# Patient Record
Sex: MALE | Race: WHITE | NOT HISPANIC OR LATINO | URBAN - METROPOLITAN AREA
[De-identification: names, ages, dates, MRNs, and addresses within clinical notes are randomized per-mention and may not be internally consistent; named-entity substitution may affect disease eponyms.]

---

## 2018-04-07 ENCOUNTER — INPATIENT (INPATIENT)
Facility: HOSPITAL | Age: 63
LOS: 9 days | Discharge: HOME | DRG: 234 | End: 2018-04-17
Attending: THORACIC SURGERY (CARDIOTHORACIC VASCULAR SURGERY) | Admitting: STUDENT IN AN ORGANIZED HEALTH CARE EDUCATION/TRAINING PROGRAM
Payer: COMMERCIAL

## 2018-04-07 VITALS
TEMPERATURE: 98 F | SYSTOLIC BLOOD PRESSURE: 181 MMHG | WEIGHT: 268.08 LBS | HEART RATE: 83 BPM | OXYGEN SATURATION: 100 % | RESPIRATION RATE: 18 BRPM | HEIGHT: 71 IN | DIASTOLIC BLOOD PRESSURE: 94 MMHG

## 2018-04-07 DIAGNOSIS — Z98.890 OTHER SPECIFIED POSTPROCEDURAL STATES: Chronic | ICD-10-CM

## 2018-04-07 DIAGNOSIS — R07.9 CHEST PAIN, UNSPECIFIED: ICD-10-CM

## 2018-04-07 LAB
ALBUMIN SERPL ELPH-MCNC: 3.8 G/DL — SIGNIFICANT CHANGE UP (ref 3.3–5)
ALP SERPL-CCNC: 66 U/L — SIGNIFICANT CHANGE UP (ref 40–120)
ALT FLD-CCNC: 49 U/L RC — HIGH (ref 10–45)
ANION GAP SERPL CALC-SCNC: 14 MMOL/L — SIGNIFICANT CHANGE UP (ref 5–17)
APTT BLD: 26.2 SEC — LOW (ref 27.5–37.4)
AST SERPL-CCNC: 20 U/L — SIGNIFICANT CHANGE UP (ref 10–40)
BASOPHILS # BLD AUTO: 0.1 K/UL — SIGNIFICANT CHANGE UP (ref 0–0.2)
BASOPHILS NFR BLD AUTO: 0.6 % — SIGNIFICANT CHANGE UP (ref 0–2)
BILIRUB SERPL-MCNC: 1 MG/DL — SIGNIFICANT CHANGE UP (ref 0.2–1.2)
BUN SERPL-MCNC: 14 MG/DL — SIGNIFICANT CHANGE UP (ref 7–23)
CALCIUM SERPL-MCNC: 9.2 MG/DL — SIGNIFICANT CHANGE UP (ref 8.4–10.5)
CHLORIDE SERPL-SCNC: 95 MMOL/L — LOW (ref 96–108)
CO2 SERPL-SCNC: 27 MMOL/L — SIGNIFICANT CHANGE UP (ref 22–31)
CREAT SERPL-MCNC: 0.82 MG/DL — SIGNIFICANT CHANGE UP (ref 0.5–1.3)
EOSINOPHIL # BLD AUTO: 0.1 K/UL — SIGNIFICANT CHANGE UP (ref 0–0.5)
EOSINOPHIL NFR BLD AUTO: 0.7 % — SIGNIFICANT CHANGE UP (ref 0–6)
GLUCOSE SERPL-MCNC: 282 MG/DL — HIGH (ref 70–99)
HCT VFR BLD CALC: 38.4 % — LOW (ref 39–50)
HGB BLD-MCNC: 12.8 G/DL — LOW (ref 13–17)
INR BLD: 1.37 RATIO — HIGH (ref 0.88–1.16)
LYMPHOCYTES # BLD AUTO: 18.3 % — SIGNIFICANT CHANGE UP (ref 13–44)
LYMPHOCYTES # BLD AUTO: 2.5 K/UL — SIGNIFICANT CHANGE UP (ref 1–3.3)
MCHC RBC-ENTMCNC: 31.1 PG — SIGNIFICANT CHANGE UP (ref 27–34)
MCHC RBC-ENTMCNC: 33.4 GM/DL — SIGNIFICANT CHANGE UP (ref 32–36)
MCV RBC AUTO: 93 FL — SIGNIFICANT CHANGE UP (ref 80–100)
MONOCYTES # BLD AUTO: 0.6 K/UL — SIGNIFICANT CHANGE UP (ref 0–0.9)
MONOCYTES NFR BLD AUTO: 4.1 % — SIGNIFICANT CHANGE UP (ref 2–14)
NEUTROPHILS # BLD AUTO: 10.4 K/UL — HIGH (ref 1.8–7.4)
NEUTROPHILS NFR BLD AUTO: 76.4 % — SIGNIFICANT CHANGE UP (ref 43–77)
PLATELET # BLD AUTO: 253 K/UL — SIGNIFICANT CHANGE UP (ref 150–400)
POTASSIUM SERPL-MCNC: 4.4 MMOL/L — SIGNIFICANT CHANGE UP (ref 3.5–5.3)
POTASSIUM SERPL-SCNC: 4.4 MMOL/L — SIGNIFICANT CHANGE UP (ref 3.5–5.3)
PROT SERPL-MCNC: 7.6 G/DL — SIGNIFICANT CHANGE UP (ref 6–8.3)
PROTHROM AB SERPL-ACNC: 14.9 SEC — HIGH (ref 9.8–12.7)
RBC # BLD: 4.12 M/UL — LOW (ref 4.2–5.8)
RBC # FLD: 12.4 % — SIGNIFICANT CHANGE UP (ref 10.3–14.5)
SODIUM SERPL-SCNC: 136 MMOL/L — SIGNIFICANT CHANGE UP (ref 135–145)
TROPONIN T SERPL-MCNC: 1.05 NG/ML — HIGH (ref 0–0.06)
TROPONIN T SERPL-MCNC: 1.24 NG/ML — HIGH (ref 0–0.06)
WBC # BLD: 13.6 K/UL — HIGH (ref 3.8–10.5)
WBC # FLD AUTO: 13.6 K/UL — HIGH (ref 3.8–10.5)

## 2018-04-07 PROCEDURE — 99285 EMERGENCY DEPT VISIT HI MDM: CPT

## 2018-04-07 PROCEDURE — 99223 1ST HOSP IP/OBS HIGH 75: CPT

## 2018-04-07 PROCEDURE — 71046 X-RAY EXAM CHEST 2 VIEWS: CPT | Mod: 26

## 2018-04-07 PROCEDURE — 99223 1ST HOSP IP/OBS HIGH 75: CPT | Mod: AI

## 2018-04-07 RX ORDER — METOPROLOL TARTRATE 50 MG
12.5 TABLET ORAL EVERY 12 HOURS
Qty: 0 | Refills: 0 | Status: DISCONTINUED | OUTPATIENT
Start: 2018-04-07 | End: 2018-04-11

## 2018-04-07 RX ORDER — ATORVASTATIN CALCIUM 80 MG/1
80 TABLET, FILM COATED ORAL AT BEDTIME
Qty: 0 | Refills: 0 | Status: DISCONTINUED | OUTPATIENT
Start: 2018-04-07 | End: 2018-04-11

## 2018-04-07 RX ORDER — DEXTROSE 50 % IN WATER 50 %
12.5 SYRINGE (ML) INTRAVENOUS ONCE
Qty: 0 | Refills: 0 | Status: DISCONTINUED | OUTPATIENT
Start: 2018-04-07 | End: 2018-04-11

## 2018-04-07 RX ORDER — SODIUM CHLORIDE 9 MG/ML
3 INJECTION INTRAMUSCULAR; INTRAVENOUS; SUBCUTANEOUS ONCE
Qty: 0 | Refills: 0 | Status: COMPLETED | OUTPATIENT
Start: 2018-04-07 | End: 2018-04-07

## 2018-04-07 RX ORDER — CLOPIDOGREL BISULFATE 75 MG/1
75 TABLET, FILM COATED ORAL DAILY
Qty: 0 | Refills: 0 | Status: DISCONTINUED | OUTPATIENT
Start: 2018-04-07 | End: 2018-04-08

## 2018-04-07 RX ORDER — GLUCAGON INJECTION, SOLUTION 0.5 MG/.1ML
1 INJECTION, SOLUTION SUBCUTANEOUS ONCE
Qty: 0 | Refills: 0 | Status: DISCONTINUED | OUTPATIENT
Start: 2018-04-07 | End: 2018-04-11

## 2018-04-07 RX ORDER — DEXTROSE 50 % IN WATER 50 %
1 SYRINGE (ML) INTRAVENOUS ONCE
Qty: 0 | Refills: 0 | Status: DISCONTINUED | OUTPATIENT
Start: 2018-04-07 | End: 2018-04-11

## 2018-04-07 RX ORDER — CLOPIDOGREL BISULFATE 75 MG/1
300 TABLET, FILM COATED ORAL ONCE
Qty: 0 | Refills: 0 | Status: COMPLETED | OUTPATIENT
Start: 2018-04-07 | End: 2018-04-07

## 2018-04-07 RX ORDER — HEPARIN SODIUM 5000 [USP'U]/ML
6000 INJECTION INTRAVENOUS; SUBCUTANEOUS EVERY 6 HOURS
Qty: 0 | Refills: 0 | Status: DISCONTINUED | OUTPATIENT
Start: 2018-04-07 | End: 2018-04-09

## 2018-04-07 RX ORDER — HEPARIN SODIUM 5000 [USP'U]/ML
5000 INJECTION INTRAVENOUS; SUBCUTANEOUS ONCE
Qty: 0 | Refills: 0 | Status: COMPLETED | OUTPATIENT
Start: 2018-04-07 | End: 2018-04-07

## 2018-04-07 RX ORDER — SODIUM CHLORIDE 9 MG/ML
1000 INJECTION, SOLUTION INTRAVENOUS
Qty: 0 | Refills: 0 | Status: DISCONTINUED | OUTPATIENT
Start: 2018-04-07 | End: 2018-04-11

## 2018-04-07 RX ORDER — DEXTROSE 50 % IN WATER 50 %
25 SYRINGE (ML) INTRAVENOUS ONCE
Qty: 0 | Refills: 0 | Status: DISCONTINUED | OUTPATIENT
Start: 2018-04-07 | End: 2018-04-11

## 2018-04-07 RX ORDER — ISOSORBIDE MONONITRATE 60 MG/1
30 TABLET, EXTENDED RELEASE ORAL DAILY
Qty: 0 | Refills: 0 | Status: DISCONTINUED | OUTPATIENT
Start: 2018-04-07 | End: 2018-04-09

## 2018-04-07 RX ORDER — ASPIRIN/CALCIUM CARB/MAGNESIUM 324 MG
81 TABLET ORAL DAILY
Qty: 0 | Refills: 0 | Status: DISCONTINUED | OUTPATIENT
Start: 2018-04-07 | End: 2018-04-11

## 2018-04-07 RX ORDER — ASPIRIN/CALCIUM CARB/MAGNESIUM 324 MG
324 TABLET ORAL ONCE
Qty: 0 | Refills: 0 | Status: COMPLETED | OUTPATIENT
Start: 2018-04-07 | End: 2018-04-07

## 2018-04-07 RX ORDER — HEPARIN SODIUM 5000 [USP'U]/ML
INJECTION INTRAVENOUS; SUBCUTANEOUS
Qty: 25000 | Refills: 0 | Status: DISCONTINUED | OUTPATIENT
Start: 2018-04-07 | End: 2018-04-08

## 2018-04-07 RX ORDER — INSULIN LISPRO 100/ML
VIAL (ML) SUBCUTANEOUS
Qty: 0 | Refills: 0 | Status: DISCONTINUED | OUTPATIENT
Start: 2018-04-07 | End: 2018-04-11

## 2018-04-07 RX ADMIN — ATORVASTATIN CALCIUM 80 MILLIGRAM(S): 80 TABLET, FILM COATED ORAL at 23:27

## 2018-04-07 RX ADMIN — HEPARIN SODIUM 5000 UNIT(S): 5000 INJECTION INTRAVENOUS; SUBCUTANEOUS at 20:29

## 2018-04-07 RX ADMIN — CLOPIDOGREL BISULFATE 300 MILLIGRAM(S): 75 TABLET, FILM COATED ORAL at 20:31

## 2018-04-07 RX ADMIN — HEPARIN SODIUM 1000 UNIT(S)/HR: 5000 INJECTION INTRAVENOUS; SUBCUTANEOUS at 20:30

## 2018-04-07 RX ADMIN — Medication 12.5 MILLIGRAM(S): at 23:27

## 2018-04-07 RX ADMIN — SODIUM CHLORIDE 3 MILLILITER(S): 9 INJECTION INTRAMUSCULAR; INTRAVENOUS; SUBCUTANEOUS at 18:41

## 2018-04-07 RX ADMIN — Medication 324 MILLIGRAM(S): at 18:43

## 2018-04-07 NOTE — H&P ADULT - HISTORY OF PRESENT ILLNESS
62 m with h/o DM and htn though pt states d/mervin meds 1 yr ago 2nd controlled numbers with weight loss (+ h/o retinopathy + laser rx and neuropathy of toes) now a/w 2nd episode of chest tightness and dyspnea. 62 m with h/o DM and htn though pt states d/mervin meds 1 yr ago 2nd controlled numbers with weight loss (+ h/o retinopathy + laser rx and neuropathy of toes) now a/w 2nd episode of chest tightness and dyspnea.  Pt is very active, does martial arts.  Saw PMD tuesday with uri symptoms (cough) and given prednisone/antibiotics.  Had chest tightness and dyspnea once and resolved (in the setting of exertion/dust) but occurred today at 2pm.  Noted diaphoresis once at night unrelated to cp/sob.  No n/v.  Chronic constipation with mild RLQ pain relieved with BM.      In ED elevated BPs, hr 90s.  EKG changes  +Q waves II, III, AVF, fixed 1mm STD inferior leads, Q-waves V4-V6 ( new from 11/27/2017 EKG).  + troponin.  Started heparin drip and loaded with aspirin and plavix.  Pt asymptomatic in ED.

## 2018-04-07 NOTE — ED PROVIDER NOTE - PROGRESS NOTE DETAILS
NSTEMI, [facility] and cards called Dennys PGY3: positive trop and started on heparin ACS and plavix plus aspirin. [facility] and obtain the number of the Cardiologist when hospitalist calls me back and consult [facility] Card[facility]. Dennys PGY3: 2x attempts to get in touch with [facility] for admission/cards consult Dennys PGY3: no call back from Dr. Sellers. Patient requires urgent consult from cardiolgy. Consulted Dr. Zapien in house attending cardiologist to see patient and make recommendations.

## 2018-04-07 NOTE — ED ADULT NURSE NOTE - OBJECTIVE STATEMENT
62y male c/o dyspnea. A&Ox3 and VSS. Denies medical hx. 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 Patient states episode of chest pain and sob during construction. Went to Urgent Care who prescribed steroid and Z-pack. Nanda PALACIOS at Urgent Care reported EKG abnormalities and recommend patient go to ED. Upon arrival to ED, patient denies chest pain and sob. Reports intermittent fever/chills since Tuesday and one additional epiosode of chest pain. 62y male c/o dyspnea. A&Ox3 and VSS. Denies medical hx. 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 Patient states episode of chest pain and sob during construction. Went to Urgent Care who prescribed steroid and Z-pack. Nanda PALACIOS at Urgent Care reported EKG abnormalities and recommend patient go to ED. Upon arrival to ED, patient denies chest pain and sob. States additional episode of midsternal chest pain lasting 2-3 min in duration today accompanied by nonexertional sob. Reports intermittent fever/chills and nonproductive cough since Tuesday. Denies n/v/d. Lungs clear. Cardiac monitor in place. Family at bedside.

## 2018-04-07 NOTE — H&P ADULT - NSHPLABSRESULTS_GEN_ALL_CORE
labs/cxr/ekg reviewed personally by me  ekg as above  cxr ? pulm edema  wbc 13.6, plt 253, hb 12.8, inr 1.37  alt 49  bun 14, creat 0.82, gluc 282  trop 1.24  bnp 6770

## 2018-04-07 NOTE — ED PROVIDER NOTE - MEDICAL DECISION MAKING DETAILS
Hx obesity, prior neg stress test 6 yrs ago, p/w CP and SOB, brief episode, now resolved, ECG w/ new wave inversions in inferior leads. Hx obesity, prior neg stress test 6 yrs ago, p/w CP and SOB, brief episode, now resolved, ECG w/ new wave inversions in inferior leads.      Attn - pt with subtle EKG changes inferior leads - CE, EKG, CXR  r/o ACS

## 2018-04-07 NOTE — H&P ADULT - PROBLEM SELECTOR PLAN 2
pt with significant hyperglycemia  monitor FSs and dose ISS  ck A1C  likely will need basal/bolus regimen for glycemia control

## 2018-04-07 NOTE — H&P ADULT - PMH
Essential hypertension    Type 2 diabetes mellitus with diabetic neuropathy, without long-term current use of insulin

## 2018-04-07 NOTE — ED ADULT NURSE REASSESSMENT NOTE - NS ED NURSE REASSESS COMMENT FT1
Patient's chest xray delayed because patient requested he wanted to eat first.  ED MD aware.  Xray to be called when patient is finished.

## 2018-04-07 NOTE — ED PROVIDER NOTE - OBJECTIVE STATEMENT
63 y/o M w/ hx of  obesity p/w acute CP; had brief episode around 2pm today associated w/ cough and SOB, nonexertional, sharp, substernal, resolved after few minutes. Had similar episode 4 days ago. No cough, fever, chills, n/v/d/. Now feels well.   Cardiologist: Dr. Orr  primary care physician: [facility] 63 y/o M w/ hx of  obesity p/w acute CP; had brief episode around 2pm today associated w/ cough and SOB, nonexertional, sharp, substernal, resolved after few minutes. Had similar episode 4 days ago. No cough, fever, chills, n/v/d/. Now feels well.   Cardiologist: Dr. 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  Seen at Urgent care today and + EKG changes.  asymptomatic now.  Reports normal stress and echo approx 6 yrs ago.  No prior episodes or family hx of CAD.  Pt exercises couple of hours few times each week without cp or change in exertional capactiy.

## 2018-04-07 NOTE — H&P ADULT - PROBLEM SELECTOR PLAN 1
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 new event  trend Bob and ck tsh/a1c/lipid panel  ck TTE  for nstemi management overnight cont heparin drip and monitor PTTs to adjust dose  start metoprolol 12.5mg po bid with caution given possible inferior wall dz  start lipitor 80mg qhs and monitor liver enzymes  plan to start imdur 30mg daily in the morning to ensure bp stability overnight  tele monitoring  npo after MN for likely cath in am

## 2018-04-07 NOTE — ED ADULT TRIAGE NOTE - CHIEF COMPLAINT QUOTE
pt c/o right sided chest pain and SOB for a few days, [facility] sent pt for abnormal EKG

## 2018-04-07 NOTE — ED ADULT NURSE REASSESSMENT NOTE - NS ED NURSE REASSESS COMMENT FT1
at the bedside per patient's request.  Patient states he "is feeling hot".  No diaphoresis noted, denies CP but states some SOB.  He states "I am very nervous and scared".  Repeat EKG to be performed.  ED MD Noyola aware.  Daughter at the bedside.  at the bedside per patient's request.  Patient states he "is feeling hot".  No diaphoresis noted, denies CP but states some SOB.  He states "I am very nervous and scared".  Repeat EKG to be performed.  ED MD Noyola aware.  Daughter at the bedside.  Heparin infusion in progress as ordered.

## 2018-04-07 NOTE — CONSULT NOTE ADULT - SUBJECTIVE AND OBJECTIVE BOX
Cardiology Attending Consult Note      CHIEF COMPLAINT/REASON FOR CONSULT: NSTEMI, CP  Date of Admission: 04-07-18    HISTORY OF PRESENT ILLNESS:    62y.o. Male with DM, HTN, Active smoker (Cigars 4-5x/week x 2-3 years), now admitted after presenting to urgent care with cough/dyspnea, found to have abnormal EKG, subsequently sent to ED where he was found to have elevated cardiac enzymes c/w NSTEMI. He reports that he was doing well until earlier this week, went to urgent care on tuesday with cough/fatigue and was started on antibiotics, returned to urgent care after no improvement and sent to the ED. Here his EKG demonstrates NSR@81, +Q waves II, III, AVF, fixed 1mm STD inferior leads, Q-waves V4-V6 (Q-waves in V4-V6 appear new when compared to prior EKG 11/27/2017). Initial Troponin 1.24, BNP 6770. He reports mild chest tightness 1/10, but has mostly been chest pain free over the last several days. No HA. No diziness. No N/V/D/C.     ET: Active in [employer] 3-4x/week    ALLERGIES:  allergy to INSECT VENOM (Hives)  penicillin (Rash)    Home Medications:  None    MEDICATIONS:  heparin  Infusion.  Unit(s)/Hr IV Continuous <Continuous>    PAST MEDICAL & SURGICAL HISTORY:      FAMILY HISTORY:  No significant hx of MI/CVA    SOCIAL HISTORY:    Active cigar smoker, no ETOH, no IVDU  -Works in construction    REVIEW OF SYSTEMS:    CONSTITUTIONAL: No weakness, fevers or chills  EYES/ENT: No visual changes;  No vertigo or throat pain   NECK: No pain or stiffness  RESPIRATORY: No cough, wheezing, hemoptysis; No shortness of breath  CARDIOVASCULAR: +chest pain or palpitations  GASTROINTESTINAL: No abdominal or epigastric pain. No nausea, vomiting, or hematemesis; No diarrhea or constipation. No melena or hematochezia.  GENITOURINARY: No dysuria, frequency or hematuria  NEUROLOGICAL: No numbness or weakness  SKIN: No itching, burning, rashes, or lesions   All other review of systems is negative unless indicated above.    PHYSICAL EXAM:  T(C): 37 (04-07-18 @ 18:16), Max: 37 (04-07-18 @ 18:16)  HR: 92 (04-07-18 @ 20:40) (82 - 92)  BP: 174/123 (04-07-18 @ 20:40) (147/97 - 181/94)  RR: 18 (04-07-18 @ 20:40) (18 - 18)  SpO2: 96% (04-07-18 @ 20:40) (94% - 100%)  Wt(kg): --    Drug Dosing Weight  Height (cm): 180.34 (07 Apr 2018 17:48)  Weight (kg): 121.6 (07 Apr 2018 17:48)  BMI (kg/m2): 37.4 (07 Apr 2018 17:48)  BSA (m2): 2.39 (07 Apr 2018 17:48)    I&O's Summary      Appearance: Normal	  HEENT:   Normal oral mucosa, PERRL, EOMI	  Lymphatic: No lymphadenopathy  Cardiovascular: Normal S1 S2, No JVD, No murmurs  Respiratory: Lungs clear to auscultation	  Psychiatry: A & O x 3, Mood & affect appropriate  Gastrointestinal:  Soft, Non-tender, + BS	  Skin: No rashes, No ecchymoses, No cyanosis	  Neurologic: Non-focal  Extremities: Normal range of motion, No clubbing, cyanosis or edema  Vascular: Peripheral pulses palpable 2+ bilaterally    LABS:	 	    CBC Full  -  ( 07 Apr 2018 18:46 )  WBC Count : 13.6 K/uL  Hemoglobin : 12.8 g/dL  Hematocrit : 38.4 %  Platelet Count - Automated : 253 K/uL  Mean Cell Volume : 93.0 fl  Mean Cell Hemoglobin : 31.1 pg  Mean Cell Hemoglobin Concentration : 33.4 gm/dL  Auto Neutrophil # : 10.4 K/uL  Auto Lymphocyte # : 2.5 K/uL  Auto Monocyte # : 0.6 K/uL  Auto Eosinophil # : 0.1 K/uL  Auto Basophil # : 0.1 K/uL  Auto Neutrophil % : 76.4 %  Auto Lymphocyte % : 18.3 %  Auto Monocyte % : 4.1 %  Auto Eosinophil % : 0.7 %  Auto Basophil % : 0.6 %    04-07    136  |  95<L>  |  14  ----------------------------<  282<H>  4.4   |  27  |  0.82    Ca    9.2      07 Apr 2018 18:46    TPro  7.6  /  Alb  3.8  /  TBili  1.0  /  DBili  x   /  AST  20  /  ALT  49<H>  /  AlkPhos  66  04-07    PT/INR - ( 07 Apr 2018 18:46 )   PT: 14.9 sec;   INR: 1.37 ratio         PTT - ( 07 Apr 2018 18:46 )  PTT:26.2 sec  LIVER FUNCTIONS - ( 07 Apr 2018 18:46 )  Alb: 3.8 g/dL / Pro: 7.6 g/dL / ALK PHOS: 66 U/L / ALT: 49 U/L RC / AST: 20 U/L / GGT: x             EKG:  NSR@81, +Q waves II, III, AVF, fixed 1mm STD inferior leads, Q-waves V4-V6 (Q-waves in V4-V6 appear new when compared to prior EKG 11/27/2017).    Telemetry: NSR    CXR: Clear lugns BL    TTE: None      A/P: 62y.o. Male with DM, HTN, Active smoker (Cigars 4-5x/week x 2-3 years), now admitted with NSTEMI, EKG with new Q-waves in V4-V6 when compared to prior EKG 11/27/2017, Initial Troponin 1.24, BNP 6770.     1. NSTEMI - Suspect this patient has a history of CAD with old infarcts given EKG with old Q-Waves in the inferior leads. Now with new Q-waves in V4-V6 suggestive of recent or subacute event, now with elevated cardiac enzymes. EKG pattern seems to suggestive of inferior + anterolateral ischemia. Given risk factors including DM, HTN, active smoking and elevated cardiac enzymes, will treat for NSTEMI and plan for LHC.  -Cont start Heparin GTT  -Serial EKG if progression in chest pain or pain changes in quality  -Serial Cardiac enzymes (Troponin/CKMB x 2 sets or until downtrending)  -Cont ASA 81 mg po QD  -s/p Plavix load in ED. Cont Plavix 75 gm po QD.  -Start Atorvastatin 80 mg po first dose now, then po QHS  -Start Metoprolol 12.5 mg po first dose now, then po BID  -Monitor on Telemetry  -Please order routine TTE  -Please order Hemoglobin A1c, Lipid Panel, TSH  -Please keep NPO after midnight for possible LHC in AM vs Monday pending repeat cardiac enzymes. Will discuss with interventional team    2. HTN - Hypertensive urgency in the setting of NSTEMI vs hypertensive heart syndrome  -Start Imdur 30 mg po QD, first dose now.  -Start low dose metoprolol as noted above.    Thank you for this interesting consult. My team will continue to follow along with you.      Curry Mills MD  Cardiology Attending  [facility]   Cell: [phone]  (Cardiology Nocturnist cell number available 7 pm - 7 am every night; available daytime week days for follow-up only; daytime weekends covered by general cardiology consult service)

## 2018-04-07 NOTE — H&P ADULT - PROBLEM SELECTOR PLAN 4
? BERNAL given risk factors  ck acute hep serologies  repeat hepatic panel in am, consider rUQ US if remain elevated

## 2018-04-07 NOTE — H&P ADULT - NSHPSOCIALHISTORY_GEN_ALL_CORE
father  at 89 of MI  lives with wife  works in finance at [employer]  intermittent cigar use,  remote history of  binge drinking (up to 15 beers at a timg) but no longer, no drug use

## 2018-04-07 NOTE — ED PROVIDER NOTE - PHYSICAL EXAMINATION
Gen: NAD  Eyes:  sclerae white, no icterus  ENT: Moist mucous membranes. No exudates  Neck: supple, no LAD, mass or goiter, trachea midline  CV: RRR. Audible S1 and S2. No murmurs, rubs, gallops, S3, nor S4  Pulm: Clear to auscultation bilaterally. No wheezes, rales, or rhonchi  Abd: BS+, nondistended, No tenderness to palpation  Musculoskeletal:  No edema  Skin: no lesions or scars noted  Psych: mood good, affect full range and congruent with mood.  Neurologic: AAOx3 Gen: NAD  Eyes:  sclerae white, no icterus  ENT: Moist mucous membranes. No exudates  Neck: supple, no LAD, mass or goiter, trachea midline  CV: RRR. Audible S1 and S2. No murmurs, rubs, gallops, S3, nor S4  Pulm: Clear to auscultation bilaterally. No wheezes, rales, or rhonchi  Abd: BS+, nondistended, No tenderness to palpation  Musculoskeletal:  No edema  Skin: no lesions or scars noted  Psych: mood good, affect full range and congruent with mood.  Neurologic: AAOx3      Attn - alert, nad, skin dry, no pallor, moist mm, lungs-, cor-, abdo - obese, soft, NT, ND, on CVAT, ext - no edema, neuro -

## 2018-04-07 NOTE — ED ADULT NURSE NOTE - CHIEF COMPLAINT QUOTE
pt c/o right sided chest pain and SOB for a few days, [facility] sent pt for abnormal EKG

## 2018-04-08 DIAGNOSIS — I10 ESSENTIAL (PRIMARY) HYPERTENSION: ICD-10-CM

## 2018-04-08 DIAGNOSIS — R74.0 NONSPECIFIC ELEVATION OF LEVELS OF TRANSAMINASE AND LACTIC ACID DEHYDROGENASE [LDH]: ICD-10-CM

## 2018-04-08 DIAGNOSIS — E11.40 TYPE 2 DIABETES MELLITUS WITH DIABETIC NEUROPATHY, UNSPECIFIED: ICD-10-CM

## 2018-04-08 DIAGNOSIS — I21.4 NON-ST ELEVATION (NSTEMI) MYOCARDIAL INFARCTION: ICD-10-CM

## 2018-04-08 LAB
ALBUMIN SERPL ELPH-MCNC: 3.8 G/DL — SIGNIFICANT CHANGE UP (ref 3.3–5)
ALP SERPL-CCNC: 66 U/L — SIGNIFICANT CHANGE UP (ref 40–120)
ALT FLD-CCNC: 33 U/L RC — SIGNIFICANT CHANGE UP (ref 10–45)
ANION GAP SERPL CALC-SCNC: 14 MMOL/L — SIGNIFICANT CHANGE UP (ref 5–17)
APTT BLD: 26 SEC — LOW (ref 27.5–37.4)
APTT BLD: 27.4 SEC — LOW (ref 27.5–37.4)
APTT BLD: 28 SEC — SIGNIFICANT CHANGE UP (ref 27.5–37.4)
APTT BLD: 28.5 SEC — SIGNIFICANT CHANGE UP (ref 27.5–37.4)
AST SERPL-CCNC: 12 U/L — SIGNIFICANT CHANGE UP (ref 10–40)
BILIRUB DIRECT SERPL-MCNC: 0.2 MG/DL — SIGNIFICANT CHANGE UP (ref 0–0.2)
BILIRUB INDIRECT FLD-MCNC: 0.9 MG/DL — SIGNIFICANT CHANGE UP (ref 0.2–1)
BILIRUB SERPL-MCNC: 1.1 MG/DL — SIGNIFICANT CHANGE UP (ref 0.2–1.2)
BUN SERPL-MCNC: 19 MG/DL — SIGNIFICANT CHANGE UP (ref 7–23)
CALCIUM SERPL-MCNC: 9.4 MG/DL — SIGNIFICANT CHANGE UP (ref 8.4–10.5)
CHLORIDE SERPL-SCNC: 97 MMOL/L — SIGNIFICANT CHANGE UP (ref 96–108)
CHOLEST SERPL-MCNC: 146 MG/DL — SIGNIFICANT CHANGE UP (ref 10–199)
CK MB CFR SERPL CALC: 3.4 NG/ML — SIGNIFICANT CHANGE UP (ref 0–6.7)
CK MB CFR SERPL CALC: 3.7 NG/ML — SIGNIFICANT CHANGE UP (ref 0–6.7)
CK SERPL-CCNC: 74 U/L — SIGNIFICANT CHANGE UP (ref 30–200)
CK SERPL-CCNC: 79 U/L — SIGNIFICANT CHANGE UP (ref 30–200)
CO2 SERPL-SCNC: 28 MMOL/L — SIGNIFICANT CHANGE UP (ref 22–31)
CREAT SERPL-MCNC: 0.88 MG/DL — SIGNIFICANT CHANGE UP (ref 0.5–1.3)
GLUCOSE SERPL-MCNC: 186 MG/DL — HIGH (ref 70–99)
HAV IGM SER-ACNC: SIGNIFICANT CHANGE UP
HBA1C BLD-MCNC: 7.9 % — HIGH (ref 4–5.6)
HBV CORE IGM SER-ACNC: SIGNIFICANT CHANGE UP
HBV SURFACE AG SER-ACNC: SIGNIFICANT CHANGE UP
HCT VFR BLD CALC: 36.9 % — LOW (ref 39–50)
HCT VFR BLD CALC: 37.2 % — LOW (ref 39–50)
HCV AB S/CO SERPL IA: 0.12 S/CO — SIGNIFICANT CHANGE UP
HCV AB SERPL-IMP: SIGNIFICANT CHANGE UP
HDLC SERPL-MCNC: 21 MG/DL — LOW (ref 40–125)
HGB BLD-MCNC: 12.6 G/DL — LOW (ref 13–17)
HGB BLD-MCNC: 13.1 G/DL — SIGNIFICANT CHANGE UP (ref 13–17)
LIPID PNL WITH DIRECT LDL SERPL: 91 MG/DL — SIGNIFICANT CHANGE UP
MAGNESIUM SERPL-MCNC: 1.9 MG/DL — SIGNIFICANT CHANGE UP (ref 1.6–2.6)
MCHC RBC-ENTMCNC: 31.9 PG — SIGNIFICANT CHANGE UP (ref 27–34)
MCHC RBC-ENTMCNC: 33 PG — SIGNIFICANT CHANGE UP (ref 27–34)
MCHC RBC-ENTMCNC: 34.2 GM/DL — SIGNIFICANT CHANGE UP (ref 32–36)
MCHC RBC-ENTMCNC: 35.3 GM/DL — SIGNIFICANT CHANGE UP (ref 32–36)
MCV RBC AUTO: 93.3 FL — SIGNIFICANT CHANGE UP (ref 80–100)
MCV RBC AUTO: 93.4 FL — SIGNIFICANT CHANGE UP (ref 80–100)
PHOSPHATE SERPL-MCNC: 3.3 MG/DL — SIGNIFICANT CHANGE UP (ref 2.5–4.5)
PLATELET # BLD AUTO: 267 K/UL — SIGNIFICANT CHANGE UP (ref 150–400)
PLATELET # BLD AUTO: 319 K/UL — SIGNIFICANT CHANGE UP (ref 150–400)
POTASSIUM SERPL-MCNC: 4.1 MMOL/L — SIGNIFICANT CHANGE UP (ref 3.5–5.3)
POTASSIUM SERPL-SCNC: 4.1 MMOL/L — SIGNIFICANT CHANGE UP (ref 3.5–5.3)
PROT SERPL-MCNC: 7.3 G/DL — SIGNIFICANT CHANGE UP (ref 6–8.3)
RBC # BLD: 3.96 M/UL — LOW (ref 4.2–5.8)
RBC # BLD: 3.98 M/UL — LOW (ref 4.2–5.8)
RBC # FLD: 12.5 % — SIGNIFICANT CHANGE UP (ref 10.3–14.5)
RBC # FLD: 12.5 % — SIGNIFICANT CHANGE UP (ref 10.3–14.5)
SODIUM SERPL-SCNC: 139 MMOL/L — SIGNIFICANT CHANGE UP (ref 135–145)
TOTAL CHOLESTEROL/HDL RATIO MEASUREMENT: 7 RATIO — SIGNIFICANT CHANGE UP (ref 3.4–9.6)
TRIGL SERPL-MCNC: 169 MG/DL — HIGH (ref 10–149)
TROPONIN T SERPL-MCNC: 0.97 NG/ML — HIGH (ref 0–0.06)
TROPONIN T SERPL-MCNC: 1.51 NG/ML — HIGH (ref 0–0.06)
TSH SERPL-MCNC: 2.09 UIU/ML — SIGNIFICANT CHANGE UP (ref 0.27–4.2)
WBC # BLD: 13.9 K/UL — HIGH (ref 3.8–10.5)
WBC # BLD: 18 K/UL — HIGH (ref 3.8–10.5)
WBC # FLD AUTO: 13.9 K/UL — HIGH (ref 3.8–10.5)
WBC # FLD AUTO: 18 K/UL — HIGH (ref 3.8–10.5)

## 2018-04-08 PROCEDURE — 70450 CT HEAD/BRAIN W/O DYE: CPT | Mod: 26

## 2018-04-08 RX ORDER — HEPARIN SODIUM 5000 [USP'U]/ML
INJECTION INTRAVENOUS; SUBCUTANEOUS
Qty: 25000 | Refills: 0 | Status: DISCONTINUED | OUTPATIENT
Start: 2018-04-08 | End: 2018-04-08

## 2018-04-08 RX ORDER — HEPARIN SODIUM 5000 [USP'U]/ML
1300 INJECTION INTRAVENOUS; SUBCUTANEOUS
Qty: 25000 | Refills: 0 | Status: DISCONTINUED | OUTPATIENT
Start: 2018-04-08 | End: 2018-04-09

## 2018-04-08 RX ADMIN — Medication 12.5 MILLIGRAM(S): at 12:10

## 2018-04-08 RX ADMIN — ATORVASTATIN CALCIUM 80 MILLIGRAM(S): 80 TABLET, FILM COATED ORAL at 21:53

## 2018-04-08 RX ADMIN — HEPARIN SODIUM 1600 UNIT(S)/HR: 5000 INJECTION INTRAVENOUS; SUBCUTANEOUS at 14:46

## 2018-04-08 RX ADMIN — Medication 3: at 09:31

## 2018-04-08 RX ADMIN — HEPARIN SODIUM 1900 UNIT(S)/HR: 5000 INJECTION INTRAVENOUS; SUBCUTANEOUS at 21:53

## 2018-04-08 RX ADMIN — HEPARIN SODIUM 1300 UNIT(S)/HR: 5000 INJECTION INTRAVENOUS; SUBCUTANEOUS at 07:20

## 2018-04-08 RX ADMIN — HEPARIN SODIUM 6000 UNIT(S): 5000 INJECTION INTRAVENOUS; SUBCUTANEOUS at 21:58

## 2018-04-08 RX ADMIN — Medication 1: at 12:53

## 2018-04-08 RX ADMIN — ISOSORBIDE MONONITRATE 30 MILLIGRAM(S): 60 TABLET, EXTENDED RELEASE ORAL at 12:10

## 2018-04-08 RX ADMIN — HEPARIN SODIUM 6000 UNIT(S): 5000 INJECTION INTRAVENOUS; SUBCUTANEOUS at 14:46

## 2018-04-08 RX ADMIN — Medication 81 MILLIGRAM(S): at 12:10

## 2018-04-08 RX ADMIN — HEPARIN SODIUM 6000 UNIT(S): 5000 INJECTION INTRAVENOUS; SUBCUTANEOUS at 07:41

## 2018-04-08 RX ADMIN — Medication 3: at 17:56

## 2018-04-08 RX ADMIN — Medication 12.5 MILLIGRAM(S): at 17:56

## 2018-04-08 NOTE — PROGRESS NOTE ADULT - ASSESSMENT
62 m with h/o DM and htn though pt states d/mervin meds 1 yr ago 2nd controlled numbers with weight loss (+ h/o retinopathy + laser rx and neuropathy of toes) now a/w 2nd episode of chest tightness and dyspnea, admitted for NSTEMI.

## 2018-04-08 NOTE — CHART NOTE - NSCHARTNOTEFT_GEN_A_CORE
JASE KENNEDY  62y Male  Patient is a 62y old  Male who presents with a chief complaint of complain of CP (08 Apr 2018 12:56)    Event Summary:  Pt with serial CE Q6 hours, noted with Trop #4 (1.51) uptrending from CE x 3 (Trop #1-1.24, Trop #2-1.05, Trop # 3-0.97). Pt currently subtherapeutic on Hep gtt for NSTEMI, aptt 28.5, RN increased rate by 3 units as per protocol. Pt currently chest pain free, with no other complaints at this time. Pt remains hemodynamically stable.  -Discuss Trop #4 with Dr. Sellers-Mary, recommends c/w Hep gtt and plan to cath tomorrow in AM, no urgent cath tonight.  -Attending, Dr. Locke, aware of Trop & of Mary recommendations, will continue to medically manage for now.      [facility]athaniel [facility]  #[phone]

## 2018-04-08 NOTE — PROGRESS NOTE ADULT - SUBJECTIVE AND OBJECTIVE BOX
Patient is a 62y old  Male who presents with a chief complaint of cp/sob (07 Apr 2018 23:07)      SUBJECTIVE / OVERNIGHT EVENTS:    Patient seen and examined. denies cp sob. co hunger. wants to eat breakfast. explains symptoms start friday, did not feel well, heavy chest and breathing. has been doing construction on house sheetrock, heavy lifting.       Vital Signs Last 24 Hrs  T(C): 36.9 (08 Apr 2018 08:28), Max: 37.2 (08 Apr 2018 05:58)  T(F): 98.4 (08 Apr 2018 08:28), Max: 99 (08 Apr 2018 05:58)  HR: 79 (08 Apr 2018 08:28) (76 - 92)  BP: 158/109 (08 Apr 2018 08:28) (147/97 - 181/94)  BP(mean): 140 (07 Apr 2018 23:07) (140 - 140)  RR: 20 (08 Apr 2018 08:28) (18 - 20)  SpO2: 98% (08 Apr 2018 08:28) (94% - 100%)  I&O's Summary      PE:  GENERAL: NAD, AAOx3, obese  HEAD:  Atraumatic, Normocephalic  EYES: EOMI, PERRLA, conjunctiva and sclera clear  NECK: Supple, No JVD  CHEST/LUNG: CTABL, No wheeze  HEART: Regular rate and rhythm; no murmur  ABDOMEN: Soft, Nontender, Nondistended; Bowel sounds present  EXTREMITIES:  2+ Peripheral Pulses, No clubbing, cyanosis, or edema  SKIN: No rashes or lesions  NEURO: No focal deficits    LABS:                        12.6   13.9  )-----------( 267      ( 08 Apr 2018 03:55 )             36.9     04-07    136  |  95<L>  |  14  ----------------------------<  282<H>  4.4   |  27  |  0.82    Ca    9.2      07 Apr 2018 18:46    TPro  7.6  /  Alb  3.8  /  TBili  1.0  /  DBili  x   /  AST  20  /  ALT  49<H>  /  AlkPhos  66  04-07    PT/INR - ( 07 Apr 2018 18:46 )   PT: 14.9 sec;   INR: 1.37 ratio         PTT - ( 08 Apr 2018 05:54 )  PTT:27.4 sec  CAPILLARY BLOOD GLUCOSE        CARDIAC MARKERS ( 08 Apr 2018 03:55 )  x     / 0.97 ng/mL / 79 U/L / x     / 3.7 ng/mL  CARDIAC MARKERS ( 07 Apr 2018 23:08 )  x     / 1.05 ng/mL / x     / x     / x      CARDIAC MARKERS ( 07 Apr 2018 18:46 )  x     / 1.24 ng/mL / x     / x     / x              RADIOLOGY & ADDITIONAL TESTS:    Imaging Personally Reviewed:  [x] YES  [ ] NO    Consultant(s) Notes Reviewed:  [x] YES  [ ] NO    MEDICATIONS  (STANDING):  aspirin enteric coated 81 milliGRAM(s) Oral daily  atorvastatin 80 milliGRAM(s) Oral at bedtime  clopidogrel Tablet 75 milliGRAM(s) Oral daily  dextrose 5%. 1000 milliLiter(s) (50 mL/Hr) IV Continuous <Continuous>  dextrose 50% Injectable 12.5 Gram(s) IV Push once  dextrose 50% Injectable 25 Gram(s) IV Push once  dextrose 50% Injectable 25 Gram(s) IV Push once  heparin  Infusion. 1300 Unit(s)/Hr (13 mL/Hr) IV Continuous <Continuous>  insulin lispro (HumaLOG) corrective regimen sliding scale   SubCutaneous three times a day before meals  isosorbide    mononitrate Tablet (ISMO) 30 milliGRAM(s) Oral daily  metoprolol tartrate 12.5 milliGRAM(s) Oral every 12 hours    MEDICATIONS  (PRN):  dextrose Gel 1 Dose(s) Oral once PRN Blood Glucose LESS THAN 70 milliGRAM(s)/deciliter  glucagon  Injectable 1 milliGRAM(s) IntraMuscular once PRN Glucose LESS THAN 70 milligrams/deciliter  heparin  Injectable 6000 Unit(s) IV Push every 6 hours PRN For aPTT less than 40      Care Discussed with Consultants/Other Providers [x] YES  [ ] NO    HEALTH ISSUES - PROBLEM Dx:  Transaminitis: Transaminitis  Essential hypertension: Essential hypertension  Type 2 diabetes mellitus with diabetic neuropathy, without long-term current use of insulin: Type 2 diabetes mellitus with diabetic neuropathy, without long-term current use of insulin  NSTEMI (non-ST elevated myocardial infarction): NSTEMI (non-ST elevated myocardial infarction)

## 2018-04-08 NOTE — PROVIDER CONTACT NOTE (MEDICATION) - ACTION/TREATMENT ORDERED:
NHAN Darden made aware. Heparin bolus given as order. Follow [facility] ACS nomogram as order. Increase Heparin gtt to 16cc/hr. Bleeding precaution maintained.

## 2018-04-08 NOTE — CONSULT NOTE ADULT - SUBJECTIVE AND OBJECTIVE BOX
[facility] Cardiology Consult - SECOND OPINION  ____________________________________________________________    CC: Chest pain.       HPI:  62 M DM, HTN, Active smoker (Cigars 4-5x/week x 2-3 years), who presented to urgent care yesterday with dyspnea a a cough found to have abnormal EKG there - so he was referred to ED where he was found to have elevated cardiac enzymes He went to urgent care on tuesday with cough/fatigue and was started on antibiotics, returned to urgent care after no improvement yesterday now with associated mild chest tightness and was sent to the ED.  Currently CP free.      PAST MEDICAL & SURGICAL HISTORY:  Essential hypertension  Type 2 diabetes mellitus with diabetic neuropathy, without long-term current use of insulin  History of excision of pilonidal cyst  H/O inguinal hernia repair      MEDICATIONS  (STANDING):  aspirin enteric coated 81 milliGRAM(s) Oral daily  atorvastatin 80 milliGRAM(s) Oral at bedtime  clopidogrel Tablet 75 milliGRAM(s) Oral daily  dextrose 5%. 1000 milliLiter(s) (50 mL/Hr) IV Continuous <Continuous>  dextrose 50% Injectable 12.5 Gram(s) IV Push once  dextrose 50% Injectable 25 Gram(s) IV Push once  dextrose 50% Injectable 25 Gram(s) IV Push once  heparin  Infusion. 1300 Unit(s)/Hr (13 mL/Hr) IV Continuous <Continuous>  insulin lispro (HumaLOG) corrective regimen sliding scale   SubCutaneous three times a day before meals  isosorbide    mononitrate Tablet (ISMO) 30 milliGRAM(s) Oral daily  metoprolol tartrate 12.5 milliGRAM(s) Oral every 12 hours    MEDICATIONS  (PRN):  dextrose Gel 1 Dose(s) Oral once PRN Blood Glucose LESS THAN 70 milliGRAM(s)/deciliter  glucagon  Injectable 1 milliGRAM(s) IntraMuscular once PRN Glucose LESS THAN 70 milligrams/deciliter  heparin  Injectable 6000 Unit(s) IV Push every 6 hours PRN For aPTT less than 40      Allergies    allery to INSECT VENOM (Hives)  Novocain (Hives)    Intolerances        Social Histroy: Tobacco- , ETOH-, Illicit Drugs-    T(C): 36.9 (04-08-18 @ 08:28), Max: 37.2 (04-08-18 @ 05:58)  HR: 79 (04-08-18 @ 08:28) (76 - 92)  BP: 158/109 (04-08-18 @ 08:28) (147/97 - 181/94)  RR: 20 (04-08-18 @ 08:28) (18 - 20)  SpO2: 98% (04-08-18 @ 08:28) (94% - 100%)  I&O's Summary      Review of Systems:  Constitutional: [ ] Fever [ ] Chills [ ] Fatigue [ ] Weight change   HEENT: [ ] Blurred vision [ ] Eye Pain [ ] Headache [ ] Runny nose [ ] Sore Throat   Respiratory: [ x] Cough [ ] Wheezing [ ] Shortness of breath  Cardiovascular: [x ] Chest Pain [ ] Palpitations [ ] GARCÍA [ ] PND [ ] Orthopnea  Gastrointestinal: [ ] Abdominal Pain [ ] Diarrhea [ ] Constipation [ ] Hemorrhoids [ ] Nausea [ ] Vomiting  Genitourinary: [ ] Nocturia [ ] Dysuria [ ] Incontinence  Extremities: [ ] Swelling [ ] Joint Pain  Neurologic: [ ] Focal deficit [ ] Paresthesias [ ] Syncope  Lymphatic: [ ] Swelling [ ] Lymphadenopathy   Skin: [ ] Rash [ ] Ecchymoses [ ] Wounds [ ] Lesions  Psychiatry: [ ] Depression [ ] Suicidal/Homicidal Ideation [ ] Anxiety [ ] Sleep Disturbances  [x ] 10 point review of systems is otherwise negative except as mentioned above            [ ]Unable to obtain    PHYSICAL EXAM:  GENERAL: Alert, NAD  NECK: Supple, No JVD, No carotid bruit.  CHEST/LUNG: Clear to auscultation bilaterally; No wheezes, rales, or rhonchi  HEART: S1 S2 normal, RRR,  No murmurs, rubs, or gallops  ABDOMEN: Soft, Nontender, Nondistended; Bowel sounds present  EXTREMITIES:  No LE edema.      LABS:                        12.6   13.9  )-----------( 267      ( 08 Apr 2018 03:55 )             36.9     04-07    136  |  95<L>  |  14  ----------------------------<  282<H>  4.4   |  27  |  0.82    Ca    9.2      07 Apr 2018 18:46    TPro  7.6  /  Alb  3.8  /  TBili  1.0  /  DBili  x   /  AST  20  /  ALT  49<H>  /  AlkPhos  66  04-07    PT/INR - ( 07 Apr 2018 18:46 )   PT: 14.9 sec;   INR: 1.37 ratio         PTT - ( 08 Apr 2018 05:54 )  PTT:27.4 sec  CARDIAC MARKERS ( 08 Apr 2018 03:55 )  x     / 0.97 ng/mL / 79 U/L / x     / 3.7 ng/mL  CARDIAC MARKERS ( 07 Apr 2018 23:08 )  x     / 1.05 ng/mL / x     / x     / x      CARDIAC MARKERS ( 07 Apr 2018 18:46 )  x     / 1.24 ng/mL / x     / x     / x          Serum Pro-Brain Natriuretic Peptide: 6770 pg/mL (04-07-18 @ 18:46)        MEDICATIONS  (STANDING):  aspirin enteric coated 81 milliGRAM(s) Oral daily  atorvastatin 80 milliGRAM(s) Oral at bedtime  clopidogrel Tablet 75 milliGRAM(s) Oral daily  dextrose 5%. 1000 milliLiter(s) (50 mL/Hr) IV Continuous <Continuous>  dextrose 50% Injectable 12.5 Gram(s) IV Push once  dextrose 50% Injectable 25 Gram(s) IV Push once  dextrose 50% Injectable 25 Gram(s) IV Push once  heparin  Infusion. 1300 Unit(s)/Hr (13 mL/Hr) IV Continuous <Continuous>  insulin lispro (HumaLOG) corrective regimen sliding scale   SubCutaneous three times a day before meals  isosorbide    mononitrate Tablet (ISMO) 30 milliGRAM(s) Oral daily  metoprolol tartrate 12.5 milliGRAM(s) Oral every 12 hours    MEDICATIONS  (PRN):  dextrose Gel 1 Dose(s) Oral once PRN Blood Glucose LESS THAN 70 milliGRAM(s)/deciliter  glucagon  Injectable 1 milliGRAM(s) IntraMuscular once PRN Glucose LESS THAN 70 milligrams/deciliter  heparin  Injectable 6000 Unit(s) IV Push every 6 hours PRN For aPTT less than 40      RADIOLOGY & ADDITIONAL TESTS:    Cardiology testing:  EKG: NSR inferiro and anterior Q waves, inferior ST depressions. [facility] Cardiology Consult - SECOND OPINION  ____________________________________________________________    CC: Chest pain.       HPI:  62 M h/o Obesity, DM, HTN, Active smoker (Cigars 4-5x/week x 2-3 years), who presented to urgent care yesterday with dyspnea a cough found to have abnormal EKG there - so he was referred to ED where he was found to have elevated cardiac enzymes He went to urgent care on tuesday with cough/fatigue and was started on antibiotics, returned to urgent care after no improvement yesterday. He denies any chest pain or palpitations the whole time. No LE edema, orthopnea or PND. Admits to significant life stress (wife with h/o ETOH abuse)    PAST MEDICAL & SURGICAL HISTORY:  Essential hypertension  Type 2 diabetes mellitus with diabetic neuropathy, without long-term current use of insulin  History of excision of pilonidal cyst  H/O inguinal hernia repair      MEDICATIONS  (STANDING):  aspirin enteric coated 81 milliGRAM(s) Oral daily  atorvastatin 80 milliGRAM(s) Oral at bedtime  clopidogrel Tablet 75 milliGRAM(s) Oral daily  dextrose 5%. 1000 milliLiter(s) (50 mL/Hr) IV Continuous <Continuous>  dextrose 50% Injectable 12.5 Gram(s) IV Push once  dextrose 50% Injectable 25 Gram(s) IV Push once  dextrose 50% Injectable 25 Gram(s) IV Push once  heparin  Infusion. 1300 Unit(s)/Hr (13 mL/Hr) IV Continuous <Continuous>  insulin lispro (HumaLOG) corrective regimen sliding scale   SubCutaneous three times a day before meals  isosorbide    mononitrate Tablet (ISMO) 30 milliGRAM(s) Oral daily  metoprolol tartrate 12.5 milliGRAM(s) Oral every 12 hours    MEDICATIONS  (PRN):  dextrose Gel 1 Dose(s) Oral once PRN Blood Glucose LESS THAN 70 milliGRAM(s)/deciliter  glucagon  Injectable 1 milliGRAM(s) IntraMuscular once PRN Glucose LESS THAN 70 milligrams/deciliter  heparin  Injectable 6000 Unit(s) IV Push every 6 hours PRN For aPTT less than 40      Allergies    allery to INSECT VENOM (Hives)  Novocain (Hives)    Intolerances        Social Histroy: Tobacco- , ETOH-, Illicit Drugs-    T(C): 36.9 (04-08-18 @ 08:28), Max: 37.2 (04-08-18 @ 05:58)  HR: 79 (04-08-18 @ 08:28) (76 - 92)  BP: 158/109 (04-08-18 @ 08:28) (147/97 - 181/94)  RR: 20 (04-08-18 @ 08:28) (18 - 20)  SpO2: 98% (04-08-18 @ 08:28) (94% - 100%)  I&O's Summary      Review of Systems:  Constitutional: [ ] Fever [ ] Chills [ ] Fatigue [ ] Weight change   HEENT: [ ] Blurred vision [ ] Eye Pain [ ] Headache [ ] Runny nose [ ] Sore Throat   Respiratory: [ x] Cough [ ] Wheezing [ ] Shortness of breath  Cardiovascular: [x ] Chest Pain [ ] Palpitations [ ] GARCÍA [ ] PND [ ] Orthopnea  Gastrointestinal: [ ] Abdominal Pain [ ] Diarrhea [ ] Constipation [ ] Hemorrhoids [ ] Nausea [ ] Vomiting  Genitourinary: [ ] Nocturia [ ] Dysuria [ ] Incontinence  Extremities: [ ] Swelling [ ] Joint Pain  Neurologic: [ ] Focal deficit [ ] Paresthesias [ ] Syncope  Lymphatic: [ ] Swelling [ ] Lymphadenopathy   Skin: [ ] Rash [ ] Ecchymoses [ ] Wounds [ ] Lesions  Psychiatry: [ ] Depression [ ] Suicidal/Homicidal Ideation [ ] Anxiety [ ] Sleep Disturbances  [x ] 10 point review of systems is otherwise negative except as mentioned above            [ ]Unable to obtain    PHYSICAL EXAM:  GENERAL: Alert, NAD  NECK: Supple, No JVD, No carotid bruit.  CHEST/LUNG: Clear to auscultation bilaterally; No wheezes, rales, or rhonchi  HEART: S1 S2 normal, RRR,  No murmurs, rubs, or gallops  ABDOMEN: Soft, Nontender, Nondistended; Bowel sounds present  EXTREMITIES:  No LE edema.      LABS:                        12.6   13.9  )-----------( 267      ( 08 Apr 2018 03:55 )             36.9     04-07    136  |  95<L>  |  14  ----------------------------<  282<H>  4.4   |  27  |  0.82    Ca    9.2      07 Apr 2018 18:46    TPro  7.6  /  Alb  3.8  /  TBili  1.0  /  DBili  x   /  AST  20  /  ALT  49<H>  /  AlkPhos  66  04-07    PT/INR - ( 07 Apr 2018 18:46 )   PT: 14.9 sec;   INR: 1.37 ratio         PTT - ( 08 Apr 2018 05:54 )  PTT:27.4 sec  CARDIAC MARKERS ( 08 Apr 2018 03:55 )  x     / 0.97 ng/mL / 79 U/L / x     / 3.7 ng/mL  CARDIAC MARKERS ( 07 Apr 2018 23:08 )  x     / 1.05 ng/mL / x     / x     / x      CARDIAC MARKERS ( 07 Apr 2018 18:46 )  x     / 1.24 ng/mL / x     / x     / x          Serum Pro-Brain Natriuretic Peptide: 6770 pg/mL (04-07-18 @ 18:46)        MEDICATIONS  (STANDING):  aspirin enteric coated 81 milliGRAM(s) Oral daily  atorvastatin 80 milliGRAM(s) Oral at bedtime  clopidogrel Tablet 75 milliGRAM(s) Oral daily  dextrose 5%. 1000 milliLiter(s) (50 mL/Hr) IV Continuous <Continuous>  dextrose 50% Injectable 12.5 Gram(s) IV Push once  dextrose 50% Injectable 25 Gram(s) IV Push once  dextrose 50% Injectable 25 Gram(s) IV Push once  heparin  Infusion. 1300 Unit(s)/Hr (13 mL/Hr) IV Continuous <Continuous>  insulin lispro (HumaLOG) corrective regimen sliding scale   SubCutaneous three times a day before meals  isosorbide    mononitrate Tablet (ISMO) 30 milliGRAM(s) Oral daily  metoprolol tartrate 12.5 milliGRAM(s) Oral every 12 hours    MEDICATIONS  (PRN):  dextrose Gel 1 Dose(s) Oral once PRN Blood Glucose LESS THAN 70 milliGRAM(s)/deciliter  glucagon  Injectable 1 milliGRAM(s) IntraMuscular once PRN Glucose LESS THAN 70 milligrams/deciliter  heparin  Injectable 6000 Unit(s) IV Push every 6 hours PRN For aPTT less than 40      RADIOLOGY & ADDITIONAL TESTS:    Cardiology testing:  EKG: NSR, inferior Q waves (old compared with previous EKG), Q waves V3-V6 (new compared with previous EKG).

## 2018-04-08 NOTE — CHART NOTE - NSCHARTNOTEFT_GEN_A_CORE
JASE KENNEDY  62y Male  Patient is a 62y old  Male who presents with a chief complaint of complain of CP (08 Apr 2018 12:56)    Event Summary:  Notified by RN, pt c/o R eye pressure. Pt with hx of open-angle glaucoma s/p shunts in b/l eyes x 1.5 years ago. His Ophthalmologist is Dr. Jason Wolff ([facility]) & endorses shunts were placed at [facility] [address] [facility] in [facility] by Dr. Juárez.  -House Ophthalmologist fellow, Dr. Ana Reynolds [phone], consulted to evaluate IOP, plan to evaluate patient urgently in ED holding. At this time, no vision changes, good pupillary response to light.       Nathaniel Saintus [facility]  #[phone] JASE KENNEDY  62y Male  Patient is a 62y old  Male who presents with a chief complaint of complain of CP (08 Apr 2018 12:56)    Event Summary:  Notified by RN, pt c/o R eye pressure/R Frontal HA. Pt with hx of open-angle glaucoma s/p shunts in b/l eyes x 1.5 years ago. His Ophthalmologist is Dr. Jason Wolff ([facility]) & endorses shunts were placed at [facility] in [facility] by Dr. Juárez.  -House Ophthalmologist fellow, Dr. Ana Reynolds [phone], consulted to evaluate IOP, evaluated patient urgently in ED holding, pressures recorded as 14 & 12, WNL. At this time, no vision changes, good pupillary response to light.   -Pt currently on Hep gtt, plan to perform CTH to r/o ICH Nathaniel Saintus [facility]  #[phone]

## 2018-04-09 DIAGNOSIS — I25.10 ATHEROSCLEROTIC HEART DISEASE OF NATIVE CORONARY ARTERY WITHOUT ANGINA PECTORIS: ICD-10-CM

## 2018-04-09 LAB
APPEARANCE UR: CLEAR — SIGNIFICANT CHANGE UP
APTT BLD: 31.6 SEC — SIGNIFICANT CHANGE UP (ref 27.5–37.4)
APTT BLD: 55.6 SEC — HIGH (ref 27.5–37.4)
BILIRUB UR-MCNC: NEGATIVE — SIGNIFICANT CHANGE UP
BLD GP AB SCN SERPL QL: NEGATIVE — SIGNIFICANT CHANGE UP
CK MB CFR SERPL CALC: 3.1 NG/ML — SIGNIFICANT CHANGE UP (ref 0–6.7)
CK MB CFR SERPL CALC: 3.2 NG/ML — SIGNIFICANT CHANGE UP (ref 0–6.7)
CK SERPL-CCNC: 63 U/L — SIGNIFICANT CHANGE UP (ref 30–200)
CK SERPL-CCNC: 68 U/L — SIGNIFICANT CHANGE UP (ref 30–200)
COLOR SPEC: YELLOW — SIGNIFICANT CHANGE UP
DIFF PNL FLD: NEGATIVE — SIGNIFICANT CHANGE UP
GLUCOSE UR QL: NEGATIVE — SIGNIFICANT CHANGE UP
HCT VFR BLD CALC: 37.8 % — LOW (ref 39–50)
HGB BLD-MCNC: 13.1 G/DL — SIGNIFICANT CHANGE UP (ref 13–17)
KETONES UR-MCNC: NEGATIVE — SIGNIFICANT CHANGE UP
LEUKOCYTE ESTERASE UR-ACNC: NEGATIVE — SIGNIFICANT CHANGE UP
MCHC RBC-ENTMCNC: 32.6 PG — SIGNIFICANT CHANGE UP (ref 27–34)
MCHC RBC-ENTMCNC: 34.7 GM/DL — SIGNIFICANT CHANGE UP (ref 32–36)
MCV RBC AUTO: 93.8 FL — SIGNIFICANT CHANGE UP (ref 80–100)
MRSA PCR RESULT.: SIGNIFICANT CHANGE UP
NITRITE UR-MCNC: NEGATIVE — SIGNIFICANT CHANGE UP
PA ADP PRP-ACNC: 194 PRU — SIGNIFICANT CHANGE UP (ref 194–417)
PH UR: 7 — SIGNIFICANT CHANGE UP (ref 5–8)
PLATELET # BLD AUTO: 264 K/UL — SIGNIFICANT CHANGE UP (ref 150–400)
PROT UR-MCNC: NEGATIVE — SIGNIFICANT CHANGE UP
RBC # BLD: 4.03 M/UL — LOW (ref 4.2–5.8)
RBC # FLD: 12.6 % — SIGNIFICANT CHANGE UP (ref 10.3–14.5)
RH IG SCN BLD-IMP: POSITIVE — SIGNIFICANT CHANGE UP
RH IG SCN BLD-IMP: POSITIVE — SIGNIFICANT CHANGE UP
S AUREUS DNA NOSE QL NAA+PROBE: SIGNIFICANT CHANGE UP
SP GR SPEC: 1.02 — SIGNIFICANT CHANGE UP (ref 1.01–1.02)
TROPONIN T SERPL-MCNC: 1.18 NG/ML — HIGH (ref 0–0.06)
TROPONIN T SERPL-MCNC: 1.24 NG/ML — HIGH (ref 0–0.06)
UROBILINOGEN FLD QL: 1 MG/DL
WBC # BLD: 13.1 K/UL — HIGH (ref 3.8–10.5)
WBC # FLD AUTO: 13.1 K/UL — HIGH (ref 3.8–10.5)

## 2018-04-09 PROCEDURE — 93458 L HRT ARTERY/VENTRICLE ANGIO: CPT | Mod: 26

## 2018-04-09 PROCEDURE — 93306 TTE W/DOPPLER COMPLETE: CPT | Mod: 26

## 2018-04-09 PROCEDURE — 93880 EXTRACRANIAL BILAT STUDY: CPT | Mod: 26

## 2018-04-09 PROCEDURE — 99152 MOD SED SAME PHYS/QHP 5/>YRS: CPT

## 2018-04-09 RX ORDER — ISOSORBIDE MONONITRATE 60 MG/1
30 TABLET, EXTENDED RELEASE ORAL DAILY
Qty: 0 | Refills: 0 | Status: DISCONTINUED | OUTPATIENT
Start: 2018-04-09 | End: 2018-04-11

## 2018-04-09 RX ORDER — ACETAMINOPHEN 500 MG
650 TABLET ORAL ONCE
Qty: 0 | Refills: 0 | Status: COMPLETED | OUTPATIENT
Start: 2018-04-09 | End: 2018-04-09

## 2018-04-09 RX ORDER — INSULIN GLARGINE 100 [IU]/ML
10 INJECTION, SOLUTION SUBCUTANEOUS AT BEDTIME
Qty: 0 | Refills: 0 | Status: DISCONTINUED | OUTPATIENT
Start: 2018-04-09 | End: 2018-04-11

## 2018-04-09 RX ORDER — HEPARIN SODIUM 5000 [USP'U]/ML
6000 INJECTION INTRAVENOUS; SUBCUTANEOUS EVERY 6 HOURS
Qty: 0 | Refills: 0 | Status: DISCONTINUED | OUTPATIENT
Start: 2018-04-09 | End: 2018-04-11

## 2018-04-09 RX ORDER — SODIUM CHLORIDE 0.65 %
1 AEROSOL, SPRAY (ML) NASAL THREE TIMES A DAY
Qty: 0 | Refills: 0 | Status: DISCONTINUED | OUTPATIENT
Start: 2018-04-09 | End: 2018-04-11

## 2018-04-09 RX ORDER — HEPARIN SODIUM 5000 [USP'U]/ML
2200 INJECTION INTRAVENOUS; SUBCUTANEOUS
Qty: 25000 | Refills: 0 | Status: DISCONTINUED | OUTPATIENT
Start: 2018-04-09 | End: 2018-04-11

## 2018-04-09 RX ORDER — INSULIN LISPRO 100/ML
5 VIAL (ML) SUBCUTANEOUS
Qty: 0 | Refills: 0 | Status: DISCONTINUED | OUTPATIENT
Start: 2018-04-09 | End: 2018-04-11

## 2018-04-09 RX ADMIN — ATORVASTATIN CALCIUM 80 MILLIGRAM(S): 80 TABLET, FILM COATED ORAL at 21:12

## 2018-04-09 RX ADMIN — Medication 12.5 MILLIGRAM(S): at 17:26

## 2018-04-09 RX ADMIN — Medication 1: at 17:25

## 2018-04-09 RX ADMIN — Medication 1: at 08:30

## 2018-04-09 RX ADMIN — INSULIN GLARGINE 10 UNIT(S): 100 INJECTION, SOLUTION SUBCUTANEOUS at 21:43

## 2018-04-09 RX ADMIN — HEPARIN SODIUM 2200 UNIT(S)/HR: 5000 INJECTION INTRAVENOUS; SUBCUTANEOUS at 04:22

## 2018-04-09 RX ADMIN — HEPARIN SODIUM 6000 UNIT(S): 5000 INJECTION INTRAVENOUS; SUBCUTANEOUS at 04:24

## 2018-04-09 RX ADMIN — HEPARIN SODIUM 2200 UNIT(S)/HR: 5000 INJECTION INTRAVENOUS; SUBCUTANEOUS at 18:35

## 2018-04-09 RX ADMIN — Medication 5 UNIT(S): at 12:59

## 2018-04-09 RX ADMIN — Medication 5 UNIT(S): at 17:26

## 2018-04-09 RX ADMIN — Medication 650 MILLIGRAM(S): at 06:46

## 2018-04-09 RX ADMIN — Medication 81 MILLIGRAM(S): at 17:26

## 2018-04-09 RX ADMIN — Medication 12.5 MILLIGRAM(S): at 05:19

## 2018-04-09 RX ADMIN — Medication 3: at 12:59

## 2018-04-09 RX ADMIN — ISOSORBIDE MONONITRATE 30 MILLIGRAM(S): 60 TABLET, EXTENDED RELEASE ORAL at 12:59

## 2018-04-09 NOTE — PROVIDER CONTACT NOTE (OTHER) - ASSESSMENT
Pt complain of right eye pressure. Pt denied chest pain.
Headache 10/10, nonradiating. S/p CTH, negative. , /94, 77, RR 18, 96%, 98.8
Pt A&Ox4, VSS at this time. Pt denies chest pain, pressure or palpitations. Pt hemodynamically stable at this time.

## 2018-04-09 NOTE — CONSULT NOTE ADULT - PROBLEM SELECTOR PROBLEM 2
Type 2 diabetes mellitus with diabetic neuropathy, without long-term current use of insulin
CAD (coronary artery disease)

## 2018-04-09 NOTE — CONSULT NOTE ADULT - SUBJECTIVE AND OBJECTIVE BOX
History of Present Illness:  62y Male    Past Medical History  Essential hypertension  Type 2 diabetes mellitus with diabetic neuropathy, without long-term current use of insulin      Past Surgical History  History of excision of pilonidal cyst  H/O inguinal hernia repair      MEDICATIONS  (STANDING):  aspirin enteric coated 81 milliGRAM(s) Oral daily  atorvastatin 80 milliGRAM(s) Oral at bedtime  dextrose 5%. 1000 milliLiter(s) (50 mL/Hr) IV Continuous <Continuous>  dextrose 50% Injectable 12.5 Gram(s) IV Push once  dextrose 50% Injectable 25 Gram(s) IV Push once  dextrose 50% Injectable 25 Gram(s) IV Push once  insulin glargine Injectable (LANTUS) 10 Unit(s) SubCutaneous at bedtime  insulin lispro (HumaLOG) corrective regimen sliding scale   SubCutaneous three times a day before meals  insulin lispro Injectable (HumaLOG) 5 Unit(s) SubCutaneous three times a day before meals  isosorbide   mononitrate ER Tablet (IMDUR) 30 milliGRAM(s) Oral daily  metoprolol tartrate 12.5 milliGRAM(s) Oral every 12 hours    MEDICATIONS  (PRN):  dextrose Gel 1 Dose(s) Oral once PRN Blood Glucose LESS THAN 70 milliGRAM(s)/deciliter  glucagon  Injectable 1 milliGRAM(s) IntraMuscular once PRN Glucose LESS THAN 70 milligrams/deciliter  sodium chloride 0.65% Nasal 1 Spray(s) Both Nostrils three times a day PRN Nasal Congestion    Antiplatelet therapy:                           Last dose/amt:    Allergies: allery to INSECT VENOM (Hives)  Novocain (Hives)      SOCIAL HISTORY:  Smoker: [ ] Yes  [ ] No        PACK YEARS:                         WHEN QUIT?  ETOH use: [ ] Yes  [ ] No              FREQUENCY / QUANTITY:  Ilicit Drug use:  [ ] Yes  [ ] No  Occupation:  Live with:  Assist device use:    PMD:  Referring Cardiologist:    Relevant Family History  FAMILY HISTORY:  Family history of acute myocardial infarction (Father): father in 90s      Review of Systems  GENERAL:  Fevers[] chills[] sweats[] fatigue[] weight loss[] weight gain []                                      [ ] NEGATIVE  NEURO:  parathesias[] seizures []  syncope []  confusion []                                                                [ ] NEGATIVE                 EYES: glasses[]  blurry vision[]  discharge[] pain[] glaucoma []                                                           [ ] NEGATIVE                 ENMT:  difficulty hearing []  vertigo[]  dysphagia[] epistaxis[] recent dental work []                     [ ] NEGATIVE                 CV:  chest pain[] palpitations[] GARCÍA [] diaphoresis [] edema[]                                                           [ ] NEGATIVE                                 RESPIRATORY:  wheezing[] SOB[] cough [] sputum[] hemoptysis[]                                                   [ ] NEGATIVE               GI:  nausea[]  vomiting []  diarrhea[] constipation [] melena []                                                          [ ] NEGATIVE            : hematuria[ ]  dysuria[ ] urgency[] incontinence[]                                                                          [ ] NEGATIVE                    MUSKULOSKELETAL:  arthritis[ ]  joint swelling [ ] muscle weakness [ ]                                            [ ] NEGATIVE                     SKIN/BREAST:  rash[ ] itching [ ]  hair loss[ ] masses[ ]                                                                          [ ] NEGATIVE                     PSYCH:  dementia [ ] depresion [ ] anxiety[ ]                                                                                          [ ] NEGATIVE                        HEME/LYMPH:  bruises easily[ ] enlarged lymph nodes[ ] tender lymph nodes[ ]                           [ ] NEGATIVE                      ENDOCRINE:  cold intolerance[ ] heat intolerance[ ] polydipsia[ ]                                                      [ ] NEGATIVE                        PHYSICAL EXAM  Vital Signs Last 24 Hrs  T(C): 36.8 (09 Apr 2018 12:50), Max: 37.1 (09 Apr 2018 06:25)  T(F): 98.3 (09 Apr 2018 12:50), Max: 98.8 (09 Apr 2018 06:25)  HR: 79 (09 Apr 2018 13:20) (74 - 82)  BP: 151/87 (09 Apr 2018 13:20) (134/82 - 160/98)  BP(mean): --  RR: 20 (09 Apr 2018 12:50) (17 - 20)  SpO2: 95% (09 Apr 2018 12:50) (95% - 98%)    General: Well nourished, well developed, no acute distress.                                                         Neuro: Normal exam oriented to person/place & time with no focal motor or sensory  deficits.                    Eyes: Normal exam of conjunctiva & lids, pupils equally reactive.   ENT: Normal exam of nasal/oral mucosa with absence of cyanosis.   Neck: Normal exam of jugular veins, trachea & thyroid.   Chest: Normal lung exam with good air movement absence of wheezes, rales, or rhonchi:                                                                          CV:  Auscultation: normal [ ] S3[ ] S4[ ] Irregular [ ] Rub[ ] Clicks[ ]  Murmurs none:[ ]systolic [ ]  diastolic [ ] holosystolic [ ]  Carotids: No Bruits[ ] Other____________ Abdominal Aorta: normal [ ] nonpalpable[ ]                                                                         GI: Normal exam of abdomen, liver & spleen with no noted masses or tenderness.                                                                                              Extremities: Normal no evidence of cyanosis or deformity Edema: none[ ]trace[ ]1+[ ]2+[ ]3+[ ]4+[ ]  Lower Extremity Pulses: Right[ ] Left[ ]Varicosities[ ]  SKIN : Normal exam to inspection & palation.                                                           LABS:                        13.1   13.1  )-----------( 264      ( 09 Apr 2018 05:37 )             37.8     04-09    134<L>  |  96  |  18  ----------------------------<  246<H>  4.5   |  24  |  0.84    Ca    9.0      09 Apr 2018 05:37  Phos  3.3     04-08  Mg     1.9     04-08    TPro  7.3  /  Alb  3.8  /  TBili  1.1  /  DBili  0.2  /  AST  12  /  ALT  33  /  AlkPhos  66  04-08    PT/INR - ( 07 Apr 2018 18:46 )   PT: 14.9 sec;   INR: 1.37 ratio         PTT - ( 09 Apr 2018 10:02 )  PTT:55.6 sec    CARDIAC MARKERS ( 09 Apr 2018 05:37 )  x     / 1.18 ng/mL / 68 U/L / x     / 3.1 ng/mL  CARDIAC MARKERS ( 08 Apr 2018 23:43 )  x     / 1.24 ng/mL / 63 U/L / x     / 3.2 ng/mL  CARDIAC MARKERS ( 08 Apr 2018 14:06 )  x     / 1.51 ng/mL / 74 U/L / x     / 3.4 ng/mL  CARDIAC MARKERS ( 08 Apr 2018 03:55 )  x     / 0.97 ng/mL / 79 U/L / x     / 3.7 ng/mL  CARDIAC MARKERS ( 07 Apr 2018 23:08 )  x     / 1.05 ng/mL / x     / x     / x      CARDIAC MARKERS ( 07 Apr 2018 18:46 )  x     / 1.24 ng/mL / x     / x     / x              Cardiac Cath:    TTE / JIMMY:    Assessment:  62y Male presents with Chest pain History of Present Illness:  62y Male  with complaints of a cough and shortness of breath over last month,  he denies chest pain or palpitations  found  to have TVD on cardiac cath today.  Past Medical History  Essential hypertension  Type 2 diabetes mellitus with diabetic neuropathy, without long-term current use of insulin    Past Surgical History  History of excision of pilonidal cyst  H/O inguinal hernia repair      MEDICATIONS  (STANDING):  aspirin enteric coated 81 milliGRAM(s) Oral daily  atorvastatin 80 milliGRAM(s) Oral at bedtime  dextrose 5%. 1000 milliLiter(s) (50 mL/Hr) IV Continuous <Continuous>  dextrose 50% Injectable 12.5 Gram(s) IV Push once  dextrose 50% Injectable 25 Gram(s) IV Push once  dextrose 50% Injectable 25 Gram(s) IV Push once  insulin glargine Injectable (LANTUS) 10 Unit(s) SubCutaneous at bedtime  insulin lispro (HumaLOG) corrective regimen sliding scale   SubCutaneous three times a day before meals  insulin lispro Injectable (HumaLOG) 5 Unit(s) SubCutaneous three times a day before meals  isosorbide   mononitrate ER Tablet (IMDUR) 30 milliGRAM(s) Oral daily  metoprolol tartrate 12.5 milliGRAM(s) Oral every 12 hours    MEDICATIONS  (PRN):  dextrose Gel 1 Dose(s) Oral once PRN Blood Glucose LESS THAN 70 milliGRAM(s)/deciliter  glucagon  Injectable 1 milliGRAM(s) IntraMuscular once PRN Glucose LESS THAN 70 milligrams/deciliter  sodium chloride 0.65% Nasal 1 Spray(s) Both Nostrils three times a day PRN Nasal Congestion        Allergies: allergy to INSECT VENOM (Hives)  Novocain (Hives)      SOCIAL HISTORY:  Smoker:  Yes   cigar smoking      PACK YEARS:      5                    WHEN QUIT? 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  Assist device use:   on    Relevant Family History  FAMILY HISTORY:  Family history of acute myocardial infarction (Father): father in 90s      Review of Systems  GENERAL:  denies   Fevers and chill                                     [ NEGATIVE  NEURO:  parathesias[] seizures []  syncope []  confusion []                                                                [ ] NEGATIVE                 EYES: glasses[]  blurry vision[]  discharge[] pain[] glaucoma []                                                           [ ] NEGATIVE                 ENMT:  difficulty hearing []  vertigo[]  dysphagia[] epistaxis[] recent dental work []                     [ ] NEGATIVE                 CV:  chest pain[] palpitations[] GARCÍA [] diaphoresis [] edema[]                                                           [ ] NEGATIVE                                 RESPIRATORY:  wheezing[] SOB[] cough [] sputum[] hemoptysis[]                                                   [ ] NEGATIVE               GI:  nausea[]  vomiting []  diarrhea[] constipation [] melena []                                                          [ ] NEGATIVE            : hematuria[ ]  dysuria[ ] urgency[] incontinence[]                                                                          [ ] NEGATIVE                    MUSKULOSKELETAL:  arthritis[ ]  joint swelling [ ] muscle weakness [ ]                                            [x ] NEGATIVE                     SKIN/BREAST:  rash[ ] itching [ ]  hair loss[ ] masses[ ]                                                                          [x ] NEGATIVE                     PSYCH:  dementia [ ] depresion [ ] anxiety[ ]                                                                                          [x ] NEGATIVE                        HEME/LYMPH:  bruises easily[ ] enlarged lymph nodes[ ] tender lymph nodes[ ]                           [ x] NEGATIVE                      ENDOCRINE: history of DM2                                                                           PHYSICAL EXAM  Vital Signs Last 24 Hrs  T(C): 36.8 (09 Apr 2018 12:50), Max: 37.1 (09 Apr 2018 06:25)  T(F): 98.3 (09 Apr 2018 12:50), Max: 98.8 (09 Apr 2018 06:25)  HR: 79 (09 Apr 2018 13:20) (74 - 82)  BP: 151/87 (09 Apr 2018 13:20) (134/82 - 160/98)  BP(mean): --  RR: 20 (09 Apr 2018 12:50) (17 - 20)  SpO2: 95% (09 Apr 2018 12:50) (95% - 98%)    General: Well nourished, well developed, no acute distress.                                                         Neuro: Normal exam oriented to person/place & time with no focal motor or sensory  deficits.                    Eyes: Normal exam of conjunctiva & lids, pupils equally reactive.   ENT: Normal exam of nasal/oral mucosa with absence of cyanosis.   Neck: Normal exam of jugular veins, trachea & thyroid.   Chest: Normal lung exam with good air movement absence of wheezes, rales, or rhonchi:                                                                          CV:  Auscultation: normal [ ] S3[ ] S4[ ] Irregular [ ] Rub[ ] Clicks[ ]  Murmurs none:[ ]systolic [ ]  diastolic [ ] holosystolic [ ]  Carotids: No Bruits[ ] Other____________ Abdominal Aorta: normal [ ] nonpalpable[ ]                                                                         GI: Normal exam of abdomen, liver & spleen with no noted masses or tenderness.                                                                                              Extremities: Normal no evidence of cyanosis or deformity Edema: none[ ]trace[ ]1+[ ]2+[ ]3+[ ]4+[ ]  Lower Extremity Pulses: Right[ ] Left[ ]Varicosities[ ]  SKIN : Normal exam to inspection & palation.                                                           LABS:                        13.1   13.1  )-----------( 264      ( 09 Apr 2018 05:37 )             37.8     04-09    134<L>  |  96  |  18  ----------------------------<  246<H>  4.5   |  24  |  0.84    Ca    9.0      09 Apr 2018 05:37  Phos  3.3     04-08  Mg     1.9     04-08    TPro  7.3  /  Alb  3.8  /  TBili  1.1  /  DBili  0.2  /  AST  12  /  ALT  33  /  AlkPhos  66  04-08    PT/INR - ( 07 Apr 2018 18:46 )   PT: 14.9 sec;   INR: 1.37 ratio         PTT - ( 09 Apr 2018 10:02 )  PTT:55.6 sec    CARDIAC MARKERS ( 09 Apr 2018 05:37 )  x     / 1.18 ng/mL / 68 U/L / x     / 3.1 ng/mL  CARDIAC MARKERS ( 08 Apr 2018 23:43 )  x     / 1.24 ng/mL / 63 U/L / x     / 3.2 ng/mL  CARDIAC MARKERS ( 08 Apr 2018 14:06 )  x     / 1.51 ng/mL / 74 U/L / x     / 3.4 ng/mL  CARDIAC MARKERS ( 08 Apr 2018 03:55 )  x     / 0.97 ng/mL / 79 U/L / x     / 3.7 ng/mL  CARDIAC MARKERS ( 07 Apr 2018 23:08 )  x     / 1.05 ng/mL / x     / x     / x      CARDIAC MARKERS ( 07 Apr 2018 18:46 )  x     / 1.24 ng/mL / x     / x     / x              Cardiac Cath:    TTE / JIMMY:    Assessment:  62y Male presents with Chest pain History of Present Illness:  62y Male  with complaints of a cough and shortness of breath over last month,  he denies chest pain or palpitations  found  to have TVD on cardiac cath today.  Past Medical History  Essential hypertension  Type 2 diabetes mellitus with diabetic neuropathy, without long-term current use of insulin    Past Surgical History  History of excision of pilonidal cyst  H/O inguinal hernia repair      MEDICATIONS  (STANDING):  aspirin enteric coated 81 milliGRAM(s) Oral daily  atorvastatin 80 milliGRAM(s) Oral at bedtime  dextrose 5%. 1000 milliLiter(s) (50 mL/Hr) IV Continuous <Continuous>  dextrose 50% Injectable 12.5 Gram(s) IV Push once  dextrose 50% Injectable 25 Gram(s) IV Push once  dextrose 50% Injectable 25 Gram(s) IV Push once  insulin glargine Injectable (LANTUS) 10 Unit(s) SubCutaneous at bedtime  insulin lispro (HumaLOG) corrective regimen sliding scale   SubCutaneous three times a day before meals  insulin lispro Injectable (HumaLOG) 5 Unit(s) SubCutaneous three times a day before meals  isosorbide   mononitrate ER Tablet (IMDUR) 30 milliGRAM(s) Oral daily  metoprolol tartrate 12.5 milliGRAM(s) Oral every 12 hours    MEDICATIONS  (PRN):  dextrose Gel 1 Dose(s) Oral once PRN Blood Glucose LESS THAN 70 milliGRAM(s)/deciliter  glucagon  Injectable 1 milliGRAM(s) IntraMuscular once PRN Glucose LESS THAN 70 milligrams/deciliter  sodium chloride 0.65% Nasal 1 Spray(s) Both Nostrils three times a day PRN Nasal Congestion        Allergies: allergy to INSECT VENOM (Hives)  Novocain (Hives)      SOCIAL HISTORY:  Smoker:  Yes   cigar smoking      PACK YEARS:      5                    WHEN QUIT? 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  Assist device use:   on    Relevant Family History  FAMILY HISTORY:  Family history of acute myocardial infarction (Father): father in 90s      Review of Systems  GENERAL:  denies   Fevers and chill                                     [ NEGATIVE  NEURO:  parathesias[] seizures []  syncope []  confusion []                                                                [ ] NEGATIVE                 EYES: glasses[]  blurry vision[]  discharge[] pain[] glaucoma []                                                           [ ] NEGATIVE                 ENMT:  difficulty hearing []  vertigo[]  dysphagia[] epistaxis[] recent dental work []                     [ ] NEGATIVE                 CV:  chest pain[] palpitations[] GARCÍA [] diaphoresis [] edema[]                                                           [ ] NEGATIVE                                 RESPIRATORY:  wheezing[] SOB[] cough [] sputum[] hemoptysis[]                                                   [ ] NEGATIVE               GI:  nausea[]  vomiting []  diarrhea[] constipation [] melena []                                                          [ ] NEGATIVE            : hematuria[ ]  dysuria[ ] urgency[] incontinence[]                                                                          [ ] NEGATIVE                    MUSKULOSKELETAL:  arthritis[ ]  joint swelling [ ] muscle weakness [ ]                                            [x ] NEGATIVE                     SKIN/BREAST:  rash[ ] itching [ ]  hair loss[ ] masses[ ]                                                                          [x ] NEGATIVE                     PSYCH:  dementia [ ] depresion [ ] anxiety[ ]                                                                                          [x ] NEGATIVE                        HEME/LYMPH:  bruises easily[ ] enlarged lymph nodes[ ] tender lymph nodes[ ]                           [ x] NEGATIVE                      ENDOCRINE: history of DM2                                                                           PHYSICAL EXAM  Vital Signs Last 24 Hrs  T(C): 36.8 (09 Apr 2018 12:50), Max: 37.1 (09 Apr 2018 06:25)  T(F): 98.3 (09 Apr 2018 12:50), Max: 98.8 (09 Apr 2018 06:25)  HR: 79 (09 Apr 2018 13:20) (74 - 82)  BP: 151/87 (09 Apr 2018 13:20) (134/82 - 160/98)  BP(mean): --  RR: 20 (09 Apr 2018 12:50) (17 - 20)  SpO2: 95% (09 Apr 2018 12:50) (95% - 98%)    General: Well nourished, well developed, no acute distress.                                                         Neuro: Normal exam oriented to person/place & time with no focal motor or sensory  deficits.                    Eyes: Normal exam of conjunctiva & lids, pupils equally reactive.   ENT: Normal exam of nasal/oral mucosa with absence of cyanosis.   Neck: Normal exam of jugular veins, trachea & thyroid.   Chest: Normal lung exam with good air movement absence of wheezes, rales, or rhonchi:                                                                          CV:  Auscultation: normal [ ] S3[ ] S4[ ] Irregular [ ] Rub[ ] Clicks[ ]  Murmurs none:[ ]systolic [ ]  diastolic [ ] holosystolic [ ]  Carotids: No Bruits[ ] Other____________ Abdominal Aorta: normal [ ] nonpalpable[ ]                                                                         GI: Normal exam of abdomen, liver & spleen with no noted masses or tenderness.                                                                                              Extremities: Normal no evidence of cyanosis or deformity Edema: none[ ]trace[ ]1+[ ]2+[ ]3+[ ]4+[ ]  Lower Extremity Pulses: Right[ ] Left[ ]Varicosities[ ]  SKIN : Normal exam to inspection & palation.                                                           LABS:                        13.1   13.1  )-----------( 264      ( 09 Apr 2018 05:37 )             37.8     04-09    134<L>  |  96  |  18  ----------------------------<  246<H>  4.5   |  24  |  0.84    Ca    9.0      09 Apr 2018 05:37  Phos  3.3     04-08  Mg     1.9     04-08    TPro  7.3  /  Alb  3.8  /  TBili  1.1  /  DBili  0.2  /  AST  12  /  ALT  33  /  AlkPhos  66  04-08    PT/INR - ( 07 Apr 2018 18:46 )   PT: 14.9 sec;   INR: 1.37 ratio         PTT - ( 09 Apr 2018 10:02 )  PTT:55.6 sec    CARDIAC MARKERS ( 09 Apr 2018 05:37 )  x     / 1.18 ng/mL / 68 U/L / x     / 3.1 ng/mL  CARDIAC MARKERS ( 08 Apr 2018 23:43 )  x     / 1.24 ng/mL / 63 U/L / x     / 3.2 ng/mL  CARDIAC MARKERS ( 08 Apr 2018 14:06 )  x     / 1.51 ng/mL / 74 U/L / x     / 3.4 ng/mL  CARDIAC MARKERS ( 08 Apr 2018 03:55 )  x     / 0.97 ng/mL / 79 U/L / x     / 3.7 ng/mL  CARDIAC MARKERS ( 07 Apr 2018 23:08 )  x     / 1.05 ng/mL / x     / x     / x      CARDIAC MARKERS ( 07 Apr 2018 18:46 )  x     / 1.24 ng/mL / x     / x     / x              Cardiac Cath:    TTE / JIMMY:   results to follow History of Present Illness:  62y Male  with complaints of a cough and shortness of breath over last month,  he denies chest pain or palpitations  found  to have TVD on cardiac cath today.  Past Medical History  Essential hypertension  Type 2 diabetes mellitus with diabetic neuropathy, without long-term current use of insulin    Past Surgical History  History of excision of pilonidal cyst  H/O inguinal hernia repair      MEDICATIONS  (STANDING):  aspirin enteric coated 81 milliGRAM(s) Oral daily  atorvastatin 80 milliGRAM(s) Oral at bedtime  dextrose 5%. 1000 milliLiter(s) (50 mL/Hr) IV Continuous <Continuous>  dextrose 50% Injectable 12.5 Gram(s) IV Push once  dextrose 50% Injectable 25 Gram(s) IV Push once  dextrose 50% Injectable 25 Gram(s) IV Push once  insulin glargine Injectable (LANTUS) 10 Unit(s) SubCutaneous at bedtime  insulin lispro (HumaLOG) corrective regimen sliding scale   SubCutaneous three times a day before meals  insulin lispro Injectable (HumaLOG) 5 Unit(s) SubCutaneous three times a day before meals  isosorbide   mononitrate ER Tablet (IMDUR) 30 milliGRAM(s) Oral daily  metoprolol tartrate 12.5 milliGRAM(s) Oral every 12 hours    MEDICATIONS  (PRN):  dextrose Gel 1 Dose(s) Oral once PRN Blood Glucose LESS THAN 70 milliGRAM(s)/deciliter  glucagon  Injectable 1 milliGRAM(s) IntraMuscular once PRN Glucose LESS THAN 70 milligrams/deciliter  sodium chloride 0.65% Nasal 1 Spray(s) Both Nostrils three times a day PRN Nasal Congestion        Allergies: allergy to INSECT VENOM (Hives)  Novocain (Hives)      SOCIAL HISTORY:  Smoker:  Yes   cigar smoking      PACK YEARS:      5                    WHEN QUIT? 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  Assist device use:   on    Relevant Family History  FAMILY HISTORY:  Family history of acute myocardial infarction (Father): father in 90s      Review of Systems  GENERAL:  denies   Fevers and chill                                     [ NEGATIVE  NEURO:    denies     parathesias, seizures, and syncope                                                                               EYES:  denies  glasses/  blurry vision                ENMT:   denies difficulty hearing /  vertigo/  dysphagia                CV:   denies chest pain or palpitations  has  GARCÍA                                RESPIRATORY:   denies wheezing  has SOB has cough                                     : hematuria[ ]  dysuria[ ] urgency[] incontinence[]                                                                          [x ] NEGATIVE                    MUSKULOSKELETAL:  arthritis[ ]  joint swelling [ ] muscle weakness [ ]                                            [x ] NEGATIVE                     SKIN/BREAST:  rash[ ] itching [ ]  hair loss[ ] masses[ ]                                                                          [x ] NEGATIVE                     PSYCH:  dementia [ ] depresion [ ] anxiety[ ]                                                                                          [x ] NEGATIVE                        HEME/LYMPH:  bruises easily[ ] enlarged lymph nodes[ ] tender lymph nodes[ ]                           [ x] NEGATIVE                      ENDOCRINE: history of DM2                                                                           PHYSICAL EXAM  Vital Signs Last 24 Hrs  T(C): 36.8 (09 Apr 2018 12:50), Max: 37.1 (09 Apr 2018 06:25)  T(F): 98.3 (09 Apr 2018 12:50), Max: 98.8 (09 Apr 2018 06:25)  HR: 79 (09 Apr 2018 13:20) (74 - 82)  BP: 151/87 (09 Apr 2018 13:20) (134/82 - 160/98)  BP(mean): --  RR: 20 (09 Apr 2018 12:50) (17 - 20)  SpO2: 95% (09 Apr 2018 12:50) (95% - 98%)    General: Well nourished, well developed, no acute distress with anxiety                                                         Neuro: Normal exam oriented to person/place & time with no focal motor or sensory  deficits.                    Eyes: Normal exam of conjunctiva & lids, pupils equally reactive.   ENT: Normal exam of nasal/oral mucosa with absence of cyanosis.   Neck: Normal exam of jugular veins, trachea & thyroid.   Chest: Normal lung exam with good air movement absence of wheezes, rales, or rhonchi:                                                                          CV:  Auscultation: normal [x ] S3[ ] S4[ ] Carotids: No Bruits[x ]                                                                     GI: Normal exam of abdomen, liver & spleen with no noted masses or tenderness.                                                                                              Extremities: Normal no evidence of cyanosis or deformity Edema: none[x ]trace  Lower Extremity Pulses: Right[ ] Left[ ]Varicosities[ ]  SKIN : Normal exam to inspection & palation.                                                           LABS:                        13.1   13.1  )-----------( 264      ( 09 Apr 2018 05:37 )             37.8     04-09    134<L>  |  96  |  18  ----------------------------<  246<H>  4.5   |  24  |  0.84    Ca    9.0      09 Apr 2018 05:37  Phos  3.3     04-08  Mg     1.9     04-08    TPro  7.3  /  Alb  3.8  /  TBili  1.1  /  DBili  0.2  /  AST  12  /  ALT  33  /  AlkPhos  66  04-08    PT/INR - ( 07 Apr 2018 18:46 )   PT: 14.9 sec;   INR: 1.37 ratio         PTT - ( 09 Apr 2018 10:02 )  PTT:55.6 sec    CARDIAC MARKERS ( 09 Apr 2018 05:37 )  x     / 1.18 ng/mL / 68 U/L / x     / 3.1 ng/mL  CARDIAC MARKERS ( 08 Apr 2018 23:43 )  x     / 1.24 ng/mL / 63 U/L / x     / 3.2 ng/mL  CARDIAC MARKERS ( 08 Apr 2018 14:06 )  x     / 1.51 ng/mL / 74 U/L / x     / 3.4 ng/mL  CARDIAC MARKERS ( 08 Apr 2018 03:55 )  x     / 0.97 ng/mL / 79 U/L / x     / 3.7 ng/mL  CARDIAC MARKERS ( 07 Apr 2018 23:08 )  x     / 1.05 ng/mL / x     / x     / x      CARDIAC MARKERS ( 07 Apr 2018 18:46 )  x     / 1.24 ng/mL / x     / x     / x              Cardiac Cath:LM:   --  LM: Angiography showed minor luminal irregularities with no flow  limiting lesions.  LAD:   --  Proximal LAD: There was a diffuse 50 % stenosis.  --  Mid LAD: There was a 100 % stenosis.  CX:   --  Distal circumflex: Angiography showed severe atherosclerosis.  --  OM1: There was a 95 % stenosis.  RCA:   --  Mid RCA: There was a 100 % stenosis.  COMPLICATIONS: There were no complications.  DIAGNOSTIC IMPRESSIONS: Severe multivessel CAD. Elevated LVEDP  DIAGNOSTIC RECOMMENDATIONS: Echocardiogram. CTS consultation for CABG eval.    TTE / JIMMY:   results to follow

## 2018-04-09 NOTE — CONSULT NOTE ADULT - ASSESSMENT
62 M   s/p cardiac cath   eith  h/o Obesity, DM, HTN, active cigar smoker. 62 M   s/p cardiac cath    found to have TVD    h/o Obesity, DM, HTN, active cigar smoker. 62 M   s/p cardiac cath    found to have TVD    h/o Obesity, DM 2 , HTN, active cigar smoker.

## 2018-04-09 NOTE — CONSULT NOTE ADULT - PROBLEM SELECTOR PROBLEM 1
CAD (coronary artery disease)
NSTEMI (non-ST elevated myocardial infarction)
Type 2 diabetes mellitus with diabetic neuropathy, without long-term current use of insulin

## 2018-04-09 NOTE — PROVIDER CONTACT NOTE (OTHER) - ACTION/TREATMENT ORDERED:
NP made aware.
NP states to wait for PTT result and to scan heparin gtt when patient arrives back to unit after reassessment of cath results. Will maintain safety.
Provider stated she will order Tylenol PO.

## 2018-04-09 NOTE — CHART NOTE - NSCHARTNOTEFT_GEN_A_CORE
Pt s/p cardiac catheterization, found with triple vessel disease. CTs consulted for CABG evaluation (#7891), plan to evaluate patient for recommendations. Pt remains hemodynamically stable at this time, no complaints of chest pain.    Nathaniel Saintus [facility]  #[phone]

## 2018-04-09 NOTE — PROGRESS NOTE ADULT - SUBJECTIVE AND OBJECTIVE BOX
Patient is a 62y old  Male who presents with a chief complaint of complain of CP (08 Apr 2018 12:56)      SUBJECTIVE / OVERNIGHT EVENTS:    Patient seen and examined. yesterday, co right eye pressure, evaluated by ophthomology and IOP 14 and 12. co severe HA, CT head no hemorrhage. patient currently denies complaints. states he is nervous. denies cp sob.      Vital Signs Last 24 Hrs  T(C): 37.1 (09 Apr 2018 06:25), Max: 37.1 (09 Apr 2018 06:25)  T(F): 98.8 (09 Apr 2018 06:25), Max: 98.8 (09 Apr 2018 06:25)  HR: 77 (09 Apr 2018 06:25) (74 - 79)  BP: 147/94 (09 Apr 2018 06:25) (134/82 - 160/98)  BP(mean): --  RR: 18 (09 Apr 2018 06:25) (17 - 18)  SpO2: 96% (09 Apr 2018 06:25) (95% - 97%)  I&O's Summary    08 Apr 2018 07:01  -  09 Apr 2018 07:00  --------------------------------------------------------  IN: 513 mL / OUT: 1050 mL / NET: -537 mL    09 Apr 2018 07:01  -  09 Apr 2018 08:49  --------------------------------------------------------  IN: 344 mL / OUT: 300 mL / NET: 44 mL        PE:  GENERAL: NAD, AAOx3, obese  HEAD:  Atraumatic, Normocephalic  EYES:  conjunctiva and sclera clear  NECK: Supple, No JVD  CHEST/LUNG: CTABL, No wheeze  HEART: Regular rate and rhythm; no murmur  ABDOMEN: Soft, Nontender, Nondistended; Bowel sounds present  EXTREMITIES:  2+ Peripheral Pulses, No clubbing, cyanosis, or edema  SKIN: No rashes or lesions  NEURO: No focal deficits    LABS:                        13.1   13.1  )-----------( 264      ( 09 Apr 2018 05:37 )             37.8     04-09    134<L>  |  96  |  18  ----------------------------<  246<H>  4.5   |  24  |  0.84    Ca    9.0      09 Apr 2018 05:37  Phos  3.3     04-08  Mg     1.9     04-08    TPro  7.3  /  Alb  3.8  /  TBili  1.1  /  DBili  0.2  /  AST  12  /  ALT  33  /  AlkPhos  66  04-08    PT/INR - ( 07 Apr 2018 18:46 )   PT: 14.9 sec;   INR: 1.37 ratio         PTT - ( 09 Apr 2018 03:46 )  PTT:31.6 sec  CAPILLARY BLOOD GLUCOSE      POCT Blood Glucose.: 197 mg/dL (09 Apr 2018 08:06)  POCT Blood Glucose.: 221 mg/dL (09 Apr 2018 06:29)  POCT Blood Glucose.: 252 mg/dL (08 Apr 2018 22:16)  POCT Blood Glucose.: 292 mg/dL (08 Apr 2018 17:40)  POCT Blood Glucose.: 194 mg/dL (08 Apr 2018 12:26)  POCT Blood Glucose.: 291 mg/dL (08 Apr 2018 08:57)    CARDIAC MARKERS ( 09 Apr 2018 05:37 )  x     / 1.18 ng/mL / 68 U/L / x     / 3.1 ng/mL  CARDIAC MARKERS ( 08 Apr 2018 23:43 )  x     / 1.24 ng/mL / 63 U/L / x     / 3.2 ng/mL  CARDIAC MARKERS ( 08 Apr 2018 14:06 )  x     / 1.51 ng/mL / 74 U/L / x     / 3.4 ng/mL  CARDIAC MARKERS ( 08 Apr 2018 03:55 )  x     / 0.97 ng/mL / 79 U/L / x     / 3.7 ng/mL  CARDIAC MARKERS ( 07 Apr 2018 23:08 )  x     / 1.05 ng/mL / x     / x     / x      CARDIAC MARKERS ( 07 Apr 2018 18:46 )  x     / 1.24 ng/mL / x     / x     / x              RADIOLOGY & ADDITIONAL TESTS:    Imaging Personally Reviewed:  [x] YES  [ ] NO    Consultant(s) Notes Reviewed:  [x] YES  [ ] NO    MEDICATIONS  (STANDING):  aspirin enteric coated 81 milliGRAM(s) Oral daily  atorvastatin 80 milliGRAM(s) Oral at bedtime  dextrose 5%. 1000 milliLiter(s) (50 mL/Hr) IV Continuous <Continuous>  dextrose 50% Injectable 12.5 Gram(s) IV Push once  dextrose 50% Injectable 25 Gram(s) IV Push once  dextrose 50% Injectable 25 Gram(s) IV Push once  heparin  Infusion. 1300 Unit(s)/Hr (13 mL/Hr) IV Continuous <Continuous>  insulin lispro (HumaLOG) corrective regimen sliding scale   SubCutaneous three times a day before meals  isosorbide    mononitrate Tablet (ISMO) 30 milliGRAM(s) Oral daily  metoprolol tartrate 12.5 milliGRAM(s) Oral every 12 hours    MEDICATIONS  (PRN):  dextrose Gel 1 Dose(s) Oral once PRN Blood Glucose LESS THAN 70 milliGRAM(s)/deciliter  glucagon  Injectable 1 milliGRAM(s) IntraMuscular once PRN Glucose LESS THAN 70 milligrams/deciliter  heparin  Injectable 6000 Unit(s) IV Push every 6 hours PRN For aPTT less than 40      Care Discussed with Consultants/Other Providers [x] YES  [ ] NO    HEALTH ISSUES - PROBLEM Dx:  Transaminitis: Transaminitis  Essential hypertension: Essential hypertension  Type 2 diabetes mellitus with diabetic neuropathy, without long-term current use of insulin: Type 2 diabetes mellitus with diabetic neuropathy, without long-term current use of insulin  NSTEMI (non-ST elevated myocardial infarction): NSTEMI (non-ST elevated myocardial infarction)

## 2018-04-09 NOTE — PROVIDER CONTACT NOTE (OTHER) - REASON
Headache
Pt complain of right eye pressure.
Pt heparin drip PTT 55.6-Pt transported off unit for cath so unable to scan
independent

## 2018-04-09 NOTE — PROVIDER CONTACT NOTE (OTHER) - BACKGROUND
Pt admitted with NSTEMI. Heparin gtt at 16cc/hr. Pt on ACS nomogram
Pt admitted for CP
Pt admitted for chest pain.

## 2018-04-09 NOTE — CONSULT NOTE ADULT - SUBJECTIVE AND OBJECTIVE BOX
HPI:  62 m with h/o DM and htn though pt states d/mervin meds 1 yr ago 2nd controlled numbers with weight loss (+ h/o retinopathy + laser rx and neuropathy of toes) now a/w 2nd episode of chest tightness and dyspnea.  Pt is very active, does martial arts.  Saw PMD tuesday with uri symptoms (cough) and given prednisone/antibiotics.  Had chest tightness and dyspnea once and resolved (in the setting of exertion/dust) but occurred today at 2pm.  Noted diaphoresis once at night unrelated to cp/sob.  No n/v.  Chronic constipation with mild RLQ pain relieved with BM.      In ED elevated BPs, hr 90s.  EKG changes  +Q waves II, III, AVF, fixed 1mm STD inferior leads, Q-waves V4-V6 ( new from 11/27/2017 EKG).  + troponin.  Started heparin drip and loaded with aspirin and plavix.  Pt asymptomatic in ED. (07 Apr 2018 23:07)  Patient has history of diabetes, not on any DM meds at home,  no polyuria polydipsia. Patient follows up with PCP.    PAST MEDICAL & SURGICAL HISTORY:  Essential hypertension  Type 2 diabetes mellitus with diabetic neuropathy, without long-term current use of insulin  History of excision of pilonidal cyst  H/O inguinal hernia repair      FAMILY HISTORY:  Family history of acute myocardial infarction (Father): father in 90s      Social History:    Outpatient Medications:    MEDICATIONS  (STANDING):  aspirin enteric coated 81 milliGRAM(s) Oral daily  atorvastatin 80 milliGRAM(s) Oral at bedtime  dextrose 5%. 1000 milliLiter(s) (50 mL/Hr) IV Continuous <Continuous>  dextrose 50% Injectable 12.5 Gram(s) IV Push once  dextrose 50% Injectable 25 Gram(s) IV Push once  dextrose 50% Injectable 25 Gram(s) IV Push once  heparin  Infusion. 1300 Unit(s)/Hr (13 mL/Hr) IV Continuous <Continuous>  insulin glargine Injectable (LANTUS) 10 Unit(s) SubCutaneous at bedtime  insulin lispro (HumaLOG) corrective regimen sliding scale   SubCutaneous three times a day before meals  insulin lispro Injectable (HumaLOG) 5 Unit(s) SubCutaneous three times a day before meals  isosorbide   mononitrate ER Tablet (IMDUR) 30 milliGRAM(s) Oral daily  metoprolol tartrate 12.5 milliGRAM(s) Oral every 12 hours    MEDICATIONS  (PRN):  dextrose Gel 1 Dose(s) Oral once PRN Blood Glucose LESS THAN 70 milliGRAM(s)/deciliter  glucagon  Injectable 1 milliGRAM(s) IntraMuscular once PRN Glucose LESS THAN 70 milligrams/deciliter  heparin  Injectable 6000 Unit(s) IV Push every 6 hours PRN For aPTT less than 40      Allergies    allery to INSECT VENOM (Hives)  Novocain (Hives)    Intolerances      Review of Systems:  Constitutional: No fever, no chills  Eyes: No blurry vision  Neuro: No tremors  HEENT: No pain, no neck swelling  Cardiovascular: No chest pain, no palpitations  Respiratory: Has SOB, no cough  GI: No nausea, vomiting, abdominal pain  : No dysuria  Skin: no rash  MSK: Has leg swelling.  Psych: no depression  Endocrine: no polyuria, polydipsia    ALL OTHER SYSTEMS REVIEWED AND NEGATIVE    UNABLE TO OBTAIN    PHYSICAL EXAM:  VITALS: T(C): 37.1 (04-09-18 @ 08:53)  T(F): 98.8 (04-09-18 @ 08:53), Max: 98.8 (04-09-18 @ 06:25)  HR: 80 (04-09-18 @ 08:53) (74 - 80)  BP: 145/88 (04-09-18 @ 08:53) (134/82 - 160/98)  RR:  (17 - 19)  SpO2:  (95% - 98%)  Wt(kg): --  GENERAL: NAD, well-groomed, well-developed  EYES: No proptosis, no lid lag  HEENT:  Atraumatic, Normocephalic  THYROID: Normal size, no palpable nodules  RESPIRATORY: Clear to auscultation bilaterally; No rales, rhonchi, wheezing  CARDIOVASCULAR: Si S2, No murmurs;  GI: Soft, non distended, normal bowel sounds  SKIN: Dry, intact, No rashes or lesions  MUSCULOSKELETAL: Has BL lower extremity edema.  NEURO:  no tremor, sensation decreased in feet BL,    POCT Blood Glucose.: 197 mg/dL (04-09-18 @ 08:06)  POCT Blood Glucose.: 221 mg/dL (04-09-18 @ 06:29)  POCT Blood Glucose.: 252 mg/dL (04-08-18 @ 22:16)  POCT Blood Glucose.: 292 mg/dL (04-08-18 @ 17:40)  POCT Blood Glucose.: 194 mg/dL (04-08-18 @ 12:26)  POCT Blood Glucose.: 291 mg/dL (04-08-18 @ 08:57)                            13.1   13.1  )-----------( 264      ( 09 Apr 2018 05:37 )             37.8       04-09    134<L>  |  96  |  18  ----------------------------<  246<H>  4.5   |  24  |  0.84    EGFR if : 110  EGFR if non : 94    Ca    9.0      04-09  Mg     1.9     04-08  Phos  3.3     04-08    TPro  7.3  /  Alb  3.8  /  TBili  1.1  /  DBili  0.2  /  AST  12  /  ALT  33  /  AlkPhos  66  04-08      Thyroid Function Tests:  04-08 @ 07:52 TSH 2.09 FreeT4 -- T3 -- Anti TPO -- Anti Thyroglobulin Ab -- TSI --      Hemoglobin A1C, Whole Blood: 7.9 % <H> [4.0 - 5.6] (04-08-18 @ 07:52)      04-08 Chol 146 LDL 91 HDL 21<L> Trig 169<H>    Radiology:

## 2018-04-09 NOTE — PROGRESS NOTE ADULT - SUBJECTIVE AND OBJECTIVE BOX
[facility] Cardiology Progress Note  _______________________________    Pt. seen and examined. No new cardiac-related complaints. No chest pain, dyspnea, LE edema, orthopnea or palpitations.    Telemetry - sinus 60-70's, PAC's, multifocal PVC's.    T(C): 37.1 (04-09-18 @ 08:53), Max: 37.1 (04-09-18 @ 06:25)  HR: 80 (04-09-18 @ 08:53) (74 - 80)  BP: 145/88 (04-09-18 @ 08:53) (134/82 - 160/98)  RR: 19 (04-09-18 @ 08:53) (17 - 19)  SpO2: 98% (04-09-18 @ 08:53) (95% - 98%)  I&O's Summary    08 Apr 2018 07:01  -  09 Apr 2018 07:00  --------------------------------------------------------  IN: 513 mL / OUT: 1050 mL / NET: -537 mL    09 Apr 2018 07:01  -  09 Apr 2018 09:12  --------------------------------------------------------  IN: 344 mL / OUT: 650 mL / NET: -306 mL        PHYSICAL EXAM:  GENERAL: Alert, NAD  NECK: Supple, No JVD, No carotid bruit.  CHEST/LUNG: Clear to auscultation bilaterally; No wheezes, rales, or rhonchi  HEART: S1 S2 normal, RRR,  No murmurs, rubs, or gallops  ABDOMEN: Soft, Nontender, Nondistended; Bowel sounds present  EXTREMITIES:  No LE edema.    LABS:                        13.1   13.1  )-----------( 264      ( 09 Apr 2018 05:37 )             37.8     04-09    134<L>  |  96  |  18  ----------------------------<  246<H>  4.5   |  24  |  0.84    Ca    9.0      09 Apr 2018 05:37  Phos  3.3     04-08  Mg     1.9     04-08    TPro  7.3  /  Alb  3.8  /  TBili  1.1  /  DBili  0.2  /  AST  12  /  ALT  33  /  AlkPhos  66  04-08    PT/INR - ( 07 Apr 2018 18:46 )   PT: 14.9 sec;   INR: 1.37 ratio         PTT - ( 09 Apr 2018 03:46 )  PTT:31.6 sec  CARDIAC MARKERS ( 09 Apr 2018 05:37 )  x     / 1.18 ng/mL / 68 U/L / x     / 3.1 ng/mL  CARDIAC MARKERS ( 08 Apr 2018 23:43 )  x     / 1.24 ng/mL / 63 U/L / x     / 3.2 ng/mL  CARDIAC MARKERS ( 08 Apr 2018 14:06 )  x     / 1.51 ng/mL / 74 U/L / x     / 3.4 ng/mL  CARDIAC MARKERS ( 08 Apr 2018 03:55 )  x     / 0.97 ng/mL / 79 U/L / x     / 3.7 ng/mL  CARDIAC MARKERS ( 07 Apr 2018 23:08 )  x     / 1.05 ng/mL / x     / x     / x      CARDIAC MARKERS ( 07 Apr 2018 18:46 )  x     / 1.24 ng/mL / x     / x     / x                  MEDICATIONS  (STANDING):  aspirin enteric coated 81 milliGRAM(s) Oral daily  atorvastatin 80 milliGRAM(s) Oral at bedtime  dextrose 5%. 1000 milliLiter(s) (50 mL/Hr) IV Continuous <Continuous>  dextrose 50% Injectable 12.5 Gram(s) IV Push once  dextrose 50% Injectable 25 Gram(s) IV Push once  dextrose 50% Injectable 25 Gram(s) IV Push once  heparin  Infusion. 1300 Unit(s)/Hr (13 mL/Hr) IV Continuous <Continuous>  insulin lispro (HumaLOG) corrective regimen sliding scale   SubCutaneous three times a day before meals  isosorbide    mononitrate Tablet (ISMO) 30 milliGRAM(s) Oral daily  metoprolol tartrate 12.5 milliGRAM(s) Oral every 12 hours    MEDICATIONS  (PRN):  dextrose Gel 1 Dose(s) Oral once PRN Blood Glucose LESS THAN 70 milliGRAM(s)/deciliter  glucagon  Injectable 1 milliGRAM(s) IntraMuscular once PRN Glucose LESS THAN 70 milligrams/deciliter  heparin  Injectable 6000 Unit(s) IV Push every 6 hours PRN For aPTT less than 40      RADIOLOGY & ADDITIONAL TESTS:

## 2018-04-09 NOTE — PROVIDER CONTACT NOTE (OTHER) - SITUATION
Pt heparin drip PTT 55.6-Pt transported off unit for cath so unable to scan heparin drip. RN in cath lab states heparin gtt will be shut off during cath procedure.

## 2018-04-09 NOTE — CONSULT NOTE ADULT - ASSESSMENT
Assessment  DMT2: 62y Male with DM T2 with hyperglycemia on insulin coverage now, not on any DM meds at home, was trying to lose weight,  blood sugars running high, no hypoglycemic episode,  eating meals,  non compliant with low carb diet.  CAD: Planing Cath today, o medications, stable, monitored.  HTN: Controlled, On med.

## 2018-04-09 NOTE — CONSULT NOTE ADULT - PROBLEM SELECTOR RECOMMENDATION 9
OR  wednesday for CABG  Please send [id number]  Need ECHO for LV function, EF
-  - CK's are normal and troponins have downtrended since admission  - Likely recent/missed MI.  - currently asymptomatic without any clinical evidence of heart failure.  - telemetry monitoring.  - cont heparin gtt for.  - cont. ASA and high intensity statin.  - will DC plavix after today's dose in case cath show's 3VD.  - check echo.  - Plan for cardiac cath tomorrow. Risks/benefits/alternatives d/w pt and he is agreeable to proceed for tomorrow.  - will follow.    Venkatesh Sellers M.D., [facility]  [phone]
Will continue current insulin regimen for now. Will continue monitoring FS, log, will Follow up.  Will start Lantus 10 units at bed time.  Will start Humalog 5 units before each meal in addition to Humalog correction scale coverage.  Patient counseled for compliance with consistent low carb diet.

## 2018-04-10 LAB
ALBUMIN SERPL ELPH-MCNC: 3.5 G/DL — SIGNIFICANT CHANGE UP (ref 3.3–5)
ALP SERPL-CCNC: 58 U/L — SIGNIFICANT CHANGE UP (ref 40–120)
ALT FLD-CCNC: 21 U/L RC — SIGNIFICANT CHANGE UP (ref 10–45)
ANION GAP SERPL CALC-SCNC: 12 MMOL/L — SIGNIFICANT CHANGE UP (ref 5–17)
APTT BLD: 49.8 SEC — HIGH (ref 27.5–37.4)
APTT BLD: 56.3 SEC — HIGH (ref 27.5–37.4)
APTT BLD: 63.1 SEC — HIGH (ref 27.5–37.4)
AST SERPL-CCNC: 7 U/L — LOW (ref 10–40)
BILIRUB SERPL-MCNC: 1.2 MG/DL — SIGNIFICANT CHANGE UP (ref 0.2–1.2)
BUN SERPL-MCNC: 14 MG/DL — SIGNIFICANT CHANGE UP (ref 7–23)
CALCIUM SERPL-MCNC: 9.2 MG/DL — SIGNIFICANT CHANGE UP (ref 8.4–10.5)
CHLORIDE SERPL-SCNC: 97 MMOL/L — SIGNIFICANT CHANGE UP (ref 96–108)
CO2 SERPL-SCNC: 27 MMOL/L — SIGNIFICANT CHANGE UP (ref 22–31)
CREAT SERPL-MCNC: 0.91 MG/DL — SIGNIFICANT CHANGE UP (ref 0.5–1.3)
GLUCOSE SERPL-MCNC: 180 MG/DL — HIGH (ref 70–99)
HCT VFR BLD CALC: 37.9 % — LOW (ref 39–50)
HCT VFR BLD CALC: 39.2 % — SIGNIFICANT CHANGE UP (ref 39–50)
HGB BLD-MCNC: 13.1 G/DL — SIGNIFICANT CHANGE UP (ref 13–17)
HGB BLD-MCNC: 13.5 G/DL — SIGNIFICANT CHANGE UP (ref 13–17)
MCHC RBC-ENTMCNC: 32 PG — SIGNIFICANT CHANGE UP (ref 27–34)
MCHC RBC-ENTMCNC: 32.1 PG — SIGNIFICANT CHANGE UP (ref 27–34)
MCHC RBC-ENTMCNC: 34.5 GM/DL — SIGNIFICANT CHANGE UP (ref 32–36)
MCHC RBC-ENTMCNC: 34.5 GM/DL — SIGNIFICANT CHANGE UP (ref 32–36)
MCV RBC AUTO: 92.7 FL — SIGNIFICANT CHANGE UP (ref 80–100)
MCV RBC AUTO: 93 FL — SIGNIFICANT CHANGE UP (ref 80–100)
PLATELET # BLD AUTO: 257 K/UL — SIGNIFICANT CHANGE UP (ref 150–400)
PLATELET # BLD AUTO: 276 K/UL — SIGNIFICANT CHANGE UP (ref 150–400)
POTASSIUM SERPL-MCNC: 4.3 MMOL/L — SIGNIFICANT CHANGE UP (ref 3.5–5.3)
POTASSIUM SERPL-SCNC: 4.3 MMOL/L — SIGNIFICANT CHANGE UP (ref 3.5–5.3)
PROT SERPL-MCNC: 6.8 G/DL — SIGNIFICANT CHANGE UP (ref 6–8.3)
RBC # BLD: 4.07 M/UL — LOW (ref 4.2–5.8)
RBC # BLD: 4.22 M/UL — SIGNIFICANT CHANGE UP (ref 4.2–5.8)
RBC # FLD: 12.6 % — SIGNIFICANT CHANGE UP (ref 10.3–14.5)
RBC # FLD: 13 % — SIGNIFICANT CHANGE UP (ref 10.3–14.5)
SODIUM SERPL-SCNC: 136 MMOL/L — SIGNIFICANT CHANGE UP (ref 135–145)
WBC # BLD: 12.8 K/UL — HIGH (ref 3.8–10.5)
WBC # BLD: 14.5 K/UL — HIGH (ref 3.8–10.5)
WBC # FLD AUTO: 12.8 K/UL — HIGH (ref 3.8–10.5)
WBC # FLD AUTO: 14.5 K/UL — HIGH (ref 3.8–10.5)

## 2018-04-10 RX ORDER — PANTOPRAZOLE SODIUM 20 MG/1
40 TABLET, DELAYED RELEASE ORAL DAILY
Qty: 0 | Refills: 0 | Status: DISCONTINUED | OUTPATIENT
Start: 2018-04-11 | End: 2018-04-12

## 2018-04-10 RX ORDER — INSULIN HUMAN 100 [IU]/ML
3 INJECTION, SOLUTION SUBCUTANEOUS
Qty: 100 | Refills: 0 | Status: DISCONTINUED | OUTPATIENT
Start: 2018-04-11 | End: 2018-04-13

## 2018-04-10 RX ORDER — SODIUM CHLORIDE 9 MG/ML
1000 INJECTION, SOLUTION INTRAVENOUS
Qty: 0 | Refills: 0 | Status: DISCONTINUED | OUTPATIENT
Start: 2018-04-11 | End: 2018-04-17

## 2018-04-10 RX ORDER — ALPRAZOLAM 0.25 MG
0.25 TABLET ORAL ONCE
Qty: 0 | Refills: 0 | Status: DISCONTINUED | OUTPATIENT
Start: 2018-04-10 | End: 2018-04-10

## 2018-04-10 RX ORDER — MEPERIDINE HYDROCHLORIDE 50 MG/ML
25 INJECTION INTRAMUSCULAR; INTRAVENOUS; SUBCUTANEOUS ONCE
Qty: 0 | Refills: 0 | Status: DISCONTINUED | OUTPATIENT
Start: 2018-04-11 | End: 2018-04-12

## 2018-04-10 RX ORDER — FAMOTIDINE 10 MG/ML
20 INJECTION INTRAVENOUS DAILY
Qty: 0 | Refills: 0 | Status: DISCONTINUED | OUTPATIENT
Start: 2018-04-10 | End: 2018-04-11

## 2018-04-10 RX ORDER — NYSTATIN CREAM 100000 [USP'U]/G
1 CREAM TOPICAL ONCE
Qty: 0 | Refills: 0 | Status: COMPLETED | OUTPATIENT
Start: 2018-04-10 | End: 2018-04-10

## 2018-04-10 RX ORDER — CEFUROXIME AXETIL 250 MG
1500 TABLET ORAL ONCE
Qty: 0 | Refills: 0 | Status: DISCONTINUED | OUTPATIENT
Start: 2018-04-10 | End: 2018-04-11

## 2018-04-10 RX ORDER — CHLORHEXIDINE GLUCONATE 213 G/1000ML
15 SOLUTION TOPICAL
Qty: 0 | Refills: 0 | Status: DISCONTINUED | OUTPATIENT
Start: 2018-04-10 | End: 2018-04-11

## 2018-04-10 RX ORDER — POTASSIUM CHLORIDE 20 MEQ
10 PACKET (EA) ORAL ONCE
Qty: 0 | Refills: 0 | Status: DISCONTINUED | OUTPATIENT
Start: 2018-04-11 | End: 2018-04-12

## 2018-04-10 RX ORDER — DEXTROSE 50 % IN WATER 50 %
25 SYRINGE (ML) INTRAVENOUS
Qty: 0 | Refills: 0 | Status: DISCONTINUED | OUTPATIENT
Start: 2018-04-11 | End: 2018-04-17

## 2018-04-10 RX ORDER — METOCLOPRAMIDE HCL 10 MG
10 TABLET ORAL EVERY 8 HOURS
Qty: 0 | Refills: 0 | Status: COMPLETED | OUTPATIENT
Start: 2018-04-11 | End: 2018-04-13

## 2018-04-10 RX ORDER — DEXTROSE 50 % IN WATER 50 %
50 SYRINGE (ML) INTRAVENOUS
Qty: 0 | Refills: 0 | Status: DISCONTINUED | OUTPATIENT
Start: 2018-04-11 | End: 2018-04-17

## 2018-04-10 RX ORDER — FLUCONAZOLE 150 MG/1
200 TABLET ORAL ONCE
Qty: 0 | Refills: 0 | Status: COMPLETED | OUTPATIENT
Start: 2018-04-10 | End: 2018-04-10

## 2018-04-10 RX ORDER — DOCUSATE SODIUM 100 MG
100 CAPSULE ORAL THREE TIMES A DAY
Qty: 0 | Refills: 0 | Status: DISCONTINUED | OUTPATIENT
Start: 2018-04-11 | End: 2018-04-17

## 2018-04-10 RX ORDER — POTASSIUM CHLORIDE 20 MEQ
10 PACKET (EA) ORAL
Qty: 0 | Refills: 0 | Status: DISCONTINUED | OUTPATIENT
Start: 2018-04-11 | End: 2018-04-12

## 2018-04-10 RX ADMIN — FAMOTIDINE 20 MILLIGRAM(S): 10 INJECTION INTRAVENOUS at 17:49

## 2018-04-10 RX ADMIN — Medication 5 UNIT(S): at 17:49

## 2018-04-10 RX ADMIN — HEPARIN SODIUM 2400 UNIT(S)/HR: 5000 INJECTION INTRAVENOUS; SUBCUTANEOUS at 07:36

## 2018-04-10 RX ADMIN — Medication 1: at 08:15

## 2018-04-10 RX ADMIN — NYSTATIN CREAM 1 APPLICATION(S): 100000 CREAM TOPICAL at 23:02

## 2018-04-10 RX ADMIN — Medication 81 MILLIGRAM(S): at 11:40

## 2018-04-10 RX ADMIN — Medication 12.5 MILLIGRAM(S): at 17:49

## 2018-04-10 RX ADMIN — ISOSORBIDE MONONITRATE 30 MILLIGRAM(S): 60 TABLET, EXTENDED RELEASE ORAL at 11:40

## 2018-04-10 RX ADMIN — ATORVASTATIN CALCIUM 80 MILLIGRAM(S): 80 TABLET, FILM COATED ORAL at 22:39

## 2018-04-10 RX ADMIN — Medication 12.5 MILLIGRAM(S): at 05:08

## 2018-04-10 RX ADMIN — CHLORHEXIDINE GLUCONATE 15 MILLILITER(S): 213 SOLUTION TOPICAL at 22:39

## 2018-04-10 RX ADMIN — HEPARIN SODIUM 2400 UNIT(S)/HR: 5000 INJECTION INTRAVENOUS; SUBCUTANEOUS at 14:23

## 2018-04-10 RX ADMIN — Medication 1: at 12:43

## 2018-04-10 RX ADMIN — Medication 1: at 17:49

## 2018-04-10 RX ADMIN — HEPARIN SODIUM 2400 UNIT(S)/HR: 5000 INJECTION INTRAVENOUS; SUBCUTANEOUS at 01:08

## 2018-04-10 RX ADMIN — Medication 5 UNIT(S): at 08:15

## 2018-04-10 RX ADMIN — INSULIN GLARGINE 10 UNIT(S): 100 INJECTION, SOLUTION SUBCUTANEOUS at 22:45

## 2018-04-10 RX ADMIN — Medication 5 UNIT(S): at 12:43

## 2018-04-10 RX ADMIN — Medication 0.25 MILLIGRAM(S): at 23:02

## 2018-04-10 RX ADMIN — FLUCONAZOLE 200 MILLIGRAM(S): 150 TABLET ORAL at 22:39

## 2018-04-10 NOTE — PROGRESS NOTE ADULT - SUBJECTIVE AND OBJECTIVE BOX
Chief complaint  Patient is a 62y old  Male who presents with a chief complaint of complain of CP (08 Apr 2018 12:56)   Review of systems  Patient in bed, looks comfortable, no fever,  had no hypoglycemia.    Labs and Fingersticks  CAPILLARY BLOOD GLUCOSE      POCT Blood Glucose.: 182 mg/dL (10 Apr 2018 12:39)  POCT Blood Glucose.: 202 mg/dL (10 Apr 2018 11:29)  POCT Blood Glucose.: 176 mg/dL (10 Apr 2018 07:37)  POCT Blood Glucose.: 204 mg/dL (09 Apr 2018 21:34)      Anion Gap, Serum: 12 (04-10 @ 07:07)  Anion Gap, Serum: 14 (04-09 @ 05:37)      Calcium, Total Serum: 9.2 (04-10 @ 07:07)  Calcium, Total Serum: 9.0 (04-09 @ 05:37)  Albumin, Serum: 3.5 (04-10 @ 07:07)    Alanine Aminotransferase (ALT/SGPT): 21 (04-10 @ 07:07)  Alkaline Phosphatase, Serum: 58 (04-10 @ 07:07)  Aspartate Aminotransferase (AST/SGOT): 7 <L> (04-10 @ 07:07)        04-10    136  |  97  |  14  ----------------------------<  180<H>  4.3   |  27  |  0.91    Ca    9.2      10 Apr 2018 07:07    TPro  6.8  /  Alb  3.5  /  TBili  1.2  /  DBili  x   /  AST  7<L>  /  ALT  21  /  AlkPhos  58  04-10                        13.5   14.5  )-----------( 276      ( 10 Apr 2018 07:07 )             39.2     Medications  MEDICATIONS  (STANDING):  aspirin enteric coated 81 milliGRAM(s) Oral daily  atorvastatin 80 milliGRAM(s) Oral at bedtime  cefuroxime  IVPB 1500 milliGRAM(s) IV Intermittent once  chlorhexidine 0.12% Liquid 15 milliLiter(s) Swish and Spit two times a day  dextrose 5%. 1000 milliLiter(s) (50 mL/Hr) IV Continuous <Continuous>  dextrose 50% Injectable 12.5 Gram(s) IV Push once  dextrose 50% Injectable 25 Gram(s) IV Push once  dextrose 50% Injectable 25 Gram(s) IV Push once  famotidine    Tablet 20 milliGRAM(s) Oral daily  heparin  Infusion. 2200 Unit(s)/Hr (22 mL/Hr) IV Continuous <Continuous>  insulin glargine Injectable (LANTUS) 10 Unit(s) SubCutaneous at bedtime  insulin lispro (HumaLOG) corrective regimen sliding scale   SubCutaneous three times a day before meals  insulin lispro Injectable (HumaLOG) 5 Unit(s) SubCutaneous three times a day before meals  isosorbide   mononitrate ER Tablet (IMDUR) 30 milliGRAM(s) Oral daily  metoprolol tartrate 12.5 milliGRAM(s) Oral every 12 hours      Physical Exam  General: Patient comfortable in bed  Vital Signs Last 12 Hrs  T(F): 98.7 (04-10-18 @ 13:30), Max: 98.7 (04-10-18 @ 13:30)  HR: 80 (04-10-18 @ 13:30) (77 - 80)  BP: 126/82 (04-10-18 @ 13:30) (126/82 - 141/95)  BP(mean): --  RR: 18 (04-10-18 @ 13:30) (18 - 18)  SpO2: 96% (04-10-18 @ 13:30) (96% - 96%)  Neck: No palpable thyroid nodules.  CVS: S1S2, No murmurs  Respiratory: No wheezing, no crepitations  GI: Abdomen soft, bowel sounds positive  Musculoskeletal:  edema lower extremities.   Skin: No skin rashes, no ecchymosis    Diagnostics

## 2018-04-10 NOTE — PROGRESS NOTE ADULT - ASSESSMENT
62 M h/o Obesity, DM, HTN, active cigar smoker a/w likely recent/missed MI and cath showing multivessel CAD best addressed by surgical intervention

## 2018-04-10 NOTE — PROGRESS NOTE ADULT - ASSESSMENT
Assessment  DMT2: 62y Male with DM T2 with hyperglycemia on insulin, blood sugars improving now, no hypoglycemic episode,  eating meals,  non compliant with low carb diet.  CAD: Planing Cath today, o medications, stable, monitored.  HTN: Controlled, On med.

## 2018-04-10 NOTE — PROGRESS NOTE ADULT - SUBJECTIVE AND OBJECTIVE BOX
Cardiac Surgery Pre-op Note:    CC: Patient is a 62y old  Male who presents with a chief complaint of complain of CP found to have triple vessel CAD                                                                                                             Surgeon: Yareli    Procedure: CABG    Allergies    allery to INSECT VENOM (Hives)  Novocain (Hives)    Intolerances        HPI:  62 m with h/o DM and htn though pt states d/mervin meds 1 yr ago 2nd controlled numbers with weight loss (+ h/o retinopathy + laser rx and neuropathy of toes) now a/w 2nd episode of chest tightness and dyspnea.  Pt is very active, does martial arts.  Saw PMD tuesday with uri symptoms (cough) and given prednisone/antibiotics.  Had chest tightness and dyspnea once and resolved (in the setting of exertion/dust) but occurred today at 2pm.  Noted diaphoresis once at night unrelated to cp/sob.  No n/v.  Chronic constipation with mild RLQ pain relieved with BM.      In ED elevated BPs, hr 90s.  EKG changes  +Q waves II, III, AVF, fixed 1mm STD inferior leads, Q-waves V4-V6 ( new from 2017 EKG).  + troponin.  Started heparin drip and loaded with aspirin and plavix.  Pt asymptomatic in ED. (2018 23:07)      PAST MEDICAL & SURGICAL HISTORY:  Essential hypertension  Type 2 diabetes mellitus with diabetic neuropathy, without long-term current use of insulin  History of excision of pilonidal cyst  H/O inguinal hernia repair      MEDICATIONS  (STANDING):  aspirin enteric coated 81 milliGRAM(s) Oral daily  atorvastatin 80 milliGRAM(s) Oral at bedtime  cefuroxime  IVPB 1500 milliGRAM(s) IV Intermittent once  chlorhexidine 0.12% Liquid 15 milliLiter(s) Swish and Spit two times a day  dextrose 5%. 1000 milliLiter(s) (50 mL/Hr) IV Continuous <Continuous>  dextrose 50% Injectable 12.5 Gram(s) IV Push once  dextrose 50% Injectable 25 Gram(s) IV Push once  dextrose 50% Injectable 25 Gram(s) IV Push once  famotidine    Tablet 20 milliGRAM(s) Oral daily  heparin  Infusion. 2200 Unit(s)/Hr (22 mL/Hr) IV Continuous <Continuous>  insulin glargine Injectable (LANTUS) 10 Unit(s) SubCutaneous at bedtime  insulin lispro (HumaLOG) corrective regimen sliding scale   SubCutaneous three times a day before meals  insulin lispro Injectable (HumaLOG) 5 Unit(s) SubCutaneous three times a day before meals  isosorbide   mononitrate ER Tablet (IMDUR) 30 milliGRAM(s) Oral daily  metoprolol tartrate 12.5 milliGRAM(s) Oral every 12 hours    MEDICATIONS  (PRN):  dextrose Gel 1 Dose(s) Oral once PRN Blood Glucose LESS THAN 70 milliGRAM(s)/deciliter  glucagon  Injectable 1 milliGRAM(s) IntraMuscular once PRN Glucose LESS THAN 70 milligrams/deciliter  heparin  Injectable 6000 Unit(s) IV Push every 6 hours PRN For aPTT less than 40  sodium chloride 0.65% Nasal 1 Spray(s) Both Nostrils three times a day PRN Nasal Congestion        Labs:                        13.5   14.5  )-----------( 276      ( 10 Apr 2018 07:07 )             39.2     04-10    136  |  97  |  14  ----------------------------<  180<H>  4.3   |  27  |  0.91    Ca    9.2      10 Apr 2018 07:07    TPro  6.8  /  Alb  3.5  /  TBili  1.2  /  DBili  x   /  AST  7<L>  /  ALT  21  /  AlkPhos  58  0410    PTT - ( 10 Apr 2018 13:43 )  PTT:56.3 sec    Blood Type: ABO Interpretation: O ( @ 16:45)    HGB A1C: Hemoglobin A1C, Whole Blood: 7.9 % ( @ 07:52)    Pro-BNP: Serum Pro-Brain Natriuretic Peptide: 6770 pg/mL ( @ 18:46)    Thyroid Panel:  @ 07:52/2.09  --/--/--    MRSA: MRSA PCR Result.: Thuanteglendy ( @ 16:01)   / MSSA:   Urinalysis Basic - ( 2018 14:48 )    Color: Yellow / Appearance: Clear / S.017 / pH: x  Gluc: x / Ketone: Negative  / Bili: Negative / Urobili: 1 mg/dL   Blood: x / Protein: Negative / Nitrite: Negative   Leuk Esterase: Negative / RBC: x / WBC x   Sq Epi: x / Non Sq Epi: x / Bacteria: x        CXR:     EKG:  NORMAL SINUS RHYTHM  INFERIOR INFARCT  80s    Carotid Duplex:    No hemodynamically significant carotid artery stenoses.    PFT's:    Echocardiogram:  1. Mitral annular calcification. Tethered mitral valve  leaflets with normal opening.  2. Aortic valve not well visualized; appears calcified.  Peak transaortic valve gradient equals 17 mm Hg, mean  transaortic valve gradient equals 8 mm Hg, estimated aortic  valve area equals 1.1 sqcm (by continuity equation), aortic  valve velocity time integral equals 40 cm, consistent with  low gradient moderate aortic stenosis. Aortic valve and  LVOT are poorly visualized limiting assessment. Consider  additionalimaging of the aortic valve. No aortic valve  regurgitation seen.  3. Severe concentric left ventricular hypertrophy.  4. Endocardial visualization enhanced with intravenous  injection of echo contrast (Definity). Left ventricle is  suboptimtally visualized despite the use of intravenous  echo contrast; grossly moderate to severe left ventricular  systolic dysfunction. The mid septum, distal segments and  the apex are severely hypo/akinetic. EF by visual  estimation approximately 30-35%.  5. Moderate diastolic dysfunction (Stage II).  6. The right ventricle is not well visualized; grossly  normal right ventricle appears mildly enlarged with normal  systolic function.    Cardiac catheterization:  CORONARY VESSELS: The coronarycirculation is right dominant.  LM:   --  LM: Angiography showed minor luminal irregularities with no flow  limiting lesions.  LAD:   --  Proximal LAD: There was a diffuse 50 % stenosis.  --  Mid LAD: There was a 100 % stenosis.  CX:   --  Distal circumflex: Angiography showed severe atherosclerosis.  --  OM1: There was a 95 % stenosis.  RCA:   --  Mid RCA: There was a 100 % stenosis.      Gen: WN/WD NAD  Neuro: AAOx3, nonfocal  Pulm: CTA B/L  CV: RRR, S1S2  Abd: Soft, NT, ND +BS  Ext: No edema, + peripheral pulses      Pt has AICD/PPM [ ] Yes  [x ] No             Brand Name:  Pre-op Beta Blocker ordered within 24 hrs of surgery?  [x ] Yes  [ ] No  If not, Why?  Type & Cross  [x ] Yes  [ ] No  NPO after Midnight [x ] Yes  [ ] No  Pre-op ABX ordered, to be taped on chart:  [x ] Yes  [ ] No     Hibiclens/Peridex ordered [x ] Yes  [ ] No  Intraop on Hold:  CXR, JIMMY [x ]   Consent obtained  [ ] Yes  [ ] No Cardiac Surgery Pre-op Note:    CC: Patient is a 62y old  Male who presents with a chief complaint of complain of CP found to have triple vessel CAD                                                                                                             Surgeon: Yareli    Procedure: CABG    Allergies    allery to INSECT VENOM (Hives)  Novocain (Hives)    Intolerances        HPI:  62 m with h/o DM and htn though pt states d/mervin meds 1 yr ago 2nd controlled numbers with weight loss (+ h/o retinopathy + laser rx and neuropathy of toes) now a/w 2nd episode of chest tightness and dyspnea.  Pt is very active, does martial arts.  Saw PMD tuesday with uri symptoms (cough) and given prednisone/antibiotics.  Had chest tightness and dyspnea once and resolved (in the setting of exertion/dust) but occurred today at 2pm.  Noted diaphoresis once at night unrelated to cp/sob.  No n/v.  Chronic constipation with mild RLQ pain relieved with BM.      In ED elevated BPs, hr 90s.  EKG changes  +Q waves II, III, AVF, fixed 1mm STD inferior leads, Q-waves V4-V6 ( new from 2017 EKG).  + troponin.  Started heparin drip and loaded with aspirin and plavix.  Pt asymptomatic in ED. (2018 23:07)      PAST MEDICAL & SURGICAL HISTORY:  Essential hypertension  Type 2 diabetes mellitus with diabetic neuropathy, without long-term current use of insulin  History of excision of pilonidal cyst  H/O inguinal hernia repair      MEDICATIONS  (STANDING):  aspirin enteric coated 81 milliGRAM(s) Oral daily  atorvastatin 80 milliGRAM(s) Oral at bedtime  cefuroxime  IVPB 1500 milliGRAM(s) IV Intermittent once  chlorhexidine 0.12% Liquid 15 milliLiter(s) Swish and Spit two times a day  dextrose 5%. 1000 milliLiter(s) (50 mL/Hr) IV Continuous <Continuous>  dextrose 50% Injectable 12.5 Gram(s) IV Push once  dextrose 50% Injectable 25 Gram(s) IV Push once  dextrose 50% Injectable 25 Gram(s) IV Push once  famotidine    Tablet 20 milliGRAM(s) Oral daily  heparin  Infusion. 2200 Unit(s)/Hr (22 mL/Hr) IV Continuous <Continuous>  insulin glargine Injectable (LANTUS) 10 Unit(s) SubCutaneous at bedtime  insulin lispro (HumaLOG) corrective regimen sliding scale   SubCutaneous three times a day before meals  insulin lispro Injectable (HumaLOG) 5 Unit(s) SubCutaneous three times a day before meals  isosorbide   mononitrate ER Tablet (IMDUR) 30 milliGRAM(s) Oral daily  metoprolol tartrate 12.5 milliGRAM(s) Oral every 12 hours    MEDICATIONS  (PRN):  dextrose Gel 1 Dose(s) Oral once PRN Blood Glucose LESS THAN 70 milliGRAM(s)/deciliter  glucagon  Injectable 1 milliGRAM(s) IntraMuscular once PRN Glucose LESS THAN 70 milligrams/deciliter  heparin  Injectable 6000 Unit(s) IV Push every 6 hours PRN For aPTT less than 40  sodium chloride 0.65% Nasal 1 Spray(s) Both Nostrils three times a day PRN Nasal Congestion        Labs:                        13.5   14.5  )-----------( 276      ( 10 Apr 2018 07:07 )             39.2     04-10    136  |  97  |  14  ----------------------------<  180<H>  4.3   |  27  |  0.91    Ca    9.2      10 Apr 2018 07:07    TPro  6.8  /  Alb  3.5  /  TBili  1.2  /  DBili  x   /  AST  7<L>  /  ALT  21  /  AlkPhos  58  0410    PTT - ( 10 Apr 2018 13:43 )  PTT:56.3 sec    Blood Type: ABO Interpretation: O ( @ 16:45)    HGB A1C: Hemoglobin A1C, Whole Blood: 7.9 % ( @ 07:52)    Pro-BNP: Serum Pro-Brain Natriuretic Peptide: 6770 pg/mL ( @ 18:46)    Thyroid Panel:  @ 07:52/2.09  --/--/--    MRSA: MRSA PCR Result.: Thuanteglendy ( @ 16:01)   / MSSA:   Urinalysis Basic - ( 2018 14:48 )    Color: Yellow / Appearance: Clear / S.017 / pH: x  Gluc: x / Ketone: Negative  / Bili: Negative / Urobili: 1 mg/dL   Blood: x / Protein: Negative / Nitrite: Negative   Leuk Esterase: Negative / RBC: x / WBC x   Sq Epi: x / Non Sq Epi: x / Bacteria: x        CXR:     EKG:  NORMAL SINUS RHYTHM  INFERIOR INFARCT  80s    Carotid Duplex:    No hemodynamically significant carotid artery stenoses.    PFT's:    Echocardiogram:  1. Mitral annular calcification. Tethered mitral valve  leaflets with normal opening.  2. Aortic valve not well visualized; appears calcified.  Peak transaortic valve gradient equals 17 mm Hg, mean  transaortic valve gradient equals 8 mm Hg, estimated aortic  valve area equals 1.1 sqcm (by continuity equation), aortic  valve velocity time integral equals 40 cm, consistent with  low gradient moderate aortic stenosis. Aortic valve and  LVOT are poorly visualized limiting assessment. Consider  additionalimaging of the aortic valve. No aortic valve  regurgitation seen.  3. Severe concentric left ventricular hypertrophy.  4. Endocardial visualization enhanced with intravenous  injection of echo contrast (Definity). Left ventricle is  suboptimtally visualized despite the use of intravenous  echo contrast; grossly moderate to severe left ventricular  systolic dysfunction. The mid septum, distal segments and  the apex are severely hypo/akinetic. EF by visual  estimation approximately 30-35%.  5. Moderate diastolic dysfunction (Stage II).  6. The right ventricle is not well visualized; grossly  normal right ventricle appears mildly enlarged with normal  systolic function.    Cardiac catheterization:  CORONARY VESSELS: The coronarycirculation is right dominant.  LM:   --  LM: Angiography showed minor luminal irregularities with no flow  limiting lesions.  LAD:   --  Proximal LAD: There was a diffuse 50 % stenosis.  --  Mid LAD: There was a 100 % stenosis.  CX:   --  Distal circumflex: Angiography showed severe atherosclerosis.  --  OM1: There was a 95 % stenosis.  RCA:   --  Mid RCA: There was a 100 % stenosis.      Gen: WN/WD NAD  Neuro: AAOx3, nonfocal  Pulm: CTA B/L  CV: RRR, S1S2  Abd: Soft, NT, ND +BS  Ext: No edema, + peripheral pulses  B/L groin rash not raised, with chronic  pigment changes noted c/w fungal dermatitis  *Diflucan po x 1> Dr Downs aware      Pt has AICD/PPM [ ] Yes  [x ] No             Brand Name:  Pre-op Beta Blocker ordered within 24 hrs of surgery?  [x ] Yes  [ ] No  If not, Why?  Type & Cross  [x ] Yes  [ ] No  NPO after Midnight [x ] Yes  [ ] No  Pre-op ABX ordered, to be taped on chart:  [x ] Yes  [ ] No     Hibiclens/Peridex ordered [x ] Yes  [ ] No  Intraop on Hold:  CXR, JIMMY [x ]   Consent obtained  [ ] Yes  [ ] No

## 2018-04-10 NOTE — PROGRESS NOTE ADULT - SUBJECTIVE AND OBJECTIVE BOX
Patient is a 62y old  Male who presents with a chief complaint of complain of CP (2018 12:56)      SUBJECTIVE / OVERNIGHT EVENTS:    Patient seen and examined.       Vital Signs Last 24 Hrs  T(C): 36.9 (10 Apr 2018 09:06), Max: 37.4 (2018 22:05)  T(F): 98.5 (10 Apr 2018 09:06), Max: 99.3 (2018 22:05)  HR: 79 (10 Apr 2018 09:06) (69 - 82)  BP: 138/83 (10 Apr 2018 09:06) (118/68 - 151/87)  BP(mean): --  RR: 18 (10 Apr 2018 09:) (18 - 20)  SpO2: 96% (10 Apr 2018 09:) (95% - 98%)  I&O's Summary    2018 07:01  -  10 Apr 2018 07:00  --------------------------------------------------------  IN: 1100 mL / OUT: 2550 mL / NET: -1450 mL    10 Apr 2018 07:01  -  10 Apr 2018 09:43  --------------------------------------------------------  IN: 120 mL / OUT: 0 mL / NET: 120 mL        PE:  GENERAL: NAD, AAOx3  HEAD:  Atraumatic, Normocephalic  EYES: EOMI, PERRLA, conjunctiva and sclera clear  NECK: Supple, No JVD  CHEST/LUNG: CTABL, No wheeze  HEART: Regular rate and rhythm; + murmur  ABDOMEN: Soft, Nontender, Nondistended; Bowel sounds present  EXTREMITIES:  2+ Peripheral Pulses, No clubbing, cyanosis, or edema  SKIN: No rashes or lesions  NEURO: No focal deficits    LABS:                        13.5   14.5  )-----------( 276      ( 10 Apr 2018 07:07 )             39.2     04-10    136  |  97  |  14  ----------------------------<  180<H>  4.3   |  27  |  0.91    Ca    9.2      10 Apr 2018 07:07  Phos  3.3     04-08  Mg     1.9     04-08    TPro  6.8  /  Alb  3.5  /  TBili  1.2  /  DBili  x   /  AST  7<L>  /  ALT  21  /  AlkPhos  58  04-10    PTT - ( 10 Apr 2018 07:07 )  PTT:63.1 sec  CAPILLARY BLOOD GLUCOSE      POCT Blood Glucose.: 176 mg/dL (10 Apr 2018 07:37)  POCT Blood Glucose.: 204 mg/dL (2018 21:34)  POCT Blood Glucose.: 193 mg/dL (2018 17:14)  POCT Blood Glucose.: 282 mg/dL (2018 12:57)    CARDIAC MARKERS ( 2018 05:37 )  x     / 1.18 ng/mL / 68 U/L / x     / 3.1 ng/mL  CARDIAC MARKERS ( 2018 23:43 )  x     / 1.24 ng/mL / 63 U/L / x     / 3.2 ng/mL  CARDIAC MARKERS ( 2018 14:06 )  x     / 1.51 ng/mL / 74 U/L / x     / 3.4 ng/mL      Urinalysis Basic - ( 2018 14:48 )    Color: Yellow / Appearance: Clear / S.017 / pH: x  Gluc: x / Ketone: Negative  / Bili: Negative / Urobili: 1 mg/dL   Blood: x / Protein: Negative / Nitrite: Negative   Leuk Esterase: Negative / RBC: x / WBC x   Sq Epi: x / Non Sq Epi: x / Bacteria: x        RADIOLOGY & ADDITIONAL TESTS:    Imaging Personally Reviewed:  [x] YES  [ ] NO    Consultant(s) Notes Reviewed:  [x] YES  [ ] NO    MEDICATIONS  (STANDING):  aspirin enteric coated 81 milliGRAM(s) Oral daily  atorvastatin 80 milliGRAM(s) Oral at bedtime  dextrose 5%. 1000 milliLiter(s) (50 mL/Hr) IV Continuous <Continuous>  dextrose 50% Injectable 12.5 Gram(s) IV Push once  dextrose 50% Injectable 25 Gram(s) IV Push once  dextrose 50% Injectable 25 Gram(s) IV Push once  heparin  Infusion. 2200 Unit(s)/Hr (22 mL/Hr) IV Continuous <Continuous>  insulin glargine Injectable (LANTUS) 10 Unit(s) SubCutaneous at bedtime  insulin lispro (HumaLOG) corrective regimen sliding scale   SubCutaneous three times a day before meals  insulin lispro Injectable (HumaLOG) 5 Unit(s) SubCutaneous three times a day before meals  isosorbide   mononitrate ER Tablet (IMDUR) 30 milliGRAM(s) Oral daily  metoprolol tartrate 12.5 milliGRAM(s) Oral every 12 hours    MEDICATIONS  (PRN):  dextrose Gel 1 Dose(s) Oral once PRN Blood Glucose LESS THAN 70 milliGRAM(s)/deciliter  glucagon  Injectable 1 milliGRAM(s) IntraMuscular once PRN Glucose LESS THAN 70 milligrams/deciliter  heparin  Injectable 6000 Unit(s) IV Push every 6 hours PRN For aPTT less than 40  sodium chloride 0.65% Nasal 1 Spray(s) Both Nostrils three times a day PRN Nasal Congestion      Care Discussed with Consultants/Other Providers [x] YES  [ ] NO    HEALTH ISSUES - PROBLEM Dx:  CAD (coronary artery disease): CAD (coronary artery disease)  Transaminitis: Transaminitis  Essential hypertension: Essential hypertension  Type 2 diabetes mellitus with diabetic neuropathy, without long-term current use of insulin: Type 2 diabetes mellitus with diabetic neuropathy, without long-term current use of insulin  NSTEMI (non-ST elevated myocardial infarction): NSTEMI (non-ST elevated myocardial infarction) Patient is a 62y old  Male who presents with a chief complaint of complain of CP (2018 12:56)      SUBJECTIVE / OVERNIGHT EVENTS:    Patient seen and examined. denies cp sob. states he is scared. on hep gtt.       Vital Signs Last 24 Hrs  T(C): 36.9 (10 Apr 2018 09:06), Max: 37.4 (2018 22:05)  T(F): 98.5 (10 Apr 2018 09:06), Max: 99.3 (2018 22:05)  HR: 79 (10 Apr 2018 09:06) (69 - 82)  BP: 138/83 (10 Apr 2018 09:06) (118/68 - 151/87)  BP(mean): --  RR: 18 (10 Apr 2018 09:) (18 - 20)  SpO2: 96% (10 Apr 2018 09:06) (95% - 98%)  I&O's Summary    2018 07:01  -  10 Apr 2018 07:00  --------------------------------------------------------  IN: 1100 mL / OUT: 2550 mL / NET: -1450 mL    10 Apr 2018 07:01  -  10 Apr 2018 09:43  --------------------------------------------------------  IN: 120 mL / OUT: 0 mL / NET: 120 mL        PE:    GENERAL: NAD, AAOx3, obese  HEAD:  Atraumatic, Normocephalic  EYES:  conjunctiva and sclera clear  NECK: Supple, No JVD  CHEST/LUNG: CTABL, No wheeze  HEART: Regular rate and rhythm; no murmur  ABDOMEN: Soft, Nontender, Nondistended; Bowel sounds present  EXTREMITIES:  2+ Peripheral Pulses, No clubbing, cyanosis, or edema  SKIN: No rashes or lesions  NEURO: No focal deficits      LABS:                        13.5   14.5  )-----------( 276      ( 10 Apr 2018 07:07 )             39.2     04-10    136  |  97  |  14  ----------------------------<  180<H>  4.3   |  27  |  0.91    Ca    9.2      10 Apr 2018 07:07  Phos  3.3     04-08  Mg     1.9     -08    TPro  6.8  /  Alb  3.5  /  TBili  1.2  /  DBili  x   /  AST  7<L>  /  ALT  21  /  AlkPhos  58  04-10    PTT - ( 10 Apr 2018 07:07 )  PTT:63.1 sec  CAPILLARY BLOOD GLUCOSE      POCT Blood Glucose.: 176 mg/dL (10 Apr 2018 07:37)  POCT Blood Glucose.: 204 mg/dL (2018 21:34)  POCT Blood Glucose.: 193 mg/dL (2018 17:14)  POCT Blood Glucose.: 282 mg/dL (2018 12:57)    CARDIAC MARKERS ( 2018 05:37 )  x     / 1.18 ng/mL / 68 U/L / x     / 3.1 ng/mL  CARDIAC MARKERS ( 2018 23:43 )  x     / 1.24 ng/mL / 63 U/L / x     / 3.2 ng/mL  CARDIAC MARKERS ( 2018 14:06 )  x     / 1.51 ng/mL / 74 U/L / x     / 3.4 ng/mL      Urinalysis Basic - ( 2018 14:48 )    Color: Yellow / Appearance: Clear / S.017 / pH: x  Gluc: x / Ketone: Negative  / Bili: Negative / Urobili: 1 mg/dL   Blood: x / Protein: Negative / Nitrite: Negative   Leuk Esterase: Negative / RBC: x / WBC x   Sq Epi: x / Non Sq Epi: x / Bacteria: x        RADIOLOGY & ADDITIONAL TESTS:    Imaging Personally Reviewed:  [x] YES  [ ] NO    Consultant(s) Notes Reviewed:  [x] YES  [ ] NO    MEDICATIONS  (STANDING):  aspirin enteric coated 81 milliGRAM(s) Oral daily  atorvastatin 80 milliGRAM(s) Oral at bedtime  dextrose 5%. 1000 milliLiter(s) (50 mL/Hr) IV Continuous <Continuous>  dextrose 50% Injectable 12.5 Gram(s) IV Push once  dextrose 50% Injectable 25 Gram(s) IV Push once  dextrose 50% Injectable 25 Gram(s) IV Push once  heparin  Infusion. 2200 Unit(s)/Hr (22 mL/Hr) IV Continuous <Continuous>  insulin glargine Injectable (LANTUS) 10 Unit(s) SubCutaneous at bedtime  insulin lispro (HumaLOG) corrective regimen sliding scale   SubCutaneous three times a day before meals  insulin lispro Injectable (HumaLOG) 5 Unit(s) SubCutaneous three times a day before meals  isosorbide   mononitrate ER Tablet (IMDUR) 30 milliGRAM(s) Oral daily  metoprolol tartrate 12.5 milliGRAM(s) Oral every 12 hours    MEDICATIONS  (PRN):  dextrose Gel 1 Dose(s) Oral once PRN Blood Glucose LESS THAN 70 milliGRAM(s)/deciliter  glucagon  Injectable 1 milliGRAM(s) IntraMuscular once PRN Glucose LESS THAN 70 milligrams/deciliter  heparin  Injectable 6000 Unit(s) IV Push every 6 hours PRN For aPTT less than 40  sodium chloride 0.65% Nasal 1 Spray(s) Both Nostrils three times a day PRN Nasal Congestion      Care Discussed with Consultants/Other Providers [x] YES  [ ] NO    HEALTH ISSUES - PROBLEM Dx:  CAD (coronary artery disease): CAD (coronary artery disease)  Transaminitis: Transaminitis  Essential hypertension: Essential hypertension  Type 2 diabetes mellitus with diabetic neuropathy, without long-term current use of insulin: Type 2 diabetes mellitus with diabetic neuropathy, without long-term current use of insulin  NSTEMI (non-ST elevated myocardial infarction): NSTEMI (non-ST elevated myocardial infarction)

## 2018-04-10 NOTE — PROGRESS NOTE ADULT - SUBJECTIVE AND OBJECTIVE BOX
Pt in bathroom for extended period of time.  Could not see directly.  Chart reviewed exptensively.  CT surgery notes appreciated in addition to cath, med, etc..    Telemetry -sinus 72  Vital Signs Last 24 Hrs  T(C): 36.9 (10 Apr 2018 09:06), Max: 37.4 (2018 22:05)  T(F): 98.5 (10 Apr 2018 09:06), Max: 99.3 (2018 22:05)  HR: 77 (10 Apr 2018 11:34) (69 - 82)  BP: 141/95 (10 Apr 2018 11:34) (118/68 - 151/87)  BP(mean): --  RR: 18 (10 Apr 2018 09:) (18 - 20)  SpO2: 96% (10 Apr 2018 09:) (95% - 98%)    I&O's Summary    2018 07:01  -  10 Apr 2018 07:00  --------------------------------------------------------  IN: 1100 mL / OUT: 2550 mL / NET: -1450 mL    10 Apr 2018 07:01  -  10 Apr 2018 11:50  --------------------------------------------------------  IN: 120 mL / OUT: 0 mL / NET: 120 mL        PHYSICAL EXAM: deferred, pt not available to exam    LABS:                        13.5   14.5  )-----------( 276      ( 10 Apr 2018 07:07 )             39.2     04-10    136  |  97  |  14  ----------------------------<  180<H>  4.3   |  27  |  0.91    Ca    9.2      10 Apr 2018 07:07  Phos  3.3     04-08  Mg     1.9     04-08    TPro  6.8  /  Alb  3.5  /  TBili  1.2  /  DBili  x   /  AST  7<L>  /  ALT  21  /  AlkPhos  58  04-10    PTT - ( 10 Apr 2018 07:07 )  PTT:63.1 sec  CARDIAC MARKERS ( 2018 05:37 )  x     / 1.18 ng/mL / 68 U/L / x     / 3.1 ng/mL  CARDIAC MARKERS ( 2018 23:43 )  x     / 1.24 ng/mL / 63 U/L / x     / 3.2 ng/mL  CARDIAC MARKERS ( 2018 14:06 )  x     / 1.51 ng/mL / 74 U/L / x     / 3.4 ng/mL  CARDIAC MARKERS ( 2018 03:55 )  x     / 0.97 ng/mL / 79 U/L / x     / 3.7 ng/mL  CARDIAC MARKERS ( 2018 23:08 )  x     / 1.05 ng/mL / x     / x     / x      CARDIAC MARKERS ( 2018 18:46 )  x     / 1.24 ng/mL / x     / x     / x              Urinalysis Basic - ( 2018 14:48 )    Color: Yellow / Appearance: Clear / S.017 / pH: x  Gluc: x / Ketone: Negative  / Bili: Negative / Urobili: 1 mg/dL   Blood: x / Protein: Negative / Nitrite: Negative   Leuk Esterase: Negative / RBC: x / WBC x   Sq Epi: x / Non Sq Epi: x / Bacteria: x        MEDICATIONS  (STANDING):  aspirin enteric coated 81 milliGRAM(s) Oral daily  atorvastatin 80 milliGRAM(s) Oral at bedtime  dextrose 5%. 1000 milliLiter(s) (50 mL/Hr) IV Continuous <Continuous>  dextrose 50% Injectable 12.5 Gram(s) IV Push once  dextrose 50% Injectable 25 Gram(s) IV Push once  dextrose 50% Injectable 25 Gram(s) IV Push once  heparin  Infusion. 2200 Unit(s)/Hr (22 mL/Hr) IV Continuous <Continuous>  insulin glargine Injectable (LANTUS) 10 Unit(s) SubCutaneous at bedtime  insulin lispro (HumaLOG) corrective regimen sliding scale   SubCutaneous three times a day before meals  insulin lispro Injectable (HumaLOG) 5 Unit(s) SubCutaneous three times a day before meals  isosorbide   mononitrate ER Tablet (IMDUR) 30 milliGRAM(s) Oral daily  metoprolol tartrate 12.5 milliGRAM(s) Oral every 12 hours    MEDICATIONS  (PRN):  dextrose Gel 1 Dose(s) Oral once PRN Blood Glucose LESS THAN 70 milliGRAM(s)/deciliter  glucagon  Injectable 1 milliGRAM(s) IntraMuscular once PRN Glucose LESS THAN 70 milligrams/deciliter  heparin  Injectable 6000 Unit(s) IV Push every 6 hours PRN For aPTT less than 40  sodium chloride 0.65% Nasal 1 Spray(s) Both Nostrils three times a day PRN Nasal Congestion      RADIOLOGY & ADDITIONAL TESTS:

## 2018-04-11 ENCOUNTER — APPOINTMENT (OUTPATIENT)
Dept: CARDIOTHORACIC SURGERY | Facility: HOSPITAL | Age: 63
End: 2018-04-11
Payer: COMMERCIAL

## 2018-04-11 LAB
ANION GAP SERPL CALC-SCNC: 12 MMOL/L — SIGNIFICANT CHANGE UP (ref 5–17)
APTT BLD: 25.1 SEC — LOW (ref 27.5–37.4)
BASOPHILS # BLD AUTO: 0.1 K/UL — SIGNIFICANT CHANGE UP (ref 0–0.2)
BASOPHILS NFR BLD AUTO: 1 % — SIGNIFICANT CHANGE UP (ref 0–2)
BUN SERPL-MCNC: 17 MG/DL — SIGNIFICANT CHANGE UP (ref 7–23)
CALCIUM SERPL-MCNC: 8.8 MG/DL — SIGNIFICANT CHANGE UP (ref 8.4–10.5)
CHLORIDE SERPL-SCNC: 103 MMOL/L — SIGNIFICANT CHANGE UP (ref 96–108)
CK MB BLD-MCNC: 9.8 % — HIGH (ref 0–3.5)
CK MB CFR SERPL CALC: 19.2 NG/ML — HIGH (ref 0–6.7)
CK SERPL-CCNC: 195 U/L — SIGNIFICANT CHANGE UP (ref 30–200)
CO2 SERPL-SCNC: 26 MMOL/L — SIGNIFICANT CHANGE UP (ref 22–31)
CREAT SERPL-MCNC: 0.99 MG/DL — SIGNIFICANT CHANGE UP (ref 0.5–1.3)
EOSINOPHIL # BLD AUTO: 0.2 K/UL — SIGNIFICANT CHANGE UP (ref 0–0.5)
EOSINOPHIL NFR BLD AUTO: 1 % — SIGNIFICANT CHANGE UP (ref 0–6)
GAS PNL BLDA: SIGNIFICANT CHANGE UP
GLUCOSE BLDC GLUCOMTR-MCNC: 115 MG/DL — HIGH (ref 70–99)
GLUCOSE BLDC GLUCOMTR-MCNC: 125 MG/DL — HIGH (ref 70–99)
GLUCOSE BLDC GLUCOMTR-MCNC: 132 MG/DL — HIGH (ref 70–99)
GLUCOSE BLDC GLUCOMTR-MCNC: 147 MG/DL — HIGH (ref 70–99)
GLUCOSE SERPL-MCNC: 174 MG/DL — HIGH (ref 70–99)
HCT VFR BLD CALC: 33.8 % — LOW (ref 39–50)
HGB BLD-MCNC: 11.3 G/DL — LOW (ref 13–17)
INR BLD: 1.52 RATIO — HIGH (ref 0.88–1.16)
LYMPHOCYTES # BLD AUTO: 10 % — LOW (ref 13–44)
LYMPHOCYTES # BLD AUTO: 2.7 K/UL — SIGNIFICANT CHANGE UP (ref 1–3.3)
MCHC RBC-ENTMCNC: 31.4 PG — SIGNIFICANT CHANGE UP (ref 27–34)
MCHC RBC-ENTMCNC: 33.4 GM/DL — SIGNIFICANT CHANGE UP (ref 32–36)
MCV RBC AUTO: 93.9 FL — SIGNIFICANT CHANGE UP (ref 80–100)
MONOCYTES # BLD AUTO: 0.7 K/UL — SIGNIFICANT CHANGE UP (ref 0–0.9)
MONOCYTES NFR BLD AUTO: 1 % — LOW (ref 2–14)
NEUTROPHILS # BLD AUTO: 22.2 K/UL — HIGH (ref 1.8–7.4)
NEUTROPHILS NFR BLD AUTO: 80 % — HIGH (ref 43–77)
PLATELET # BLD AUTO: 250 K/UL — SIGNIFICANT CHANGE UP (ref 150–400)
POTASSIUM SERPL-MCNC: 4 MMOL/L — SIGNIFICANT CHANGE UP (ref 3.5–5.3)
POTASSIUM SERPL-SCNC: 4 MMOL/L — SIGNIFICANT CHANGE UP (ref 3.5–5.3)
PROTHROM AB SERPL-ACNC: 16.6 SEC — HIGH (ref 9.8–12.7)
RBC # BLD: 3.6 M/UL — LOW (ref 4.2–5.8)
RBC # FLD: 12.8 % — SIGNIFICANT CHANGE UP (ref 10.3–14.5)
SODIUM SERPL-SCNC: 141 MMOL/L — SIGNIFICANT CHANGE UP (ref 135–145)
TROPONIN T SERPL-MCNC: 1.42 NG/ML — HIGH (ref 0–0.06)
WBC # BLD: 25.8 K/UL — HIGH (ref 3.8–10.5)
WBC # FLD AUTO: 25.8 K/UL — HIGH (ref 3.8–10.5)

## 2018-04-11 PROCEDURE — 93010 ELECTROCARDIOGRAM REPORT: CPT

## 2018-04-11 PROCEDURE — 33518 CABG ARTERY-VEIN TWO: CPT

## 2018-04-11 PROCEDURE — 33508 ENDOSCOPIC VEIN HARVEST: CPT

## 2018-04-11 PROCEDURE — 71045 X-RAY EXAM CHEST 1 VIEW: CPT | Mod: 26

## 2018-04-11 PROCEDURE — 33533 CABG ARTERIAL SINGLE: CPT

## 2018-04-11 RX ORDER — CEFUROXIME AXETIL 250 MG
1500 TABLET ORAL EVERY 8 HOURS
Qty: 0 | Refills: 0 | Status: COMPLETED | OUTPATIENT
Start: 2018-04-11 | End: 2018-04-13

## 2018-04-11 RX ORDER — ASPIRIN/CALCIUM CARB/MAGNESIUM 324 MG
300 TABLET ORAL ONCE
Qty: 0 | Refills: 0 | Status: COMPLETED | OUTPATIENT
Start: 2018-04-11 | End: 2018-04-11

## 2018-04-11 RX ORDER — POTASSIUM CHLORIDE 20 MEQ
10 PACKET (EA) ORAL ONCE
Qty: 0 | Refills: 0 | Status: COMPLETED | OUTPATIENT
Start: 2018-04-11 | End: 2018-04-11

## 2018-04-11 RX ORDER — ASPIRIN/CALCIUM CARB/MAGNESIUM 324 MG
325 TABLET ORAL DAILY
Qty: 0 | Refills: 0 | Status: DISCONTINUED | OUTPATIENT
Start: 2018-04-12 | End: 2018-04-17

## 2018-04-11 RX ORDER — MILRINONE LACTATE 1 MG/ML
0.3 INJECTION, SOLUTION INTRAVENOUS
Qty: 20 | Refills: 0 | Status: DISCONTINUED | OUTPATIENT
Start: 2018-04-11 | End: 2018-04-12

## 2018-04-11 RX ORDER — VASOPRESSIN 20 [USP'U]/ML
0.05 INJECTION INTRAVENOUS
Qty: 100 | Refills: 0 | Status: DISCONTINUED | OUTPATIENT
Start: 2018-04-11 | End: 2018-04-12

## 2018-04-11 RX ORDER — HYDROMORPHONE HYDROCHLORIDE 2 MG/ML
0.5 INJECTION INTRAMUSCULAR; INTRAVENOUS; SUBCUTANEOUS ONCE
Qty: 0 | Refills: 0 | Status: DISCONTINUED | OUTPATIENT
Start: 2018-04-11 | End: 2018-04-11

## 2018-04-11 RX ORDER — NOREPINEPHRINE BITARTRATE/D5W 8 MG/250ML
0.05 PLASTIC BAG, INJECTION (ML) INTRAVENOUS
Qty: 8 | Refills: 0 | Status: DISCONTINUED | OUTPATIENT
Start: 2018-04-11 | End: 2018-04-12

## 2018-04-11 RX ORDER — DEXMEDETOMIDINE HYDROCHLORIDE IN 0.9% SODIUM CHLORIDE 4 UG/ML
0.4 INJECTION INTRAVENOUS
Qty: 200 | Refills: 0 | Status: DISCONTINUED | OUTPATIENT
Start: 2018-04-11 | End: 2018-04-12

## 2018-04-11 RX ADMIN — Medication 12.5 MILLIGRAM(S): at 05:25

## 2018-04-11 RX ADMIN — Medication 10 MILLIGRAM(S): at 16:12

## 2018-04-11 RX ADMIN — Medication 10 MILLIGRAM(S): at 22:53

## 2018-04-11 RX ADMIN — INSULIN HUMAN 3 UNIT(S)/HR: 100 INJECTION, SOLUTION SUBCUTANEOUS at 22:09

## 2018-04-11 RX ADMIN — Medication 11.4 MICROGRAM(S)/KG/MIN: at 17:55

## 2018-04-11 RX ADMIN — Medication 100 MILLIGRAM(S): at 16:15

## 2018-04-11 RX ADMIN — VASOPRESSIN 3 UNIT(S)/MIN: 20 INJECTION INTRAVENOUS at 17:55

## 2018-04-11 RX ADMIN — Medication 11.4 MICROGRAM(S)/KG/MIN: at 22:53

## 2018-04-11 RX ADMIN — DEXMEDETOMIDINE HYDROCHLORIDE IN 0.9% SODIUM CHLORIDE 12.16 MICROGRAM(S)/KG/HR: 4 INJECTION INTRAVENOUS at 18:42

## 2018-04-11 RX ADMIN — Medication 125 MILLILITER(S): at 22:30

## 2018-04-11 RX ADMIN — Medication 125 MILLILITER(S): at 22:00

## 2018-04-11 RX ADMIN — MILRINONE LACTATE 13.68 MICROGRAM(S)/KG/MIN: 1 INJECTION, SOLUTION INTRAVENOUS at 17:58

## 2018-04-11 RX ADMIN — Medication 100 MILLIEQUIVALENT(S): at 15:02

## 2018-04-11 RX ADMIN — INSULIN HUMAN 3 UNIT(S)/HR: 100 INJECTION, SOLUTION SUBCUTANEOUS at 17:57

## 2018-04-11 RX ADMIN — Medication 300 MILLIGRAM(S): at 20:45

## 2018-04-11 RX ADMIN — VASOPRESSIN 3 UNIT(S)/MIN: 20 INJECTION INTRAVENOUS at 22:53

## 2018-04-11 RX ADMIN — Medication 50 MILLIEQUIVALENT(S): at 15:30

## 2018-04-11 RX ADMIN — DEXMEDETOMIDINE HYDROCHLORIDE IN 0.9% SODIUM CHLORIDE 12.16 MICROGRAM(S)/KG/HR: 4 INJECTION INTRAVENOUS at 22:09

## 2018-04-11 RX ADMIN — MILRINONE LACTATE 10.94 MICROGRAM(S)/KG/MIN: 1 INJECTION, SOLUTION INTRAVENOUS at 22:09

## 2018-04-11 RX ADMIN — PANTOPRAZOLE SODIUM 40 MILLIGRAM(S): 20 TABLET, DELAYED RELEASE ORAL at 16:15

## 2018-04-11 RX ADMIN — CHLORHEXIDINE GLUCONATE 15 MILLILITER(S): 213 SOLUTION TOPICAL at 05:25

## 2018-04-11 RX ADMIN — HYDROMORPHONE HYDROCHLORIDE 0.5 MILLIGRAM(S): 2 INJECTION INTRAMUSCULAR; INTRAVENOUS; SUBCUTANEOUS at 22:15

## 2018-04-11 RX ADMIN — HYDROMORPHONE HYDROCHLORIDE 0.5 MILLIGRAM(S): 2 INJECTION INTRAMUSCULAR; INTRAVENOUS; SUBCUTANEOUS at 22:00

## 2018-04-11 NOTE — PROGRESS NOTE ADULT - SUBJECTIVE AND OBJECTIVE BOX
Chief complaint  Patient is a 62y old  Male who presents with a chief complaint of complain of CP (08 Apr 2018 12:56)   Review of systems  Patient in bed, looks comfortable, no fever, no hypoglycemia.    Labs and Fingersticks  CAPILLARY BLOOD GLUCOSE  163 (11 Apr 2018 15:00)  184 (11 Apr 2018 14:00)      POCT Blood Glucose.: 250 mg/dL (10 Apr 2018 21:41)  POCT Blood Glucose.: 154 mg/dL (10 Apr 2018 17:19)      Anion Gap, Serum: 12 (04-10 @ 07:07)      Calcium, Total Serum: 9.2 (04-10 @ 07:07)  Albumin, Serum: 3.5 (04-10 @ 07:07)    Alanine Aminotransferase (ALT/SGPT): 21 (04-10 @ 07:07)  Alkaline Phosphatase, Serum: 58 (04-10 @ 07:07)  Aspartate Aminotransferase (AST/SGOT): 7 <L> (04-10 @ 07:07)        04-10    136  |  97  |  14  ----------------------------<  180<H>  4.3   |  27  |  0.91    Ca    9.2      10 Apr 2018 07:07    TPro  6.8  /  Alb  3.5  /  TBili  1.2  /  DBili  x   /  AST  7<L>  /  ALT  21  /  AlkPhos  58  04-10                        11.3   25.8  )-----------( 250      ( 11 Apr 2018 14:34 )             33.8     Medications  MEDICATIONS  (STANDING):  cefuroxime  IVPB 1500 milliGRAM(s) IV Intermittent every 8 hours  dextrose 50% Injectable 50 milliLiter(s) IV Push every 15 minutes  dextrose 50% Injectable 25 milliLiter(s) IV Push every 15 minutes  docusate sodium 100 milliGRAM(s) Oral three times a day  insulin Infusion 3 Unit(s)/Hr (3 mL/Hr) IV Continuous <Continuous>  metoclopramide Injectable 10 milliGRAM(s) IV Push every 8 hours  milrinone Infusion 0.375 MICROgram(s)/kG/Min (13.68 mL/Hr) IV Continuous <Continuous>  norepinephrine Infusion 0.05 MICROgram(s)/kG/Min (11.4 mL/Hr) IV Continuous <Continuous>  pantoprazole  Injectable 40 milliGRAM(s) IV Push daily  potassium chloride  10 mEq/50 mL IVPB 10 milliEquivalent(s) IV Intermittent every 1 hour  potassium chloride  10 mEq/50 mL IVPB 10 milliEquivalent(s) IV Intermittent every 1 hour  potassium chloride  10 mEq/50 mL IVPB 10 milliEquivalent(s) IV Intermittent once  sodium chloride 0.45%. 1000 milliLiter(s) (10 mL/Hr) IV Continuous <Continuous>  vasopressin Infusion 0.05 Unit(s)/Min (3 mL/Hr) IV Continuous <Continuous>      Physical Exam  General: Patient comfortable in bed  Vital Signs Last 12 Hrs  T(F): 98.4 (04-11-18 @ 15:00), Max: 98.4 (04-11-18 @ 05:05)  HR: 72 (04-11-18 @ 15:00) (71 - 77)  BP: 114/73 (04-11-18 @ 05:05) (114/73 - 114/73)  BP(mean): --  RR: 14 (04-11-18 @ 15:00) (10 - 18)  SpO2: 98% (04-11-18 @ 15:00) (96% - 100%)  Neck: No palpable thyroid nodules.  CVS: S1S2, No murmurs  Respiratory: No wheezing, no crepitations  GI: Abdomen soft, bowel sounds positive  Musculoskeletal:  edema lower extremities.   Skin: No skin rashes, no ecchymosis    Diagnostics

## 2018-04-11 NOTE — BRIEF OPERATIVE NOTE - PRE-OP DX
Coronary artery disease involving native coronary artery with other forms of angina pectoris, unspecified whether native or transplanted heart  04/11/2018    Active  Israel Velez

## 2018-04-11 NOTE — BRIEF OPERATIVE NOTE - PROCEDURE
<<-----Click on this checkbox to enter Procedure CABG, using internal thoracic artery and endoscopic vein procurement, adult  04/11/2018  CABGx3    LIMA-LAD, SVG-OM, SVG-PDA  Active  [facility]

## 2018-04-11 NOTE — BRIEF OPERATIVE NOTE - ADULT CT SURG CONDUIT HARVEST PERFORMED BY
[facility]  Right greater saphenous vein via endoscopic techniwue Rosie  Right greater saphenous vein via endoscopic technique

## 2018-04-12 DIAGNOSIS — Z95.1 PRESENCE OF AORTOCORONARY BYPASS GRAFT: ICD-10-CM

## 2018-04-12 LAB
ALBUMIN SERPL ELPH-MCNC: 3.8 G/DL — SIGNIFICANT CHANGE UP (ref 3.3–5)
ALP SERPL-CCNC: 34 U/L — LOW (ref 40–120)
ALT FLD-CCNC: 16 U/L RC — SIGNIFICANT CHANGE UP (ref 10–45)
ANION GAP SERPL CALC-SCNC: 13 MMOL/L — SIGNIFICANT CHANGE UP (ref 5–17)
APTT BLD: 27.5 SEC — SIGNIFICANT CHANGE UP (ref 27.5–37.4)
AST SERPL-CCNC: 22 U/L — SIGNIFICANT CHANGE UP (ref 10–40)
BASOPHILS # BLD AUTO: 0 K/UL — SIGNIFICANT CHANGE UP (ref 0–0.2)
BASOPHILS NFR BLD AUTO: 0 % — SIGNIFICANT CHANGE UP (ref 0–2)
BILIRUB SERPL-MCNC: 1.3 MG/DL — HIGH (ref 0.2–1.2)
BUN SERPL-MCNC: 25 MG/DL — HIGH (ref 7–23)
CALCIUM SERPL-MCNC: 8.7 MG/DL — SIGNIFICANT CHANGE UP (ref 8.4–10.5)
CHLORIDE SERPL-SCNC: 106 MMOL/L — SIGNIFICANT CHANGE UP (ref 96–108)
CO2 SERPL-SCNC: 24 MMOL/L — SIGNIFICANT CHANGE UP (ref 22–31)
CREAT SERPL-MCNC: 1.06 MG/DL — SIGNIFICANT CHANGE UP (ref 0.5–1.3)
EOSINOPHIL # BLD AUTO: 0 K/UL — SIGNIFICANT CHANGE UP (ref 0–0.5)
EOSINOPHIL NFR BLD AUTO: 0.1 % — SIGNIFICANT CHANGE UP (ref 0–6)
GAS PNL BLDA: SIGNIFICANT CHANGE UP
GLUCOSE BLDC GLUCOMTR-MCNC: 101 MG/DL — HIGH (ref 70–99)
GLUCOSE BLDC GLUCOMTR-MCNC: 128 MG/DL — HIGH (ref 70–99)
GLUCOSE BLDC GLUCOMTR-MCNC: 132 MG/DL — HIGH (ref 70–99)
GLUCOSE BLDC GLUCOMTR-MCNC: 135 MG/DL — HIGH (ref 70–99)
GLUCOSE BLDC GLUCOMTR-MCNC: 135 MG/DL — HIGH (ref 70–99)
GLUCOSE BLDC GLUCOMTR-MCNC: 136 MG/DL — HIGH (ref 70–99)
GLUCOSE BLDC GLUCOMTR-MCNC: 138 MG/DL — HIGH (ref 70–99)
GLUCOSE BLDC GLUCOMTR-MCNC: 142 MG/DL — HIGH (ref 70–99)
GLUCOSE BLDC GLUCOMTR-MCNC: 144 MG/DL — HIGH (ref 70–99)
GLUCOSE BLDC GLUCOMTR-MCNC: 153 MG/DL — HIGH (ref 70–99)
GLUCOSE BLDC GLUCOMTR-MCNC: 154 MG/DL — HIGH (ref 70–99)
GLUCOSE BLDC GLUCOMTR-MCNC: 166 MG/DL — HIGH (ref 70–99)
GLUCOSE BLDC GLUCOMTR-MCNC: 173 MG/DL — HIGH (ref 70–99)
GLUCOSE BLDC GLUCOMTR-MCNC: 179 MG/DL — HIGH (ref 70–99)
GLUCOSE BLDC GLUCOMTR-MCNC: 180 MG/DL — HIGH (ref 70–99)
GLUCOSE BLDC GLUCOMTR-MCNC: 184 MG/DL — HIGH (ref 70–99)
GLUCOSE BLDC GLUCOMTR-MCNC: 195 MG/DL — HIGH (ref 70–99)
GLUCOSE BLDC GLUCOMTR-MCNC: 196 MG/DL — HIGH (ref 70–99)
GLUCOSE BLDC GLUCOMTR-MCNC: 93 MG/DL — SIGNIFICANT CHANGE UP (ref 70–99)
GLUCOSE SERPL-MCNC: 155 MG/DL — HIGH (ref 70–99)
HCT VFR BLD CALC: 28.6 % — LOW (ref 39–50)
HGB BLD-MCNC: 10 G/DL — LOW (ref 13–17)
INR BLD: 1.43 RATIO — HIGH (ref 0.88–1.16)
LYMPHOCYTES # BLD AUTO: 1 K/UL — SIGNIFICANT CHANGE UP (ref 1–3.3)
LYMPHOCYTES # BLD AUTO: 6.5 % — LOW (ref 13–44)
MCHC RBC-ENTMCNC: 32.7 PG — SIGNIFICANT CHANGE UP (ref 27–34)
MCHC RBC-ENTMCNC: 34.9 GM/DL — SIGNIFICANT CHANGE UP (ref 32–36)
MCV RBC AUTO: 93.7 FL — SIGNIFICANT CHANGE UP (ref 80–100)
MONOCYTES # BLD AUTO: 0.5 K/UL — SIGNIFICANT CHANGE UP (ref 0–0.9)
MONOCYTES NFR BLD AUTO: 3.5 % — SIGNIFICANT CHANGE UP (ref 2–14)
NEUTROPHILS # BLD AUTO: 13.5 K/UL — HIGH (ref 1.8–7.4)
NEUTROPHILS NFR BLD AUTO: 89.9 % — HIGH (ref 43–77)
PLATELET # BLD AUTO: 184 K/UL — SIGNIFICANT CHANGE UP (ref 150–400)
POTASSIUM SERPL-MCNC: 4.7 MMOL/L — SIGNIFICANT CHANGE UP (ref 3.5–5.3)
POTASSIUM SERPL-SCNC: 4.7 MMOL/L — SIGNIFICANT CHANGE UP (ref 3.5–5.3)
PROT SERPL-MCNC: 6 G/DL — SIGNIFICANT CHANGE UP (ref 6–8.3)
PROTHROM AB SERPL-ACNC: 15.6 SEC — HIGH (ref 9.8–12.7)
RBC # BLD: 3.05 M/UL — LOW (ref 4.2–5.8)
RBC # FLD: 12.6 % — SIGNIFICANT CHANGE UP (ref 10.3–14.5)
SODIUM SERPL-SCNC: 143 MMOL/L — SIGNIFICANT CHANGE UP (ref 135–145)
WBC # BLD: 15 K/UL — HIGH (ref 3.8–10.5)
WBC # FLD AUTO: 15 K/UL — HIGH (ref 3.8–10.5)

## 2018-04-12 PROCEDURE — 71045 X-RAY EXAM CHEST 1 VIEW: CPT | Mod: 26

## 2018-04-12 PROCEDURE — 93010 ELECTROCARDIOGRAM REPORT: CPT

## 2018-04-12 RX ORDER — ATORVASTATIN CALCIUM 80 MG/1
80 TABLET, FILM COATED ORAL AT BEDTIME
Qty: 0 | Refills: 0 | Status: DISCONTINUED | OUTPATIENT
Start: 2018-04-12 | End: 2018-04-17

## 2018-04-12 RX ORDER — ALBUMIN HUMAN 25 %
250 VIAL (ML) INTRAVENOUS ONCE
Qty: 0 | Refills: 0 | Status: COMPLETED | OUTPATIENT
Start: 2018-04-12 | End: 2018-04-11

## 2018-04-12 RX ORDER — INSULIN LISPRO 100/ML
4 VIAL (ML) SUBCUTANEOUS
Qty: 0 | Refills: 0 | Status: DISCONTINUED | OUTPATIENT
Start: 2018-04-12 | End: 2018-04-13

## 2018-04-12 RX ORDER — ENOXAPARIN SODIUM 100 MG/ML
40 INJECTION SUBCUTANEOUS DAILY
Qty: 0 | Refills: 0 | Status: DISCONTINUED | OUTPATIENT
Start: 2018-04-12 | End: 2018-04-17

## 2018-04-12 RX ORDER — INSULIN LISPRO 100/ML
VIAL (ML) SUBCUTANEOUS
Qty: 0 | Refills: 0 | Status: DISCONTINUED | OUTPATIENT
Start: 2018-04-12 | End: 2018-04-17

## 2018-04-12 RX ORDER — OXYCODONE HYDROCHLORIDE 5 MG/1
10 TABLET ORAL EVERY 4 HOURS
Qty: 0 | Refills: 0 | Status: DISCONTINUED | OUTPATIENT
Start: 2018-04-12 | End: 2018-04-12

## 2018-04-12 RX ORDER — INSULIN GLARGINE 100 [IU]/ML
10 INJECTION, SOLUTION SUBCUTANEOUS AT BEDTIME
Qty: 0 | Refills: 0 | Status: DISCONTINUED | OUTPATIENT
Start: 2018-04-12 | End: 2018-04-13

## 2018-04-12 RX ORDER — ACETAMINOPHEN 500 MG
650 TABLET ORAL EVERY 6 HOURS
Qty: 0 | Refills: 0 | Status: DISCONTINUED | OUTPATIENT
Start: 2018-04-12 | End: 2018-04-12

## 2018-04-12 RX ORDER — OXYCODONE HYDROCHLORIDE 5 MG/1
5 TABLET ORAL EVERY 4 HOURS
Qty: 0 | Refills: 0 | Status: DISCONTINUED | OUTPATIENT
Start: 2018-04-12 | End: 2018-04-17

## 2018-04-12 RX ORDER — PANTOPRAZOLE SODIUM 20 MG/1
40 TABLET, DELAYED RELEASE ORAL
Qty: 0 | Refills: 0 | Status: DISCONTINUED | OUTPATIENT
Start: 2018-04-12 | End: 2018-04-17

## 2018-04-12 RX ORDER — ACETAMINOPHEN 500 MG
1000 TABLET ORAL ONCE
Qty: 0 | Refills: 0 | Status: COMPLETED | OUTPATIENT
Start: 2018-04-12 | End: 2018-04-12

## 2018-04-12 RX ORDER — HYDROMORPHONE HYDROCHLORIDE 2 MG/ML
0.5 INJECTION INTRAMUSCULAR; INTRAVENOUS; SUBCUTANEOUS ONCE
Qty: 0 | Refills: 0 | Status: DISCONTINUED | OUTPATIENT
Start: 2018-04-12 | End: 2018-04-12

## 2018-04-12 RX ORDER — HUMAN INSULIN 100 [IU]/ML
8 INJECTION, SUSPENSION SUBCUTANEOUS ONCE
Qty: 0 | Refills: 0 | Status: COMPLETED | OUTPATIENT
Start: 2018-04-12 | End: 2018-04-12

## 2018-04-12 RX ORDER — ALBUMIN HUMAN 25 %
250 VIAL (ML) INTRAVENOUS ONCE
Qty: 0 | Refills: 0 | Status: COMPLETED | OUTPATIENT
Start: 2018-04-12 | End: 2018-04-12

## 2018-04-12 RX ORDER — OXYCODONE HYDROCHLORIDE 5 MG/1
10 TABLET ORAL EVERY 6 HOURS
Qty: 0 | Refills: 0 | Status: DISCONTINUED | OUTPATIENT
Start: 2018-04-12 | End: 2018-04-17

## 2018-04-12 RX ORDER — POLYETHYLENE GLYCOL 3350 17 G/17G
17 POWDER, FOR SOLUTION ORAL DAILY
Qty: 0 | Refills: 0 | Status: DISCONTINUED | OUTPATIENT
Start: 2018-04-12 | End: 2018-04-17

## 2018-04-12 RX ORDER — POLYETHYLENE GLYCOL 3350 17 G/17G
17 POWDER, FOR SOLUTION ORAL DAILY
Qty: 0 | Refills: 0 | Status: DISCONTINUED | OUTPATIENT
Start: 2018-04-12 | End: 2018-04-12

## 2018-04-12 RX ORDER — MILRINONE LACTATE 1 MG/ML
0.15 INJECTION, SOLUTION INTRAVENOUS
Qty: 20 | Refills: 0 | Status: DISCONTINUED | OUTPATIENT
Start: 2018-04-12 | End: 2018-04-13

## 2018-04-12 RX ORDER — ACETAMINOPHEN 500 MG
650 TABLET ORAL EVERY 6 HOURS
Qty: 0 | Refills: 0 | Status: DISCONTINUED | OUTPATIENT
Start: 2018-04-12 | End: 2018-04-17

## 2018-04-12 RX ORDER — LISINOPRIL 2.5 MG/1
5 TABLET ORAL DAILY
Qty: 0 | Refills: 0 | Status: DISCONTINUED | OUTPATIENT
Start: 2018-04-12 | End: 2018-04-17

## 2018-04-12 RX ORDER — OXYCODONE HYDROCHLORIDE 5 MG/1
5 TABLET ORAL EVERY 4 HOURS
Qty: 0 | Refills: 0 | Status: DISCONTINUED | OUTPATIENT
Start: 2018-04-12 | End: 2018-04-12

## 2018-04-12 RX ADMIN — PANTOPRAZOLE SODIUM 40 MILLIGRAM(S): 20 TABLET, DELAYED RELEASE ORAL at 12:25

## 2018-04-12 RX ADMIN — HYDROMORPHONE HYDROCHLORIDE 0.5 MILLIGRAM(S): 2 INJECTION INTRAMUSCULAR; INTRAVENOUS; SUBCUTANEOUS at 04:45

## 2018-04-12 RX ADMIN — Medication 125 MILLILITER(S): at 01:45

## 2018-04-12 RX ADMIN — Medication 10 MILLIGRAM(S): at 14:33

## 2018-04-12 RX ADMIN — Medication 400 MILLIGRAM(S): at 00:45

## 2018-04-12 RX ADMIN — OXYCODONE HYDROCHLORIDE 10 MILLIGRAM(S): 5 TABLET ORAL at 10:13

## 2018-04-12 RX ADMIN — INSULIN GLARGINE 10 UNIT(S): 100 INJECTION, SOLUTION SUBCUTANEOUS at 22:52

## 2018-04-12 RX ADMIN — OXYCODONE HYDROCHLORIDE 5 MILLIGRAM(S): 5 TABLET ORAL at 22:16

## 2018-04-12 RX ADMIN — Medication 400 MILLIGRAM(S): at 07:00

## 2018-04-12 RX ADMIN — Medication 325 MILLIGRAM(S): at 12:25

## 2018-04-12 RX ADMIN — OXYCODONE HYDROCHLORIDE 10 MILLIGRAM(S): 5 TABLET ORAL at 18:31

## 2018-04-12 RX ADMIN — Medication 100 MILLIGRAM(S): at 14:32

## 2018-04-12 RX ADMIN — Medication 125 MILLILITER(S): at 01:15

## 2018-04-12 RX ADMIN — INSULIN HUMAN 3 UNIT(S)/HR: 100 INJECTION, SOLUTION SUBCUTANEOUS at 12:40

## 2018-04-12 RX ADMIN — Medication 1000 MILLIGRAM(S): at 01:15

## 2018-04-12 RX ADMIN — Medication 4 UNIT(S): at 12:26

## 2018-04-12 RX ADMIN — ENOXAPARIN SODIUM 40 MILLIGRAM(S): 100 INJECTION SUBCUTANEOUS at 12:25

## 2018-04-12 RX ADMIN — Medication 100 MILLIGRAM(S): at 18:00

## 2018-04-12 RX ADMIN — POLYETHYLENE GLYCOL 3350 17 GRAM(S): 17 POWDER, FOR SOLUTION ORAL at 12:39

## 2018-04-12 RX ADMIN — Medication 100 MILLIGRAM(S): at 05:27

## 2018-04-12 RX ADMIN — HUMAN INSULIN 8 UNIT(S): 100 INJECTION, SUSPENSION SUBCUTANEOUS at 10:26

## 2018-04-12 RX ADMIN — HYDROMORPHONE HYDROCHLORIDE 0.5 MILLIGRAM(S): 2 INJECTION INTRAMUSCULAR; INTRAVENOUS; SUBCUTANEOUS at 07:00

## 2018-04-12 RX ADMIN — ATORVASTATIN CALCIUM 80 MILLIGRAM(S): 80 TABLET, FILM COATED ORAL at 21:45

## 2018-04-12 RX ADMIN — Medication 100 MILLIGRAM(S): at 21:45

## 2018-04-12 RX ADMIN — Medication 100 MILLIGRAM(S): at 08:17

## 2018-04-12 RX ADMIN — HYDROMORPHONE HYDROCHLORIDE 0.5 MILLIGRAM(S): 2 INJECTION INTRAMUSCULAR; INTRAVENOUS; SUBCUTANEOUS at 07:15

## 2018-04-12 RX ADMIN — Medication 100 MILLIGRAM(S): at 00:45

## 2018-04-12 RX ADMIN — OXYCODONE HYDROCHLORIDE 10 MILLIGRAM(S): 5 TABLET ORAL at 09:43

## 2018-04-12 RX ADMIN — HYDROMORPHONE HYDROCHLORIDE 0.5 MILLIGRAM(S): 2 INJECTION INTRAMUSCULAR; INTRAVENOUS; SUBCUTANEOUS at 05:00

## 2018-04-12 RX ADMIN — OXYCODONE HYDROCHLORIDE 5 MILLIGRAM(S): 5 TABLET ORAL at 21:46

## 2018-04-12 RX ADMIN — OXYCODONE HYDROCHLORIDE 10 MILLIGRAM(S): 5 TABLET ORAL at 18:01

## 2018-04-12 RX ADMIN — Medication 1000 MILLIGRAM(S): at 07:30

## 2018-04-12 RX ADMIN — Medication 4 UNIT(S): at 18:01

## 2018-04-12 RX ADMIN — MILRINONE LACTATE 10.94 MICROGRAM(S)/KG/MIN: 1 INJECTION, SOLUTION INTRAVENOUS at 12:40

## 2018-04-12 RX ADMIN — Medication 10 MILLIGRAM(S): at 05:28

## 2018-04-12 RX ADMIN — Medication 10 MILLIGRAM(S): at 21:45

## 2018-04-12 NOTE — PHYSICAL THERAPY INITIAL EVALUATION ADULT - ADDITIONAL COMMENTS
Pt lives w/ wife in a pvt house w/ 3 steps x 2 to enter and 6 steps to main floor ([place]). Pt was indep w/ all ADLs w/o an assist device.

## 2018-04-12 NOTE — PHYSICAL THERAPY INITIAL EVALUATION ADULT - PERTINENT HX OF CURRENT PROBLEM, REHAB EVAL
62 m with h/o DM and htn (+ h/o retinopathy + laser rx and neuropathy of toes) now a/w 2nd episode of chest tightness and dyspnea. Pt NSTEMI and now s/p C3

## 2018-04-12 NOTE — PROGRESS NOTE ADULT - SUBJECTIVE AND OBJECTIVE BOX
[facility] Cardiology Progress Note  _______________________________    Pt. seen and examined in CTICU. Chart reviewed.    Telemetry - currently sinus rhythm 70's.    T(C): 35.8 (18 @ 06:00), Max: 37.4 (18 @ 19:00)  HR: 91 (18 @ 09:00) (63 - 91)  BP: --  RR: 10 (18 @ 09:00) (8 - 33)  SpO2: 93% (18 @ 09:00) (88% - 100%)  I&O's Summary    2018 07:  -  2018 07:00  --------------------------------------------------------  IN: 2156.5 mL / OUT: 1320 mL / NET: 836.5 mL    2018 07:01  -  2018 09:35  --------------------------------------------------------  IN: 99 mL / OUT: 70 mL / NET: 29 mL        PHYSICAL EXAM:  GENERAL: Alert, NAD.  NECK: Supple, No JVD, no carotid bruit.  CHEST/LUNG: Clear to auscultation bilaterally; No wheezes, rales, or rhonchi.  HEART: S1 S2 normal, RRR; No murmurs, rubs, or gallops  ABDOMEN: Soft, Nontender, Nondistended; Bowel sounds present  EXTREMITIES:  No LE edema.      LABS:                        10.0   15.0  )-----------( 184      ( 2018 02:10 )             28.6         143  |  106  |  25<H>  ----------------------------<  155<H>  4.7   |  24  |  1.06    Ca    8.7      2018 02:09    TPro  6.0  /  Alb  3.8  /  TBili  1.3<H>  /  DBili  x   /  AST  22  /  ALT  16  /  AlkPhos  34<L>  04-12    PT/INR - ( 2018 02:10 )   PT: 15.6 sec;   INR: 1.43 ratio         PTT - ( 2018 02:10 )  PTT:27.5 sec  CARDIAC MARKERS ( 2018 14:34 )  x     / 1.42 ng/mL / 195 U/L / x     / 19.2 ng/mL  CARDIAC MARKERS ( 2018 05:37 )  x     / 1.18 ng/mL / 68 U/L / x     / 3.1 ng/mL  CARDIAC MARKERS ( 2018 23:43 )  x     / 1.24 ng/mL / 63 U/L / x     / 3.2 ng/mL  CARDIAC MARKERS ( 2018 14:06 )  x     / 1.51 ng/mL / 74 U/L / x     / 3.4 ng/mL  CARDIAC MARKERS ( 2018 03:55 )  x     / 0.97 ng/mL / 79 U/L / x     / 3.7 ng/mL  CARDIAC MARKERS ( 2018 23:08 )  x     / 1.05 ng/mL / x     / x     / x      CARDIAC MARKERS ( 2018 18:46 )  x     / 1.24 ng/mL / x     / x     / x                  MEDICATIONS  (STANDING):  aspirin enteric coated 325 milliGRAM(s) Oral daily  atorvastatin 80 milliGRAM(s) Oral at bedtime  cefuroxime  IVPB 1500 milliGRAM(s) IV Intermittent every 8 hours  dextrose 50% Injectable 50 milliLiter(s) IV Push every 15 minutes  dextrose 50% Injectable 25 milliLiter(s) IV Push every 15 minutes  docusate sodium 100 milliGRAM(s) Oral three times a day  enoxaparin Injectable 40 milliGRAM(s) SubCutaneous daily  insulin glargine Injectable (LANTUS) 10 Unit(s) SubCutaneous at bedtime  insulin Infusion 3 Unit(s)/Hr (3 mL/Hr) IV Continuous <Continuous>  insulin lispro (HumaLOG) corrective regimen sliding scale   SubCutaneous Before meals and at bedtime  insulin lispro Injectable (HumaLOG) 4 Unit(s) SubCutaneous before breakfast  insulin lispro Injectable (HumaLOG) 4 Unit(s) SubCutaneous before lunch  insulin lispro Injectable (HumaLOG) 4 Unit(s) SubCutaneous before dinner  insulin NPH human recombinant 8 Unit(s) SubCutaneous once  metoclopramide Injectable 10 milliGRAM(s) IV Push every 8 hours  milrinone Infusion 0.3 MICROgram(s)/kG/Min (10.944 mL/Hr) IV Continuous <Continuous>  pantoprazole    Tablet 40 milliGRAM(s) Oral before breakfast  polyethylene glycol 3350 17 Gram(s) Oral daily  sodium chloride 0.45%. 1000 milliLiter(s) (10 mL/Hr) IV Continuous <Continuous>    MEDICATIONS  (PRN):  acetaminophen   Tablet. 650 milliGRAM(s) Oral every 6 hours PRN Mild Pain (1 - 3)  oxyCODONE    IR 5 milliGRAM(s) Oral every 4 hours PRN Moderate Pain (4 - 6)  oxyCODONE    IR 10 milliGRAM(s) Oral every 6 hours PRN Severe Pain (7 - 10)      RADIOLOGY & ADDITIONAL TESTS:  < from: Cardiac Cath Lab - Adult (18 @ 10:29) >  [facility]  Department of Cardiology  [address] [place] [address]  [phone]  Cath Lab Report -- Comprehensive Report  Patient: JASE KENNEDY  Study date: 2018  Account number: [id number]  MR number: [id number]  : 1955  Gender: Male  Race: W  Case Physician(s):  Manjeet George M.D.  Fellow:  Elisabeth Aparicio D.O.  Referring Physician:  ANA Cunha M.D.  INDICATIONS: Initial NSTEMI.  HISTORY: There was no prior cardiac history. The patient has hypertension,  insulin-controlled diabetes, medication-treated dyslipidemia, and morbid  obesity. PRIOR CARDIOVASCULAR PROCEDURES: None.  PROCEDURE:  --  Left heart catheterization.  --  Left coronary angiography.  --  Right coronary angiography.  TECHNIQUE: The risks and alternatives of the procedures and conscious  sedation were explained to the patient and informed consent was obtained.  Cardiac catheterization performed electively.  Local anesthetic given. Right radial artery access. A 6FR PRELUDE KIT was  inserted in the vessel. Left heart catheterization. A 5FR FR4.0 EXPO  catheter was utilized. After recording ascending aortic pressure, the  catheter was advanced across the aortic valve and left ventricular  pressure was recorded. Left coronary artery angiography. The vessel was  injected utilizing a 5FR FL3.5 EXPO catheter. Right coronary artery  angiography. The vessel was injected utilizing a 5FR FR4.0 EXPO catheter.  RADIATION EXPOSURE: 1.5 min.  CONTRAST GIVEN: Omnipaque 22 ml.  MEDICATIONS GIVEN: Midazolam, 1 mg, IV. Fentanyl, 25 mcg, IV. Verapamil  (Isoptin, Calan, Covera), 2.5 mg, IA. Heparin, 3000 units, IA.  VENTRICLES: No left ventriculogram was performed.  CORONARY VESSELS: The coronarycirculation is right dominant.  LM:   --  LM: Angiography showed minor luminal irregularities with no flow  limiting lesions.  LAD:   --  Proximal LAD: There was a diffuse 50 % stenosis.  --  Mid LAD: There was a 100 % stenosis.  CX:   --  Distal circumflex: Angiography showed severe atherosclerosis.  --  OM1: There was a 95 % stenosis.  RCA:   --  Mid RCA: There was a 100 % stenosis.  COMPLICATIONS: There were no complications.  DIAGNOSTIC IMPRESSIONS: Severe multivessel CAD. Elevated LVEDP  DIAGNOSTIC RECOMMENDATIONS: Echocardiogram. CTS consultation for CABG eval.  Prepared and signed by  Manjeet George M.D.  Signed 2018 11:20:27  HEMODYNAMIC TABLES  Pressures:  Baseline  Pressures:  - HR: 75  Pressures:  - Rhythm:  Pressures:  -- Aortic Pressure (S/D/M): 161/107/130  Pressures:  -- Left Ventricle (s/edp): 147/45/--  Outputs:  Baseline  Outputs:  -- CALCULATIONS[age]  Outputs:  -- CALCULATIONS: Body Surface Area: 2.39  Outputs:  -- CALCULATIONS: Height in cm: 180.00  Outputs:  -- CALCULATIONS: Sex: Male  Outputs:  -- CALCULATIONS: Weight in k.60    < end of copied text >    Echo  < from: TTE with Doppler (w/Cont) (18 @ 14:57) >  Patient name: JASE KENNEDY  YOB: 1955   Age: 62 (M)   MR#: [id number]  Study Date: 2018  [facility] Lilia Little RDCS  Study quality: Technically difficult  Referring Physician: Jessy Locke MD  Blood Pressure: 136/96 mmHg  Height: 180 cm  Weight: 122 kg  BSA: 2.4 m2  ------------------------------------------------------------------------  PROCEDURE: Transthoracic echocardiogram with 2-D, M-Mode  and complete spectral and color flow Doppler. Verbal  consent was obtained for injection of echo contrast  following a discussion of risks and benefits. Following  intravenous injection of contrast, harmonic imaging was  performed.  INDICATION: Encounter for preprocedural cardiovascular  examination (Z01.810)  ------------------------------------------------------------------------  Dimensions:    Normal Values:  LA:     5.1    2.0 - 4.0 cm  Ao:     3.6    2.0 - 3.8 cm  SEPTUM: 1.3    0.6 - 1.2 cm  PWT:    1.3    0.6 - 1.1 cm  LVIDd:  6.1    3.0 - 5.6 cm  LVIDs:  4.8    1.8 - 4.0 cm  Derived variables:  LVMI: 150 g/m2  RWT: 0.42  EF (Visual Estimate): 30-35 %  Doppler Peak Velocity (m/sec): AoV=2.0  ------------------------------------------------------------------------  Observations:  Mitral Valve: Mitral annular calcification. Tethered mitral  valve leaflets with normal opening. Minimal mitral  regurgitation.  Aortic Valve/Aorta: Aortic valve not well visualized;  appears calcified. Peak transaortic valve gradient equals  17 mm Hg, mean transaortic valve gradient equals 8 mm Hg,  estimated aortic valve area equals 1.1 sqcm (by continuity  equation), aortic valve velocity time integral equals 40  cm, consistent with low gradient moderate aortic stenosis.  Aortic valve and LVOT are poorly visualized limiting  assessment. Consider additional imaging of the aortic  valve. No aortic valve regurgitation seen. Peak left  ventricular outflow tract gradient equals 2 mm Hg, mean  gradient is equal to 1 mm Hg, LVOT velocity time integral  [id number] cm.  Aortic Root: 3.6 cm.  LVOT diameter: 2 cm.  Left Atrium: Moderately dilated left atrium.  LA volume  index = 46 cc/m2.  Left Ventricle: Endocardial visualization enhanced with  intravenous injection of echo contrast (Definity). Left  ventricle is suboptimtally visualized despite the use of  intravenous echo contrast; grossly moderate to severe left  ventricular systolic dysfunction. The mid septum, distal  segments and the apex are severely hypo/akinetic. EF by  visual estimation approximately 30-35%. Severe concentric  left ventricular hypertrophy. Moderate diastolic  dysfunction (Stage II).  Right Heart: Right atrium not well visualized, probably  normal. The right ventricle is not well visualized; grossly  normal right ventricle appears mildly enlarged with normal  systolic function. Tricuspid valve not well visualized,  probably normal. Minimal tricuspid regurgitation. Normal  pulmonic valve.  Pericardium/Pleura: Normal pericardium with no pericardial  effusion.  Hemodynamic: Estimated right atrial pressure is 8 mm Hg.  Inadequate tricuspid regurgitation Doppler envelope  precludes estimation of RVSP.  ------------------------------------------------------------------------  Conclusions:  Technically difficult study.  1. Mitral annular calcification. Tethered mitral valve  leaflets with normal opening.  2. Aortic valve not well visualized; appears calcified.  Peak transaortic valve gradient equals 17 mm Hg, mean  transaortic valve gradient equals 8 mm Hg, estimated aortic  valve area equals 1.1 sqcm (by continuity equation), aortic  valve velocity time integral equals 40 cm, consistent with  low gradient moderate aortic stenosis. Aortic valve and  LVOT are poorly visualized limiting assessment. Consider  additionalimaging of the aortic valve. No aortic valve  regurgitation seen.  3. Severe concentric left ventricular hypertrophy.  4. Endocardial visualization enhanced with intravenous  injection of echo contrast (Definity). Left ventricle is  suboptimtally visualized despite the use of intravenous  echo contrast; grossly moderate to severe left ventricular  systolic dysfunction. The mid septum, distal segments and  the apex are severely hypo/akinetic. EF by visual  estimation approximately 30-35%.  5. Moderate diastolic dysfunction (Stage II).  6. The right ventricle is not well visualized; grossly  normal right ventricle appears mildly enlarged with normal  systolic function.  *** No previous Echo exam.  ------------------------------------------------------------------------  Confirmed on  2018 - 18:34:55 by Munira Guzman M.D.  ------------------------------------------------------------------------    < end of copied text >

## 2018-04-12 NOTE — PROGRESS NOTE ADULT - ASSESSMENT
63 y/o male with PMHx of HTN and DM type II s/p NSTEMI and C3L on 4/11. Overall uncomplicated postop course.    4/11 Extubated; BIBAP overnight  4/12 Rand d/c in Unit. Transferred to SDU POD #1. OOB to chair. AVSS; doing well. (+) pacing wires.  Continue primacor drip at 0.15mcg and insulin drip. Continue asa, statin, post op zinacef, PPI, Continue PT. D/C planning. 61 y/o male with PMHx of HTN and DM type II s/p NSTEMI and C3L on 4/11. Overall uncomplicated postop course. Post op EF 45-50%    4/11 Extubated; BIBAP overnight  4/12 Rand d/c in Unit. Transferred to SDU POD #1. OOB to chair. AVSS; doing well. (+) pacing wires.  Continue primacor drip at 0.15mcg and insulin drip. Continue asa, statin, post op zinacef, PPI, Continue PT. D/C planning.

## 2018-04-12 NOTE — PROGRESS NOTE ADULT - ASSESSMENT
62 year old male s/p CABGx3  1. wean primacor as tolerated  2. wean pressors as tolerated  3. aspirin, statins for graft patency ppx  4. vte ppx  5. gi ppx  6. blood sugar control  7. pain management  8. oob to chair, ambulate as tolerated

## 2018-04-12 NOTE — AIRWAY REMOVAL NOTE  ADULT & PEDS - ARTIFICAL AIRWAY REMOVAL COMMENTS
Writen order for extubation verified.  The patient was identified by full name and date of birth compared to the identification band.  Present during the procedure was MEDHAT Thao

## 2018-04-12 NOTE — PHYSICAL THERAPY INITIAL EVALUATION ADULT - PLANNED THERAPY INTERVENTIONS, PT EVAL
transfer training/gait training/1. LTG - Pt will be able to indep'ly neg 6 steps using HR within 2 wks/bed mobility training

## 2018-04-12 NOTE — PROGRESS NOTE ADULT - SUBJECTIVE AND OBJECTIVE BOX
VITAL SIGNS    Telemetry:  NSR 70-80s  Vital Signs Last 24 Hrs  T(C): 36.4 (18 @ 13:30), Max: 37.4 (18 @ 19:00)  T(F): 97.6 (18 @ 13:30), Max: 99.3 (18 @ 19:00)  HR: 81 (18 @ 13:30) (63 - 91)  BP: 128/72 (18 @ 13:30) (128/72 - 128/72)  RR: 18 (18 @ 13:30) (8 - 33)  SpO2: 98% (18 @ 13:30) (88% - 100%)             @ :01  -   @ 07:00  --------------------------------------------------------  IN: 2156.5 mL / OUT: 1320 mL / NET: 836.5 mL     @ 07:01  -   @ 15:00  --------------------------------------------------------  IN: 141 mL / OUT: 70 mL / NET: 71 mL       Daily     Daily Weight in k.5 (2018 00:00)  Admit Wt: Drug Dosing Weight  Height (cm): 180 (2018 14:00)  Weight (kg): 121.6 (2018 14:00)  BMI (kg/m2): 37.5 (2018 14:00)  BSA (m2): 2.39 (2018 14:00)    Bilirubin Total, Serum: 1.3 mg/dL ( @ 02:09)    CAPILLARY BLOOD GLUCOSE  180 (2018 11:00)  138 (2018 10:00)  137 (2018 09:00)  135 (2018 08:00)  144 (2018 07:00)  135 (2018 06:00)  145 (2018 05:00)  153 (2018 04:00)  154 (2018 03:00)  147 (2018 02:00)  146 (2018 01:00)  101 (2018 00:00)  115 (2018 23:00)  125 (2018 22:00)  137 (2018 21:00)  140 (2018 20:00)  147 (2018 18:30)  155 (2018 17:15)  153 (2018 16:00)      POCT Blood Glucose.: 196 mg/dL (2018 14:03)  POCT Blood Glucose.: 173 mg/dL (2018 13:09)  POCT Blood Glucose.: 184 mg/dL (2018 12:15)  POCT Blood Glucose.: 180 mg/dL (2018 11:19)  POCT Blood Glucose.: 138 mg/dL (2018 10:10)  POCT Blood Glucose.: 135 mg/dL (2018 08:16)  POCT Blood Glucose.: 144 mg/dL (2018 07:07)  POCT Blood Glucose.: 132 mg/dL (2018 05:59)  POCT Blood Glucose.: 153 mg/dL (2018 04:11)  POCT Blood Glucose.: 154 mg/dL (2018 02:57)  POCT Blood Glucose.: 101 mg/dL (2018 00:09)  POCT Blood Glucose.: 115 mg/dL (2018 23:05)  POCT Blood Glucose.: 125 mg/dL (2018 22:06)  POCT Blood Glucose.: 132 mg/dL (2018 21:04)  POCT Blood Glucose.: 147 mg/dL (2018 18:29)          MEDICATIONS  acetaminophen   Tablet. 650 milliGRAM(s) Oral every 6 hours PRN  aspirin enteric coated 325 milliGRAM(s) Oral daily  atorvastatin 80 milliGRAM(s) Oral at bedtime  cefuroxime  IVPB 1500 milliGRAM(s) IV Intermittent every 8 hours  dextrose 50% Injectable 50 milliLiter(s) IV Push every 15 minutes  dextrose 50% Injectable 25 milliLiter(s) IV Push every 15 minutes  docusate sodium 100 milliGRAM(s) Oral three times a day  enoxaparin Injectable 40 milliGRAM(s) SubCutaneous daily  insulin glargine Injectable (LANTUS) 10 Unit(s) SubCutaneous at bedtime  insulin Infusion 3 Unit(s)/Hr IV Continuous <Continuous>  insulin lispro (HumaLOG) corrective regimen sliding scale   SubCutaneous Before meals and at bedtime  insulin lispro Injectable (HumaLOG) 4 Unit(s) SubCutaneous before breakfast  insulin lispro Injectable (HumaLOG) 4 Unit(s) SubCutaneous before lunch  insulin lispro Injectable (HumaLOG) 4 Unit(s) SubCutaneous before dinner  metoclopramide Injectable 10 milliGRAM(s) IV Push every 8 hours  milrinone Infusion 0.3 MICROgram(s)/kG/Min IV Continuous <Continuous>  oxyCODONE    IR 5 milliGRAM(s) Oral every 4 hours PRN  oxyCODONE    IR 10 milliGRAM(s) Oral every 6 hours PRN  pantoprazole    Tablet 40 milliGRAM(s) Oral before breakfast  polyethylene glycol 3350 17 Gram(s) Oral daily  sodium chloride 0.45%. 1000 milliLiter(s) IV Continuous <Continuous>      PHYSICAL EXAM  Subjective: Pt denies any pain, sob, or fever.   Neurology: alert and oriented x 3, nonfocal, no gross deficits  CV : s1, s2  Sternal Wound :  CDI , Stable; (+) pw  Lungs: Crackles at bases  Abdomen: soft, NT,ND, (+) Bowel sounds  :  voiding  Extremities:  Right leg with ACE Bandage. -c/c/e. Pedal pulses 2+ b/l     Lt pleural chest tube; [facility]; Mariela    LABS      143  |  106  |  25<H>  ----------------------------<  155<H>  4.7   |  24  |  1.06    Ca    8.7      2018 02:09    TPro  6.0  /  Alb  3.8  /  TBili  1.3<H>  /  DBili  x   /  AST  22  /  ALT  16  /  AlkPhos  34<L>                                   10.0   15.0  )-----------( 184      ( 2018 02:10 )             28.6          PT/INR - ( 2018 02:10 )   PT: 15.6 sec;   INR: 1.43 ratio         PTT - ( 2018 02:10 )  PTT:27.5 sec

## 2018-04-12 NOTE — PROGRESS NOTE ADULT - SUBJECTIVE AND OBJECTIVE BOX
Operation: CABGx3  POD: 1  Narrative: patient resting comfortably in bed    Vital Signs Last 24 Hrs  T(C): 36.2 (12 Apr 2018 01:00), Max: 37.4 (11 Apr 2018 19:00)  T(F): 97.2 (12 Apr 2018 01:00), Max: 99.3 (11 Apr 2018 19:00)  HR: 66 (12 Apr 2018 01:00) (63 - 77)  BP: 114/73 (11 Apr 2018 05:05) (114/73 - 114/73)  BP(mean): --  RR: 13 (12 Apr 2018 01:00) (10 - 33)  SpO2: 92% (12 Apr 2018 01:00) (92% - 100%)  I&O's Detail    10 Apr 2018 07:01  -  11 Apr 2018 07:00  --------------------------------------------------------  IN:    heparin  Infusion.: 192 mL    Oral Fluid: 300 mL  Total IN: 492 mL    OUT:    Voided: 700 mL  Total OUT: 700 mL    Total NET: -208 mL      11 Apr 2018 07:01  -  12 Apr 2018 01:34  --------------------------------------------------------  IN:    Albumin 5%  - 250 mL: 500 mL    dexmedetomidine Infusion: 69.2 mL    insulin Infusion: 60.5 mL    milrinone  Infusion: 44 mL    milrinone  Infusion: 101.5 mL    norepinephrine Infusion: 76.8 mL    sodium chloride 0.45%.: 120 mL    Solution: 400 mL    vasopressin Infusion: 16 mL  Total IN: 1388 mL    OUT:    Chest Tube: 260 mL    Chest Tube: 110 mL    Indwelling Catheter - Urethral: 610 mL  Total OUT: 980 mL    Total NET: 408 mL    LABS:    MEDICATIONS  (STANDING):  acetaminophen  IVPB. 1000 milliGRAM(s) IV Intermittent once  acetaminophen  IVPB. 1000 milliGRAM(s) IV Intermittent once  albumin human  5% IVPB 250 milliLiter(s) IV Intermittent once  albumin human  5% IVPB 250 milliLiter(s) IV Intermittent once  albumin human  5% IVPB 250 milliLiter(s) IV Intermittent once  albumin human  5% IVPB 250 milliLiter(s) IV Intermittent once  aspirin enteric coated 325 milliGRAM(s) Oral daily  cefuroxime  IVPB 1500 milliGRAM(s) IV Intermittent every 8 hours  dexmedetomidine Infusion 0.4 MICROgram(s)/kG/Hr (12.16 mL/Hr) IV Continuous <Continuous>  dextrose 50% Injectable 50 milliLiter(s) IV Push every 15 minutes  dextrose 50% Injectable 25 milliLiter(s) IV Push every 15 minutes  docusate sodium 100 milliGRAM(s) Oral three times a day  HYDROmorphone  Injectable 0.5 milliGRAM(s) IV Push once  insulin Infusion 3 Unit(s)/Hr (3 mL/Hr) IV Continuous <Continuous>  metoclopramide Injectable 10 milliGRAM(s) IV Push every 8 hours  milrinone Infusion 0.3 MICROgram(s)/kG/Min (10.944 mL/Hr) IV Continuous <Continuous>  norepinephrine Infusion 0.05 MICROgram(s)/kG/Min (11.4 mL/Hr) IV Continuous <Continuous>  pantoprazole  Injectable 40 milliGRAM(s) IV Push daily  potassium chloride  10 mEq/50 mL IVPB 10 milliEquivalent(s) IV Intermittent every 1 hour  potassium chloride  10 mEq/50 mL IVPB 10 milliEquivalent(s) IV Intermittent every 1 hour  potassium chloride  10 mEq/50 mL IVPB 10 milliEquivalent(s) IV Intermittent once  sodium chloride 0.45%. 1000 milliLiter(s) (10 mL/Hr) IV Continuous <Continuous>  vasopressin Infusion 0.05 Unit(s)/Min (3 mL/Hr) IV Continuous <Continuous>    MEDICATIONS  (PRN):  meperidine     Injectable 25 milliGRAM(s) IV Push once PRN For Shivering      General: Alert and orientedx3, in no acute distress  Chest: sternal dressing C/D/I  Heart: S1, S2, regular rate and rhythm  Lungs: clear to auscultation B/L, without wheezes, rhonci, rales  Abdomen: Soft, non distended, non tender, positive bowel sounds  Extremeties: without edema B/L LE, positive DP pulses B/L     Does the patient have a history of CHF: yes  -If yes, systolic or diastolic: systolic and diastolic  -pre-operative EF: 30-35-->45-50 after revascularization + stage II Diastolic dysfunction     Extubation within 24 hours: yes    Does the patient have a history of kidney disease: no	  -give CKD stage if applicable:  -what is patient's baseline Creatinine: 0.82    Beta Blockers contraindicated for the first 24 hours due to vasopressor/inotrpic medication: yes  -If on pressors, indication: inotropic support    Did the patient receive transfusion of blood and/or products: no  -If yes, indication:    DVT PPX: SCD b/l     Masha-operative antibiotics discontinued within 48 hours of CTU admission: yes  -name/date/time of discontinue  -zinacef/4-13-18/00:15    Patient care discussed on morning interdisciplinary rounds with CTS team.

## 2018-04-12 NOTE — PROGRESS NOTE ADULT - SUBJECTIVE AND OBJECTIVE BOX
Chief complaint  Patient is a 62y old  Male who presents with a chief complaint of complain of CP (08 Apr 2018 12:56)   Review of systems  Patient in bed, looks comfortable, no fever, no hypoglycemia.    Labs and Fingersticks  CAPILLARY BLOOD GLUCOSE  180 (12 Apr 2018 11:00)  138 (12 Apr 2018 10:00)  137 (12 Apr 2018 09:00)  135 (12 Apr 2018 08:00)  144 (12 Apr 2018 07:00)  135 (12 Apr 2018 06:00)  145 (12 Apr 2018 05:00)  153 (12 Apr 2018 04:00)  154 (12 Apr 2018 03:00)  147 (12 Apr 2018 02:00)  146 (12 Apr 2018 01:00)  101 (12 Apr 2018 00:00)  115 (11 Apr 2018 23:00)  125 (11 Apr 2018 22:00)  137 (11 Apr 2018 21:00)  140 (11 Apr 2018 20:00)  147 (11 Apr 2018 18:30)  155 (11 Apr 2018 17:15)      POCT Blood Glucose.: 166 mg/dL (12 Apr 2018 16:06)  POCT Blood Glucose.: 195 mg/dL (12 Apr 2018 15:09)  POCT Blood Glucose.: 196 mg/dL (12 Apr 2018 14:03)  POCT Blood Glucose.: 173 mg/dL (12 Apr 2018 13:09)  POCT Blood Glucose.: 184 mg/dL (12 Apr 2018 12:15)  POCT Blood Glucose.: 180 mg/dL (12 Apr 2018 11:19)  POCT Blood Glucose.: 138 mg/dL (12 Apr 2018 10:10)  POCT Blood Glucose.: 135 mg/dL (12 Apr 2018 08:16)  POCT Blood Glucose.: 144 mg/dL (12 Apr 2018 07:07)  POCT Blood Glucose.: 132 mg/dL (12 Apr 2018 05:59)  POCT Blood Glucose.: 153 mg/dL (12 Apr 2018 04:11)  POCT Blood Glucose.: 154 mg/dL (12 Apr 2018 02:57)  POCT Blood Glucose.: 101 mg/dL (12 Apr 2018 00:09)  POCT Blood Glucose.: 115 mg/dL (11 Apr 2018 23:05)  POCT Blood Glucose.: 125 mg/dL (11 Apr 2018 22:06)  POCT Blood Glucose.: 132 mg/dL (11 Apr 2018 21:04)  POCT Blood Glucose.: 147 mg/dL (11 Apr 2018 18:29)      Anion Gap, Serum: 13 (04-12 @ 02:09)  Anion Gap, Serum: 12 (04-11 @ 14:34)      Calcium, Total Serum: 8.7 (04-12 @ 02:09)  Calcium, Total Serum: 8.8 (04-11 @ 14:34)  Albumin, Serum: 3.8 (04-12 @ 02:09)    Alanine Aminotransferase (ALT/SGPT): 16 (04-12 @ 02:09)  Alkaline Phosphatase, Serum: 34 <L> (04-12 @ 02:09)  Aspartate Aminotransferase (AST/SGOT): 22 (04-12 @ 02:09)        04-12    143  |  106  |  25<H>  ----------------------------<  155<H>  4.7   |  24  |  1.06    Ca    8.7      12 Apr 2018 02:09    TPro  6.0  /  Alb  3.8  /  TBili  1.3<H>  /  DBili  x   /  AST  22  /  ALT  16  /  AlkPhos  34<L>  04-12                        10.0   15.0  )-----------( 184      ( 12 Apr 2018 02:10 )             28.6     Medications  MEDICATIONS  (STANDING):  aspirin enteric coated 325 milliGRAM(s) Oral daily  atorvastatin 80 milliGRAM(s) Oral at bedtime  cefuroxime  IVPB 1500 milliGRAM(s) IV Intermittent every 8 hours  dextrose 50% Injectable 50 milliLiter(s) IV Push every 15 minutes  dextrose 50% Injectable 25 milliLiter(s) IV Push every 15 minutes  docusate sodium 100 milliGRAM(s) Oral three times a day  enoxaparin Injectable 40 milliGRAM(s) SubCutaneous daily  insulin glargine Injectable (LANTUS) 10 Unit(s) SubCutaneous at bedtime  insulin Infusion 3 Unit(s)/Hr (3 mL/Hr) IV Continuous <Continuous>  insulin lispro (HumaLOG) corrective regimen sliding scale   SubCutaneous Before meals and at bedtime  insulin lispro Injectable (HumaLOG) 4 Unit(s) SubCutaneous before breakfast  insulin lispro Injectable (HumaLOG) 4 Unit(s) SubCutaneous before lunch  insulin lispro Injectable (HumaLOG) 4 Unit(s) SubCutaneous before dinner  metoclopramide Injectable 10 milliGRAM(s) IV Push every 8 hours  milrinone Infusion 0.3 MICROgram(s)/kG/Min (10.944 mL/Hr) IV Continuous <Continuous>  pantoprazole    Tablet 40 milliGRAM(s) Oral before breakfast  polyethylene glycol 3350 17 Gram(s) Oral daily  sodium chloride 0.45%. 1000 milliLiter(s) (10 mL/Hr) IV Continuous <Continuous>      Physical Exam  General: Patient comfortable in bed  Vital Signs Last 12 Hrs  T(F): 97.6 (04-12-18 @ 15:41), Max: 97.6 (04-12-18 @ 13:30)  HR: 80 (04-12-18 @ 15:41) (75 - 91)  BP: 115/67 (04-12-18 @ 15:41) (115/67 - 128/72)  BP(mean): --  RR: 18 (04-12-18 @ 15:41) (10 - 33)  SpO2: 99% (04-12-18 @ 15:41) (88% - 99%)  Neck: No palpable thyroid nodules.  CVS: S1S2, No murmurs  Respiratory: No wheezing, no crepitations  GI: Abdomen soft, bowel sounds positive  Musculoskeletal:  edema lower extremities.   Skin: No skin rashes, no ecchymosis    Diagnostics

## 2018-04-12 NOTE — PROGRESS NOTE ADULT - ASSESSMENT
62 M h/o Obesity, DM, HTN, active cigar smoker, LVH a/w recent/missed MI and found to have multivessel CAD and ischemic cardiomyopathy (EF 30-35%) now s/p 3V CABG.

## 2018-04-13 ENCOUNTER — TRANSCRIPTION ENCOUNTER (OUTPATIENT)
Age: 63
End: 2018-04-13

## 2018-04-13 LAB
ALBUMIN SERPL ELPH-MCNC: 3.5 G/DL — SIGNIFICANT CHANGE UP (ref 3.3–5)
ALP SERPL-CCNC: 43 U/L — SIGNIFICANT CHANGE UP (ref 40–120)
ALT FLD-CCNC: 23 U/L RC — SIGNIFICANT CHANGE UP (ref 10–45)
ANION GAP SERPL CALC-SCNC: 12 MMOL/L — SIGNIFICANT CHANGE UP (ref 5–17)
AST SERPL-CCNC: 21 U/L — SIGNIFICANT CHANGE UP (ref 10–40)
BASOPHILS # BLD AUTO: 0 K/UL — SIGNIFICANT CHANGE UP (ref 0–0.2)
BASOPHILS NFR BLD AUTO: 0 % — SIGNIFICANT CHANGE UP (ref 0–2)
BILIRUB SERPL-MCNC: 0.7 MG/DL — SIGNIFICANT CHANGE UP (ref 0.2–1.2)
BUN SERPL-MCNC: 31 MG/DL — HIGH (ref 7–23)
CALCIUM SERPL-MCNC: 8.7 MG/DL — SIGNIFICANT CHANGE UP (ref 8.4–10.5)
CHLORIDE SERPL-SCNC: 101 MMOL/L — SIGNIFICANT CHANGE UP (ref 96–108)
CO2 SERPL-SCNC: 23 MMOL/L — SIGNIFICANT CHANGE UP (ref 22–31)
CREAT SERPL-MCNC: 1.03 MG/DL — SIGNIFICANT CHANGE UP (ref 0.5–1.3)
EOSINOPHIL # BLD AUTO: 0.1 K/UL — SIGNIFICANT CHANGE UP (ref 0–0.5)
EOSINOPHIL NFR BLD AUTO: 0.2 % — SIGNIFICANT CHANGE UP (ref 0–6)
GLUCOSE BLDC GLUCOMTR-MCNC: 120 MG/DL — HIGH (ref 70–99)
GLUCOSE BLDC GLUCOMTR-MCNC: 162 MG/DL — HIGH (ref 70–99)
GLUCOSE BLDC GLUCOMTR-MCNC: 169 MG/DL — HIGH (ref 70–99)
GLUCOSE BLDC GLUCOMTR-MCNC: 187 MG/DL — HIGH (ref 70–99)
GLUCOSE BLDC GLUCOMTR-MCNC: 283 MG/DL — HIGH (ref 70–99)
GLUCOSE SERPL-MCNC: 168 MG/DL — HIGH (ref 70–99)
HCT VFR BLD CALC: 29.8 % — LOW (ref 39–50)
HGB BLD-MCNC: 9.7 G/DL — LOW (ref 13–17)
LYMPHOCYTES # BLD AUTO: 1.9 K/UL — SIGNIFICANT CHANGE UP (ref 1–3.3)
LYMPHOCYTES # BLD AUTO: 8.3 % — LOW (ref 13–44)
MCHC RBC-ENTMCNC: 31 PG — SIGNIFICANT CHANGE UP (ref 27–34)
MCHC RBC-ENTMCNC: 32.6 GM/DL — SIGNIFICANT CHANGE UP (ref 32–36)
MCV RBC AUTO: 95 FL — SIGNIFICANT CHANGE UP (ref 80–100)
MONOCYTES # BLD AUTO: 1.4 K/UL — HIGH (ref 0–0.9)
MONOCYTES NFR BLD AUTO: 6.1 % — SIGNIFICANT CHANGE UP (ref 2–14)
NEUTROPHILS # BLD AUTO: 19.6 K/UL — HIGH (ref 1.8–7.4)
NEUTROPHILS NFR BLD AUTO: 85.4 % — HIGH (ref 43–77)
PLATELET # BLD AUTO: 207 K/UL — SIGNIFICANT CHANGE UP (ref 150–400)
POTASSIUM SERPL-MCNC: 5.2 MMOL/L — SIGNIFICANT CHANGE UP (ref 3.5–5.3)
POTASSIUM SERPL-SCNC: 5.2 MMOL/L — SIGNIFICANT CHANGE UP (ref 3.5–5.3)
PROT SERPL-MCNC: 6.4 G/DL — SIGNIFICANT CHANGE UP (ref 6–8.3)
RBC # BLD: 3.14 M/UL — LOW (ref 4.2–5.8)
RBC # FLD: 12.9 % — SIGNIFICANT CHANGE UP (ref 10.3–14.5)
SODIUM SERPL-SCNC: 136 MMOL/L — SIGNIFICANT CHANGE UP (ref 135–145)
WBC # BLD: 22.9 K/UL — HIGH (ref 3.8–10.5)
WBC # FLD AUTO: 22.9 K/UL — HIGH (ref 3.8–10.5)

## 2018-04-13 PROCEDURE — 71045 X-RAY EXAM CHEST 1 VIEW: CPT | Mod: 26,76

## 2018-04-13 RX ORDER — METOPROLOL TARTRATE 50 MG
50 TABLET ORAL DAILY
Qty: 0 | Refills: 0 | Status: DISCONTINUED | OUTPATIENT
Start: 2018-04-13 | End: 2018-04-17

## 2018-04-13 RX ORDER — AMIODARONE HYDROCHLORIDE 400 MG/1
200 TABLET ORAL
Qty: 0 | Refills: 0 | Status: DISCONTINUED | OUTPATIENT
Start: 2018-04-13 | End: 2018-04-17

## 2018-04-13 RX ORDER — INSULIN LISPRO 100/ML
4 VIAL (ML) SUBCUTANEOUS
Qty: 0 | Refills: 0 | Status: DISCONTINUED | OUTPATIENT
Start: 2018-04-13 | End: 2018-04-13

## 2018-04-13 RX ORDER — INSULIN LISPRO 100/ML
6 VIAL (ML) SUBCUTANEOUS
Qty: 0 | Refills: 0 | Status: DISCONTINUED | OUTPATIENT
Start: 2018-04-13 | End: 2018-04-15

## 2018-04-13 RX ORDER — INSULIN GLARGINE 100 [IU]/ML
15 INJECTION, SOLUTION SUBCUTANEOUS AT BEDTIME
Qty: 0 | Refills: 0 | Status: DISCONTINUED | OUTPATIENT
Start: 2018-04-13 | End: 2018-04-15

## 2018-04-13 RX ADMIN — Medication 100 MILLIGRAM(S): at 22:05

## 2018-04-13 RX ADMIN — Medication 100 MILLIGRAM(S): at 06:12

## 2018-04-13 RX ADMIN — Medication 6 UNIT(S): at 11:23

## 2018-04-13 RX ADMIN — OXYCODONE HYDROCHLORIDE 5 MILLIGRAM(S): 5 TABLET ORAL at 08:30

## 2018-04-13 RX ADMIN — Medication 10 MILLIGRAM(S): at 06:12

## 2018-04-13 RX ADMIN — LISINOPRIL 5 MILLIGRAM(S): 2.5 TABLET ORAL at 06:12

## 2018-04-13 RX ADMIN — Medication 100 MILLIGRAM(S): at 00:05

## 2018-04-13 RX ADMIN — OXYCODONE HYDROCHLORIDE 10 MILLIGRAM(S): 5 TABLET ORAL at 11:21

## 2018-04-13 RX ADMIN — Medication 6 UNIT(S): at 17:03

## 2018-04-13 RX ADMIN — OXYCODONE HYDROCHLORIDE 5 MILLIGRAM(S): 5 TABLET ORAL at 08:27

## 2018-04-13 RX ADMIN — Medication 4: at 07:57

## 2018-04-13 RX ADMIN — INSULIN GLARGINE 15 UNIT(S): 100 INJECTION, SOLUTION SUBCUTANEOUS at 22:06

## 2018-04-13 RX ADMIN — POLYETHYLENE GLYCOL 3350 17 GRAM(S): 17 POWDER, FOR SOLUTION ORAL at 11:24

## 2018-04-13 RX ADMIN — Medication 4: at 22:08

## 2018-04-13 RX ADMIN — Medication 50 MILLIGRAM(S): at 17:05

## 2018-04-13 RX ADMIN — AMIODARONE HYDROCHLORIDE 200 MILLIGRAM(S): 400 TABLET ORAL at 17:05

## 2018-04-13 RX ADMIN — OXYCODONE HYDROCHLORIDE 10 MILLIGRAM(S): 5 TABLET ORAL at 17:47

## 2018-04-13 RX ADMIN — ENOXAPARIN SODIUM 40 MILLIGRAM(S): 100 INJECTION SUBCUTANEOUS at 11:24

## 2018-04-13 RX ADMIN — OXYCODONE HYDROCHLORIDE 10 MILLIGRAM(S): 5 TABLET ORAL at 11:50

## 2018-04-13 RX ADMIN — Medication 4 UNIT(S): at 07:57

## 2018-04-13 RX ADMIN — Medication 325 MILLIGRAM(S): at 11:23

## 2018-04-13 RX ADMIN — OXYCODONE HYDROCHLORIDE 10 MILLIGRAM(S): 5 TABLET ORAL at 23:42

## 2018-04-13 RX ADMIN — PANTOPRAZOLE SODIUM 40 MILLIGRAM(S): 20 TABLET, DELAYED RELEASE ORAL at 06:12

## 2018-04-13 RX ADMIN — Medication 12: at 11:23

## 2018-04-13 RX ADMIN — OXYCODONE HYDROCHLORIDE 10 MILLIGRAM(S): 5 TABLET ORAL at 18:17

## 2018-04-13 RX ADMIN — ATORVASTATIN CALCIUM 80 MILLIGRAM(S): 80 TABLET, FILM COATED ORAL at 22:05

## 2018-04-13 NOTE — DISCHARGE NOTE ADULT - PLAN OF CARE
full recovery follow up appt with Dr. Sky Downs, cardiac surgery on   follow up appt with Dr. Sellers, cardiologist- in 2-3 weeks  maintain glucose < 150  shower daily follow up appt with Dr. Sky Downs, cardiac surgery on   follow up appt with Dr. Sellers, cardiologist- in 2-3 weeks  follow up appt with Dr. FRANSISCO Harrison, endocrinologist in 2-3 weeks  maintain glucose < 150  shower daily

## 2018-04-13 NOTE — DIETITIAN INITIAL EVALUATION ADULT. - NS AS NUTRI INTERV COLLABORAT
Referral to other providers/Provided pt with information for diabetes wellness outpatient program for continued management support

## 2018-04-13 NOTE — DISCHARGE NOTE ADULT - MEDICATION SUMMARY - MEDICATIONS TO TAKE
I will START or STAY ON the medications listed below when I get home from the hospital:    coni needles  -- 1 unit(s) subcutaneous 4 times a day   -- Indication: For diabetes    glucometer lancets  -- 1 unit(s) subcutaneous 4 times a day   -- Indication: For diabetes    glucometer  -- 1 unit(s) subcutaneous 4 times a day   -- Indication: For diabetes    glucometer strips  -- 1 unit(s) subcutaneous 4 times a day   -- Indication: For diabetes    spironolactone 25 mg oral tablet  -- 1 tab(s) by mouth once a day  -- Indication: For water pill    acetaminophen 325 mg oral tablet  -- 2 tab(s) by mouth every 6 hours, As needed, Mild Pain (1 - 3)  -- Indication: For mild pain    oxyCODONE 5 mg oral tablet  -- 1 tab(s) by mouth every 4 hours, As needed, Moderate Pain (4 - 6) MDD:6  -- Indication: For moderate pain    aspirin 325 mg oral delayed release tablet  -- 1 tab(s) by mouth once a day  -- Indication: For anti-platelet    lisinopril 5 mg oral tablet  -- 1 tab(s) by mouth once a day  -- Indication: For Htn    Lantus Solostar Pen 100 units/mL subcutaneous solution  -- 18  subcutaneous once a day (at bedtime)   -- Do not drink alcoholic beverages when taking this medication.  It is very important that you take or use this exactly as directed.  Do not skip doses or discontinue unless directed by your doctor.  Keep in refrigerator.  Do not freeze.    -- Indication: For diabetes    HumaLOG KwikPen 100 units/mL injectable solution  -- 8 milliliter(s) injectable 3 times a day (before meals)   -- Indication: For diabetes    atorvastatin 80 mg oral tablet  -- 1 tab(s) by mouth once a day (at bedtime)  -- Indication: For Hi cholesterol    metoprolol succinate 50 mg oral tablet, extended release  -- 1 tab(s) by mouth once a day  -- Indication: For CArdiac med    tiotropium 18 mcg inhalation capsule  -- 1 cap(s) inhaled once a day  -- Indication: For lungs    furosemide 40 mg oral tablet  -- 1 tab(s) by mouth once a day  -- Indication: For water pill    docusate sodium 100 mg oral capsule  -- 1 cap(s) by mouth 3 times a day  -- Indication: For Stool softener    polyethylene glycol 3350 oral powder for reconstitution  -- 17 gram(s) by mouth once a day  -- Indication: For laxative    pantoprazole 40 mg oral delayed release tablet  -- 1 tab(s) by mouth once a day (before a meal)  -- Indication: For gerd

## 2018-04-13 NOTE — DISCHARGE NOTE ADULT - HOSPITAL COURSE
62M-PMH:  HTN and DM type II s/p NSTEMI and C3L on 4/11.Post op course   4/11 Extubated; BIBAP overnight for support. Remains on milrinone gtt for hemodynamic support.   4/12 Rand d/c in Unit. Transferred to SDU POD #1. OOB to chair. AVSS; doing well. (+) isolated pacing wires.  Continue primacor drip at 0.15 mcg and insulin drip. Continue asa, statin, post op zinacef, PPI, Continue PT. D/C planning.   4/13 Milrinone gtt weaned off. Left pleural CT d/c'd. Currently hemodynamically stable, OOB and ambulating. Incisional pain relieved with pain meds. L pleural d/c'd. Currently POD#2, progressing well. Overall uncomplicated post-op course. Post op EF 45-50%   Afib -amiodarone 200 BID  4/14Modified amio load 200 BID Toprol 50 daily ambulate TID Disposition to home Monday  no PT needs.  4/15 pacing wires d/c dressing removed Duonebs added ambulating disposition to home  4/16 VSS;  RSR 60-70 on amio and b-blockesr +BM after suppository ; + hypervolemia- diuresis; discharge planning   4/17-d/c plan home today - d/w Dr. Downs 62M-PMH:  HTN and DM type II s/p NSTEMI and C3L on 4/11.Post op course   4/11 Extubated; BIBAP overnight for support. Remains on milrinone gtt for hemodynamic support.   4/12 Rand d/c in Unit. Transferred to SDU POD #1. OOB to chair. AVSS; doing well. (+) isolated pacing wires.  Continue primacor drip at 0.15 mcg and insulin drip. Continue asa, statin, post op zinacef, PPI, Continue PT. D/C planning.   4/13 Milrinone gtt weaned off. Left pleural CT d/c'd. Currently hemodynamically stable, OOB and ambulating. Incisional pain relieved with pain meds. L pleural d/c'd. Currently POD#2, progressing well. Overall uncomplicated post-op course. Post op EF 45-50%   Afib -amiodarone 200 BID  4/14Modified amio load 200 BID Toprol 50 daily ambulate TID Disposition to home Monday  no PT needs.  4/15 pacing wires d/c dressing removed Duonebs added ambulating disposition to home  4/16 VSS;  RSR 60-70 on amio and b-blockesr +BM after suppository ; + hypervolemia- diuresis; discharge planning   4/17-d/c plan home today - d/w Dr. Downs new to insulin - pt. able to demonstrate insulin administration

## 2018-04-13 NOTE — DISCHARGE NOTE ADULT - OTHER SIGNIFICANT FINDINGS
VSS: Tele: SR 60-70  Cor: S1S2  Lungs: CTA  MT: inc CDI-stable sternum  abd: soft NT/ND +BS +BM  ext: tr. edema, no calf tenderness

## 2018-04-13 NOTE — DISCHARGE NOTE ADULT - CARE PLAN
Principal Discharge DX:	S/P CABG x 3  Goal:	full recovery  Assessment and plan of treatment:	follow up appt with Dr. Sky Downs, cardiac surgery on   follow up appt with Dr. Sellers, cardiologist- in 2-3 weeks  maintain glucose < 150  shower daily Principal Discharge DX:	S/P CABG x 3  Goal:	full recovery  Assessment and plan of treatment:	follow up appt with Dr. Sky Downs, cardiac surgery on   follow up appt with Dr. Sellers, cardiologist- in 2-3 weeks  follow up appt with Dr. FRANSISCO Harrison, endocrinologist in 2-3 weeks  maintain glucose < 150  shower daily

## 2018-04-13 NOTE — DISCHARGE NOTE ADULT - CARE PROVIDER_API CALL
Sky Downs), Surgery; Thoracic and Cardiac Surgery  [address] [place] [address]  Phone: [phone]  Fax: [phone]    Venkatesh Sellers), Cardiovascular Disease; Internal Medicine  [address] [place] [address]  [address]  [address] [place] [address]  Phone: [phone]  Fax: [phone]    Rodolfo Harrison), EndocrinologyMetabDiabetes; Internal Medicine  [address] [place] [address]  Phone: [phone]  Fax: [phone]

## 2018-04-13 NOTE — DIETITIAN INITIAL EVALUATION ADULT. - OTHER INFO
Pt seen for nutrition-risk screen: HgA1c 7.9%. Pt currently reports good po intake, denies chewing/swallowing difficulty, denies GI distress. Pt denies significant weight changes PTA, states weight has been around 260 pounds over the past year. Pre-surgery wt ~268 pounds. In terms of DM control pt states his HgA1c 1 year ago was 6.0% therefore he was taken off medication and stopped checking his fingersticks. Pt kept fairly active and did martial arts several days per week. Does have a glucometer at home. Pt with multiple questions about diet, amenable to heart healthy diet and T2DM diet/management education at this time, states good motivation to change diet and prevent future cardiac events.

## 2018-04-13 NOTE — DISCHARGE NOTE ADULT - ADDITIONAL INSTRUCTIONS
Refer to your Cardiac Surgery Do's & Don'ts Fact Sheet  shower daily  weigh yourself daily - report weight gain > 2.5 pounds overnight to your MD

## 2018-04-13 NOTE — PROGRESS NOTE ADULT - PROBLEM SELECTOR PLAN 2
- ACE started, lisinopril 5mg PO daily with BP parameters - ACE started, lisinopril 5mg PO daily with BP parameters  - milrinone gtt recently weaned off, plan to restart BB later when hemodynamically able

## 2018-04-13 NOTE — DISCHARGE NOTE ADULT - PATIENT PORTAL LINK FT
You can access the [facility] Patient Portal, offered by [facility], by registering with the following website: [email or web address]

## 2018-04-13 NOTE — DIETITIAN INITIAL EVALUATION ADULT. - NS AS NUTRI INTERV ED CONTENT
Recommended modifications/Extensive T2DM diet/management and heart healthy diet education provided. Discussed the following: importance of DM control and healthful diet in disease management; SMBG, general goal fingerstick ranges, consistent CHO diet order, CHO food/beverage sources, appropriate CHO portion sizes, recommended CHO servings per meals/snacks, low sodium recommendations, healthful food choices when dining out, food items high in sodium to limit/avoid, avoiding sugar-sweetened beverages unless treating hypoglycemia; s/s/treatment/prevention of hypoglycemia, general healthful diet with adequate intake of vegetables and lean proteins; adequate intake of protein and kcal post-op, physical activity as tolerated/per MD discretion, weight loss 5-10% of body weight to promote overall improved metabolic profile, 1800 kcal dietary recommendations to promote desirable wt loss. T2DM nutrition therapy handout; Heart-healthy nutrition therapy handout; Low sodium nutrition therapy handout, and 1800 kcal diet handout provided for review at pt's leisure./Nutrition relationship to health/disease/Purpose of the nutrition education

## 2018-04-13 NOTE — PROGRESS NOTE ADULT - ASSESSMENT
61 y/o male with PMHx of HTN and DM type II s/p NSTEMI and C3L on 4/11. Currently POD#2, progressing well. Overall uncomplicated post-op course. Post op EF 45-50%    Hospital Course:  4/11 Extubated; BIBAP overnight for support. Remains on milrinone gtt for hemodynamic support.   4/12 Rand d/c in Unit. Transferred to SDU POD #1. OOB to chair. AVSS; doing well. (+) isolated pacing wires.  Continue primacor drip at 0.15 mcg and insulin drip. Continue asa, statin, post op zinacef, PPI, Continue PT. D/C planning.   4/13 Milrinone gtt weaned off. Left pleural CT d/c'd. Currently hemodynamically stable, OOB and ambulating. Incisional pain relieved with pain meds. 63 y/o male with PMHx of HTN and DM type II s/p NSTEMI and C3L on 4/11. Currently POD#2, progressing well. Overall uncomplicated post-op course. Post op EF 45-50%    Hospital Course:  4/11 Extubated; BIBAP overnight for support. Remains on milrinone gtt for hemodynamic support.   4/12 Rand d/c in Unit. Transferred to SDU POD #1. OOB to chair. AVSS; doing well. (+) isolated pacing wires.  Continue primacor drip at 0.15 mcg and insulin drip. Continue asa, statin, post op zinacef, PPI, Continue PT. D/C planning.   4/13 Milrinone gtt weaned off. Left pleural CT d/c'd. Currently hemodynamically stable, OOB and ambulating. Incisional pain relieved with pain meds.   Transferred to floor  Brief paf started on toprol 50 and  amio daily  Bp conrol w ace

## 2018-04-13 NOTE — DISCHARGE NOTE ADULT - NS AS ACTIVITY OBS
Stairs allowed/Walking-Indoors allowed/Showering allowed/Sex allowed/No Heavy lifting/straining/Walking-Outdoors allowed

## 2018-04-13 NOTE — DIETITIAN INITIAL EVALUATION ADULT. - ENERGY NEEDS
ht: 71 inches. wt: 281 pounds (current, standing, +1 generalized edema). BMI: 37.37 kG/m2 (based on pre-surgical weight). UBW: 260 pounds. IBW: 172 pounds +/- 10%. %IBW: 156%  Other pertinent objective information: 62 year old male pt with PMH HTN, T2DM s/p NSTEMI found to have multivessel disease now s/p CABG x 3 4/11/18, POD#2. No pressure ulcers noted.

## 2018-04-13 NOTE — DIETITIAN INITIAL EVALUATION ADULT. - ORAL INTAKE PTA
good/Pt reports good po intake PTA. Typical meal intake: Breakfast: [employer] egg diggs and cheese on an english muffin, cinnamon toast, or egg whites with spinach and whole wheat toast with coffee. Lunch: Whole foods chicken with vegetables or take-out meal. Dinner: Often take-out meal chinese food, pizza, due to pt and wife's schedule making it difficult to prepare dinner meals from scratch. Pt denies regular soda intake but admits to frequent intake of juices. Pt takes whey protein prior to admit.

## 2018-04-13 NOTE — PROGRESS NOTE ADULT - SUBJECTIVE AND OBJECTIVE BOX
CTU Daily Progress Note  =====================================================  POD # 2              HPI/ROS: 61 y/o male with PMHx of HTN and DM type II s/p NSTEMI and C3L on 4/11. Currently POD#2, progressing well. Overall uncomplicated post-op course. Post op EF 45-50%    Hospital Course:  4/11 Extubated; BIBAP overnight for support. Remains on milrinone gtt for hemodynamic support.   4/12 Rand d/c in Unit. Transferred to SDU POD #1. OOB to chair. AVSS; doing well. (+) isolated pacing wires.  Continue primacor drip at 0.15 mcg and insulin drip. Continue asa, statin, post op zinacef, PPI, Continue PT. D/C planning.   4/13 Milrinone gtt weaned off. Left pleural CT d/c'd. Currently hemodynamically stable, OOB and ambulating. Incisional pain relieved with pain meds. L pleural d/c'd.        PAST MEDICAL & SURGICAL HISTORY:  Essential hypertension  Type 2 diabetes mellitus with diabetic neuropathy, without long-term current use of insulin  History of excision of pilonidal cyst  H/O inguinal hernia repair        Allergies: allery to INSECT VENOM (Hives)  Novocain (Hives)    ICU VITAL SIGNS  (last 24 hours):  --------------------------------------------------------------------------------------  ICU Vital Signs Last 24 Hrs    T(C): 37.1 (13 Apr 2018 08:30), Max: 37.1 (13 Apr 2018 08:30)  T(F): 98.8 (13 Apr 2018 08:30), Max: 98.8 (13 Apr 2018 08:30)  HR: 84 (13 Apr 2018 08:30) (80 - 84)  BP: 116/59 (13 Apr 2018 08:30) (115/67 - 146/77)  BP(mean): 101 (13 Apr 2018 03:42) (94 - 101)  ABP: 128/91 (12 Apr 2018 13:30) (86/76 - 128/91)  ABP(mean): 82 (12 Apr 2018 12:00) (82 - 82)  RR: 20 (13 Apr 2018 08:30) (17 - 20)  SpO2: 91% (13 Apr 2018 08:30) (91% - 100%)    I&O's Detail    12 Apr 2018 07:01  -  13 Apr 2018 07:00  --------------------------------------------------------  IN:    insulin Infusion: 24.5 mL    milrinone  Infusion: 16.5 mL    milrinone  Infusion: 66 mL    sodium chloride 0.45%.: 160 mL    Solution: 50 mL  Total IN: 317 mL    OUT:    Chest Tube: 10 mL    Chest Tube: 10 mL    Indwelling Catheter - Urethral: 70 mL    Voided: 525 mL  Total OUT: 615 mL    Total NET: -298 mL      13 Apr 2018 07:01  -  13 Apr 2018 11:16  --------------------------------------------------------  IN:    Oral Fluid: 360 mL  Total IN: 360 mL    OUT:    Voided: 550 mL  Total OUT: 550 mL    Total NET: -190 mL                INS/OUTS:  --------------------------------------------------------------------------------------  I&O's Summary    12 Apr 2018 07:01  -  13 Apr 2018 07:00  --------------------------------------------------------  IN: 317 mL / OUT: 615 mL / NET: -298 mL    13 Apr 2018 07:01  -  13 Apr 2018 11:16  --------------------------------------------------------  IN: 360 mL / OUT: 550 mL / NET: -190 mL          LABS:  --------------------------------------------------------------------------------------                                            9.7                   Neurophils% (auto):   85.4   (04-13 @ 05:57):    22.9 )-----------(207          Lymphocytes% (auto):  8.3                                           29.8                   Eosinphils% (auto):   0.2      Manual%: Neutrophils x    ; Lymphocytes x    ; Eosinophils x    ; Bands%: x    ; Blasts x          04-13    136  |  101  |  31<H>  ----------------------------<  168<H>  5.2   |  23  |  1.03    Ca    8.7      13 Apr 2018 05:57    TPro  6.4  /  Alb  3.5  /  TBili  0.7  /  DBili  x   /  AST  21  /  ALT  23  /  AlkPhos  43  04-13      ABG - ( 12 Apr 2018 08:57 )  pH: 7.34  /  pCO2: 48    /  pO2: 75    / HCO3: 26    / Base Excess: .1    /  SaO2: 94    / Lactate: x                  MEDICATIONS:   --------------------------------------------------------------------------------------  Neurologic Medications  acetaminophen   Tablet. 650 milliGRAM(s) Oral every 6 hours PRN Mild Pain (1 - 3)  oxyCODONE    IR 5 milliGRAM(s) Oral every 4 hours PRN Moderate Pain (4 - 6)  oxyCODONE    IR 10 milliGRAM(s) Oral every 6 hours PRN Severe Pain (7 - 10)    Respiratory Medications    Cardiovascular Medications  lisinopril 5 milliGRAM(s) Oral daily    Gastrointestinal Medications  docusate sodium 100 milliGRAM(s) Oral three times a day  pantoprazole    Tablet 40 milliGRAM(s) Oral before breakfast  polyethylene glycol 3350 17 Gram(s) Oral daily  sodium chloride 0.45%. 1000 milliLiter(s) IV Continuous <Continuous>    Genitourinary Medications    Hematologic/Oncologic Medications  aspirin enteric coated 325 milliGRAM(s) Oral daily  enoxaparin Injectable 40 milliGRAM(s) SubCutaneous daily    Antimicrobial/Immunologic Medications    Endocrine/Metabolic Medications  atorvastatin 80 milliGRAM(s) Oral at bedtime  dextrose 50% Injectable 50 milliLiter(s) IV Push every 15 minutes  dextrose 50% Injectable 25 milliLiter(s) IV Push every 15 minutes  insulin glargine Injectable (LANTUS) 10 Unit(s) SubCutaneous at bedtime  insulin Infusion 3 Unit(s)/Hr IV Continuous <Continuous>  insulin lispro (HumaLOG) corrective regimen sliding scale   SubCutaneous Before meals and at bedtime  insulin lispro Injectable (HumaLOG) 4 Unit(s) SubCutaneous before breakfast  insulin lispro Injectable (HumaLOG) 4 Unit(s) SubCutaneous before lunch  insulin lispro Injectable (HumaLOG) 4 Unit(s) SubCutaneous before dinner    Topical/Other Medications          PHYSICAL EXAM  --------------------------------------------------------------------------------------  NEUROLOGY    Normal, NAD, alert & oriented x3, no focal deficits, moves all extremities. PERLL      RESPIRATORY  Lungs clear to auscultation but slightly diminished at the bases, no adventitious breath sounds. Normal expansion/effort. Chest tubes to suction intact. L pleural       CARDIOVASCULAR  S1, S2.  Regular rate and rhythm. No murmurs, rubs or gallops auscultated.    GI/NUTRITION  Abdomen soft, Non-tender, Non-distended. Positive bowel sounds auscultated throughout. Pt reports passing gas.    VASCULAR  Extremities warm, pink, well-perfused. +2 peripheral pulses. Extremities warm, pink, well-perfused. Trace generalized edema.     MUSCULOSKELETAL  All extremities moving spontaneously without limitations, active ROM.     SKIN  No rashes, lesions or skin breakdown. Midsternal surgical wound site clean, dry, intact, midsternal op-site dressing intact. RLE harvest site clean. dry, intact, no evidence of bleeding - ace wrap in place. R IJ intro in place. Peripheral IV.         Tubes/Lines/Drains  --------------------------------------------------------------------------------------  [x] Peripheral IV  [x] Central Venous Line     	[x] R	[] L	[] IJ	[] Fem	[] SC	Date Placed:   [] Arterial Line		[] R	[] L	[] Fem	[] Rad	[] Ax	Date Placed:   [] PICC		[] Midline		[] Mediport  [] Urinary Catheter		Date Placed:   [x] Necessity of urinary, arterial, and venous catheters discussed      --------------------------------------------------------------------------------------

## 2018-04-13 NOTE — PROGRESS NOTE ADULT - PROBLEM SELECTOR PLAN 3
- glucose control (A1C 7.9)  - endo following  - pre-meal and sliding scale SQ insulin  - HS lantus SQ 10 units - glucose control (A1C 7.9)  - endo following  - pre-meal and sliding scale SQ insulin  - HS lantus SQ 15 units

## 2018-04-13 NOTE — DIETITIAN INITIAL EVALUATION ADULT. - ETIOLOGY
limited prior exposure to nutrition-related education increased nutrient demands and post-op wound healing

## 2018-04-13 NOTE — DIETITIAN INITIAL EVALUATION ADULT. - NS AS NUTRI INTERV MEALS SNACK
[place] - modified diet/General/healthful diet/1) Recommend continue consistent CHO diet for glucose control; add low sodium to promote heart health./Carbohydrate - modified diet

## 2018-04-13 NOTE — DISCHARGE NOTE ADULT - CARE PROVIDERS DIRECT ADDRESSES
[email or web address],[email or web address],DirectAddress_Unknown

## 2018-04-14 LAB
ANION GAP SERPL CALC-SCNC: 12 MMOL/L — SIGNIFICANT CHANGE UP (ref 5–17)
BUN SERPL-MCNC: 29 MG/DL — HIGH (ref 7–23)
CALCIUM SERPL-MCNC: 8.7 MG/DL — SIGNIFICANT CHANGE UP (ref 8.4–10.5)
CHLORIDE SERPL-SCNC: 98 MMOL/L — SIGNIFICANT CHANGE UP (ref 96–108)
CO2 SERPL-SCNC: 25 MMOL/L — SIGNIFICANT CHANGE UP (ref 22–31)
CREAT SERPL-MCNC: 0.92 MG/DL — SIGNIFICANT CHANGE UP (ref 0.5–1.3)
GLUCOSE BLDC GLUCOMTR-MCNC: 125 MG/DL — HIGH (ref 70–99)
GLUCOSE BLDC GLUCOMTR-MCNC: 141 MG/DL — HIGH (ref 70–99)
GLUCOSE BLDC GLUCOMTR-MCNC: 164 MG/DL — HIGH (ref 70–99)
GLUCOSE BLDC GLUCOMTR-MCNC: 170 MG/DL — HIGH (ref 70–99)
GLUCOSE BLDC GLUCOMTR-MCNC: 171 MG/DL — HIGH (ref 70–99)
GLUCOSE SERPL-MCNC: 137 MG/DL — HIGH (ref 70–99)
HCT VFR BLD CALC: 30.8 % — LOW (ref 39–50)
HGB BLD-MCNC: 10.2 G/DL — LOW (ref 13–17)
MCHC RBC-ENTMCNC: 31.5 PG — SIGNIFICANT CHANGE UP (ref 27–34)
MCHC RBC-ENTMCNC: 33 GM/DL — SIGNIFICANT CHANGE UP (ref 32–36)
MCV RBC AUTO: 95.5 FL — SIGNIFICANT CHANGE UP (ref 80–100)
PLATELET # BLD AUTO: 209 K/UL — SIGNIFICANT CHANGE UP (ref 150–400)
POTASSIUM SERPL-MCNC: 4.8 MMOL/L — SIGNIFICANT CHANGE UP (ref 3.5–5.3)
POTASSIUM SERPL-SCNC: 4.8 MMOL/L — SIGNIFICANT CHANGE UP (ref 3.5–5.3)
RBC # BLD: 3.23 M/UL — LOW (ref 4.2–5.8)
RBC # FLD: 12.9 % — SIGNIFICANT CHANGE UP (ref 10.3–14.5)
SODIUM SERPL-SCNC: 135 MMOL/L — SIGNIFICANT CHANGE UP (ref 135–145)
WBC # BLD: 17.5 K/UL — HIGH (ref 3.8–10.5)
WBC # FLD AUTO: 17.5 K/UL — HIGH (ref 3.8–10.5)

## 2018-04-14 RX ADMIN — LISINOPRIL 5 MILLIGRAM(S): 2.5 TABLET ORAL at 05:43

## 2018-04-14 RX ADMIN — Medication 4: at 12:29

## 2018-04-14 RX ADMIN — OXYCODONE HYDROCHLORIDE 10 MILLIGRAM(S): 5 TABLET ORAL at 15:40

## 2018-04-14 RX ADMIN — Medication 100 MILLIGRAM(S): at 05:43

## 2018-04-14 RX ADMIN — Medication 6 UNIT(S): at 12:29

## 2018-04-14 RX ADMIN — Medication 100 MILLIGRAM(S): at 21:43

## 2018-04-14 RX ADMIN — OXYCODONE HYDROCHLORIDE 10 MILLIGRAM(S): 5 TABLET ORAL at 22:13

## 2018-04-14 RX ADMIN — Medication 6 UNIT(S): at 17:43

## 2018-04-14 RX ADMIN — OXYCODONE HYDROCHLORIDE 10 MILLIGRAM(S): 5 TABLET ORAL at 00:12

## 2018-04-14 RX ADMIN — OXYCODONE HYDROCHLORIDE 10 MILLIGRAM(S): 5 TABLET ORAL at 09:33

## 2018-04-14 RX ADMIN — Medication 50 MILLIGRAM(S): at 05:43

## 2018-04-14 RX ADMIN — OXYCODONE HYDROCHLORIDE 10 MILLIGRAM(S): 5 TABLET ORAL at 16:10

## 2018-04-14 RX ADMIN — Medication 4: at 22:05

## 2018-04-14 RX ADMIN — PANTOPRAZOLE SODIUM 40 MILLIGRAM(S): 20 TABLET, DELAYED RELEASE ORAL at 05:43

## 2018-04-14 RX ADMIN — ATORVASTATIN CALCIUM 80 MILLIGRAM(S): 80 TABLET, FILM COATED ORAL at 21:43

## 2018-04-14 RX ADMIN — INSULIN GLARGINE 15 UNIT(S): 100 INJECTION, SOLUTION SUBCUTANEOUS at 22:05

## 2018-04-14 RX ADMIN — OXYCODONE HYDROCHLORIDE 10 MILLIGRAM(S): 5 TABLET ORAL at 10:03

## 2018-04-14 RX ADMIN — AMIODARONE HYDROCHLORIDE 200 MILLIGRAM(S): 400 TABLET ORAL at 17:43

## 2018-04-14 RX ADMIN — ENOXAPARIN SODIUM 40 MILLIGRAM(S): 100 INJECTION SUBCUTANEOUS at 11:50

## 2018-04-14 RX ADMIN — Medication 6 UNIT(S): at 08:18

## 2018-04-14 RX ADMIN — Medication 325 MILLIGRAM(S): at 11:50

## 2018-04-14 RX ADMIN — Medication 4: at 17:43

## 2018-04-14 RX ADMIN — OXYCODONE HYDROCHLORIDE 10 MILLIGRAM(S): 5 TABLET ORAL at 21:43

## 2018-04-14 RX ADMIN — AMIODARONE HYDROCHLORIDE 200 MILLIGRAM(S): 400 TABLET ORAL at 05:45

## 2018-04-14 NOTE — PROGRESS NOTE ADULT - SUBJECTIVE AND OBJECTIVE BOX
Chief complaint  Patient is a 62y old  Male who presents with a chief complaint of complain of CP (13 Apr 2018 13:56)   Review of systems  Patient in bed, looks comfortable, no fever, no hypoglycemia.    Labs and Fingersticks  CAPILLARY BLOOD GLUCOSE      POCT Blood Glucose.: 164 mg/dL (14 Apr 2018 16:38)  POCT Blood Glucose.: 171 mg/dL (14 Apr 2018 12:18)  POCT Blood Glucose.: 125 mg/dL (14 Apr 2018 07:49)  POCT Blood Glucose.: 141 mg/dL (14 Apr 2018 03:54)  POCT Blood Glucose.: 162 mg/dL (13 Apr 2018 21:36)      Anion Gap, Serum: 12 (04-14 @ 06:59)  Anion Gap, Serum: 12 (04-13 @ 05:57)      Calcium, Total Serum: 8.7 (04-14 @ 06:59)  Calcium, Total Serum: 8.7 (04-13 @ 05:57)  Albumin, Serum: 3.5 (04-13 @ 05:57)    Alanine Aminotransferase (ALT/SGPT): 23 (04-13 @ 05:57)  Alkaline Phosphatase, Serum: 43 (04-13 @ 05:57)  Aspartate Aminotransferase (AST/SGOT): 21 (04-13 @ 05:57)        04-14    135  |  98  |  29<H>  ----------------------------<  137<H>  4.8   |  25  |  0.92    Ca    8.7      14 Apr 2018 06:59    TPro  6.4  /  Alb  3.5  /  TBili  0.7  /  DBili  x   /  AST  21  /  ALT  23  /  AlkPhos  43  04-13                        10.2   17.5  )-----------( 209      ( 14 Apr 2018 06:59 )             30.8     Medications  MEDICATIONS  (STANDING):  amiodarone    Tablet 200 milliGRAM(s) Oral two times a day  aspirin enteric coated 325 milliGRAM(s) Oral daily  atorvastatin 80 milliGRAM(s) Oral at bedtime  dextrose 50% Injectable 50 milliLiter(s) IV Push every 15 minutes  dextrose 50% Injectable 25 milliLiter(s) IV Push every 15 minutes  docusate sodium 100 milliGRAM(s) Oral three times a day  enoxaparin Injectable 40 milliGRAM(s) SubCutaneous daily  insulin glargine Injectable (LANTUS) 15 Unit(s) SubCutaneous at bedtime  insulin lispro (HumaLOG) corrective regimen sliding scale   SubCutaneous Before meals and at bedtime  insulin lispro Injectable (HumaLOG) 6 Unit(s) SubCutaneous three times a day with meals  lisinopril 5 milliGRAM(s) Oral daily  metoprolol succinate ER 50 milliGRAM(s) Oral daily  pantoprazole    Tablet 40 milliGRAM(s) Oral before breakfast  polyethylene glycol 3350 17 Gram(s) Oral daily  sodium chloride 0.45%. 1000 milliLiter(s) (10 mL/Hr) IV Continuous <Continuous>      Physical Exam  General: Patient comfortable in bed  Vital Signs Last 12 Hrs  T(F): 98.8 (04-14-18 @ 14:21), Max: 98.8 (04-14-18 @ 14:21)  HR: 68 (04-14-18 @ 14:21) (68 - 68)  BP: 117/77 (04-14-18 @ 14:21) (117/77 - 117/77)  BP(mean): --  RR: 17 (04-14-18 @ 14:21) (17 - 17)  SpO2: 94% (04-14-18 @ 14:21) (94% - 94%)  Neck: No palpable thyroid nodules.  CVS: S1S2, No murmurs  Respiratory: No wheezing, no crepitations  GI: Abdomen soft, bowel sounds positive  Musculoskeletal:  edema lower extremities.   Skin: No skin rashes, no ecchymosis    Diagnostics

## 2018-04-14 NOTE — PROGRESS NOTE ADULT - PROBLEM SELECTOR PLAN 3
- glucose control (A1C 7.9)  - endo following  - pre-meal and sliding scale SQ insulin  - HS lantus SQ 15 units

## 2018-04-14 NOTE — PROGRESS NOTE ADULT - SUBJECTIVE AND OBJECTIVE BOX
Subjective Hello OOB in chair no acute distress     VITAL SIGNS    Telemetry:  NSR 70-80s    Vital Signs Last 24 Hrs  T(C): 37.1 (18 @ 05:42), Max: 37.1 (18 @ 05:42)  T(F): 98.8 (18 @ 05:42), Max: 98.8 (18 @ 05:42)  HR: 73 (18 @ 05:42) (73 - 80)  BP: 125/82 (18 @ 05:42) (122/75 - 125/82)  RR: 17 (18 @ 05:42) (17 - 18)  SpO2: 93% (18 @ 05:42) (93% - 93%)            @ 07:01  -   @ 07:00  --------------------------------------------------------  IN: 1240 mL / OUT: 3100 mL / NET: -1860 mL      Daily Weight in k.8 (2018 09:05)    CAPILLARY BLOOD GLUCOSE  POCT Blood Glucose.: 171 mg/dL (2018 12:18)  POCT Blood Glucose.: 125 mg/dL (2018 07:49)  POCT Blood Glucose.: 141 mg/dL (2018 03:54)  POCT Blood Glucose.: 162 mg/dL (2018 21:36)  POCT Blood Glucose.: 120 mg/dL (2018 16:24)  MEDICATIONS  (STANDING):  amiodarone    Tablet 200 milliGRAM(s) Oral two times a day  aspirin enteric coated 325 milliGRAM(s) Oral daily  atorvastatin 80 milliGRAM(s) Oral at bedtime  dextrose 50% Injectable 50 milliLiter(s) IV Push every 15 minutes  dextrose 50% Injectable 25 milliLiter(s) IV Push every 15 minutes  docusate sodium 100 milliGRAM(s) Oral three times a day  enoxaparin Injectable 40 milliGRAM(s) SubCutaneous daily  insulin glargine Injectable (LANTUS) 15 Unit(s) SubCutaneous at bedtime  insulin lispro (HumaLOG) corrective regimen sliding scale   SubCutaneous Before meals and at bedtime  insulin lispro Injectable (HumaLOG) 6 Unit(s) SubCutaneous three times a day with meals  lisinopril 5 milliGRAM(s) Oral daily  metoprolol succinate ER 50 milliGRAM(s) Oral daily  pantoprazole    Tablet 40 milliGRAM(s) Oral before breakfast  polyethylene glycol 3350 17 Gram(s) Oral daily  sodium chloride 0.45%. 1000 milliLiter(s) (10 mL/Hr) IV Continuous <Continuous>    MEDICATIONS  (PRN):  acetaminophen   Tablet. 650 milliGRAM(s) Oral every 6 hours PRN Mild Pain (1 - 3)  oxyCODONE    IR 5 milliGRAM(s) Oral every 4 hours PRN Moderate Pain (4 - 6)  oxyCODONE    IR 10 milliGRAM(s) Oral every 6 hours PRN Severe Pain (7 - 10)          Pacing Wires        [  ]   Settings:                                  Isolated  [x2  ]   PHYSICAL EXAM  Neurology: alert and oriented x 3, nonfocal, no gross deficits  CV :S1 S2 RRR  Sternal Wound :  CDI , Sternum Stable  Lungs:B/l CTA  Abdomen: soft, nontender, nondistended, positive bowel sounds, last bowel movement   :   Voids          Extremities:    b/l le +1 edema + DP RT leg ACE wrapped      Physical Therapy Rec:   Home  [ x ]     Discussed with Cardiothoracic Team at AM rounds.

## 2018-04-14 NOTE — PROGRESS NOTE ADULT - SUBJECTIVE AND OBJECTIVE BOX
PH Coverage    CC: no CP/SOB    TELEMETRY: NSR    PHYSICAL EXAM:    T(C): 37.1 (04-14-18 @ 05:42), Max: 37.1 (04-13-18 @ 08:30)  HR: 73 (04-14-18 @ 05:42) (73 - 84)  BP: 125/82 (04-14-18 @ 05:42) (116/59 - 125/82)  RR: 17 (04-14-18 @ 05:42) (17 - 20)  SpO2: 93% (04-14-18 @ 05:42) (91% - 96%)  Wt(kg): --  I&O's Summary    13 Apr 2018 07:01  -  14 Apr 2018 07:00  --------------------------------------------------------  IN: 1240 mL / OUT: 3100 mL / NET: -1860 mL        Appearance: Normal	  Cardiovascular: Normal S1 S2,RRR, No JVD, No murmurs  Respiratory: Lungs clear to auscultation	  Gastrointestinal:  Soft, Non-tender, + BS	  Extremities: Normal range of motion, No clubbing, cyanosis or edema  Vascular: Peripheral pulses palpable 2+ bilaterally     LABS:	 	                          10.2   17.5  )-----------( 209      ( 14 Apr 2018 06:59 )             30.8     04-13    136  |  101  |  31<H>  ----------------------------<  168<H>  5.2   |  23  |  1.03    Ca    8.7      13 Apr 2018 05:57    TPro  6.4  /  Alb  3.5  /  TBili  0.7  /  DBili  x   /  AST  21  /  ALT  23  /  AlkPhos  43  04-13          CARDIAC MARKERS:    MEDICATIONS  (STANDING):  amiodarone    Tablet 200 milliGRAM(s) Oral two times a day  aspirin enteric coated 325 milliGRAM(s) Oral daily  atorvastatin 80 milliGRAM(s) Oral at bedtime  dextrose 50% Injectable 50 milliLiter(s) IV Push every 15 minutes  dextrose 50% Injectable 25 milliLiter(s) IV Push every 15 minutes  docusate sodium 100 milliGRAM(s) Oral three times a day  enoxaparin Injectable 40 milliGRAM(s) SubCutaneous daily  insulin glargine Injectable (LANTUS) 15 Unit(s) SubCutaneous at bedtime  insulin lispro (HumaLOG) corrective regimen sliding scale   SubCutaneous Before meals and at bedtime  insulin lispro Injectable (HumaLOG) 6 Unit(s) SubCutaneous three times a day with meals  lisinopril 5 milliGRAM(s) Oral daily  metoprolol succinate ER 50 milliGRAM(s) Oral daily  pantoprazole    Tablet 40 milliGRAM(s) Oral before breakfast  polyethylene glycol 3350 17 Gram(s) Oral daily  sodium chloride 0.45%. 1000 milliLiter(s) (10 mL/Hr) IV Continuous <Continuous>

## 2018-04-14 NOTE — PROGRESS NOTE ADULT - ASSESSMENT
This is a  61 y/o male with PMHx of HTN and DM type II s/p NSTEMI and C3L on 4/11.Post op course   4/11 Extubated; BIBAP overnight for support. Remains on milrinone gtt for hemodynamic support.   4/12 Rand d/c in Unit. Transferred to SDU POD #1. OOB to chair. AVSS; doing well. (+) isolated pacing wires.  Continue primacor drip at 0.15 mcg and insulin drip. Continue asa, statin, post op zinacef, PPI, Continue PT. D/C planning.   4/13 Milrinone gtt weaned off. Left pleural CT d/c'd. Currently hemodynamically stable, OOB and ambulating. Incisional pain relieved with pain meds. L pleural d/c'd. Currently POD#2, progressing well. Overall uncomplicated post-op course. Post op EF 45-50%   Afib -amiodarone 200 BID  4/14Modified amio load 200 BID Toprol 50 daily ambulate TID Disposition to home Monday  no PT needs.

## 2018-04-15 LAB
ANION GAP SERPL CALC-SCNC: 12 MMOL/L — SIGNIFICANT CHANGE UP (ref 5–17)
BUN SERPL-MCNC: 23 MG/DL — SIGNIFICANT CHANGE UP (ref 7–23)
CALCIUM SERPL-MCNC: 9.1 MG/DL — SIGNIFICANT CHANGE UP (ref 8.4–10.5)
CHLORIDE SERPL-SCNC: 97 MMOL/L — SIGNIFICANT CHANGE UP (ref 96–108)
CO2 SERPL-SCNC: 27 MMOL/L — SIGNIFICANT CHANGE UP (ref 22–31)
CREAT SERPL-MCNC: 0.92 MG/DL — SIGNIFICANT CHANGE UP (ref 0.5–1.3)
GLUCOSE BLDC GLUCOMTR-MCNC: 125 MG/DL — HIGH (ref 70–99)
GLUCOSE BLDC GLUCOMTR-MCNC: 129 MG/DL — HIGH (ref 70–99)
GLUCOSE BLDC GLUCOMTR-MCNC: 138 MG/DL — HIGH (ref 70–99)
GLUCOSE BLDC GLUCOMTR-MCNC: 237 MG/DL — HIGH (ref 70–99)
GLUCOSE SERPL-MCNC: 123 MG/DL — HIGH (ref 70–99)
HCT VFR BLD CALC: 32.7 % — LOW (ref 39–50)
HGB BLD-MCNC: 11.4 G/DL — LOW (ref 13–17)
MCHC RBC-ENTMCNC: 32.8 PG — SIGNIFICANT CHANGE UP (ref 27–34)
MCHC RBC-ENTMCNC: 34.8 GM/DL — SIGNIFICANT CHANGE UP (ref 32–36)
MCV RBC AUTO: 94.3 FL — SIGNIFICANT CHANGE UP (ref 80–100)
PLATELET # BLD AUTO: 232 K/UL — SIGNIFICANT CHANGE UP (ref 150–400)
POTASSIUM SERPL-MCNC: 4.5 MMOL/L — SIGNIFICANT CHANGE UP (ref 3.5–5.3)
POTASSIUM SERPL-SCNC: 4.5 MMOL/L — SIGNIFICANT CHANGE UP (ref 3.5–5.3)
RBC # BLD: 3.47 M/UL — LOW (ref 4.2–5.8)
RBC # FLD: 12.8 % — SIGNIFICANT CHANGE UP (ref 10.3–14.5)
SODIUM SERPL-SCNC: 136 MMOL/L — SIGNIFICANT CHANGE UP (ref 135–145)
WBC # BLD: 16.1 K/UL — HIGH (ref 3.8–10.5)
WBC # FLD AUTO: 16.1 K/UL — HIGH (ref 3.8–10.5)

## 2018-04-15 RX ORDER — FUROSEMIDE 40 MG
40 TABLET ORAL DAILY
Qty: 0 | Refills: 0 | Status: DISCONTINUED | OUTPATIENT
Start: 2018-04-15 | End: 2018-04-17

## 2018-04-15 RX ORDER — INSULIN LISPRO 100/ML
8 VIAL (ML) SUBCUTANEOUS
Qty: 0 | Refills: 0 | Status: DISCONTINUED | OUTPATIENT
Start: 2018-04-15 | End: 2018-04-17

## 2018-04-15 RX ORDER — IPRATROPIUM/ALBUTEROL SULFATE 18-103MCG
3 AEROSOL WITH ADAPTER (GRAM) INHALATION EVERY 6 HOURS
Qty: 0 | Refills: 0 | Status: DISCONTINUED | OUTPATIENT
Start: 2018-04-15 | End: 2018-04-17

## 2018-04-15 RX ORDER — SORBITOL SOLUTION 70 %
15 SOLUTION, ORAL MISCELLANEOUS ONCE
Qty: 0 | Refills: 0 | Status: COMPLETED | OUTPATIENT
Start: 2018-04-15 | End: 2018-04-15

## 2018-04-15 RX ORDER — TIOTROPIUM BROMIDE 18 UG/1
1 CAPSULE ORAL; RESPIRATORY (INHALATION) DAILY
Qty: 0 | Refills: 0 | Status: DISCONTINUED | OUTPATIENT
Start: 2018-04-15 | End: 2018-04-17

## 2018-04-15 RX ORDER — ALBUTEROL 90 UG/1
1 AEROSOL, METERED ORAL EVERY 4 HOURS
Qty: 0 | Refills: 0 | Status: DISCONTINUED | OUTPATIENT
Start: 2018-04-15 | End: 2018-04-17

## 2018-04-15 RX ORDER — SPIRONOLACTONE 25 MG/1
25 TABLET, FILM COATED ORAL DAILY
Qty: 0 | Refills: 0 | Status: DISCONTINUED | OUTPATIENT
Start: 2018-04-15 | End: 2018-04-17

## 2018-04-15 RX ORDER — INSULIN GLARGINE 100 [IU]/ML
18 INJECTION, SOLUTION SUBCUTANEOUS AT BEDTIME
Qty: 0 | Refills: 0 | Status: DISCONTINUED | OUTPATIENT
Start: 2018-04-15 | End: 2018-04-17

## 2018-04-15 RX ADMIN — AMIODARONE HYDROCHLORIDE 200 MILLIGRAM(S): 400 TABLET ORAL at 17:05

## 2018-04-15 RX ADMIN — Medication 15 MILLILITER(S): at 22:14

## 2018-04-15 RX ADMIN — LISINOPRIL 5 MILLIGRAM(S): 2.5 TABLET ORAL at 05:23

## 2018-04-15 RX ADMIN — Medication 3 MILLILITER(S): at 12:06

## 2018-04-15 RX ADMIN — POLYETHYLENE GLYCOL 3350 17 GRAM(S): 17 POWDER, FOR SOLUTION ORAL at 12:09

## 2018-04-15 RX ADMIN — OXYCODONE HYDROCHLORIDE 10 MILLIGRAM(S): 5 TABLET ORAL at 22:42

## 2018-04-15 RX ADMIN — Medication 100 MILLIGRAM(S): at 05:23

## 2018-04-15 RX ADMIN — Medication 100 MILLIGRAM(S): at 22:12

## 2018-04-15 RX ADMIN — INSULIN GLARGINE 18 UNIT(S): 100 INJECTION, SOLUTION SUBCUTANEOUS at 22:12

## 2018-04-15 RX ADMIN — PANTOPRAZOLE SODIUM 40 MILLIGRAM(S): 20 TABLET, DELAYED RELEASE ORAL at 05:24

## 2018-04-15 RX ADMIN — Medication 3 MILLILITER(S): at 23:20

## 2018-04-15 RX ADMIN — Medication 3 MILLILITER(S): at 17:08

## 2018-04-15 RX ADMIN — Medication 100 MILLIGRAM(S): at 12:09

## 2018-04-15 RX ADMIN — OXYCODONE HYDROCHLORIDE 10 MILLIGRAM(S): 5 TABLET ORAL at 16:07

## 2018-04-15 RX ADMIN — Medication 50 MILLIGRAM(S): at 05:23

## 2018-04-15 RX ADMIN — OXYCODONE HYDROCHLORIDE 10 MILLIGRAM(S): 5 TABLET ORAL at 16:35

## 2018-04-15 RX ADMIN — ATORVASTATIN CALCIUM 80 MILLIGRAM(S): 80 TABLET, FILM COATED ORAL at 22:12

## 2018-04-15 RX ADMIN — AMIODARONE HYDROCHLORIDE 200 MILLIGRAM(S): 400 TABLET ORAL at 05:23

## 2018-04-15 RX ADMIN — Medication 40 MILLIGRAM(S): at 09:34

## 2018-04-15 RX ADMIN — Medication 8 UNIT(S): at 17:04

## 2018-04-15 RX ADMIN — OXYCODONE HYDROCHLORIDE 10 MILLIGRAM(S): 5 TABLET ORAL at 08:58

## 2018-04-15 RX ADMIN — Medication 650 MILLIGRAM(S): at 20:36

## 2018-04-15 RX ADMIN — OXYCODONE HYDROCHLORIDE 10 MILLIGRAM(S): 5 TABLET ORAL at 22:12

## 2018-04-15 RX ADMIN — Medication 6 UNIT(S): at 07:57

## 2018-04-15 RX ADMIN — Medication 8: at 12:08

## 2018-04-15 RX ADMIN — Medication 650 MILLIGRAM(S): at 20:06

## 2018-04-15 RX ADMIN — Medication 6 UNIT(S): at 12:08

## 2018-04-15 RX ADMIN — ENOXAPARIN SODIUM 40 MILLIGRAM(S): 100 INJECTION SUBCUTANEOUS at 12:09

## 2018-04-15 RX ADMIN — SPIRONOLACTONE 25 MILLIGRAM(S): 25 TABLET, FILM COATED ORAL at 17:27

## 2018-04-15 RX ADMIN — Medication 325 MILLIGRAM(S): at 12:09

## 2018-04-15 RX ADMIN — OXYCODONE HYDROCHLORIDE 10 MILLIGRAM(S): 5 TABLET ORAL at 09:25

## 2018-04-15 NOTE — PROGRESS NOTE ADULT - ASSESSMENT
This is a  61 y/o male with PMHx of HTN and DM type II s/p NSTEMI and C3L on 4/11.Post op course   4/11 Extubated; BIBAP overnight for support. Remains on milrinone gtt for hemodynamic support.   4/12 Rand d/c in Unit. Transferred to SDU POD #1. OOB to chair. AVSS; doing well. (+) isolated pacing wires.  Continue primacor drip at 0.15 mcg and insulin drip. Continue asa, statin, post op zinacef, PPI, Continue PT. D/C planning.   4/13 Milrinone gtt weaned off. Left pleural CT d/c'd. Currently hemodynamically stable, OOB and ambulating. Incisional pain relieved with pain meds. L pleural d/c'd. Currently POD#2, progressing well. Overall uncomplicated post-op course. Post op EF 45-50%   Afib -amiodarone 200 BID  4/14Modified amio load 200 BID Toprol 50 daily ambulate TID Disposition to home Monday  no PT needs.  4/15 pacing wires d/c dressing removed Duonebs added ambulating disposition to home

## 2018-04-15 NOTE — PROGRESS NOTE ADULT - SUBJECTIVE AND OBJECTIVE BOX
Chief complaint  Patient is a 62y old  Male who presents with a chief complaint of complain of CP (13 Apr 2018 13:56)   Review of systems  Patient in bed, looks comfortable, no fever, no hypoglycemia.    Labs and Fingersticks  CAPILLARY BLOOD GLUCOSE      POCT Blood Glucose.: 129 mg/dL (15 Apr 2018 16:35)  POCT Blood Glucose.: 237 mg/dL (15 Apr 2018 11:34)  POCT Blood Glucose.: 125 mg/dL (15 Apr 2018 07:31)  POCT Blood Glucose.: 170 mg/dL (14 Apr 2018 21:52)      Anion Gap, Serum: 12 (04-15 @ 06:53)  Anion Gap, Serum: 12 (04-14 @ 06:59)      Calcium, Total Serum: 9.1 (04-15 @ 06:53)  Calcium, Total Serum: 8.7 (04-14 @ 06:59)          04-15    136  |  97  |  23  ----------------------------<  123<H>  4.5   |  27  |  0.92    Ca    9.1      15 Apr 2018 06:53                          11.4   16.1  )-----------( 232      ( 15 Apr 2018 06:53 )             32.7     Medications  MEDICATIONS  (STANDING):  ALBUTerol    90 MICROgram(s) HFA Inhaler 1 Puff(s) Inhalation every 4 hours  ALBUTerol/ipratropium for Nebulization 3 milliLiter(s) Nebulizer every 6 hours  amiodarone    Tablet 200 milliGRAM(s) Oral two times a day  aspirin enteric coated 325 milliGRAM(s) Oral daily  atorvastatin 80 milliGRAM(s) Oral at bedtime  bisacodyl Suppository 10 milliGRAM(s) Rectal at bedtime  dextrose 50% Injectable 50 milliLiter(s) IV Push every 15 minutes  dextrose 50% Injectable 25 milliLiter(s) IV Push every 15 minutes  docusate sodium 100 milliGRAM(s) Oral three times a day  enoxaparin Injectable 40 milliGRAM(s) SubCutaneous daily  furosemide    Tablet 40 milliGRAM(s) Oral daily  insulin glargine Injectable (LANTUS) 18 Unit(s) SubCutaneous at bedtime  insulin lispro (HumaLOG) corrective regimen sliding scale   SubCutaneous Before meals and at bedtime  insulin lispro Injectable (HumaLOG) 8 Unit(s) SubCutaneous three times a day with meals  lisinopril 5 milliGRAM(s) Oral daily  metoprolol succinate ER 50 milliGRAM(s) Oral daily  pantoprazole    Tablet 40 milliGRAM(s) Oral before breakfast  polyethylene glycol 3350 17 Gram(s) Oral daily  sodium chloride 0.45%. 1000 milliLiter(s) (10 mL/Hr) IV Continuous <Continuous>  spironolactone 25 milliGRAM(s) Oral daily  tiotropium 18 MICROgram(s) Capsule 1 Capsule(s) Inhalation daily      Physical Exam  General: Patient comfortable in bed  Vital Signs Last 12 Hrs  T(F): 98.4 (04-15-18 @ 13:48), Max: 98.4 (04-15-18 @ 13:48)  HR: 70 (04-15-18 @ 13:48) (70 - 70)  BP: 151/84 (04-15-18 @ 13:48) (151/84 - 151/84)  BP(mean): --  RR: 18 (04-15-18 @ 13:48) (18 - 18)  SpO2: 99% (04-15-18 @ 13:48) (99% - 99%)  Neck: No palpable thyroid nodules.  CVS: S1S2, No murmurs  Respiratory: No wheezing, no crepitations  GI: Abdomen soft, bowel sounds positive  Musculoskeletal:  edema lower extremities.   Skin: No skin rashes, no ecchymosis    Diagnostics

## 2018-04-15 NOTE — PROGRESS NOTE ADULT - SUBJECTIVE AND OBJECTIVE BOX
Subjective Hello OOB in chair  no acute distress    VITAL SIGNS    Telemetry:  NSR 60-70    Vital Signs Last 24 Hrs  T(C): 36.2 (04-15-18 @ 05:29), Max: 37.2 (04-14-18 @ 19:57)  T(F): 97.2 (04-15-18 @ 05:29), Max: 99 (04-14-18 @ 19:57)  HR: 69 (04-15-18 @ 05:29) (68 - 72)  BP: 142/88 (04-15-18 @ 05:29) (117/77 - 142/88)  RR: 18 (04-15-18 @ 05:29) (17 - 18)  SpO2: 96% (04-15-18 @ 05:29) (94% - 96%)           04-14 @ 07:01  -  04-15 @ 07:00  --------------------------------------------------------  IN: 440 mL / OUT: 600 mL / NET: -160 mL    04-15 @ 07:01  -  04-15 @ 13:49  --------------------------------------------------------  IN: 340 mL / OUT: 0 mL / NET: 340 mL  weight 126.8 KG up from 121.6 preop    CAPILLARY BLOOD GLUCOSE  POCT Blood Glucose.: 237 mg/dL (15 Apr 2018 11:34)  POCT Blood Glucose.: 125 mg/dL (15 Apr 2018 07:31)  POCT Blood Glucose.: 170 mg/dL (14 Apr 2018 21:52)  POCT Blood Glucose.: 164 mg/dL (14 Apr 2018 16:38)        Pacing Wires        [  ]   Settings:                                  Isolated  [ x ]  PHYSICAL EXAM  Neurology: alert and oriented x 3, nonfocal, no gross deficits  CV : S1 S2 RRR    Sternal Wound : JENNIFER sternum Stable pacing    Lungs:    Abdomen: soft, nontender, nondistended, positive bowel sounds, last bowel movement     :               Extremities:                                           Physical Therapy Rec:   Home  [  ]   Home w/ PT  [  ]  Rehab  [  ]    Discussed with Cardiothoracic Team at AM rounds. Subjective Hello OOB in chair  no acute distress    VITAL SIGNS    Telemetry:  NSR 60-70  Vital Signs Last 24 Hrs  T(C): 36.2 (04-15-18 @ 05:29), Max: 37.2 (04-14-18 @ 19:57)  T(F): 97.2 (04-15-18 @ 05:29), Max: 99 (04-14-18 @ 19:57)  HR: 69 (04-15-18 @ 05:29) (68 - 72)  BP: 142/88 (04-15-18 @ 05:29) (117/77 - 142/88)  RR: 18 (04-15-18 @ 05:29) (17 - 18)  SpO2: 96% (04-15-18 @ 05:29) (94% - 96%)           04-14 @ 07:01  -  04-15 @ 07:00  --------------------------------------------------------  IN: 440 mL / OUT: 600 mL / NET: -160 mL    04-15 @ 07:01  -  04-15 @ 13:49  --------------------------------------------------------  IN: 340 mL / OUT: 0 mL / NET: 340 mL  weight 126.8 KG up from 121.6 preop    CAPILLARY BLOOD GLUCOSE  POCT Blood Glucose.: 237 mg/dL (15 Apr 2018 11:34)  POCT Blood Glucose.: 125 mg/dL (15 Apr 2018 07:31)  POCT Blood Glucose.: 170 mg/dL (14 Apr 2018 21:52)  POCT Blood Glucose.: 164 mg/dL (14 Apr 2018 16:38)        Pacing Wires        [  ]   Settings:                                  Isolated  [ x ]  PHYSICAL EXAM  Neurology: alert and oriented x 3, nonfocal, no gross deficits  CV : S1 S2 RRR  Lungs B/L breath sounds diminished in bases  Sternal Wound : JENNIFER sternum Stable pacing  Abdomen: soft, nontender, nondistended, positive bowel sounds, last bowel movement 4/13  :     Voids        Extremities:   B/L LE + DP +1 edema      Physical Therapy Rec:   Home  x[  ]   Home w/ PT  [  ]  Rehab  [  ]    Discussed with Cardiothoracic Team at AM rounds.

## 2018-04-15 NOTE — PROGRESS NOTE ADULT - SUBJECTIVE AND OBJECTIVE BOX
PH Coverage    CC: no new complaints    TELEMETRY: NSR, PVC    PHYSICAL EXAM:    T(C): 36.2 (04-15-18 @ 05:29), Max: 37.2 (04-14-18 @ 19:57)  HR: 69 (04-15-18 @ 05:29) (68 - 72)  BP: 142/88 (04-15-18 @ 05:29) (117/77 - 142/88)  RR: 18 (04-15-18 @ 05:29) (17 - 18)  SpO2: 96% (04-15-18 @ 05:29) (94% - 96%)  Wt(kg): --  I&O's Summary    14 Apr 2018 07:01  -  15 Apr 2018 07:00  --------------------------------------------------------  IN: 440 mL / OUT: 600 mL / NET: -160 mL        Appearance: Normal	  Cardiovascular: Normal S1 S2,RRR, No JVD, No murmurs  Respiratory: Lungs clear to auscultation	  Gastrointestinal:  Soft, Non-tender, + BS	  Extremities: Normal range of motion, No clubbing, cyanosis or edema  Vascular: Peripheral pulses palpable 2+ bilaterally     LABS:	 	                          11.4   16.1  )-----------( 232      ( 15 Apr 2018 06:53 )             32.7     04-15    136  |  97  |  23  ----------------------------<  123<H>  4.5   |  27  |  0.92    Ca    9.1      15 Apr 2018 06:53            CARDIAC MARKERS:

## 2018-04-16 DIAGNOSIS — E87.70 FLUID OVERLOAD, UNSPECIFIED: ICD-10-CM

## 2018-04-16 DIAGNOSIS — I25.10 ATHEROSCLEROTIC HEART DISEASE OF NATIVE CORONARY ARTERY WITHOUT ANGINA PECTORIS: ICD-10-CM

## 2018-04-16 LAB
ANION GAP SERPL CALC-SCNC: 12 MMOL/L — SIGNIFICANT CHANGE UP (ref 5–17)
BUN SERPL-MCNC: 20 MG/DL — SIGNIFICANT CHANGE UP (ref 7–23)
CALCIUM SERPL-MCNC: 9.4 MG/DL — SIGNIFICANT CHANGE UP (ref 8.4–10.5)
CHLORIDE SERPL-SCNC: 96 MMOL/L — SIGNIFICANT CHANGE UP (ref 96–108)
CO2 SERPL-SCNC: 28 MMOL/L — SIGNIFICANT CHANGE UP (ref 22–31)
CREAT SERPL-MCNC: 0.92 MG/DL — SIGNIFICANT CHANGE UP (ref 0.5–1.3)
GLUCOSE BLDC GLUCOMTR-MCNC: 132 MG/DL — HIGH (ref 70–99)
GLUCOSE BLDC GLUCOMTR-MCNC: 138 MG/DL — HIGH (ref 70–99)
GLUCOSE BLDC GLUCOMTR-MCNC: 195 MG/DL — HIGH (ref 70–99)
GLUCOSE BLDC GLUCOMTR-MCNC: 217 MG/DL — HIGH (ref 70–99)
GLUCOSE SERPL-MCNC: 139 MG/DL — HIGH (ref 70–99)
HCT VFR BLD CALC: 35.1 % — LOW (ref 39–50)
HGB BLD-MCNC: 11.9 G/DL — LOW (ref 13–17)
MCHC RBC-ENTMCNC: 31.8 PG — SIGNIFICANT CHANGE UP (ref 27–34)
MCHC RBC-ENTMCNC: 33.8 GM/DL — SIGNIFICANT CHANGE UP (ref 32–36)
MCV RBC AUTO: 94 FL — SIGNIFICANT CHANGE UP (ref 80–100)
PLATELET # BLD AUTO: 275 K/UL — SIGNIFICANT CHANGE UP (ref 150–400)
POTASSIUM SERPL-MCNC: 4.4 MMOL/L — SIGNIFICANT CHANGE UP (ref 3.5–5.3)
POTASSIUM SERPL-SCNC: 4.4 MMOL/L — SIGNIFICANT CHANGE UP (ref 3.5–5.3)
RBC # BLD: 3.73 M/UL — LOW (ref 4.2–5.8)
RBC # FLD: 12.7 % — SIGNIFICANT CHANGE UP (ref 10.3–14.5)
SODIUM SERPL-SCNC: 136 MMOL/L — SIGNIFICANT CHANGE UP (ref 135–145)
WBC # BLD: 14.1 K/UL — HIGH (ref 3.8–10.5)
WBC # FLD AUTO: 14.1 K/UL — HIGH (ref 3.8–10.5)

## 2018-04-16 PROCEDURE — 71045 X-RAY EXAM CHEST 1 VIEW: CPT | Mod: 26

## 2018-04-16 RX ADMIN — ENOXAPARIN SODIUM 40 MILLIGRAM(S): 100 INJECTION SUBCUTANEOUS at 12:06

## 2018-04-16 RX ADMIN — AMIODARONE HYDROCHLORIDE 200 MILLIGRAM(S): 400 TABLET ORAL at 17:03

## 2018-04-16 RX ADMIN — Medication 3 MILLILITER(S): at 07:50

## 2018-04-16 RX ADMIN — Medication 50 MILLIGRAM(S): at 07:50

## 2018-04-16 RX ADMIN — Medication 40 MILLIGRAM(S): at 06:20

## 2018-04-16 RX ADMIN — Medication 10 MILLIGRAM(S): at 09:39

## 2018-04-16 RX ADMIN — Medication 4: at 12:03

## 2018-04-16 RX ADMIN — OXYCODONE HYDROCHLORIDE 10 MILLIGRAM(S): 5 TABLET ORAL at 06:50

## 2018-04-16 RX ADMIN — ATORVASTATIN CALCIUM 80 MILLIGRAM(S): 80 TABLET, FILM COATED ORAL at 21:30

## 2018-04-16 RX ADMIN — LISINOPRIL 5 MILLIGRAM(S): 2.5 TABLET ORAL at 06:20

## 2018-04-16 RX ADMIN — Medication 3 MILLILITER(S): at 17:03

## 2018-04-16 RX ADMIN — Medication 8 UNIT(S): at 17:02

## 2018-04-16 RX ADMIN — AMIODARONE HYDROCHLORIDE 200 MILLIGRAM(S): 400 TABLET ORAL at 06:20

## 2018-04-16 RX ADMIN — Medication 325 MILLIGRAM(S): at 12:04

## 2018-04-16 RX ADMIN — OXYCODONE HYDROCHLORIDE 10 MILLIGRAM(S): 5 TABLET ORAL at 06:20

## 2018-04-16 RX ADMIN — Medication 8 UNIT(S): at 07:49

## 2018-04-16 RX ADMIN — Medication 100 MILLIGRAM(S): at 06:20

## 2018-04-16 RX ADMIN — Medication 8: at 22:02

## 2018-04-16 RX ADMIN — SPIRONOLACTONE 25 MILLIGRAM(S): 25 TABLET, FILM COATED ORAL at 06:20

## 2018-04-16 RX ADMIN — INSULIN GLARGINE 18 UNIT(S): 100 INJECTION, SOLUTION SUBCUTANEOUS at 22:04

## 2018-04-16 RX ADMIN — PANTOPRAZOLE SODIUM 40 MILLIGRAM(S): 20 TABLET, DELAYED RELEASE ORAL at 06:20

## 2018-04-16 RX ADMIN — Medication 8 UNIT(S): at 12:02

## 2018-04-16 RX ADMIN — Medication 3 MILLILITER(S): at 12:04

## 2018-04-16 RX ADMIN — Medication 100 MILLIGRAM(S): at 21:31

## 2018-04-16 NOTE — PROGRESS NOTE ADULT - SUBJECTIVE AND OBJECTIVE BOX
[facility] Cardiology Progress Note  _______________________________    Pt. seen and examined. No new cardiac-related complaints.    Telemetry - Sinus 60-70's, PVC.    T(C): 36.5 (04-16-18 @ 06:11), Max: 37 (04-15-18 @ 20:02)  HR: 74 (04-16-18 @ 09:11) (68 - 74)  BP: 123/73 (04-16-18 @ 09:11) (123/73 - 151/84)  RR: 18 (04-16-18 @ 06:11) (17 - 18)  SpO2: 96% (04-16-18 @ 06:11) (96% - 99%)  I&O's Summary    15 Apr 2018 07:01  -  16 Apr 2018 07:00  --------------------------------------------------------  IN: 920 mL / OUT: 1200 mL / NET: -280 mL        PHYSICAL EXAM:  GENERAL: Alert, NAD.  NECK: Supple, No JVD, no carotid bruit.  CHEST/LUNG: Clear to auscultation bilaterally; No wheezes, rales, or rhonchi. Median stertonotomy c/d/i.  HEART: S1 S2 normal, RRR; No murmurs, rubs, or gallops  ABDOMEN: Soft, Nontender, Nondistended; Bowel sounds present  EXTREMITIES: trace edema.      LABS:                        11.9   14.1  )-----------( 275      ( 16 Apr 2018 06:16 )             35.1     04-16    136  |  96  |  20  ----------------------------<  139<H>  4.4   |  28  |  0.92    Ca    9.4      16 Apr 2018 06:16        CARDIAC MARKERS ( 11 Apr 2018 14:34 )  x     / 1.42 ng/mL / 195 U/L / x     / 19.2 ng/mL              MEDICATIONS  (STANDING):  ALBUTerol    90 MICROgram(s) HFA Inhaler 1 Puff(s) Inhalation every 4 hours  ALBUTerol/ipratropium for Nebulization 3 milliLiter(s) Nebulizer every 6 hours  amiodarone    Tablet 200 milliGRAM(s) Oral two times a day  aspirin enteric coated 325 milliGRAM(s) Oral daily  atorvastatin 80 milliGRAM(s) Oral at bedtime  bisacodyl Suppository 10 milliGRAM(s) Rectal once  dextrose 50% Injectable 50 milliLiter(s) IV Push every 15 minutes  dextrose 50% Injectable 25 milliLiter(s) IV Push every 15 minutes  docusate sodium 100 milliGRAM(s) Oral three times a day  enoxaparin Injectable 40 milliGRAM(s) SubCutaneous daily  furosemide    Tablet 40 milliGRAM(s) Oral daily  insulin glargine Injectable (LANTUS) 18 Unit(s) SubCutaneous at bedtime  insulin lispro (HumaLOG) corrective regimen sliding scale   SubCutaneous Before meals and at bedtime  insulin lispro Injectable (HumaLOG) 8 Unit(s) SubCutaneous three times a day with meals  lisinopril 5 milliGRAM(s) Oral daily  metoprolol succinate ER 50 milliGRAM(s) Oral daily  pantoprazole    Tablet 40 milliGRAM(s) Oral before breakfast  polyethylene glycol 3350 17 Gram(s) Oral daily  sodium chloride 0.45%. 1000 milliLiter(s) (10 mL/Hr) IV Continuous <Continuous>  spironolactone 25 milliGRAM(s) Oral daily  tiotropium 18 MICROgram(s) Capsule 1 Capsule(s) Inhalation daily    MEDICATIONS  (PRN):  acetaminophen   Tablet. 650 milliGRAM(s) Oral every 6 hours PRN Mild Pain (1 - 3)  oxyCODONE    IR 5 milliGRAM(s) Oral every 4 hours PRN Moderate Pain (4 - 6)  oxyCODONE    IR 10 milliGRAM(s) Oral every 6 hours PRN Severe Pain (7 - 10)      RADIOLOGY & ADDITIONAL TESTS:

## 2018-04-16 NOTE — PROGRESS NOTE ADULT - ASSESSMENT
This is a  63 y/o male with PMHx of HTN and DM type II s/p NSTEMI and C3L on 4/11.Post op course   4/11 Extubated; BIBAP overnight for support. Remains on milrinone gtt for hemodynamic support.   4/12 Rand d/c in Unit. Transferred to SDU POD #1. OOB to chair. AVSS; doing well. (+) isolated pacing wires.  Continue primacor drip at 0.15 mcg and insulin drip. Continue asa, statin, post op zinacef, PPI, Continue PT. D/C planning.   4/13 Milrinone gtt weaned off. Left pleural CT d/c'd. Currently hemodynamically stable, OOB and ambulating. Incisional pain relieved with pain meds. L pleural d/c'd. Currently POD#2, progressing well. Overall uncomplicated post-op course. Post op EF 45-50%   Afib -amiodarone 200 BID  4/14Modified amio load 200 BID Toprol 50 daily ambulate TID Disposition to home Monday  no PT needs.  4/15 pacing wires d/c dressing removed Duonebs added ambulating disposition to home  4/16 VSS;  RSR 60-70 on amio and b-blockesr +BM after suppository ; + hypervolemia- diuresis; discharge planning - home tue

## 2018-04-16 NOTE — CHART NOTE - NSCHARTNOTEFT_GEN_A_CORE
62 year old male pt with PMH HTN, T2DM (HgA1c 7.9%) s/p NSTEMI found to have multivessel disease now s/p CABG x 3 4/11/18, POD#5. Per endo, pt to be d/c on basal/bolus insulin regimen (on oral med PTA). Pt is in the process of learning insulin self-administration prior to d/c.    Seen today 2/2 pt request upon initial visit for RD to follow-up regarding T2DM/heart healthy diet education prior to d/c. Pt observed in bedside chair, appears lethargic dosing off during interview, also c/o dull pain on left side of abdomen, pt believes it's from 3 x BM from the sorbitol he was given for constipation. Pt states he feels overwhelmed "I have a lot of things coming at me between the insulin, the diet and the weight loss," but is amenable to review of education.    Source: Patient [x]    Family [ ]     other [x] electronic medical records    Diet : Consistent CHO, no snacks    Patient reports [ ] nausea  [ ] vomiting [ ] diarrhea [ ] constipation  [ ]chewing problems [ ] swallowing issues  [x] other: abd. pain s/p 3 BM from sorbitol per pt    PO intake:  < 50% [ ] 50-75% [x]   % [x]  other :    Source for PO intake [x] Patient [ ] family [x] chart [ ] staff [ ] other    Enteral/Parenteral Nutrition: [x] n/a    Current Weight: 273.3 pounds (current, standing, +1 generalized edema noted). Consistent with admit wt 274 pounds 4/9    Pertinent Medications: MEDICATIONS  (STANDING):  ALBUTerol    90 MICROgram(s) HFA Inhaler 1 Puff(s) Inhalation every 4 hours  ALBUTerol/ipratropium for Nebulization 3 milliLiter(s) Nebulizer every 6 hours  amiodarone    Tablet 200 milliGRAM(s) Oral two times a day  aspirin enteric coated 325 milliGRAM(s) Oral daily  atorvastatin 80 milliGRAM(s) Oral at bedtime  dextrose 50% Injectable 50 milliLiter(s) IV Push every 15 minutes  dextrose 50% Injectable 25 milliLiter(s) IV Push every 15 minutes  docusate sodium 100 milliGRAM(s) Oral three times a day  enoxaparin Injectable 40 milliGRAM(s) SubCutaneous daily  furosemide    Tablet 40 milliGRAM(s) Oral daily  insulin glargine Injectable (LANTUS) 18 Unit(s) SubCutaneous at bedtime  insulin lispro (HumaLOG) corrective regimen sliding scale   SubCutaneous Before meals and at bedtime  insulin lispro Injectable (HumaLOG) 8 Unit(s) SubCutaneous three times a day with meals  lisinopril 5 milliGRAM(s) Oral daily  metoprolol succinate ER 50 milliGRAM(s) Oral daily  pantoprazole    Tablet 40 milliGRAM(s) Oral before breakfast  polyethylene glycol 3350 17 Gram(s) Oral daily  sodium chloride 0.45%. 1000 milliLiter(s) (10 mL/Hr) IV Continuous <Continuous>  spironolactone 25 milliGRAM(s) Oral daily  tiotropium 18 MICROgram(s) Capsule 1 Capsule(s) Inhalation daily    MEDICATIONS  (PRN):  acetaminophen   Tablet. 650 milliGRAM(s) Oral every 6 hours PRN Mild Pain (1 - 3)  oxyCODONE    IR 5 milliGRAM(s) Oral every 4 hours PRN Moderate Pain (4 - 6)  oxyCODONE    IR 10 milliGRAM(s) Oral every 6 hours PRN Severe Pain (7 - 10)    Pertinent Labs:  04-16 Na136 mmol/L Glu 139 mg/dL<H> K+ 4.4 mmol/L Cr  0.92 mg/dL BUN 20 mg/dL 04-13 Alb 3.5 g/dL 04-08 [id number] 7.9 %<H> 04-08 Chol 146 mg/dL LDL 91 mg/dL HDL 21 mg/dL<L> Trig 169 mg/dL<H>  Fingersticks: 195, 138, 138, 129, 237    Skin: No pressure ulcers noted    Estimated Needs:   [x] no change since previous assessment  [ ] recalculated:       Previous Nutrition Diagnosis: Food and nutrition-related knowledge deficit; Increased nutrient needs      Nutrition Diagnosis is [x] ongoing, improved with diet education review and good po intake of meals  [ ] resolved [ ] not applicable        New Nutrition Diagnosis: [x] not applicable       Interventions:     1) Continue current consistent CHO diet, recommend add low sodium to diet order to promote heart health  2) Reviewed T2DM/heart healthy diet recommendations with pt, focused on hypoglycemia s/s/treatment/prevention in setting of new insulin use and as pt wants to lose weight as outpatient, discussed importance of still consuming CHO in moderation and according to BG values to prevent hypoglycemia and for adequate nutrition post-op; discussed following up with outpatient CDE (contact to [facility] program provided upon initial visit) when pt feels better and is more alert for continued management support. Pt voiced understanding. Emotional support provided.  3) NP made aware of pt c/o abd. pain     Monitoring and Evaluation:     [x] PO intake [x] Tolerance to diet prescription [x] weights [x] follow up per protocol    [x] other: RD remains available: Willa Howe MS, RDN, CDN, CDE. #[phone]

## 2018-04-16 NOTE — PROGRESS NOTE ADULT - SUBJECTIVE AND OBJECTIVE BOX
VITAL SIGNS    Telemetry:  rsr 60-70  Vital Signs Last 24 Hrs  T(C): 36.9 (18 @ 14:01), Max: 37 (04-15-18 @ 20:02)  T(F): 98.4 (18 @ 14:01), Max: 98.6 (04-15-18 @ 20:02)  HR: 71 (18 @ 14:01) (68 - 74)  BP: 117/73 (18 @ 14:01) (117/73 - 148/81)  RR: 18 (18 @ 14:01) (17 - 18)  SpO2: 97% (18 @ 14:01) (96% - 98%)            04-15 @ 07:01  -   @ 07:00  --------------------------------------------------------  IN: 920 mL / OUT: 1200 mL / NET: -280 mL     @ 07:01  -   @ 14:27  --------------------------------------------------------  IN: 240 mL / OUT: 0 mL / NET: 240 mL       Daily     Daily Weight in k (2018 08:00)  Admit Wt: Drug Dosing Weight  Height (cm): 180 (2018 14:00)  Weight (kg): 124 (2018 08:00)  BMI (kg/m2): 38.3 (2018 08:00)  BSA (m2): 2.41 (2018 08:00)      CAPILLARY BLOOD GLUCOSE      POCT Blood Glucose.: 195 mg/dL (2018 11:44)  POCT Blood Glucose.: 138 mg/dL (2018 07:41)  POCT Blood Glucose.: 138 mg/dL (15 Apr 2018 21:42)  POCT Blood Glucose.: 129 mg/dL (15 Apr 2018 16:35)          acetaminophen   Tablet. 650 milliGRAM(s) Oral every 6 hours PRN  ALBUTerol    90 MICROgram(s) HFA Inhaler 1 Puff(s) Inhalation every 4 hours  ALBUTerol/ipratropium for Nebulization 3 milliLiter(s) Nebulizer every 6 hours  amiodarone    Tablet 200 milliGRAM(s) Oral two times a day  aspirin enteric coated 325 milliGRAM(s) Oral daily  atorvastatin 80 milliGRAM(s) Oral at bedtime  dextrose 50% Injectable 50 milliLiter(s) IV Push every 15 minutes  dextrose 50% Injectable 25 milliLiter(s) IV Push every 15 minutes  docusate sodium 100 milliGRAM(s) Oral three times a day  enoxaparin Injectable 40 milliGRAM(s) SubCutaneous daily  furosemide    Tablet 40 milliGRAM(s) Oral daily  insulin glargine Injectable (LANTUS) 18 Unit(s) SubCutaneous at bedtime  insulin lispro (HumaLOG) corrective regimen sliding scale   SubCutaneous Before meals and at bedtime  insulin lispro Injectable (HumaLOG) 8 Unit(s) SubCutaneous three times a day with meals  lisinopril 5 milliGRAM(s) Oral daily  metoprolol succinate ER 50 milliGRAM(s) Oral daily  oxyCODONE    IR 5 milliGRAM(s) Oral every 4 hours PRN  oxyCODONE    IR 10 milliGRAM(s) Oral every 6 hours PRN  pantoprazole    Tablet 40 milliGRAM(s) Oral before breakfast  polyethylene glycol 3350 17 Gram(s) Oral daily  sodium chloride 0.45%. 1000 milliLiter(s) IV Continuous <Continuous>  spironolactone 25 milliGRAM(s) Oral daily  tiotropium 18 MICROgram(s) Capsule 1 Capsule(s) Inhalation daily      PHYSICAL EXAM    Subjective: "I haven't had a bowel movement yet."   Neurology: alert and oriented x 3, nonfocal, no gross deficits  CV : tele:  RSR 60-70  Sternal Wound :  CDI CHANCE; sternum stable   Lungs: clear. RR easy, unlabored   Abdomen: soft, nontender, nondistended, positive bowel sounds, neg bowel movement   Neg N/V/D   :  pt voiding without difficulty   Extremities:   EDWARDS;  +1edema bilaterally, neg calf tenderness.   PPP bilaterally; right SVG site cdi chance     PW: none   Chest tubes: none

## 2018-04-16 NOTE — PROGRESS NOTE ADULT - SUBJECTIVE AND OBJECTIVE BOX
Chief complaint  Patient is a 62y old  Male who presents with a chief complaint of complain of CP (13 Apr 2018 13:56)   Review of systems  Patient in bed, looks comfortable, no fever, no hypoglycemia.    Labs and Fingersticks  CAPILLARY BLOOD GLUCOSE      POCT Blood Glucose.: 195 mg/dL (16 Apr 2018 11:44)  POCT Blood Glucose.: 138 mg/dL (16 Apr 2018 07:41)  POCT Blood Glucose.: 138 mg/dL (15 Apr 2018 21:42)  POCT Blood Glucose.: 129 mg/dL (15 Apr 2018 16:35)      Anion Gap, Serum: 12 (04-16 @ 06:16)  Anion Gap, Serum: 12 (04-15 @ 06:53)      Calcium, Total Serum: 9.4 (04-16 @ 06:16)  Calcium, Total Serum: 9.1 (04-15 @ 06:53)          04-16    136  |  96  |  20  ----------------------------<  139<H>  4.4   |  28  |  0.92    Ca    9.4      16 Apr 2018 06:16                          11.9   14.1  )-----------( 275      ( 16 Apr 2018 06:16 )             35.1     Medications  MEDICATIONS  (STANDING):  ALBUTerol    90 MICROgram(s) HFA Inhaler 1 Puff(s) Inhalation every 4 hours  ALBUTerol/ipratropium for Nebulization 3 milliLiter(s) Nebulizer every 6 hours  amiodarone    Tablet 200 milliGRAM(s) Oral two times a day  aspirin enteric coated 325 milliGRAM(s) Oral daily  atorvastatin 80 milliGRAM(s) Oral at bedtime  dextrose 50% Injectable 50 milliLiter(s) IV Push every 15 minutes  dextrose 50% Injectable 25 milliLiter(s) IV Push every 15 minutes  docusate sodium 100 milliGRAM(s) Oral three times a day  enoxaparin Injectable 40 milliGRAM(s) SubCutaneous daily  furosemide    Tablet 40 milliGRAM(s) Oral daily  insulin glargine Injectable (LANTUS) 18 Unit(s) SubCutaneous at bedtime  insulin lispro (HumaLOG) corrective regimen sliding scale   SubCutaneous Before meals and at bedtime  insulin lispro Injectable (HumaLOG) 8 Unit(s) SubCutaneous three times a day with meals  lisinopril 5 milliGRAM(s) Oral daily  metoprolol succinate ER 50 milliGRAM(s) Oral daily  pantoprazole    Tablet 40 milliGRAM(s) Oral before breakfast  polyethylene glycol 3350 17 Gram(s) Oral daily  sodium chloride 0.45%. 1000 milliLiter(s) (10 mL/Hr) IV Continuous <Continuous>  spironolactone 25 milliGRAM(s) Oral daily  tiotropium 18 MICROgram(s) Capsule 1 Capsule(s) Inhalation daily      Physical Exam  General: Patient comfortable in bed  Vital Signs Last 12 Hrs  T(F): 97.7 (04-16-18 @ 06:11), Max: 97.7 (04-16-18 @ 06:11)  HR: 74 (04-16-18 @ 09:11) (68 - 74)  BP: 123/73 (04-16-18 @ 09:11) (123/73 - 135/91)  BP(mean): --  RR: 18 (04-16-18 @ 06:11) (18 - 18)  SpO2: 96% (04-16-18 @ 06:11) (96% - 96%)  Neck: No palpable thyroid nodules.  CVS: S1S2, No murmurs  Respiratory: No wheezing, no crepitations  GI: Abdomen soft, bowel sounds positive  Musculoskeletal:  edema lower extremities.   Skin: No skin rashes, no ecchymosis    Diagnostics

## 2018-04-16 NOTE — PROGRESS NOTE ADULT - PROBLEM SELECTOR PLAN 2
continue postop care   continue asa/ statin/ toprol 50 and amio 200 bid   diuresis  Pulmonary toileting  bowel regimen  discharge planning - home tue

## 2018-04-17 VITALS
HEART RATE: 68 BPM | RESPIRATION RATE: 18 BRPM | DIASTOLIC BLOOD PRESSURE: 71 MMHG | OXYGEN SATURATION: 98 % | SYSTOLIC BLOOD PRESSURE: 110 MMHG | TEMPERATURE: 99 F

## 2018-04-17 LAB
ANION GAP SERPL CALC-SCNC: 11 MMOL/L — SIGNIFICANT CHANGE UP (ref 5–17)
BUN SERPL-MCNC: 19 MG/DL — SIGNIFICANT CHANGE UP (ref 7–23)
CALCIUM SERPL-MCNC: 9.3 MG/DL — SIGNIFICANT CHANGE UP (ref 8.4–10.5)
CHLORIDE SERPL-SCNC: 96 MMOL/L — SIGNIFICANT CHANGE UP (ref 96–108)
CO2 SERPL-SCNC: 29 MMOL/L — SIGNIFICANT CHANGE UP (ref 22–31)
CREAT SERPL-MCNC: 1.04 MG/DL — SIGNIFICANT CHANGE UP (ref 0.5–1.3)
GLUCOSE BLDC GLUCOMTR-MCNC: 106 MG/DL — HIGH (ref 70–99)
GLUCOSE BLDC GLUCOMTR-MCNC: 167 MG/DL — HIGH (ref 70–99)
GLUCOSE BLDC GLUCOMTR-MCNC: 207 MG/DL — HIGH (ref 70–99)
GLUCOSE SERPL-MCNC: 104 MG/DL — HIGH (ref 70–99)
HCT VFR BLD CALC: 31.7 % — LOW (ref 39–50)
HGB BLD-MCNC: 11.1 G/DL — LOW (ref 13–17)
MCHC RBC-ENTMCNC: 32.7 PG — SIGNIFICANT CHANGE UP (ref 27–34)
MCHC RBC-ENTMCNC: 34.8 GM/DL — SIGNIFICANT CHANGE UP (ref 32–36)
MCV RBC AUTO: 93.9 FL — SIGNIFICANT CHANGE UP (ref 80–100)
PLATELET # BLD AUTO: 267 K/UL — SIGNIFICANT CHANGE UP (ref 150–400)
POTASSIUM SERPL-MCNC: 4.1 MMOL/L — SIGNIFICANT CHANGE UP (ref 3.5–5.3)
POTASSIUM SERPL-SCNC: 4.1 MMOL/L — SIGNIFICANT CHANGE UP (ref 3.5–5.3)
RBC # BLD: 3.38 M/UL — LOW (ref 4.2–5.8)
RBC # FLD: 12.9 % — SIGNIFICANT CHANGE UP (ref 10.3–14.5)
SODIUM SERPL-SCNC: 136 MMOL/L — SIGNIFICANT CHANGE UP (ref 135–145)
WBC # BLD: 13.2 K/UL — HIGH (ref 3.8–10.5)
WBC # FLD AUTO: 13.2 K/UL — HIGH (ref 3.8–10.5)

## 2018-04-17 PROCEDURE — 85576 BLOOD PLATELET AGGREGATION: CPT

## 2018-04-17 PROCEDURE — 97116 GAIT TRAINING THERAPY: CPT

## 2018-04-17 PROCEDURE — 85027 COMPLETE CBC AUTOMATED: CPT

## 2018-04-17 PROCEDURE — 93458 L HRT ARTERY/VENTRICLE ANGIO: CPT

## 2018-04-17 PROCEDURE — 86900 BLOOD TYPING SEROLOGIC ABO: CPT

## 2018-04-17 PROCEDURE — 93005 ELECTROCARDIOGRAM TRACING: CPT

## 2018-04-17 PROCEDURE — 94640 AIRWAY INHALATION TREATMENT: CPT

## 2018-04-17 PROCEDURE — C1887: CPT

## 2018-04-17 PROCEDURE — 71046 X-RAY EXAM CHEST 2 VIEWS: CPT

## 2018-04-17 PROCEDURE — 85014 HEMATOCRIT: CPT

## 2018-04-17 PROCEDURE — C1894: CPT

## 2018-04-17 PROCEDURE — 84295 ASSAY OF SERUM SODIUM: CPT

## 2018-04-17 PROCEDURE — 80061 LIPID PANEL: CPT

## 2018-04-17 PROCEDURE — 71045 X-RAY EXAM CHEST 1 VIEW: CPT

## 2018-04-17 PROCEDURE — C8929: CPT

## 2018-04-17 PROCEDURE — 82553 CREATINE MB FRACTION: CPT

## 2018-04-17 PROCEDURE — 96374 THER/PROPH/DIAG INJ IV PUSH: CPT

## 2018-04-17 PROCEDURE — 82947 ASSAY GLUCOSE BLOOD QUANT: CPT

## 2018-04-17 PROCEDURE — P9045: CPT

## 2018-04-17 PROCEDURE — 80076 HEPATIC FUNCTION PANEL: CPT

## 2018-04-17 PROCEDURE — 86901 BLOOD TYPING SEROLOGIC RH(D): CPT

## 2018-04-17 PROCEDURE — C1889: CPT

## 2018-04-17 PROCEDURE — C1769: CPT

## 2018-04-17 PROCEDURE — 99238 HOSP IP/OBS DSCHRG MGMT 30/<: CPT | Mod: 24

## 2018-04-17 PROCEDURE — 82330 ASSAY OF CALCIUM: CPT

## 2018-04-17 PROCEDURE — 84484 ASSAY OF TROPONIN QUANT: CPT

## 2018-04-17 PROCEDURE — 87640 STAPH A DNA AMP PROBE: CPT

## 2018-04-17 PROCEDURE — 99285 EMERGENCY DEPT VISIT HI MDM: CPT | Mod: 25

## 2018-04-17 PROCEDURE — 86923 COMPATIBILITY TEST ELECTRIC: CPT

## 2018-04-17 PROCEDURE — 82803 BLOOD GASES ANY COMBINATION: CPT

## 2018-04-17 PROCEDURE — 86850 RBC ANTIBODY SCREEN: CPT

## 2018-04-17 PROCEDURE — 82565 ASSAY OF CREATININE: CPT

## 2018-04-17 PROCEDURE — 80053 COMPREHEN METABOLIC PANEL: CPT

## 2018-04-17 PROCEDURE — 84443 ASSAY THYROID STIM HORMONE: CPT

## 2018-04-17 PROCEDURE — 82962 GLUCOSE BLOOD TEST: CPT

## 2018-04-17 PROCEDURE — P9047: CPT

## 2018-04-17 PROCEDURE — P9016: CPT

## 2018-04-17 PROCEDURE — 80074 ACUTE HEPATITIS PANEL: CPT

## 2018-04-17 PROCEDURE — 84132 ASSAY OF SERUM POTASSIUM: CPT

## 2018-04-17 PROCEDURE — 94660 CPAP INITIATION&MGMT: CPT

## 2018-04-17 PROCEDURE — 93880 EXTRACRANIAL BILAT STUDY: CPT

## 2018-04-17 PROCEDURE — 85730 THROMBOPLASTIN TIME PARTIAL: CPT

## 2018-04-17 PROCEDURE — 84100 ASSAY OF PHOSPHORUS: CPT

## 2018-04-17 PROCEDURE — 70450 CT HEAD/BRAIN W/O DYE: CPT

## 2018-04-17 PROCEDURE — 85610 PROTHROMBIN TIME: CPT

## 2018-04-17 PROCEDURE — 86891 AUTOLOGOUS BLOOD OP SALVAGE: CPT

## 2018-04-17 PROCEDURE — 82550 ASSAY OF CK (CPK): CPT

## 2018-04-17 PROCEDURE — 81003 URINALYSIS AUTO W/O SCOPE: CPT

## 2018-04-17 PROCEDURE — 99152 MOD SED SAME PHYS/QHP 5/>YRS: CPT

## 2018-04-17 PROCEDURE — 94002 VENT MGMT INPAT INIT DAY: CPT

## 2018-04-17 PROCEDURE — 82435 ASSAY OF BLOOD CHLORIDE: CPT

## 2018-04-17 PROCEDURE — 83605 ASSAY OF LACTIC ACID: CPT

## 2018-04-17 PROCEDURE — 83880 ASSAY OF NATRIURETIC PEPTIDE: CPT

## 2018-04-17 PROCEDURE — 97162 PT EVAL MOD COMPLEX 30 MIN: CPT

## 2018-04-17 PROCEDURE — 87641 MR-STAPH DNA AMP PROBE: CPT

## 2018-04-17 PROCEDURE — 83036 HEMOGLOBIN GLYCOSYLATED A1C: CPT

## 2018-04-17 PROCEDURE — 80048 BASIC METABOLIC PNL TOTAL CA: CPT

## 2018-04-17 PROCEDURE — 83735 ASSAY OF MAGNESIUM: CPT

## 2018-04-17 RX ORDER — ACETAMINOPHEN 500 MG
2 TABLET ORAL
Qty: 0 | Refills: 0 | COMMUNITY
Start: 2018-04-17

## 2018-04-17 RX ORDER — INSULIN LISPRO 100/ML
8 VIAL (ML) SUBCUTANEOUS
Qty: 1 | Refills: 0 | OUTPATIENT
Start: 2018-04-17 | End: 2018-05-16

## 2018-04-17 RX ORDER — DOCUSATE SODIUM 100 MG
1 CAPSULE ORAL
Qty: 90 | Refills: 0 | OUTPATIENT
Start: 2018-04-17 | End: 2018-05-16

## 2018-04-17 RX ORDER — ASPIRIN/CALCIUM CARB/MAGNESIUM 324 MG
1 TABLET ORAL
Qty: 30 | Refills: 0 | OUTPATIENT
Start: 2018-04-17 | End: 2018-05-16

## 2018-04-17 RX ORDER — FUROSEMIDE 40 MG
1 TABLET ORAL
Qty: 5 | Refills: 0 | OUTPATIENT
Start: 2018-04-17 | End: 2018-04-21

## 2018-04-17 RX ORDER — SPIRONOLACTONE 25 MG/1
1 TABLET, FILM COATED ORAL
Qty: 5 | Refills: 0 | OUTPATIENT
Start: 2018-04-17 | End: 2018-04-21

## 2018-04-17 RX ORDER — TIOTROPIUM BROMIDE 18 UG/1
1 CAPSULE ORAL; RESPIRATORY (INHALATION)
Qty: 1 | Refills: 0 | OUTPATIENT
Start: 2018-04-17 | End: 2018-05-16

## 2018-04-17 RX ORDER — LISINOPRIL 2.5 MG/1
1 TABLET ORAL
Qty: 30 | Refills: 0 | OUTPATIENT
Start: 2018-04-17 | End: 2018-05-16

## 2018-04-17 RX ORDER — PANTOPRAZOLE SODIUM 20 MG/1
1 TABLET, DELAYED RELEASE ORAL
Qty: 10 | Refills: 0 | OUTPATIENT
Start: 2018-04-17 | End: 2018-04-26

## 2018-04-17 RX ORDER — POLYETHYLENE GLYCOL 3350 17 G/17G
17 POWDER, FOR SOLUTION ORAL
Qty: 1 | Refills: 0 | OUTPATIENT
Start: 2018-04-17 | End: 2018-04-26

## 2018-04-17 RX ORDER — ENOXAPARIN SODIUM 100 MG/ML
18 INJECTION SUBCUTANEOUS
Qty: 1 | Refills: 0 | OUTPATIENT
Start: 2018-04-17 | End: 2018-05-16

## 2018-04-17 RX ORDER — OXYCODONE HYDROCHLORIDE 5 MG/1
1 TABLET ORAL
Qty: 30 | Refills: 0 | OUTPATIENT
Start: 2018-04-17 | End: 2018-04-21

## 2018-04-17 RX ADMIN — POLYETHYLENE GLYCOL 3350 17 GRAM(S): 17 POWDER, FOR SOLUTION ORAL at 12:19

## 2018-04-17 RX ADMIN — OXYCODONE HYDROCHLORIDE 10 MILLIGRAM(S): 5 TABLET ORAL at 00:40

## 2018-04-17 RX ADMIN — Medication 8: at 12:18

## 2018-04-17 RX ADMIN — PANTOPRAZOLE SODIUM 40 MILLIGRAM(S): 20 TABLET, DELAYED RELEASE ORAL at 06:28

## 2018-04-17 RX ADMIN — Medication 8 UNIT(S): at 08:22

## 2018-04-17 RX ADMIN — Medication 8 UNIT(S): at 12:19

## 2018-04-17 RX ADMIN — Medication 50 MILLIGRAM(S): at 06:31

## 2018-04-17 RX ADMIN — Medication 3 MILLILITER(S): at 00:02

## 2018-04-17 RX ADMIN — ENOXAPARIN SODIUM 40 MILLIGRAM(S): 100 INJECTION SUBCUTANEOUS at 12:19

## 2018-04-17 RX ADMIN — AMIODARONE HYDROCHLORIDE 200 MILLIGRAM(S): 400 TABLET ORAL at 06:31

## 2018-04-17 RX ADMIN — Medication 100 MILLIGRAM(S): at 06:31

## 2018-04-17 RX ADMIN — Medication 40 MILLIGRAM(S): at 06:31

## 2018-04-17 RX ADMIN — Medication 325 MILLIGRAM(S): at 12:19

## 2018-04-17 RX ADMIN — AMIODARONE HYDROCHLORIDE 200 MILLIGRAM(S): 400 TABLET ORAL at 17:08

## 2018-04-17 RX ADMIN — OXYCODONE HYDROCHLORIDE 10 MILLIGRAM(S): 5 TABLET ORAL at 01:20

## 2018-04-17 RX ADMIN — Medication 100 MILLIGRAM(S): at 12:23

## 2018-04-17 RX ADMIN — Medication 3 MILLILITER(S): at 12:18

## 2018-04-17 RX ADMIN — LISINOPRIL 5 MILLIGRAM(S): 2.5 TABLET ORAL at 06:31

## 2018-04-17 RX ADMIN — Medication 8 UNIT(S): at 17:07

## 2018-04-17 RX ADMIN — Medication 3 MILLILITER(S): at 06:31

## 2018-04-17 RX ADMIN — SPIRONOLACTONE 25 MILLIGRAM(S): 25 TABLET, FILM COATED ORAL at 06:33

## 2018-04-17 NOTE — PROGRESS NOTE ADULT - PROBLEM SELECTOR PROBLEM 3
Essential hypertension
Hypervolemia, unspecified hypervolemia type
Type 2 diabetes mellitus with diabetic neuropathy, without long-term current use of insulin

## 2018-04-17 NOTE — PROGRESS NOTE ADULT - SUBJECTIVE AND OBJECTIVE BOX
[facility] Cardiology Progress Note  _______________________________    Pt. seen and examined. No new cardiac-related complaints. Ambulating around halls this AM.    Telemetry - sinus 60-80's, PVC's    T(C): 36.7 (04-17-18 @ 05:00), Max: 37.2 (04-16-18 @ 20:08)  HR: 66 (04-17-18 @ 05:00) (66 - 75)  BP: 117/77 (04-17-18 @ 05:00) (117/73 - 133/75)  RR: 18 (04-17-18 @ 05:00) (18 - 18)  SpO2: 96% (04-17-18 @ 05:00) (95% - 97%)  I&O's Summary    16 Apr 2018 07:01  -  17 Apr 2018 07:00  --------------------------------------------------------  IN: 1440 mL / OUT: 2300 mL / NET: -860 mL        PHYSICAL EXAM:  GENERAL: Alert, NAD.  NECK: Supple, No JVD, no carotid bruit.  CHEST/LUNG: Clear to auscultation bilaterally; No wheezes, rales, or rhonchi. Median stertonotomy c/d/i.  HEART: S1 S2 normal, RRR; No murmurs, rubs, or gallops  ABDOMEN: Soft, Nontender, Nondistended; Bowel sounds present  EXTREMITIES: trace edema.      LABS:                        11.1   13.2  )-----------( 267      ( 17 Apr 2018 05:53 )             31.7     04-17    136  |  96  |  19  ----------------------------<  104<H>  4.1   |  29  |  1.04    Ca    9.3      17 Apr 2018 05:53        CARDIAC MARKERS ( 11 Apr 2018 14:34 )  x     / 1.42 ng/mL / 195 U/L / x     / 19.2 ng/mL              MEDICATIONS  (STANDING):  ALBUTerol    90 MICROgram(s) HFA Inhaler 1 Puff(s) Inhalation every 4 hours  ALBUTerol/ipratropium for Nebulization 3 milliLiter(s) Nebulizer every 6 hours  amiodarone    Tablet 200 milliGRAM(s) Oral two times a day  aspirin enteric coated 325 milliGRAM(s) Oral daily  atorvastatin 80 milliGRAM(s) Oral at bedtime  dextrose 50% Injectable 50 milliLiter(s) IV Push every 15 minutes  dextrose 50% Injectable 25 milliLiter(s) IV Push every 15 minutes  docusate sodium 100 milliGRAM(s) Oral three times a day  enoxaparin Injectable 40 milliGRAM(s) SubCutaneous daily  furosemide    Tablet 40 milliGRAM(s) Oral daily  insulin glargine Injectable (LANTUS) 18 Unit(s) SubCutaneous at bedtime  insulin lispro (HumaLOG) corrective regimen sliding scale   SubCutaneous Before meals and at bedtime  insulin lispro Injectable (HumaLOG) 8 Unit(s) SubCutaneous three times a day with meals  lisinopril 5 milliGRAM(s) Oral daily  metoprolol succinate ER 50 milliGRAM(s) Oral daily  pantoprazole    Tablet 40 milliGRAM(s) Oral before breakfast  polyethylene glycol 3350 17 Gram(s) Oral daily  sodium chloride 0.45%. 1000 milliLiter(s) (10 mL/Hr) IV Continuous <Continuous>  spironolactone 25 milliGRAM(s) Oral daily  tiotropium 18 MICROgram(s) Capsule 1 Capsule(s) Inhalation daily    MEDICATIONS  (PRN):  acetaminophen   Tablet. 650 milliGRAM(s) Oral every 6 hours PRN Mild Pain (1 - 3)  oxyCODONE    IR 5 milliGRAM(s) Oral every 4 hours PRN Moderate Pain (4 - 6)  oxyCODONE    IR 10 milliGRAM(s) Oral every 6 hours PRN Severe Pain (7 - 10)      RADIOLOGY & ADDITIONAL TESTS:

## 2018-04-17 NOTE — PROGRESS NOTE ADULT - SUBJECTIVE AND OBJECTIVE BOX
Chief complaint  Patient is a 62y old  Male who presents with a chief complaint of 4/11/18-CABG x 3 w/lima ef = 50% (13 Apr 2018 13:56)   Review of systems  Patient in bed, looks comfortable, no fever, no hypoglycemia.    Labs and Fingersticks  CAPILLARY BLOOD GLUCOSE      POCT Blood Glucose.: 167 mg/dL (17 Apr 2018 16:43)  POCT Blood Glucose.: 207 mg/dL (17 Apr 2018 11:48)  POCT Blood Glucose.: 106 mg/dL (17 Apr 2018 07:47)  POCT Blood Glucose.: 217 mg/dL (16 Apr 2018 21:39)      Anion Gap, Serum: 11 (04-17 @ 05:53)  Anion Gap, Serum: 12 (04-16 @ 06:16)      Calcium, Total Serum: 9.3 (04-17 @ 05:53)  Calcium, Total Serum: 9.4 (04-16 @ 06:16)          04-17    136  |  96  |  19  ----------------------------<  104<H>  4.1   |  29  |  1.04    Ca    9.3      17 Apr 2018 05:53                          11.1   13.2  )-----------( 267      ( 17 Apr 2018 05:53 )             31.7     Medications  MEDICATIONS  (STANDING):  ALBUTerol    90 MICROgram(s) HFA Inhaler 1 Puff(s) Inhalation every 4 hours  ALBUTerol/ipratropium for Nebulization 3 milliLiter(s) Nebulizer every 6 hours  amiodarone    Tablet 200 milliGRAM(s) Oral two times a day  aspirin enteric coated 325 milliGRAM(s) Oral daily  atorvastatin 80 milliGRAM(s) Oral at bedtime  dextrose 50% Injectable 50 milliLiter(s) IV Push every 15 minutes  dextrose 50% Injectable 25 milliLiter(s) IV Push every 15 minutes  docusate sodium 100 milliGRAM(s) Oral three times a day  enoxaparin Injectable 40 milliGRAM(s) SubCutaneous daily  furosemide    Tablet 40 milliGRAM(s) Oral daily  insulin glargine Injectable (LANTUS) 18 Unit(s) SubCutaneous at bedtime  insulin lispro (HumaLOG) corrective regimen sliding scale   SubCutaneous Before meals and at bedtime  insulin lispro Injectable (HumaLOG) 8 Unit(s) SubCutaneous three times a day with meals  lisinopril 5 milliGRAM(s) Oral daily  metoprolol succinate ER 50 milliGRAM(s) Oral daily  pantoprazole    Tablet 40 milliGRAM(s) Oral before breakfast  polyethylene glycol 3350 17 Gram(s) Oral daily  sodium chloride 0.45%. 1000 milliLiter(s) (10 mL/Hr) IV Continuous <Continuous>  spironolactone 25 milliGRAM(s) Oral daily  tiotropium 18 MICROgram(s) Capsule 1 Capsule(s) Inhalation daily      Physical Exam  General: Patient comfortable in bed  Vital Signs Last 12 Hrs  T(F): 99.2 (04-17-18 @ 13:25), Max: 99.2 (04-17-18 @ 13:25)  HR: 68 (04-17-18 @ 13:25) (68 - 68)  BP: 110/71 (04-17-18 @ 13:25) (110/71 - 110/71)  BP(mean): --  RR: 18 (04-17-18 @ 13:25) (18 - 18)  SpO2: 98% (04-17-18 @ 13:25) (98% - 98%)  Neck: No palpable thyroid nodules.  CVS: S1S2, No murmurs  Respiratory: No wheezing, no crepitations  GI: Abdomen soft, bowel sounds positive  Musculoskeletal:  edema lower extremities.   Skin: No skin rashes, no ecchymosis    Diagnostics

## 2018-04-17 NOTE — PROGRESS NOTE ADULT - PROBLEM SELECTOR PROBLEM 2
CAD (coronary artery disease)
Type 2 diabetes mellitus with diabetic neuropathy, without long-term current use of insulin
CAD (coronary artery disease)
Essential hypertension
Essential hypertension
S/P CABG x 3
Type 2 diabetes mellitus with diabetic neuropathy, without long-term current use of insulin
Essential hypertension

## 2018-04-17 NOTE — PROGRESS NOTE ADULT - PROBLEM SELECTOR PLAN 1
-  - likely recent/missed MI.  - cont heparin gtt for now.  - cont ASA and high intensity statin.  - cont low dose lopressor.  - hold plavix for now.  - DC imdur.  plan as per CT surg for OR tomorrow
CABG  am  ASA statin betablocker
-  - ASA enteric coated 325 mg PO daily  Toprol 50 XL  4/13 afib - Amio 200 bid   - statin: Atorvastatin 80 mg PO qHS for graft patency prophylaxis.  - Pulmonary toileting encouraged, coughing and deep breathing with inct spx  - Mobilize, OOB to chair and ambulate today as tolerated  - Incisional wound care per nursing  - PT home no needs  - Enoxaparin SQ 40 mg daily for DVT ppx  - Monitor electrolytes, replete as needed
-  - POD # 1 s/p 3V CABG with LIMA-LAD, SVG-OM, SVG-PDA  - sinus rhythm currently on telemetry.  - cont ASA and high intensity statin.  - reintroduce BB when able.  - diurese as tolerated.  - OOB.  - incentive spirometry.    Venkatesh Sellers M.D., [facility]  [phone]
-  - POD # 2 s/p 3V CABG with LIMA-LAD, SVG-OM, SVG-PDA  - EF 30-35%  - cont ASA and high intensity statin.  - wean milrinone.  - start BB and low dose ACE-I when able.   - OOB.  - incentive spirometry.    Venkatesh Sellers M.D., [facility]  [phone]
-  - POD # 3 s/p 3V CABG with LIMA-LAD, SVG-OM, SVG-PDA  - EF 30-35%  - cont ASA and high intensity statin.  - cont BB and low dose ACE-I   - OOB.  - incentive spirometry.
-  - likely recent/missed MI.  - cont heparin gtt for now.  - cont ASA and high intensity statin.  - cont low dose lopressor.  - hold plavix for now.  - DC imdur.  - On schedule for L heart cath today.    Plan d/w pt. Questions answered.    Venkatesh Sellers M.D., [facility]  [phone]
-  - s/p 3V CABG with LIMA-LAD, SVG-OM, SVG-PDA  - EF 30-35%  - cont ASA and high intensity statin.  - cont BB and low dose ACE-I   - OOB.  - incentive spirometry.
-  - s/p 3V CABG with LIMA-LAD, SVG-OM, SVG-PDA  - EF 30-35%  - cont ASA and high intensity statin.  - cont BB and low dose ACE-I   - on amiodarone for post-op Afib.  - cont diuresis with PO lasix and spironolactone.  - DC planning.   - follow-up with me in office in 1 week. Plan for repeat echo to reassess LV function in 6 to 8 weeks.    Venkatesh Sellers M.D., [facility]  [phone]
-  - s/p 3V CABG with LIMA-LAD, SVG-OM, SVG-PDA  - EF 30-35%  - cont ASA and high intensity statin.  - cont BB and low dose ACE-I   - on amiodarone for post-op Afib.  - cont diuresis with lasix and spironolactone.  - OOB.  - incentive spirometry.    Venkatesh Sellers M.D., [facility]  [phone]
- s/p triple vessel bypass surgery POD # 2  - CT surgery management on 2 [address] Step Down  - ASA enteric coated 325 mg PO daily  - statin: Atorvastatin 80 mg PO qHS for graft patency prophylaxis.  - Pulmonary toileting encouraged, coughing and deep breathing with inct spx  - Mobilize, OOB to chair and ambulate today as tolerated  - Incisional wound care per nursing  - PT eval appreciated  - Enoxaparin SQ 40 mg daily for DVT ppx  - Monitor electrolytes, replete as needed  - milrinone gtt weaned off  - hold AV dewey blockers for now given recent milrinone gtt, plan to restart later  - D/c A-line and RIJ intro TLC  - D/c L pleural chest tube
Will continue current insulin regimen for now. Will continue monitoring FS, log, will Follow up.  DC home on current insulin, FU 1 w  Patient counseled for compliance with consistent low carb diet.
Will continue current insulin regimen for now. Will continue monitoring FS, log, will Follow up.  Patient counseled for compliance with consistent low carb diet.
appreciate cardio recs  cont on hep gtt  pending TTE  metoprolol 12.5mg po bid  lipitor 80mg qhs  imdur 30mg daily  tele monitoring  for cath today
appreciate cardio recs  likely inferior wall ischemic disease is chronic and ? new event  trend Bob and ck tsh/a1c/lipid panel  ck TTE  for nstemi management overnight cont heparin drip and monitor PTTs to adjust dose  metoprolol 12.5mg po bid  lipitor 80mg qhs and monitor liver enzymes  imdur 30mg daily  tele monitoring  likely for cath
glucose control (A1C 7.9)  endo following  lantus and humalog as per endo - insulin teaching - pt will require insulin for at home
sp angiogram showing triple vessel disease  appreciate CTS eval, planning for CABG tomorrow  appreciate cardio recs  cont on hep gtt  TTE shows EF 30-35%, LVH, diastolic dysfunction  metoprolol 12.5mg po bid  lipitor 80mg qhs  imdur 30mg daily  tele monitoring
-ASA enteric coated 325 mg PO daily  Toprol 50 XL  4/13 afib - Amio 200 bid   - statin: Atorvastatin 80 mg PO qHS for graft patency prophylaxis.  - Pulmonary toileting encouraged, coughing and deep breathing with inct spx  - Mobilize, OOB to chair and ambulate today as tolerated  - Incisional wound care per nursing  - PT home no needs  - Enoxaparin SQ 40 mg daily for DVT ppx  - Monitor electrolytes, replete as needed
-OOB to chair; continue PT  -continue asa, lovenox, statin, post op zinacef, PPI  -miralax  pain control  wean primacor GTT as tolerated. Currently on .15
Will continue current insulin regimen for now. Will continue monitoring FS, log, will Follow up.  Patient counseled for compliance with consistent low carb diet.

## 2018-04-17 NOTE — PROGRESS NOTE ADULT - PROBLEM SELECTOR PROBLEM 1
CAD (coronary artery disease)
NSTEMI (non-ST elevated myocardial infarction)
CAD (coronary artery disease)
CAD (coronary artery disease)
CAD, multiple vessel
CAD, multiple vessel
NSTEMI (non-ST elevated myocardial infarction)
S/P CABG x 3
Type 2 diabetes mellitus with diabetic neuropathy, without long-term current use of insulin
CAD (coronary artery disease)
Type 2 diabetes mellitus with diabetic neuropathy, without long-term current use of insulin

## 2018-04-17 NOTE — PROGRESS NOTE ADULT - PROVIDER SPECIALTY LIST ADULT
CT Surgery
Cardiology
Endocrinology
Internal Medicine
Cardiology
Endocrinology

## 2018-04-18 RX ORDER — LANCETS 28 GAUGE
EACH MISCELLANEOUS
Qty: 100 | Refills: 0 | Status: ACTIVE | COMMUNITY
Start: 2018-04-03

## 2018-04-18 RX ORDER — BLOOD-GLUCOSE METER
KIT MISCELLANEOUS
Qty: 1 | Refills: 0 | Status: ACTIVE | COMMUNITY
Start: 2018-04-03

## 2018-04-18 RX ORDER — BLOOD SUGAR DIAGNOSTIC
STRIP MISCELLANEOUS
Qty: 50 | Refills: 0 | Status: ACTIVE | COMMUNITY
Start: 2018-04-03

## 2018-05-01 ENCOUNTER — APPOINTMENT (OUTPATIENT)
Dept: CARDIOTHORACIC SURGERY | Facility: CLINIC | Age: 63
End: 2018-05-01
Payer: COMMERCIAL

## 2018-05-01 VITALS
SYSTOLIC BLOOD PRESSURE: 127 MMHG | OXYGEN SATURATION: 97 % | RESPIRATION RATE: 14 BRPM | DIASTOLIC BLOOD PRESSURE: 79 MMHG | HEART RATE: 74 BPM | TEMPERATURE: 97.8 F

## 2018-05-01 PROCEDURE — 99024 POSTOP FOLLOW-UP VISIT: CPT

## 2018-05-01 RX ORDER — ASPIRIN 81 MG
81 TABLET, DELAYED RELEASE (ENTERIC COATED) ORAL DAILY
Refills: 0 | Status: ACTIVE | COMMUNITY

## 2018-05-01 RX ORDER — DOCUSATE SODIUM 100 MG/1
100 CAPSULE ORAL 3 TIMES DAILY
Refills: 0 | Status: ACTIVE | COMMUNITY

## 2018-05-09 ENCOUNTER — APPOINTMENT (OUTPATIENT)
Dept: CARDIOTHORACIC SURGERY | Facility: CLINIC | Age: 63
End: 2018-05-09
Payer: COMMERCIAL

## 2018-05-09 VITALS
WEIGHT: 256 LBS | OXYGEN SATURATION: 99 % | DIASTOLIC BLOOD PRESSURE: 77 MMHG | HEART RATE: 70 BPM | SYSTOLIC BLOOD PRESSURE: 114 MMHG | RESPIRATION RATE: 14 BRPM | HEIGHT: 71 IN | BODY MASS INDEX: 35.84 KG/M2 | TEMPERATURE: 97.8 F

## 2018-05-09 PROCEDURE — 99024 POSTOP FOLLOW-UP VISIT: CPT

## 2018-05-09 RX ORDER — TIOTROPIUM BROMIDE 18 UG/1
18 CAPSULE ORAL; RESPIRATORY (INHALATION) DAILY
Refills: 0 | Status: DISCONTINUED | COMMUNITY
End: 2018-05-09

## 2018-05-09 RX ORDER — PREDNISONE 20 MG/1
20 TABLET ORAL
Qty: 10 | Refills: 0 | Status: DISCONTINUED | COMMUNITY
Start: 2018-04-03

## 2018-05-09 RX ORDER — INSULIN LISPRO 100 [IU]/ML
100 INJECTION, SOLUTION INTRAVENOUS; SUBCUTANEOUS
Refills: 0 | Status: DISCONTINUED | COMMUNITY
End: 2018-05-09

## 2018-05-09 RX ORDER — ALBUTEROL SULFATE 90 UG/1
108 (90 BASE) AEROSOL, METERED RESPIRATORY (INHALATION)
Qty: 8 | Refills: 0 | Status: DISCONTINUED | COMMUNITY
Start: 2018-04-03

## 2018-05-09 RX ORDER — PANTOPRAZOLE 40 MG/1
40 TABLET, DELAYED RELEASE ORAL DAILY
Qty: 90 | Refills: 3 | Status: DISCONTINUED | COMMUNITY
End: 2018-05-09

## 2018-05-09 RX ORDER — FUROSEMIDE 40 MG/1
40 TABLET ORAL
Qty: 5 | Refills: 0 | Status: DISCONTINUED | COMMUNITY
Start: 2018-04-17

## 2018-05-09 RX ORDER — CICLOPIROX OLAMINE 7.7 MG/G
0.77 CREAM TOPICAL
Qty: 90 | Refills: 0 | Status: DISCONTINUED | COMMUNITY
Start: 2018-01-12

## 2018-05-09 RX ORDER — OXYCODONE 5 MG/1
5 TABLET ORAL
Qty: 30 | Refills: 0 | Status: DISCONTINUED | COMMUNITY
Start: 2018-04-17

## 2018-05-09 RX ORDER — HYDROCORTISONE 1 %
12 CREAM (GRAM) TOPICAL
Qty: 400 | Refills: 0 | Status: DISCONTINUED | COMMUNITY
Start: 2018-01-13

## 2018-05-09 RX ORDER — METHYLPREDNISOLONE 4 MG/1
4 TABLET ORAL
Qty: 21 | Refills: 0 | Status: DISCONTINUED | COMMUNITY
Start: 2018-02-21

## 2018-05-09 RX ORDER — LISINOPRIL 5 MG/1
5 TABLET ORAL
Qty: 30 | Refills: 0 | Status: DISCONTINUED | COMMUNITY
Start: 2018-04-17

## 2018-05-09 RX ORDER — METFORMIN ER 500 MG 500 MG/1
500 TABLET ORAL
Qty: 60 | Refills: 0 | Status: DISCONTINUED | COMMUNITY
Start: 2018-05-04

## 2018-05-09 RX ORDER — AZITHROMYCIN 250 MG/1
250 TABLET, FILM COATED ORAL
Qty: 6 | Refills: 0 | Status: DISCONTINUED | COMMUNITY
Start: 2018-04-03

## 2018-05-09 RX ORDER — SODIUM SULFATE, POTASSIUM SULFATE, MAGNESIUM SULFATE 17.5; 3.13; 1.6 G/ML; G/ML; G/ML
17.5-3.13-1.6 SOLUTION, CONCENTRATE ORAL
Qty: 354 | Refills: 0 | Status: DISCONTINUED | COMMUNITY
Start: 2017-11-11

## 2018-05-09 RX ORDER — POLYETHYLENE GLYCOL 3350 17 G/17G
17 POWDER, FOR SOLUTION ORAL
Qty: 255 | Refills: 0 | Status: DISCONTINUED | COMMUNITY
Start: 2018-04-17

## 2018-05-09 RX ORDER — PRAVASTATIN SODIUM 80 MG/1
80 TABLET ORAL
Qty: 30 | Refills: 0 | Status: DISCONTINUED | COMMUNITY
Start: 2018-01-13

## 2018-05-09 RX ORDER — PEN NEEDLE, DIABETIC 29 G X1/2"
32G X 4 MM NEEDLE, DISPOSABLE MISCELLANEOUS
Qty: 300 | Refills: 0 | Status: DISCONTINUED | COMMUNITY
Start: 2018-04-17

## 2018-05-09 RX ORDER — HYDROCODONE BITARTRATE AND HOMATROPINE METHYLBROMIDE 5; 1.5 MG/5ML; MG/5ML
5-1.5 SOLUTION ORAL
Qty: 100 | Refills: 0 | Status: DISCONTINUED | COMMUNITY
Start: 2018-02-16

## 2018-05-09 RX ORDER — PREDNISONE 10 MG/1
10 TABLET ORAL
Qty: 15 | Refills: 0 | Status: DISCONTINUED | COMMUNITY
Start: 2018-02-16

## 2018-05-09 RX ORDER — INSULIN GLARGINE 100 [IU]/ML
100 INJECTION, SOLUTION SUBCUTANEOUS
Refills: 0 | Status: DISCONTINUED | COMMUNITY
End: 2018-05-09

## 2018-07-14 ENCOUNTER — INPATIENT (INPATIENT)
Facility: HOSPITAL | Age: 63
LOS: 0 days | Discharge: HOME | DRG: 204 | End: 2018-07-15
Attending: INTERNAL MEDICINE | Admitting: INTERNAL MEDICINE
Payer: COMMERCIAL

## 2018-07-14 VITALS
RESPIRATION RATE: 19 BRPM | HEART RATE: 80 BPM | DIASTOLIC BLOOD PRESSURE: 88 MMHG | TEMPERATURE: 98 F | SYSTOLIC BLOOD PRESSURE: 160 MMHG | OXYGEN SATURATION: 98 %

## 2018-07-14 DIAGNOSIS — Z29.9 ENCOUNTER FOR PROPHYLACTIC MEASURES, UNSPECIFIED: ICD-10-CM

## 2018-07-14 DIAGNOSIS — I50.22 CHRONIC SYSTOLIC (CONGESTIVE) HEART FAILURE: ICD-10-CM

## 2018-07-14 DIAGNOSIS — E11.9 TYPE 2 DIABETES MELLITUS WITHOUT COMPLICATIONS: ICD-10-CM

## 2018-07-14 DIAGNOSIS — Z98.890 OTHER SPECIFIED POSTPROCEDURAL STATES: Chronic | ICD-10-CM

## 2018-07-14 DIAGNOSIS — Z79.899 OTHER LONG TERM (CURRENT) DRUG THERAPY: ICD-10-CM

## 2018-07-14 DIAGNOSIS — Z95.1 PRESENCE OF AORTOCORONARY BYPASS GRAFT: Chronic | ICD-10-CM

## 2018-07-14 DIAGNOSIS — R06.02 SHORTNESS OF BREATH: ICD-10-CM

## 2018-07-14 DIAGNOSIS — I10 ESSENTIAL (PRIMARY) HYPERTENSION: ICD-10-CM

## 2018-07-14 DIAGNOSIS — I25.118 ATHEROSCLEROTIC HEART DISEASE OF NATIVE CORONARY ARTERY WITH OTHER FORMS OF ANGINA PECTORIS: ICD-10-CM

## 2018-07-14 DIAGNOSIS — R06.09 OTHER FORMS OF DYSPNEA: ICD-10-CM

## 2018-07-14 DIAGNOSIS — I48.0 PAROXYSMAL ATRIAL FIBRILLATION: ICD-10-CM

## 2018-07-14 LAB
ALBUMIN SERPL ELPH-MCNC: 4 G/DL — SIGNIFICANT CHANGE UP (ref 3.3–5)
ALP SERPL-CCNC: 73 U/L — SIGNIFICANT CHANGE UP (ref 40–120)
ALT FLD-CCNC: 13 U/L — SIGNIFICANT CHANGE UP (ref 10–45)
ANION GAP SERPL CALC-SCNC: 11 MMOL/L — SIGNIFICANT CHANGE UP (ref 5–17)
APTT BLD: 29.8 SEC — SIGNIFICANT CHANGE UP (ref 27.5–37.4)
AST SERPL-CCNC: 14 U/L — SIGNIFICANT CHANGE UP (ref 10–40)
BASOPHILS # BLD AUTO: 0.1 K/UL — SIGNIFICANT CHANGE UP (ref 0–0.2)
BASOPHILS NFR BLD AUTO: 0.7 % — SIGNIFICANT CHANGE UP (ref 0–2)
BILIRUB SERPL-MCNC: 1.1 MG/DL — SIGNIFICANT CHANGE UP (ref 0.2–1.2)
BUN SERPL-MCNC: 18 MG/DL — SIGNIFICANT CHANGE UP (ref 7–23)
CALCIUM SERPL-MCNC: 9.5 MG/DL — SIGNIFICANT CHANGE UP (ref 8.4–10.5)
CHLORIDE SERPL-SCNC: 104 MMOL/L — SIGNIFICANT CHANGE UP (ref 96–108)
CO2 SERPL-SCNC: 26 MMOL/L — SIGNIFICANT CHANGE UP (ref 22–31)
CREAT SERPL-MCNC: 1.01 MG/DL — SIGNIFICANT CHANGE UP (ref 0.5–1.3)
EOSINOPHIL # BLD AUTO: 0.4 K/UL — SIGNIFICANT CHANGE UP (ref 0–0.5)
EOSINOPHIL NFR BLD AUTO: 4.1 % — SIGNIFICANT CHANGE UP (ref 0–6)
GAS PNL BLDV: SIGNIFICANT CHANGE UP
GLUCOSE SERPL-MCNC: 113 MG/DL — HIGH (ref 70–99)
HCT VFR BLD CALC: 37.9 % — LOW (ref 39–50)
HGB BLD-MCNC: 13.3 G/DL — SIGNIFICANT CHANGE UP (ref 13–17)
INR BLD: 1.3 RATIO — HIGH (ref 0.88–1.16)
LYMPHOCYTES # BLD AUTO: 2.6 K/UL — SIGNIFICANT CHANGE UP (ref 1–3.3)
LYMPHOCYTES # BLD AUTO: 27.7 % — SIGNIFICANT CHANGE UP (ref 13–44)
MCHC RBC-ENTMCNC: 31.3 PG — SIGNIFICANT CHANGE UP (ref 27–34)
MCHC RBC-ENTMCNC: 35 GM/DL — SIGNIFICANT CHANGE UP (ref 32–36)
MCV RBC AUTO: 89.4 FL — SIGNIFICANT CHANGE UP (ref 80–100)
MONOCYTES # BLD AUTO: 0.8 K/UL — SIGNIFICANT CHANGE UP (ref 0–0.9)
MONOCYTES NFR BLD AUTO: 8.4 % — SIGNIFICANT CHANGE UP (ref 2–14)
NEUTROPHILS # BLD AUTO: 5.4 K/UL — SIGNIFICANT CHANGE UP (ref 1.8–7.4)
NEUTROPHILS NFR BLD AUTO: 59.1 % — SIGNIFICANT CHANGE UP (ref 43–77)
NT-PROBNP SERPL-SCNC: 1772 PG/ML — HIGH (ref 0–300)
PLATELET # BLD AUTO: 197 K/UL — SIGNIFICANT CHANGE UP (ref 150–400)
POTASSIUM SERPL-MCNC: 4.2 MMOL/L — SIGNIFICANT CHANGE UP (ref 3.5–5.3)
POTASSIUM SERPL-SCNC: 4.2 MMOL/L — SIGNIFICANT CHANGE UP (ref 3.5–5.3)
PROT SERPL-MCNC: 7.3 G/DL — SIGNIFICANT CHANGE UP (ref 6–8.3)
PROTHROM AB SERPL-ACNC: 14.2 SEC — HIGH (ref 9.8–12.7)
RBC # BLD: 4.24 M/UL — SIGNIFICANT CHANGE UP (ref 4.2–5.8)
RBC # FLD: 12.7 % — SIGNIFICANT CHANGE UP (ref 10.3–14.5)
SODIUM SERPL-SCNC: 141 MMOL/L — SIGNIFICANT CHANGE UP (ref 135–145)
TROPONIN T, HIGH SENSITIVITY RESULT: 21 NG/L — SIGNIFICANT CHANGE UP (ref 0–51)
TROPONIN T, HIGH SENSITIVITY RESULT: 25 NG/L — SIGNIFICANT CHANGE UP (ref 0–51)
WBC # BLD: 9.2 K/UL — SIGNIFICANT CHANGE UP (ref 3.8–10.5)
WBC # FLD AUTO: 9.2 K/UL — SIGNIFICANT CHANGE UP (ref 3.8–10.5)

## 2018-07-14 PROCEDURE — 99285 EMERGENCY DEPT VISIT HI MDM: CPT

## 2018-07-14 PROCEDURE — 71046 X-RAY EXAM CHEST 2 VIEWS: CPT | Mod: 26

## 2018-07-14 PROCEDURE — 99223 1ST HOSP IP/OBS HIGH 75: CPT

## 2018-07-14 RX ORDER — IBUPROFEN 200 MG
600 TABLET ORAL ONCE
Qty: 0 | Refills: 0 | Status: COMPLETED | OUTPATIENT
Start: 2018-07-14 | End: 2018-07-14

## 2018-07-14 RX ORDER — ENOXAPARIN SODIUM 100 MG/ML
40 INJECTION SUBCUTANEOUS EVERY 24 HOURS
Qty: 0 | Refills: 0 | Status: DISCONTINUED | OUTPATIENT
Start: 2018-07-14 | End: 2018-07-15

## 2018-07-14 RX ORDER — ACETAMINOPHEN 500 MG
650 TABLET ORAL ONCE
Qty: 0 | Refills: 0 | Status: COMPLETED | OUTPATIENT
Start: 2018-07-14 | End: 2018-07-14

## 2018-07-14 RX ADMIN — Medication 650 MILLIGRAM(S): at 18:22

## 2018-07-14 RX ADMIN — Medication 600 MILLIGRAM(S): at 18:22

## 2018-07-14 NOTE — ED PROVIDER NOTE - PHYSICAL EXAMINATION
GENERAL: mildly anxious in no acute respiratory distress.   HEAD:  Atraumatic, Normocephalic  EYES: EOMI, PERRLA, conjunctiva and sclera clear  ENT: MMM; oropharynx clear  NECK: Supple, No JVD  CHEST/LUNG: Clear to auscultation bilaterally; No wheeze  HEART: Regular rate and rhythm; No murmurs, rubs, or gallops  ABDOMEN: Soft, Nontender, Nondistended; Bowel sounds present  EXTREMITIES:  2+ Peripheral Pulses, No clubbing, cyanosis, or edema  PSYCH: AAOx3 +anxiety   NEUROLOGY: no focal motor or sensory deficits. 5/5 muscle strength in all extremities. CN II-XII grossly intact; negative pronator drift.   SKIN: No rashes or lesions
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
## 2018-07-14 NOTE — H&P ADULT - PROBLEM SELECTOR PLAN 4
-Clarify home meds  -no signs of volume overload despite the SOB on presentation  -c/w telemetry monitoring

## 2018-07-14 NOTE — H&P ADULT - ATTENDING COMMENTS
Patient assigned to me by night hospitalist in charge for management and care for patient for this evening only. Care to be resumed by day hospitalist (Dr. Wild) in the morning and thereafter.

## 2018-07-14 NOTE — H&P ADULT - PROBLEM SELECTOR PLAN 6
-Patient is on lisinopril but not sure of dose.   -Will task pharmacy team to help clarify but the overall trends are acceptable for now
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
## 2018-07-14 NOTE — H&P ADULT - NSHPLABSRESULTS_GEN_ALL_CORE
13.3   9.2   )-----------( 197      ( 14 Jul 2018 18:01 )             37.9       07-14    141  |  104  |  18  ----------------------------<  113<H>  4.2   |  26  |  1.01    Ca    9.5      14 Jul 2018 18:01    TPro  7.3  /  Alb  4.0  /  TBili  1.1  /  DBili  x   /  AST  14  /  ALT  13  /  AlkPhos  73  07-14          PT/INR - ( 14 Jul 2018 18:01 )   PT: 14.2 sec;   INR: 1.30 ratio         PTT - ( 14 Jul 2018 18:01 )  PTT:29.8 sec      I personally reviewed & interpreted the lab findings above; CBC and CMP unremarkable. Troponin T went from 21 to 25.  I personally reviewed & interpreted the radiographic findings; CXR unchanged from prior  I personally reviewed & interpreted the EKG findings; 13.3   9.2   )-----------( 197      ( 14 Jul 2018 18:01 )             37.9       07-14    141  |  104  |  18  ----------------------------<  113<H>  4.2   |  26  |  1.01    Ca    9.5      14 Jul 2018 18:01    TPro  7.3  /  Alb  4.0  /  TBili  1.1  /  DBili  x   /  AST  14  /  ALT  13  /  AlkPhos  73  07-14          PT/INR - ( 14 Jul 2018 18:01 )   PT: 14.2 sec;   INR: 1.30 ratio         PTT - ( 14 Jul 2018 18:01 )  PTT:29.8 sec      I personally reviewed & interpreted the lab findings above; CBC and CMP unremarkable. Troponin T went from 21 to 25.  I personally reviewed & interpreted the radiographic findings; CXR unchanged from prior  I personally reviewed & interpreted the EKG findings; Old Q-waves and largely unchanged from prior apart from one isolated twave inversion in V6.

## 2018-07-14 NOTE — H&P ADULT - PROBLEM SELECTOR PLAN 3
-Patient reports he is not on anticoagulation and unclear because never documented in prior records on goals surrounding anticoagulation. He was on amiodarone and need to clarify home dose. CHADSVASC score is 3.0. Will defer anticoagulation to cardiology team

## 2018-07-14 NOTE — H&P ADULT - NSHPPHYSICALEXAM_GEN_ALL_CORE
PHYSICAL EXAM:  Vital Signs Last 24 Hrs  T(C): 36.6 (07-14-18 @ 23:00)  T(F): 97.8 (07-14-18 @ 23:00), Max: 98.1 (07-14-18 @ 17:39)  HR: 64 (07-14-18 @ 23:00) (64 - 82)  BP: 148/90 (07-14-18 @ 23:00)  BP(mean): --  RR: 20 (07-14-18 @ 23:00) (18 - 20)  SpO2: 100% (07-14-18 @ 23:00) (98% - 100%)  Wt(kg): --    Constitutional: NAD, awake and alert  EYES: EOMI  ENT:  Normal Hearing, no tonsillar exudates   Neck: Soft and supple , no thyromegaly   Respiratory: Breath sounds are clear bilaterally, No wheezing, rales or rhonchi  Cardiovascular: S1 and S2, regular rate and rhythm, no gallops or rubs, no JVD,  2/6 systolic murmur best heard at right 2nd ICS, no pitting edema   Gastrointestinal: Bowel Sounds present, soft, nontender, nondistended, no guarding, no rebound  Extremities: No cyanosis or clubbing; warm to touch  Vascular: 2+ peripheral pulses lower ex  Neurological: No focal deficits, CN II-XII intact bilaterally, sensation to light touch intact in all extremities, gait deferred Pupils are equally reactive to light and symmetrical in size.   Musculoskeletal: 5/5 strength b/l upper and lower extremities; no joint swelling.  Skin: surgical site on chest intact, no signs of infection   Psych: no depression or anhedonia, AAOx3  HEME: no bruises, no nose bleeds
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
## 2018-07-14 NOTE — ED PROVIDER NOTE - PMH
Coronary artery disease    Essential hypertension    Type 2 diabetes mellitus with diabetic neuropathy, without long-term current use of insulin

## 2018-07-14 NOTE — H&P ADULT - NSHPREVIEWOFSYSTEMS_GEN_ALL_CORE
REVIEW OF SYSTEMS:    CONSTITUTIONAL: No fevers or chills  ENMT:  No ear pain, No vertigo or throat pain  EYES: No visual changes  or photophobia  NECK: No pain or stiffness  RESPIRATORY: No cough, wheezing, hemoptysis; +SOB per HPI   CARDIOVASCULAR: No chest pain or palpitations  GASTROINTESTINAL: No abdominal or epigastric pain. No nausea, vomiting, or hematemesis; No diarrhea or constipation. No melena or hematochezia.  MUSCULOSKELETAL: No joint swelling  or warmth.  GENITOURINARY: No dysuria, frequency or hematuria  NEUROLOGICAL: No numbness or syncope  PSYCHIATRIC: No depression or anhedonia.  ENDOCRINE: No sx hypoglycemic episodes.  SKIN: No itching, burning, rashes, or lesions
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
## 2018-07-14 NOTE — ED ADULT NURSE REASSESSMENT NOTE - NS ED NURSE REASSESS COMMENT FT1
2113- patient verbalized feeling much better now after he had dinner & fluids to drink. Repeat HST done. On continuous cardiac monitoring. No change noted.

## 2018-07-14 NOTE — PATIENT PROFILE ADULT. - TEACHING/LEARNING FACTORS IMPACT ABILITY TO LEARN
Pre op instructions reviewed with patient per phone:    To confirm, Your surgeon has instructed you:  Surgery is scheduled 11/24/17 at ENT.      Please report to [facility] 1st floor main lobby by MD.   Pre admit office to call afternoon prior to surgery with final arrival time      INSTRUCTIONS IMPORTANT!!!  ¨ Do not eat, drink, or smoke after 12 midnight-including water. OK to brush teeth, no gum, candy or mints!    ¨ Take only these medicines with a small swallow of water-morning of surgery.  None  ____  Do not wear makeup, including mascara.  ____  No powder, lotions or creams to surgical area.  ____  Please remove all jewelry, including piercings and leave at home.  ____  No money or valuables needed. Please leave at home.  ____  Please bring identification and insurance information to hospital.  ____  If going home the same day, arrange for a ride home. You will not be able to   drive if Anesthesia was used.  ____  Children, under 12 years old, must remain in the waiting room with an adult.  They are not allowed in patient areas.  ____  Wear loose fitting clothing. Allow for dressings, bandages.  ____  Stop Aspirin, Ibuprofen, Motrin and Aleve at least 5-7 days before surgery, unless otherwise instructed by your doctor, or the nurse.   You MAY use Tylenol/acetaminophen until day of surgery.  ____  If you take diabetic medication, do not take am of surgery unless instructed by   Doctor.  ____ Stop taking any Fish Oil supplement or any Vitamins that contain Vitamin E at least 5 days prior to surgery.          Bathing Instructions-- The night before surgery and the morning prior to coming to the hospital:   -Do not shave the surgical area.   -Shower and wash your hair and body as usual with anti-bacterial  soap and shampoo.   -Rinse your hair and body completely.   -Use one packet of hibiclens to wash the surgical site (using your hand) gently for 5 minutes.  Do not scrub you skin too  hard.   -Do not use hibiclens on your head, face, or genitals.   -Do not wash with anti-bacterial soap after you use the hibiclens.   -Rinse your body thoroughly.   -Dry with clean, soft towel.  Do not use lotion, cream, deodorant, or powders on   the surgical site.    Use antibacterial soap in place of hibiclens if your surgery is on the head, face or genitals.         Surgical Site Infection    Prevention of surgical site infections:     -Keep incisions clean and dry.   -Do not soak/submerge incisions in water until completely healed.   -Do not apply lotions, powders, creams, or deodorants to site.   -Always make sure hands are cleaned with antibacterial soap/ alcohol-based   prior to touching the surgical site.  (This includes doctors, nurses, staff, and yourself.)    Signs and symptoms:   -Redness and pain around the area where you had surgery   -Drainage of cloudy fluid from your surgical wound   -Fever over 100.4  I have read or had read and explained to me, and understand the above information.         none

## 2018-07-14 NOTE — ED ADULT NURSE NOTE - CHPI ED SYMPTOMS NEG
no vomiting/no fever/no decreased eating/drinking/no weakness/no nausea/no numbness/no dizziness/no chills/no tingling

## 2018-07-14 NOTE — H&P ADULT - PMH
Coronary artery disease    Essential hypertension    Moderate aortic stenosis    Paroxysmal atrial fibrillation    Systolic congestive heart failure    Type 2 diabetes mellitus with diabetic neuropathy, without long-term current use of insulin

## 2018-07-14 NOTE — H&P ADULT - PROBLEM SELECTOR PLAN 5
-Clarify home regimen  -Will place on low-dose humalog sliding scale TID and bedtime   -monitor for hypoglycemia

## 2018-07-14 NOTE — ED PROVIDER NOTE - PSH
H/O inguinal hernia repair    History of excision of pilonidal cyst    S/P CABG (coronary artery bypass graft)

## 2018-07-15 ENCOUNTER — TRANSCRIPTION ENCOUNTER (OUTPATIENT)
Age: 63
End: 2018-07-15

## 2018-07-15 VITALS
HEART RATE: 66 BPM | DIASTOLIC BLOOD PRESSURE: 89 MMHG | RESPIRATION RATE: 18 BRPM | TEMPERATURE: 98 F | SYSTOLIC BLOOD PRESSURE: 145 MMHG | OXYGEN SATURATION: 97 %

## 2018-07-15 LAB
ANION GAP SERPL CALC-SCNC: 14 MMOL/L — SIGNIFICANT CHANGE UP (ref 5–17)
BUN SERPL-MCNC: 20 MG/DL — SIGNIFICANT CHANGE UP (ref 7–23)
CALCIUM SERPL-MCNC: 8.8 MG/DL — SIGNIFICANT CHANGE UP (ref 8.4–10.5)
CHLORIDE SERPL-SCNC: 103 MMOL/L — SIGNIFICANT CHANGE UP (ref 96–108)
CO2 SERPL-SCNC: 24 MMOL/L — SIGNIFICANT CHANGE UP (ref 22–31)
CREAT SERPL-MCNC: 0.83 MG/DL — SIGNIFICANT CHANGE UP (ref 0.5–1.3)
GLUCOSE BLDC GLUCOMTR-MCNC: 149 MG/DL — HIGH (ref 70–99)
GLUCOSE SERPL-MCNC: 141 MG/DL — HIGH (ref 70–99)
HCT VFR BLD CALC: 38.4 % — LOW (ref 39–50)
HGB BLD-MCNC: 13.4 G/DL — SIGNIFICANT CHANGE UP (ref 13–17)
MAGNESIUM SERPL-MCNC: 1.9 MG/DL — SIGNIFICANT CHANGE UP (ref 1.6–2.6)
MCHC RBC-ENTMCNC: 31.4 PG — SIGNIFICANT CHANGE UP (ref 27–34)
MCHC RBC-ENTMCNC: 35 GM/DL — SIGNIFICANT CHANGE UP (ref 32–36)
MCV RBC AUTO: 89.8 FL — SIGNIFICANT CHANGE UP (ref 80–100)
PLATELET # BLD AUTO: 179 K/UL — SIGNIFICANT CHANGE UP (ref 150–400)
POTASSIUM SERPL-MCNC: 4 MMOL/L — SIGNIFICANT CHANGE UP (ref 3.5–5.3)
POTASSIUM SERPL-SCNC: 4 MMOL/L — SIGNIFICANT CHANGE UP (ref 3.5–5.3)
RBC # BLD: 4.28 M/UL — SIGNIFICANT CHANGE UP (ref 4.2–5.8)
RBC # FLD: 12.8 % — SIGNIFICANT CHANGE UP (ref 10.3–14.5)
SODIUM SERPL-SCNC: 141 MMOL/L — SIGNIFICANT CHANGE UP (ref 135–145)
WBC # BLD: 8.4 K/UL — SIGNIFICANT CHANGE UP (ref 3.8–10.5)
WBC # FLD AUTO: 8.4 K/UL — SIGNIFICANT CHANGE UP (ref 3.8–10.5)

## 2018-07-15 PROCEDURE — 83880 ASSAY OF NATRIURETIC PEPTIDE: CPT

## 2018-07-15 PROCEDURE — 99285 EMERGENCY DEPT VISIT HI MDM: CPT | Mod: 25

## 2018-07-15 PROCEDURE — 85730 THROMBOPLASTIN TIME PARTIAL: CPT

## 2018-07-15 PROCEDURE — 83605 ASSAY OF LACTIC ACID: CPT

## 2018-07-15 PROCEDURE — 82435 ASSAY OF BLOOD CHLORIDE: CPT

## 2018-07-15 PROCEDURE — 83735 ASSAY OF MAGNESIUM: CPT

## 2018-07-15 PROCEDURE — 82803 BLOOD GASES ANY COMBINATION: CPT

## 2018-07-15 PROCEDURE — 82947 ASSAY GLUCOSE BLOOD QUANT: CPT

## 2018-07-15 PROCEDURE — 84295 ASSAY OF SERUM SODIUM: CPT

## 2018-07-15 PROCEDURE — 84484 ASSAY OF TROPONIN QUANT: CPT

## 2018-07-15 PROCEDURE — 85610 PROTHROMBIN TIME: CPT

## 2018-07-15 PROCEDURE — 80053 COMPREHEN METABOLIC PANEL: CPT

## 2018-07-15 PROCEDURE — 71046 X-RAY EXAM CHEST 2 VIEWS: CPT

## 2018-07-15 PROCEDURE — 82962 GLUCOSE BLOOD TEST: CPT

## 2018-07-15 PROCEDURE — 85014 HEMATOCRIT: CPT

## 2018-07-15 PROCEDURE — 84132 ASSAY OF SERUM POTASSIUM: CPT

## 2018-07-15 PROCEDURE — 80048 BASIC METABOLIC PNL TOTAL CA: CPT

## 2018-07-15 PROCEDURE — 85027 COMPLETE CBC AUTOMATED: CPT

## 2018-07-15 PROCEDURE — 82330 ASSAY OF CALCIUM: CPT

## 2018-07-15 RX ORDER — DEXTROSE 50 % IN WATER 50 %
25 SYRINGE (ML) INTRAVENOUS ONCE
Qty: 0 | Refills: 0 | Status: DISCONTINUED | OUTPATIENT
Start: 2018-07-15 | End: 2018-07-15

## 2018-07-15 RX ORDER — GLUCAGON INJECTION, SOLUTION 0.5 MG/.1ML
1 INJECTION, SOLUTION SUBCUTANEOUS ONCE
Qty: 0 | Refills: 0 | Status: DISCONTINUED | OUTPATIENT
Start: 2018-07-15 | End: 2018-07-15

## 2018-07-15 RX ORDER — METOPROLOL TARTRATE 50 MG
1 TABLET ORAL
Qty: 30 | Refills: 0 | OUTPATIENT

## 2018-07-15 RX ORDER — ASPIRIN/CALCIUM CARB/MAGNESIUM 324 MG
81 TABLET ORAL DAILY
Qty: 0 | Refills: 0 | Status: DISCONTINUED | OUTPATIENT
Start: 2018-07-15 | End: 2018-07-15

## 2018-07-15 RX ORDER — ATORVASTATIN CALCIUM 80 MG/1
1 TABLET, FILM COATED ORAL
Qty: 30 | Refills: 0 | OUTPATIENT

## 2018-07-15 RX ORDER — INSULIN LISPRO 100/ML
VIAL (ML) SUBCUTANEOUS AT BEDTIME
Qty: 0 | Refills: 0 | Status: DISCONTINUED | OUTPATIENT
Start: 2018-07-15 | End: 2018-07-15

## 2018-07-15 RX ORDER — ASPIRIN/CALCIUM CARB/MAGNESIUM 324 MG
1 TABLET ORAL
Qty: 0 | Refills: 0 | COMMUNITY

## 2018-07-15 RX ORDER — INSULIN LISPRO 100/ML
VIAL (ML) SUBCUTANEOUS
Qty: 0 | Refills: 0 | Status: DISCONTINUED | OUTPATIENT
Start: 2018-07-15 | End: 2018-07-15

## 2018-07-15 RX ADMIN — Medication 650 MILLIGRAM(S): at 00:33

## 2018-07-15 RX ADMIN — Medication 81 MILLIGRAM(S): at 08:59

## 2018-07-15 RX ADMIN — ENOXAPARIN SODIUM 40 MILLIGRAM(S): 100 INJECTION SUBCUTANEOUS at 05:50

## 2018-07-15 RX ADMIN — Medication 600 MILLIGRAM(S): at 00:33

## 2018-07-15 NOTE — DISCHARGE NOTE ADULT - PATIENT PORTAL LINK FT
You can access the [facility], offered by [facility], by registering with the following website: [email or web address]

## 2018-07-15 NOTE — DISCHARGE NOTE ADULT - CARE PLAN
Principal Discharge DX:	Coronary artery disease  Goal:	take your heart medications, low-salt + low cholesterol diet, exercise everyday for 30 minutes  Assessment and plan of treatment:	make followup appointment with Dr. Yancey this week

## 2018-07-15 NOTE — CONSULT NOTE ADULT - ASSESSMENT
ASSESSMENT/PLAN: 61 yo M w/ systolic CHF (mod LV dysfunction), diastolic CHF stage II, moderate AS, CAD w/ CABG (in April 2018), DM2, HTN, glaucoma, atrial fibrillation presents with shortness of breath and atypical right sided chest discomfort    1. Atypical Chest Pain  -low suspicion for ACS  -in light or prior history recommend nuclear stress    2. Systolic CHF/Moderate LV dysfunction  -pt appears well compensated  -cont current meds    3. AS-moderate on prior ECHO(intraop JIMMY-mild)  -Recommend ECHO to reeval    discussed with the pt

## 2018-07-15 NOTE — DISCHARGE NOTE ADULT - MEDICATION SUMMARY - MEDICATIONS TO TAKE
I will START or STAY ON the medications listed below when I get home from the hospital:    Aspirin Enteric Coated 81 mg oral delayed release tablet  -- 1 tab(s) by mouth once a day  -- Indication: For Prevent heart attack    losartan 50 mg oral tablet  -- 1 tab(s) by mouth once a day  -- Indication: For Contrl blood pressure    metFORMIN 500 mg oral tablet, extended release  -- 1 tab(s) by mouth 2 times a day  -- Indication: For Diabetes    atorvastatin 80 mg oral tablet  -- 1 tab(s) by mouth once a day (at bedtime)  -- Indication: For Cholesterol    metoprolol succinate 50 mg oral tablet, extended release  -- 1 tab(s) by mouth once a day  -- Indication: For Contrl blood pressure

## 2018-07-15 NOTE — DISCHARGE NOTE ADULT - MEDICATION SUMMARY - MEDICATIONS TO STOP TAKING
I will STOP taking the medications listed below when I get home from the hospital:    oxyCODONE 5 mg oral tablet  -- 1 tab(s) by mouth every 4 hours, As needed, Moderate Pain (4 - 6) MDD:6    lisinopril 5 mg oral tablet  -- 1 tab(s) by mouth once a day    Lantus Solostar Pen 100 units/mL subcutaneous solution  -- 18  subcutaneous once a day (at bedtime)   -- Do not drink alcoholic beverages when taking this medication.  It is very important that you take or use this exactly as directed.  Do not skip doses or discontinue unless directed by your doctor.  Keep in refrigerator.  Do not freeze.    HumaLOG KwikPen 100 units/mL injectable solution  -- 8 milliliter(s) injectable 3 times a day (before meals)    tiotropium 18 mcg inhalation capsule  -- 1 cap(s) inhaled once a day    furosemide 40 mg oral tablet  -- 1 tab(s) by mouth once a day    spironolactone 25 mg oral tablet  -- 1 tab(s) by mouth once a day    polyethylene glycol 3350 oral powder for reconstitution  -- 17 gram(s) by mouth once a day    pantoprazole 40 mg oral delayed release tablet  -- 1 tab(s) by mouth once a day (before a meal)

## 2018-07-15 NOTE — DISCHARGE NOTE ADULT - HOSPITAL COURSE
Very pleasant gentleman admitted with atypical chest discomfort.  Associated with stressor at home.  EKG without acute ischemic changes.  CXR nonacute.  Two troponins negative.  Cardiology saw pt.  Recommended TTE.  Pt elected to do this as outpt with his cardiologist in the office.  Discharged without any new meds.
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
## 2018-07-15 NOTE — DISCHARGE NOTE ADULT - INSTRUCTIONS
Low cholesterol low salt diet any chest pain shortness of breath palpation dizziness go emergency room

## 2018-07-15 NOTE — DISCHARGE NOTE ADULT - PLAN OF CARE
take your heart medications, low-salt + low cholesterol diet, exercise everyday for 30 minutes make followup appointment with Dr. Yancey this week

## 2018-09-06 PROBLEM — E11.40 TYPE 2 DIABETES MELLITUS WITH DIABETIC NEUROPATHY, UNSPECIFIED: Chronic | Status: ACTIVE | Noted: 2018-04-07

## 2018-09-06 PROBLEM — I50.20 UNSPECIFIED SYSTOLIC (CONGESTIVE) HEART FAILURE: Chronic | Status: ACTIVE | Noted: 2018-07-14

## 2018-09-06 PROBLEM — I35.0 NONRHEUMATIC AORTIC (VALVE) STENOSIS: Chronic | Status: ACTIVE | Noted: 2018-07-14

## 2018-09-06 PROBLEM — I10 ESSENTIAL (PRIMARY) HYPERTENSION: Chronic | Status: ACTIVE | Noted: 2018-04-07

## 2018-09-06 PROBLEM — I48.0 PAROXYSMAL ATRIAL FIBRILLATION: Chronic | Status: ACTIVE | Noted: 2018-07-14

## 2018-09-06 PROBLEM — I25.10 ATHEROSCLEROTIC HEART DISEASE OF NATIVE CORONARY ARTERY WITHOUT ANGINA PECTORIS: Chronic | Status: ACTIVE | Noted: 2018-07-14

## 2018-09-29 PROBLEM — Z09 POSTOPERATIVE EXAMINATION: Status: ACTIVE | Noted: 2018-05-01

## 2018-10-04 ENCOUNTER — APPOINTMENT (OUTPATIENT)
Dept: CARDIOTHORACIC SURGERY | Facility: CLINIC | Age: 63
End: 2018-10-04

## 2018-10-04 DIAGNOSIS — Z09 ENCOUNTER FOR FOLLOW-UP EXAMINATION AFTER COMPLETED TREATMENT FOR CONDITIONS OTHER THAN MALIGNANT NEOPLASM: ICD-10-CM

## 2018-10-29 ENCOUNTER — EMERGENCY (EMERGENCY)
Facility: HOSPITAL | Age: 63
LOS: 1 days | Discharge: HOME | End: 2018-10-29
Attending: EMERGENCY MEDICINE
Payer: COMMERCIAL

## 2018-10-29 VITALS
DIASTOLIC BLOOD PRESSURE: 112 MMHG | SYSTOLIC BLOOD PRESSURE: 169 MMHG | RESPIRATION RATE: 18 BRPM | TEMPERATURE: 98 F | OXYGEN SATURATION: 95 % | HEART RATE: 70 BPM

## 2018-10-29 VITALS
DIASTOLIC BLOOD PRESSURE: 90 MMHG | RESPIRATION RATE: 18 BRPM | SYSTOLIC BLOOD PRESSURE: 149 MMHG | OXYGEN SATURATION: 95 % | HEART RATE: 62 BPM | TEMPERATURE: 98 F

## 2018-10-29 DIAGNOSIS — Z98.890 OTHER SPECIFIED POSTPROCEDURAL STATES: Chronic | ICD-10-CM

## 2018-10-29 DIAGNOSIS — Z95.1 PRESENCE OF AORTOCORONARY BYPASS GRAFT: Chronic | ICD-10-CM

## 2018-10-29 LAB
BASOPHILS # BLD AUTO: 0.1 K/UL — SIGNIFICANT CHANGE UP (ref 0–0.2)
BASOPHILS NFR BLD AUTO: 0.8 % — SIGNIFICANT CHANGE UP (ref 0–2)
EOSINOPHIL # BLD AUTO: 0.4 K/UL — SIGNIFICANT CHANGE UP (ref 0–0.5)
EOSINOPHIL NFR BLD AUTO: 4.2 % — SIGNIFICANT CHANGE UP (ref 0–6)
HCT VFR BLD CALC: 41.3 % — SIGNIFICANT CHANGE UP (ref 39–50)
HGB BLD-MCNC: 14.4 G/DL — SIGNIFICANT CHANGE UP (ref 13–17)
LYMPHOCYTES # BLD AUTO: 2.8 K/UL — SIGNIFICANT CHANGE UP (ref 1–3.3)
LYMPHOCYTES # BLD AUTO: 31.3 % — SIGNIFICANT CHANGE UP (ref 13–44)
MCHC RBC-ENTMCNC: 30.9 PG — SIGNIFICANT CHANGE UP (ref 27–34)
MCHC RBC-ENTMCNC: 34.9 GM/DL — SIGNIFICANT CHANGE UP (ref 32–36)
MCV RBC AUTO: 88.4 FL — SIGNIFICANT CHANGE UP (ref 80–100)
MONOCYTES # BLD AUTO: 0.7 K/UL — SIGNIFICANT CHANGE UP (ref 0–0.9)
MONOCYTES NFR BLD AUTO: 8.2 % — SIGNIFICANT CHANGE UP (ref 2–14)
NEUTROPHILS # BLD AUTO: 4.9 K/UL — SIGNIFICANT CHANGE UP (ref 1.8–7.4)
NEUTROPHILS NFR BLD AUTO: 55.6 % — SIGNIFICANT CHANGE UP (ref 43–77)
PLATELET # BLD AUTO: 212 K/UL — SIGNIFICANT CHANGE UP (ref 150–400)
RBC # BLD: 4.67 M/UL — SIGNIFICANT CHANGE UP (ref 4.2–5.8)
RBC # FLD: 13.6 % — SIGNIFICANT CHANGE UP (ref 10.3–14.5)
WBC # BLD: 8.8 K/UL — SIGNIFICANT CHANGE UP (ref 3.8–10.5)
WBC # FLD AUTO: 8.8 K/UL — SIGNIFICANT CHANGE UP (ref 3.8–10.5)

## 2018-10-29 PROCEDURE — 93005 ELECTROCARDIOGRAM TRACING: CPT

## 2018-10-29 PROCEDURE — 93010 ELECTROCARDIOGRAM REPORT: CPT

## 2018-10-29 PROCEDURE — 71046 X-RAY EXAM CHEST 2 VIEWS: CPT

## 2018-10-29 PROCEDURE — 71046 X-RAY EXAM CHEST 2 VIEWS: CPT | Mod: 26

## 2018-10-29 PROCEDURE — 85027 COMPLETE CBC AUTOMATED: CPT

## 2018-10-29 PROCEDURE — 99284 EMERGENCY DEPT VISIT MOD MDM: CPT | Mod: 25

## 2018-10-29 PROCEDURE — 84484 ASSAY OF TROPONIN QUANT: CPT

## 2018-10-29 PROCEDURE — 80053 COMPREHEN METABOLIC PANEL: CPT

## 2018-10-29 RX ORDER — ACETAMINOPHEN 500 MG
650 TABLET ORAL ONCE
Qty: 0 | Refills: 0 | Status: COMPLETED | OUTPATIENT
Start: 2018-10-29 | End: 2018-10-29

## 2018-10-29 RX ADMIN — Medication 650 MILLIGRAM(S): at 22:10

## 2018-10-29 NOTE — ED PROVIDER NOTE - MEDICAL DECISION MAKING DETAILS
63yom pmhx of HTN HLD DM CAD s/p CABG in 04/18 sleep apnea presented with "twitch" of one second on L side of the head and chest. resolved by itself. No short of breath. no fever. Pt had same episode in June 2018 and admitted for 24 hours with neg ct and neg cardiac w/u. PE- morbid obese. looks fine. 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 will obtain basic labs, trop and crx. ZR 63yom pmhx of HTN HLD DM CAD s/p CABG in 04/18 sleep apnea presented with "twitch" of one second on L side of the head and chest. resolved by itself. No short of breath. no fever. Pt had same episode in June 2018 and admitted for 24 hours with neg ct and neg cardiac w/u. PE- morbid obese. looks fine. EKG-old inferior and anterior wall MI probably MS pain after he restart kick boxing . will obtain basic labs, trop and crx. ZR

## 2018-10-29 NOTE — ED ADULT NURSE REASSESSMENT NOTE - NS ED NURSE REASSESS COMMENT FT1
Labs are re-sent to the lab at this time. Lab states over the phone that they did not receive previous bloods. Doctor Hernandez made aware.

## 2018-10-29 NOTE — ED ADULT NURSE REASSESSMENT NOTE - NS ED NURSE REASSESS COMMENT FT1
Pt is upset regarding delay in lab draw. Patient is okay to eat as per doctor Roit. Patient is given turkey sandwich.

## 2018-10-29 NOTE — ED PROVIDER NOTE - OBJECTIVE STATEMENT
63yom pmhx of HTN HLD DM history of CAD with CABG in April 2018 here for few seconds of "tickele" in the L side of the chest and top of the L eyebrow today. No short of breath, no palp. no sweats, No nausea or vomiting. pt called cardiologist and was told to come in to get evaluated. pt reports 4 weeks ago got cardiac clearance to return to activity so started Karate and kick boxing 4xweek hour a time and last time was last thursday. No trauma or falls. No leg pain or swelling. 63yom pmhx of HTN HLD DM history of CAD with CABG in April 2018 here for few seconds of "tickele" in the L side of the chest and top of the L eyebrow today. No short of breath, no palp. no sweats, No nausea or vomiting. pt called cardiologist and was told to come in to get evaluated. pt reports 4 weeks ago got cardiac clearance to return to activity so started Karate and kick boxing 4xweek hour a time and last time was last Thursday. No trauma or falls. No leg pain or swelling.

## 2018-10-29 NOTE — ED PROVIDER NOTE - PROGRESS NOTE DETAILS
troponin is 15 ,feels fine no complaints ,pt decline to wait for another troponin wants to see dr Bower cardiologist at [facility] tomorrow ZR

## 2018-10-29 NOTE — ED ADULT NURSE NOTE - OBJECTIVE STATEMENT
64 y/o male PMH DM, HTN presents to ED c/o L frontal headache, chest discomfort x 1 day. Pt says he started doing karate 1 month ago for stress and weight management, was told by cardiologist to only to exercise and avoid physical contact fighting with others. Last class was last week. Today, pt began feeling mild headache with "chest tingle" on L side with mild L side abdominal pain that he can localize. Pain started when he was laying down. Currently says he "feels a lot better." Denies palpitations, SOB, n/v/d,, diaphoresis. EKG done in triage, placed on cardiac monitor. Gross motor and neuro intact. 18G IV placed in RAC. Safety and comfort provided.

## 2018-10-30 LAB
ALBUMIN SERPL ELPH-MCNC: 4.4 G/DL — SIGNIFICANT CHANGE UP (ref 3.3–5)
ALP SERPL-CCNC: 72 U/L — SIGNIFICANT CHANGE UP (ref 40–120)
ALT FLD-CCNC: 10 U/L — SIGNIFICANT CHANGE UP (ref 10–45)
ANION GAP SERPL CALC-SCNC: 13 MMOL/L — SIGNIFICANT CHANGE UP (ref 5–17)
AST SERPL-CCNC: 11 U/L — SIGNIFICANT CHANGE UP (ref 10–40)
BILIRUB SERPL-MCNC: 1.1 MG/DL — SIGNIFICANT CHANGE UP (ref 0.2–1.2)
BUN SERPL-MCNC: 15 MG/DL — SIGNIFICANT CHANGE UP (ref 7–23)
CALCIUM SERPL-MCNC: 9.8 MG/DL — SIGNIFICANT CHANGE UP (ref 8.4–10.5)
CHLORIDE SERPL-SCNC: 99 MMOL/L — SIGNIFICANT CHANGE UP (ref 96–108)
CO2 SERPL-SCNC: 27 MMOL/L — SIGNIFICANT CHANGE UP (ref 22–31)
CREAT SERPL-MCNC: 0.85 MG/DL — SIGNIFICANT CHANGE UP (ref 0.5–1.3)
GLUCOSE SERPL-MCNC: 114 MG/DL — HIGH (ref 70–99)
POTASSIUM SERPL-MCNC: 4 MMOL/L — SIGNIFICANT CHANGE UP (ref 3.5–5.3)
POTASSIUM SERPL-SCNC: 4 MMOL/L — SIGNIFICANT CHANGE UP (ref 3.5–5.3)
PROT SERPL-MCNC: 7.3 G/DL — SIGNIFICANT CHANGE UP (ref 6–8.3)
SODIUM SERPL-SCNC: 139 MMOL/L — SIGNIFICANT CHANGE UP (ref 135–145)
TROPONIN T, HIGH SENSITIVITY RESULT: 15 NG/L — SIGNIFICANT CHANGE UP (ref 0–51)

## 2019-01-23 NOTE — H&P ADULT - CONTRAINDICATIONS ACEI/ARB
Consent: The patient's consent was obtained including but not limited to risks of crusting, scabbing, blistering, scarring, darker or lighter pigmentary change, recurrence, incomplete removal and infection. Post-Care Instructions: IL Bleomycin was injected into affected areas after liquid nitrogen Detail Level: Zone Add 52 Modifier (Optional): no Detail Level: Simple No Medical Necessity Information: It is in your best interest to select a reason for this procedure from the list below. All of these items fulfill various CMS LCD requirements except the new and changing color options. Medical Necessity Clause: This procedure was medically necessary because the lesions that were treated were: Duration Of Freeze Thaw-Cycle (Seconds): 1 Number Of Freeze-Thaw Cycles: 2 freeze-thaw cycles Post-Care Instructions: I reviewed with the patient in detail post-care instructions. Patient is to wear sunprotection, and avoid picking at any of the treated lesions. Pt may apply Vaseline to crusted or scabbing areas.

## 2019-02-08 ENCOUNTER — APPOINTMENT (OUTPATIENT)
Dept: CARDIOLOGY | Facility: CLINIC | Age: 64
End: 2019-02-08
Payer: COMMERCIAL

## 2019-02-08 ENCOUNTER — NON-APPOINTMENT (OUTPATIENT)
Age: 64
End: 2019-02-08

## 2019-02-08 VITALS
HEART RATE: 65 BPM | WEIGHT: 273 LBS | BODY MASS INDEX: 38.22 KG/M2 | HEIGHT: 71 IN | SYSTOLIC BLOOD PRESSURE: 125 MMHG | OXYGEN SATURATION: 98 % | DIASTOLIC BLOOD PRESSURE: 79 MMHG

## 2019-02-08 PROCEDURE — 93000 ELECTROCARDIOGRAM COMPLETE: CPT

## 2019-02-08 PROCEDURE — 99215 OFFICE O/P EST HI 40 MIN: CPT

## 2019-02-08 PROCEDURE — 99402 PREV MED CNSL INDIV APPRX 30: CPT

## 2019-02-18 ENCOUNTER — FORM ENCOUNTER (OUTPATIENT)
Age: 64
End: 2019-02-18

## 2019-02-25 ENCOUNTER — RESULT CHARGE (OUTPATIENT)
Age: 64
End: 2019-02-25

## 2019-02-25 PROCEDURE — 93224 XTRNL ECG REC UP TO 48 HRS: CPT

## 2019-03-20 ENCOUNTER — OUTPATIENT (OUTPATIENT)
Dept: OUTPATIENT SERVICES | Facility: HOSPITAL | Age: 64
LOS: 1 days | End: 2019-03-20
Payer: COMMERCIAL

## 2019-03-20 ENCOUNTER — APPOINTMENT (OUTPATIENT)
Dept: MRI IMAGING | Facility: HOSPITAL | Age: 64
End: 2019-03-20

## 2019-03-20 ENCOUNTER — TRANSCRIPTION ENCOUNTER (OUTPATIENT)
Age: 64
End: 2019-03-20

## 2019-03-20 DIAGNOSIS — Z98.890 OTHER SPECIFIED POSTPROCEDURAL STATES: Chronic | ICD-10-CM

## 2019-03-20 DIAGNOSIS — I50.22 CHRONIC SYSTOLIC (CONGESTIVE) HEART FAILURE: ICD-10-CM

## 2019-03-20 DIAGNOSIS — Z95.1 PRESENCE OF AORTOCORONARY BYPASS GRAFT: Chronic | ICD-10-CM

## 2019-03-20 DIAGNOSIS — Z95.1 PRESENCE OF AORTOCORONARY BYPASS GRAFT: ICD-10-CM

## 2019-03-20 PROCEDURE — 75561 CARDIAC MRI FOR MORPH W/DYE: CPT | Mod: 26

## 2019-03-20 PROCEDURE — A9585: CPT

## 2019-03-20 PROCEDURE — 75561 CARDIAC MRI FOR MORPH W/DYE: CPT

## 2019-03-21 ENCOUNTER — TRANSCRIPTION ENCOUNTER (OUTPATIENT)
Age: 64
End: 2019-03-21

## 2019-03-26 ENCOUNTER — TRANSCRIPTION ENCOUNTER (OUTPATIENT)
Age: 64
End: 2019-03-26

## 2019-03-26 LAB
ALBUMIN SERPL ELPH-MCNC: 4.6 G/DL
ALP BLD-CCNC: 79 U/L
ALT SERPL-CCNC: 17 U/L
ANION GAP SERPL CALC-SCNC: 11 MMOL/L
AST SERPL-CCNC: 12 U/L
BASOPHILS # BLD AUTO: 0.06 K/UL
BASOPHILS NFR BLD AUTO: 0.7 %
BILIRUB SERPL-MCNC: 1.1 MG/DL
BUN SERPL-MCNC: 17 MG/DL
CALCIUM SERPL-MCNC: 9.6 MG/DL
CHLORIDE SERPL-SCNC: 101 MMOL/L
CHOLEST SERPL-MCNC: 114 MG/DL
CHOLEST/HDLC SERPL: 3.5 RATIO
CO2 SERPL-SCNC: 27 MMOL/L
CREAT SERPL-MCNC: 0.89 MG/DL
EOSINOPHIL # BLD AUTO: 0.51 K/UL
EOSINOPHIL NFR BLD AUTO: 5.6 %
GLUCOSE SERPL-MCNC: 172 MG/DL
HBA1C MFR BLD HPLC: 8.7 %
HCT VFR BLD CALC: 40.1 %
HDLC SERPL-MCNC: 33 MG/DL
HGB BLD-MCNC: 13.8 G/DL
IMM GRANULOCYTES NFR BLD AUTO: 0.3 %
LDLC SERPL CALC-MCNC: 60 MG/DL
LYMPHOCYTES # BLD AUTO: 2.95 K/UL
LYMPHOCYTES NFR BLD AUTO: 32.5 %
MAN DIFF?: NORMAL
MCHC RBC-ENTMCNC: 31.2 PG
MCHC RBC-ENTMCNC: 34.4 GM/DL
MCV RBC AUTO: 90.7 FL
MONOCYTES # BLD AUTO: 0.63 K/UL
MONOCYTES NFR BLD AUTO: 6.9 %
NEUTROPHILS # BLD AUTO: 4.89 K/UL
NEUTROPHILS NFR BLD AUTO: 54 %
NT-PROBNP SERPL-MCNC: 820 PG/ML
PLATELET # BLD AUTO: 197 K/UL
POTASSIUM SERPL-SCNC: 4.5 MMOL/L
PROT SERPL-MCNC: 6.9 G/DL
RBC # BLD: 4.42 M/UL
RBC # FLD: 13.2 %
SODIUM SERPL-SCNC: 139 MMOL/L
TRIGL SERPL-MCNC: 105 MG/DL
TSH SERPL-ACNC: 4.69 UIU/ML
WBC # FLD AUTO: 9.07 K/UL

## 2019-04-01 NOTE — PHYSICAL THERAPY INITIAL EVALUATION ADULT - WEIGHT-BEARING RESTRICTIONS: GAIT, REHAB EVAL
Tylenol dose = 140 mg  = half way between the 3.75 ml and 5 ml lines; ibuprofen dose = 75 mg (3.75 ml of children's strength or 1.875 ml of infant strength)  Continue vitamin D    Child proof your house if not done already!     Feedings discussed and ques · Waving and clapping his or her hands  · Starting to move around while holding on to the couch or other furniture (known as “cruising”)  · Getting upset when  from a parent, or becoming anxious around strangers  Feeding tips  By 9 months, your ba · Ask the healthcare provider when your baby should have his or her first dental visit. Pediatric dentists recommend that the first dental visit should occur soon after the first tooth erupts above the gums.  Although dental care may be advisory at first, t · If you haven't already done so, childproof the house. If your baby is pulling up on furniture or cruising (moving around while holding on to objects), be sure that big pieces such as cabinets and TVs are tied down.  Otherwise they may be pulled on top of · Give the baby a handful of unsweetened cereal or a few pieces of cooked pasta. · Cut cheese or soft bread into small cubes. Large pieces may be difficult to chew or swallow and can cause a baby to choke.   · Cook crunchy vegetables, such as carrots, to m weight-bearing as tolerated

## 2019-04-15 ENCOUNTER — APPOINTMENT (OUTPATIENT)
Dept: CARDIOLOGY | Facility: CLINIC | Age: 64
End: 2019-04-15
Payer: COMMERCIAL

## 2019-04-15 ENCOUNTER — NON-APPOINTMENT (OUTPATIENT)
Age: 64
End: 2019-04-15

## 2019-04-15 VITALS
BODY MASS INDEX: 38.92 KG/M2 | HEIGHT: 71 IN | DIASTOLIC BLOOD PRESSURE: 74 MMHG | TEMPERATURE: 98.2 F | WEIGHT: 278 LBS | HEART RATE: 66 BPM | OXYGEN SATURATION: 94 % | SYSTOLIC BLOOD PRESSURE: 131 MMHG

## 2019-04-15 DIAGNOSIS — Z78.9 OTHER SPECIFIED HEALTH STATUS: ICD-10-CM

## 2019-04-15 PROCEDURE — 99401 PREV MED CNSL INDIV APPRX 15: CPT

## 2019-04-15 PROCEDURE — 93000 ELECTROCARDIOGRAM COMPLETE: CPT

## 2019-04-15 PROCEDURE — 99215 OFFICE O/P EST HI 40 MIN: CPT | Mod: 25

## 2019-04-26 PROBLEM — Z78.9 DOES NOT USE ILLICIT DRUGS: Status: ACTIVE | Noted: 2019-04-26

## 2019-04-26 NOTE — DISCUSSION/SUMMARY
[FreeTextEntry1] : 63 year old gentleman - Previously Cared For At [facility] (Dr. Perez, Then Re-Referred By Dr. Gallego) - History of Glaucoma, Hypertension, Hyperlipidemia, Obesity with Poor Lifestyle Habits, T2DM, Coronary Artery Disease S/P CABG by Dr. Downs (04/2018), Systolic CHF Pending ICD Determination (Recent MUGA After OMT with LVEF 30%; But TTE With LVEF 35%), Moderate AS, Reported Atrial Fibrillation Per Chart But Unknown To Patient - Presenting For 2nd Opinion For Cardiovascular Evaluation & Determination For Need for ICD Primary Prevention\par ----------------\par ===================\par  - Holter - No AF; Trigger without arrhythmia\par \par ===================\par - High- Intensity interval trial\par           - Trial of wreight loss; OMT; CR - then re-assess\par Systolic CHF and Determination for ICD\par - Patient reluctant to have ICD due to limitations on martial arts, weights, and "overall physical recovery"\par         - He will be agreeable if LVEF is still low\par                - At that point, will refer to EP\par - We further discussed bringing in medications to ensure OMT\par         - Currently I have: Toprol XL, Lisinopril, Spironolactone - with Optimized BP/HR Parameters\par \par Aortic Stenosis\par - Monitor\par \par Reported Atrial Fibrillation\par - High CHADS2 - Consider AC if demonstration of AF\par                  - Discussed that it does not limit the possibility, but makes it much less likely. \par \par CAD s/p CABG; DM; HTN; HL\par - Monitor\par - Continue ASA\par - Continue Other Meds\par - Lengthy Discussion over weight loss and lifestyle measures including avoidance of pasta, sugary drinks, salt, and use of CPAP\par \par \par \par \par \par \par \par \par \par \par \par \par \par \par \par \par \par \par \par \par \par \par \par \par \par \par \par \par \par \par \par \par \par \par \par Hypertension (Stable; Chronic). Hyperlipidemia (Stable; Chronic). Coronary Artery Disease/Systolic CHF (Stable; Chronic). Medication plan for these conditions noted above. \par Total Time Spent in face-to-face encounter was 45 minutes. >50% time spent in counseling and coordination of care and on addressing above medical conditions in assessment.\par All labs, imaging, consulting reports, and any relevant outside records including laboratory work personally reviewed in order to evaluate, manage, and coordinate care amongst providers.  Patient-Risk (High).\par Preventive counseling on healthy diet, weight maintenance, CV risk reduction provided for 15 minutes.\par Patient expressed greater willingness to focus  on adherence to medication and lifestyle changes, particularly dietary; including greater consumption of vegetables, fruits over saturated fats. We discussed appropriate follow up to monitor progress on these areas. We also discussed the importance of weight control to reduce any exacerbation of underlying conditions.\par \par

## 2019-04-26 NOTE — PHYSICAL EXAM
[Well Groomed] : well groomed [Normal Appearance] : normal appearance [General Appearance - Well Developed] : well developed [General Appearance - Well Nourished] : well nourished [No Deformities] : no deformities [General Appearance - In No Acute Distress] : no acute distress [Normal Conjunctiva] : the conjunctiva exhibited no abnormalities [Eyelids - No Xanthelasma] : the eyelids demonstrated no xanthelasmas [Normal Oral Mucosa] : normal oral mucosa [No Oral Pallor] : no oral pallor [No Oral Cyanosis] : no oral cyanosis [Normal Jugular Venous A Waves Present] : normal jugular venous A waves present [No Jugular Venous Somers A Waves] : no jugular venous somers A waves [Normal Jugular Venous V Waves Present] : normal jugular venous V waves present [Exaggerated Use Of Accessory Muscles For Inspiration] : no accessory muscle use [Respiration, Rhythm And Depth] : normal respiratory rhythm and effort [Auscultation Breath Sounds / Voice Sounds] : lungs were clear to auscultation bilaterally [Heart Rate And Rhythm] : heart rate and rhythm were normal [Heart Sounds] : normal S1 and S2 [Murmurs] : no murmurs present [Abdomen Soft] : soft [Abdomen Tenderness] : non-tender [Abdomen Mass (___ Cm)] : no abdominal mass palpated [Abnormal Walk] : normal gait [Gait - Sufficient For Exercise Testing] : the gait was sufficient for exercise testing [Nail Clubbing] : no clubbing of the fingernails [Cyanosis, Localized] : no localized cyanosis [Petechial Hemorrhages (___cm)] : no petechial hemorrhages [] : no rash [Skin Color & Pigmentation] : normal skin color and pigmentation [No Venous Stasis] : no venous stasis [No Skin Ulcers] : no skin ulcer [Skin Lesions] : no skin lesions [No Xanthoma] : no  xanthoma was observed [Mood] : the mood was normal [Affect] : the affect was normal [Oriented To Time, Place, And Person] : oriented to person, place, and time [No Anxiety] : not feeling anxious [FreeTextEntry1] : +Sternotomy Scar

## 2019-04-26 NOTE — REASON FOR VISIT
[Follow-Up - Clinic] : a clinic follow-up of [FreeTextEntry2] : Hypertension (Stable; Chronic). Hyperlipidemia (Stable; Chronic). Coronary Artery Disease (Stable; Chronic). Patient-Risk (High).

## 2019-04-26 NOTE — HISTORY OF PRESENT ILLNESS
[FreeTextEntry1] : 63 year old gentleman - Previously Cared For At [facility] (Dr. Perez, Then Re-Referred By Dr. Gallego) - History of Glaucoma, Hypertension, Hyperlipidemia, Obesity with Poor Lifestyle Habits, T2DM, Coronary Artery Disease S/P CABG by Dr. Downs (04/2018), Systolic CHF Pending ICD Determination (Recent MUGA After OMT with LVEF 30%; But TTE With LVEF 35%), Moderate AS, Reported Atrial Fibrillation Per Chart But Unknown To Patient - Presenting For 2nd Opinion For Cardiovascular Evaluation & Determination For Need for ICD Primary Prevention\par ----------------\par ==========\par 02/2019\par Patient with above history. Concerned that his recovery and lifestyle, which consists in a large part of using weights and martial arts, will be affected by an ICD. \par He currently has been on Lisinopril, Toprol XL, Spironolactone, Metformin (although must be re-confirmed). During his hospital stay Post-CABG, he went into atrial fibrillation, though not on anticoagulation currently and in NSR. \par Patient reports a poor lifestyle - including eating large amount of simple carbohydrates - specifically pasta and substantial quantity of sugary drinks, provolone, salt, and processed foods. \par Discussed use of [employer] at Length.\par Discussed use of event monitor to determine if having arrhythmias. \par We also discussed the use of CPAP and the need for an electrophysiologist should an ICD be necessary if determined by CMRI.\par ------------------\par 04/2019 Update\par cMRI - 3-2019 - LVEF 32% Anterior wall scarring\par Continuing martial arts.\par We discussed re-assess TTE after optimal medical therapy in the future.\par He is concerned ICD implantation will affect his ability to do martial arts, despite his understanding of the potential life-saving benefits. \par We discussed possibility of Cardiac Rehabilitation as part of OMT. \par Hypertension/Hyperlipidemia/Coronary Artery Disease - controlled on current treatment,\par Labs reviewed in EMR.\par -------------------\par 0/2019\par \par \par \par \par \par \par ===================================\par ===================================\par PREVIOUS TESTING\par -----------------\par PRIOR  TO OR\par EF (Visual Estimate): 30-35 %\par Doppler Peak Velocity (m/sec): AoV=2.0\par ------------------------------------------------------------------------\par Observations:\par Mitral Valve: Mitral annular calcification. Tethered mitral\par valve leaflets with normal opening. Minimal mitral\par regurgitation.\par Aortic Valve/Aorta: Aortic valve not well visualized;\par appears calcified. Peak transaortic valve gradient equals\par 17 mm Hg, mean transaortic valve gradient equals 8 mm Hg,\par estimated aortic valve area equals 1.1 sqcm (by continuity\par equation), aortic valve velocity time integral equals 40\par cm, consistent with low gradient moderate aortic stenosis.\par Aortic valve and LVOT are poorly visualized limiting\par assessment. Consider additional imaging of the aortic\par valve. No aortic valve regurgitation seen. Peak left\par ventricular outflow tract gradient equals 2 mm Hg, mean\par gradient is equal to 1 mm Hg, LVOT velocity time integral\par equals 14 cm.\par Aortic Root: 3.6 cm.\par LVOT diameter: 2 cm.\par Left Atrium: Moderately dilated left atrium.  LA volume\par index = 46 cc/m2.\par Left Ventricle: Endocardial visualization enhanced with\par intravenous injection of echo contrast (Definity). Left\par ventricle is suboptimtally visualized despite the use of\par intravenous echo contrast; grossly moderate to severe left\par ventricular systolic dysfunction. The mid septum, distal\par segments and the apex are severely hypo/akinetic. EF by\par visual estimation approximately 30-35%. Severe concentric\par left ventricular hypertrophy. Moderate diastolic\par dysfunction (Stage II).\par Right Heart: Right atrium not well visualized, probably\par normal. The right ventricle is not well visualized; grossly\par normal right ventricle appears mildly enlarged with normal\par systolic function. Tricuspid valve not well visualized,\par probably normal. Minimal tricuspid regurgitation. Normal\par pulmonic valve.\par Pericardium/Pleura: Normal pericardium with no pericardial\par effusion.\par Hemodynamic: Estimated right atrial pressure is 8 mm Hg.\par Inadequate tricuspid regurgitation Doppler envelope\par precludes estimation of RVSP.\par ------------------------------------------------------------------------\par Conclusions:\par Technically difficult study.\par 1. Mitral annular calcification. Tethered mitral valve\par leaflets with normal opening.\par 2. Aortic valve not well visualized; appears calcified.\par Peak transaortic valve gradient equals 17 mm Hg, mean\par transaortic valve gradient equals 8 mm Hg, estimated aortic\par valve area equals 1.1 sqcm (by continuity equation), aortic\par valve velocity time integral equals 40 cm, consistent with\par low gradient moderate aortic stenosis. Aortic valve and\par LVOT are poorly visualized limiting assessment. Consider\par additional imaging of the aortic valve. No aortic valve\par regurgitation seen.\par 3. Severe concentric left ventricular hypertrophy.\par 4. Endocardial visualization enhanced with intravenous\par injection of echo contrast (Definity). Left ventricle is\par suboptimtally visualized despite the use of intravenous\par echo contrast; grossly moderate to severe left ventricular\par systolic dysfunction. The mid septum, distal segments and\par the apex are severely hypo/akinetic. EF by visual\par estimation approximately 30-35%.\par 5. Moderate diastolic dysfunction (Stage II).\par 6. The right ventricle is not well visualized; grossly\par normal right ventricle appears mildly enlarged with normal\par systolic function.\par *** No previous Echo exam.\par ------------------------------------------------------------------------\par Confirmed on  4/9/2018 - 18:34:55 by Munira Guzman M.D.\par \par \par PAST MEDICAL & SURGICAL HISTORY:\par Moderate aortic stenosis\par Systolic congestive heart failure\par Paroxysmal atrial fibrillation\par Coronary artery disease\par Essential hypertension\par Type 2 diabetes mellitus with diabetic neuropathy, without long-term current use of insulin\par S/P CABG (coronary artery bypass graft)\par History of excision of pilonidal cyst\par H/O inguinal hernia repair

## 2019-07-05 ENCOUNTER — MEDICATION RENEWAL (OUTPATIENT)
Age: 64
End: 2019-07-05

## 2019-07-05 ENCOUNTER — TRANSCRIPTION ENCOUNTER (OUTPATIENT)
Age: 64
End: 2019-07-05

## 2019-07-09 ENCOUNTER — TRANSCRIPTION ENCOUNTER (OUTPATIENT)
Age: 64
End: 2019-07-09

## 2019-07-18 ENCOUNTER — TRANSCRIPTION ENCOUNTER (OUTPATIENT)
Age: 64
End: 2019-07-18

## 2019-07-29 ENCOUNTER — EMERGENCY (EMERGENCY)
Facility: HOSPITAL | Age: 64
LOS: 1 days | Discharge: HOME | End: 2019-07-29
Attending: EMERGENCY MEDICINE | Admitting: EMERGENCY MEDICINE
Payer: COMMERCIAL

## 2019-07-29 VITALS
HEIGHT: 70 IN | HEART RATE: 85 BPM | TEMPERATURE: 100 F | OXYGEN SATURATION: 94 % | DIASTOLIC BLOOD PRESSURE: 73 MMHG | RESPIRATION RATE: 18 BRPM | WEIGHT: 279.99 LBS | SYSTOLIC BLOOD PRESSURE: 120 MMHG

## 2019-07-29 VITALS
RESPIRATION RATE: 17 BRPM | OXYGEN SATURATION: 98 % | TEMPERATURE: 99 F | SYSTOLIC BLOOD PRESSURE: 103 MMHG | DIASTOLIC BLOOD PRESSURE: 70 MMHG | HEART RATE: 71 BPM

## 2019-07-29 DIAGNOSIS — Z98.890 OTHER SPECIFIED POSTPROCEDURAL STATES: Chronic | ICD-10-CM

## 2019-07-29 DIAGNOSIS — Z95.1 PRESENCE OF AORTOCORONARY BYPASS GRAFT: Chronic | ICD-10-CM

## 2019-07-29 LAB
ALBUMIN SERPL ELPH-MCNC: 3.2 G/DL — LOW (ref 3.3–5)
ALP SERPL-CCNC: 85 U/L — SIGNIFICANT CHANGE UP (ref 40–120)
ALT FLD-CCNC: 13 U/L DA — SIGNIFICANT CHANGE UP (ref 10–45)
ANION GAP SERPL CALC-SCNC: 9 MMOL/L — SIGNIFICANT CHANGE UP (ref 5–17)
APPEARANCE UR: ABNORMAL
APTT BLD: 26.7 SEC — LOW (ref 27.5–36.3)
AST SERPL-CCNC: 13 U/L — SIGNIFICANT CHANGE UP (ref 10–40)
BACTERIA # UR AUTO: ABNORMAL /HPF
BASOPHILS # BLD AUTO: 0.05 K/UL — SIGNIFICANT CHANGE UP (ref 0–0.2)
BASOPHILS NFR BLD AUTO: 0.4 % — SIGNIFICANT CHANGE UP (ref 0–2)
BILIRUB SERPL-MCNC: 1.2 MG/DL — SIGNIFICANT CHANGE UP (ref 0.2–1.2)
BILIRUB UR-MCNC: NEGATIVE — SIGNIFICANT CHANGE UP
BUN SERPL-MCNC: 20 MG/DL — SIGNIFICANT CHANGE UP (ref 7–23)
CALCIUM SERPL-MCNC: 9 MG/DL — SIGNIFICANT CHANGE UP (ref 8.4–10.5)
CHLORIDE SERPL-SCNC: 95 MMOL/L — LOW (ref 96–108)
CO2 SERPL-SCNC: 26 MMOL/L — SIGNIFICANT CHANGE UP (ref 22–31)
COLOR SPEC: YELLOW — SIGNIFICANT CHANGE UP
COMMENT - URINE: SIGNIFICANT CHANGE UP
CREAT SERPL-MCNC: 1.25 MG/DL — SIGNIFICANT CHANGE UP (ref 0.5–1.3)
DIFF PNL FLD: ABNORMAL
EOSINOPHIL # BLD AUTO: 0.08 K/UL — SIGNIFICANT CHANGE UP (ref 0–0.5)
EOSINOPHIL NFR BLD AUTO: 0.7 % — SIGNIFICANT CHANGE UP (ref 0–6)
EPI CELLS # UR: SIGNIFICANT CHANGE UP
GLUCOSE SERPL-MCNC: 327 MG/DL — HIGH (ref 70–99)
GLUCOSE UR QL: 250
HCT VFR BLD CALC: 33.9 % — LOW (ref 39–50)
HGB BLD-MCNC: 12.2 G/DL — LOW (ref 13–17)
HYALINE CASTS # UR AUTO: ABNORMAL (ref 0–7)
IMM GRANULOCYTES NFR BLD AUTO: 0.6 % — SIGNIFICANT CHANGE UP (ref 0–1.5)
INR BLD: 1.45 RATIO — HIGH (ref 0.88–1.16)
KETONES UR-MCNC: NEGATIVE — SIGNIFICANT CHANGE UP
LACTATE SERPL-SCNC: 1.2 MMOL/L — SIGNIFICANT CHANGE UP (ref 0.7–2)
LEUKOCYTE ESTERASE UR-ACNC: ABNORMAL
LYMPHOCYTES # BLD AUTO: 1.8 K/UL — SIGNIFICANT CHANGE UP (ref 1–3.3)
LYMPHOCYTES # BLD AUTO: 15.7 % — SIGNIFICANT CHANGE UP (ref 13–44)
MCHC RBC-ENTMCNC: 31.6 PG — SIGNIFICANT CHANGE UP (ref 27–34)
MCHC RBC-ENTMCNC: 36 GM/DL — SIGNIFICANT CHANGE UP (ref 32–36)
MCV RBC AUTO: 87.8 FL — SIGNIFICANT CHANGE UP (ref 80–100)
MONOCYTES # BLD AUTO: 0.96 K/UL — HIGH (ref 0–0.9)
MONOCYTES NFR BLD AUTO: 8.3 % — SIGNIFICANT CHANGE UP (ref 2–14)
NEUTROPHILS # BLD AUTO: 8.54 K/UL — HIGH (ref 1.8–7.4)
NEUTROPHILS NFR BLD AUTO: 74.3 % — SIGNIFICANT CHANGE UP (ref 43–77)
NITRITE UR-MCNC: NEGATIVE — SIGNIFICANT CHANGE UP
NRBC # BLD: 0 /100 WBCS — SIGNIFICANT CHANGE UP (ref 0–0)
PH UR: 5 — SIGNIFICANT CHANGE UP (ref 5–8)
PLATELET # BLD AUTO: 241 K/UL — SIGNIFICANT CHANGE UP (ref 150–400)
POTASSIUM SERPL-MCNC: 5 MMOL/L — SIGNIFICANT CHANGE UP (ref 3.5–5.3)
POTASSIUM SERPL-SCNC: 5 MMOL/L — SIGNIFICANT CHANGE UP (ref 3.5–5.3)
PROT SERPL-MCNC: 7.3 G/DL — SIGNIFICANT CHANGE UP (ref 6–8.3)
PROT UR-MCNC: 100
PROTHROM AB SERPL-ACNC: 16.4 SEC — HIGH (ref 10–12.9)
RBC # BLD: 3.86 M/UL — LOW (ref 4.2–5.8)
RBC # FLD: 11.9 % — SIGNIFICANT CHANGE UP (ref 10.3–14.5)
RBC CASTS # UR COMP ASSIST: ABNORMAL /HPF (ref 0–4)
SODIUM SERPL-SCNC: 130 MMOL/L — LOW (ref 135–145)
SP GR SPEC: 1.02 — SIGNIFICANT CHANGE UP (ref 1.01–1.02)
UROBILINOGEN FLD QL: 1
WBC # BLD: 11.5 K/UL — HIGH (ref 3.8–10.5)
WBC # FLD AUTO: 11.5 K/UL — HIGH (ref 3.8–10.5)
WBC UR QL: ABNORMAL /HPF (ref 0–5)

## 2019-07-29 PROCEDURE — 83605 ASSAY OF LACTIC ACID: CPT

## 2019-07-29 PROCEDURE — 80053 COMPREHEN METABOLIC PANEL: CPT

## 2019-07-29 PROCEDURE — 71045 X-RAY EXAM CHEST 1 VIEW: CPT

## 2019-07-29 PROCEDURE — 81001 URINALYSIS AUTO W/SCOPE: CPT

## 2019-07-29 PROCEDURE — 82962 GLUCOSE BLOOD TEST: CPT

## 2019-07-29 PROCEDURE — 87186 SC STD MICRODIL/AGAR DIL: CPT

## 2019-07-29 PROCEDURE — 74177 CT ABD & PELVIS W/CONTRAST: CPT

## 2019-07-29 PROCEDURE — 85610 PROTHROMBIN TIME: CPT

## 2019-07-29 PROCEDURE — 96361 HYDRATE IV INFUSION ADD-ON: CPT

## 2019-07-29 PROCEDURE — 71045 X-RAY EXAM CHEST 1 VIEW: CPT | Mod: 26

## 2019-07-29 PROCEDURE — 99284 EMERGENCY DEPT VISIT MOD MDM: CPT

## 2019-07-29 PROCEDURE — 96374 THER/PROPH/DIAG INJ IV PUSH: CPT | Mod: XU

## 2019-07-29 PROCEDURE — 85027 COMPLETE CBC AUTOMATED: CPT

## 2019-07-29 PROCEDURE — 93005 ELECTROCARDIOGRAM TRACING: CPT

## 2019-07-29 PROCEDURE — 93010 ELECTROCARDIOGRAM REPORT: CPT

## 2019-07-29 PROCEDURE — 87150 DNA/RNA AMPLIFIED PROBE: CPT

## 2019-07-29 PROCEDURE — 85730 THROMBOPLASTIN TIME PARTIAL: CPT

## 2019-07-29 PROCEDURE — 36415 COLL VENOUS BLD VENIPUNCTURE: CPT

## 2019-07-29 PROCEDURE — 87040 BLOOD CULTURE FOR BACTERIA: CPT

## 2019-07-29 PROCEDURE — 87086 URINE CULTURE/COLONY COUNT: CPT

## 2019-07-29 PROCEDURE — 99284 EMERGENCY DEPT VISIT MOD MDM: CPT | Mod: 25

## 2019-07-29 PROCEDURE — 74177 CT ABD & PELVIS W/CONTRAST: CPT | Mod: 26

## 2019-07-29 RX ORDER — METRONIDAZOLE 500 MG
500 TABLET ORAL ONCE
Refills: 0 | Status: COMPLETED | OUTPATIENT
Start: 2019-07-29 | End: 2019-07-29

## 2019-07-29 RX ORDER — CIPROFLOXACIN LACTATE 400MG/40ML
500 VIAL (ML) INTRAVENOUS ONCE
Refills: 0 | Status: COMPLETED | OUTPATIENT
Start: 2019-07-29 | End: 2019-07-29

## 2019-07-29 RX ORDER — CIPROFLOXACIN LACTATE 400MG/40ML
400 VIAL (ML) INTRAVENOUS ONCE
Refills: 0 | Status: DISCONTINUED | OUTPATIENT
Start: 2019-07-29 | End: 2019-07-29

## 2019-07-29 RX ORDER — CIPROFLOXACIN LACTATE 400MG/40ML
1 VIAL (ML) INTRAVENOUS
Qty: 20 | Refills: 0
Start: 2019-07-29 | End: 2019-08-07

## 2019-07-29 RX ORDER — ONDANSETRON 8 MG/1
1 TABLET, FILM COATED ORAL
Qty: 9 | Refills: 0
Start: 2019-07-29 | End: 2019-07-31

## 2019-07-29 RX ORDER — ACETAMINOPHEN 500 MG
975 TABLET ORAL ONCE
Refills: 0 | Status: COMPLETED | OUTPATIENT
Start: 2019-07-29 | End: 2019-07-29

## 2019-07-29 RX ORDER — SODIUM CHLORIDE 9 MG/ML
2300 INJECTION INTRAMUSCULAR; INTRAVENOUS; SUBCUTANEOUS ONCE
Refills: 0 | Status: COMPLETED | OUTPATIENT
Start: 2019-07-29 | End: 2019-07-29

## 2019-07-29 RX ADMIN — Medication 100 MILLIGRAM(S): at 16:13

## 2019-07-29 RX ADMIN — Medication 975 MILLIGRAM(S): at 16:00

## 2019-07-29 RX ADMIN — SODIUM CHLORIDE 2300 MILLILITER(S): 9 INJECTION INTRAMUSCULAR; INTRAVENOUS; SUBCUTANEOUS at 16:22

## 2019-07-29 RX ADMIN — Medication 500 MILLIGRAM(S): at 17:41

## 2019-07-29 RX ADMIN — SODIUM CHLORIDE 2300 MILLILITER(S): 9 INJECTION INTRAMUSCULAR; INTRAVENOUS; SUBCUTANEOUS at 15:22

## 2019-07-29 NOTE — ED ADULT NURSE NOTE - NSIMPLEMENTINTERV_GEN_ALL_ED
Implemented All Universal Safety Interventions:  [place] to call system. Call bell, personal items and telephone within reach. Instruct patient to call for assistance. Room bathroom lighting operational. Non-slip footwear when patient is off stretcher. Physically safe environment: no spills, clutter or unnecessary equipment. Stretcher in lowest position, wheels locked, appropriate side rails in place.

## 2019-07-29 NOTE — ED PROVIDER NOTE - NSFOLLOWUPINSTRUCTIONS_ED_ALL_ED_FT
Follow up your test results with the urologist and gastroenterologist as discussed. DO NOT TAKE YOUR METFORMIN FOR THE NEXT 2 DAYS    Please fill the prescription for the antibiotics and take as directed.  Please finish the entire course of medication as prescribed.  If you have any belly pain after the antibiotics, yogurt has been shown to help with this.  Do not use any alcohol or grapefruit juice with any antibiotics.      Stay hydrated    Return to the ER if your symptoms worsen, high fevers, severe pain or for any other medical emergencies

## 2019-07-29 NOTE — ED PROVIDER NOTE - ATTENDING CONTRIBUTION TO CARE
Dr. Raphael: I performed a face to face bedside interview with patient regarding history of present illness, review of symptoms and past medical history. I completed an independent physical exam.  I have discussed patient's plan of care with PA.   I agree with note as stated above, having amended the EMR as needed to reflect my findings.   This includes HISTORY OF PRESENT ILLNESS, HIV, PAST MEDICAL/SURGICAL/FAMILY/SOCIAL HISTORY, ALLERGIES AND HOME MEDICATIONS, REVIEW OF SYSTEMS, PHYSICAL EXAM, and any PROGRESS NOTES during the time I functioned as the attending physician for this patient.    see mdm

## 2019-07-29 NOTE — ED PROVIDER NOTE - PROGRESS NOTE DETAILS
ESME Argueta: labs reviewed, glucose 327, will check FS.  UA results reviewed-- UTI noted. Abd CT results reviewed, left pyelonephritis noted with cholelithiasis as incidental finding. ESME Argueta: labs reviewed, glucose 327, will check FS.  UA results reviewed-- UTI noted. Abd CT results reviewed, left pyelonephritis noted with cholelithiasis as incidental finding. will refer to urology and GI

## 2019-07-29 NOTE — ED PROVIDER NOTE - CLINICAL SUMMARY MEDICAL DECISION MAKING FREE TEXT BOX
Dr. Raphael: 63M h/o CABg, DM, HTN, HLD sent by UC for 4 days of persistent LLQ pain and nausea. Pt was found to have hematuria at UC but pt hadn't noticed it. +urinary frequency no dysuria. +fevers Tm 100.4. No travel or sick contacts. No recent abx. On exam pt is well appearing, nad, rrr, ctab, MMM, abdo soft with minimal LLQ TTP, no cvat. Plan - check ua, labs and CT r/o diverticulitis vs UTI

## 2019-07-29 NOTE — ED ADULT NURSE NOTE - OBJECTIVE STATEMENT
Pt presents to the Er complaining of abdominal pain that started since Thursday, as per pt he also has frequent urination. Pt  pain level 8/10. Pt denies nausea, vomiting and diarrhea.

## 2019-07-29 NOTE — ED PROVIDER NOTE - CARE PROVIDER_API CALL
Bimal Bolden)  Gastroenterology; Internal Medicine  [address], [address]  [place] [address]  Phone: [phone]  Fax: [phone]  Follow Up Time:     Chandu Hernández)  Urology  [address] [place] [address], [address]  [place], [place] [id number]  Phone: [phone]  Fax: [phone]  Follow Up Time:

## 2019-07-30 ENCOUNTER — EMERGENCY (EMERGENCY)
Facility: HOSPITAL | Age: 64
LOS: 1 days | Discharge: HOME | End: 2019-07-30
Attending: EMERGENCY MEDICINE | Admitting: EMERGENCY MEDICINE
Payer: COMMERCIAL

## 2019-07-30 ENCOUNTER — TRANSCRIPTION ENCOUNTER (OUTPATIENT)
Age: 64
End: 2019-07-30

## 2019-07-30 VITALS
RESPIRATION RATE: 18 BRPM | DIASTOLIC BLOOD PRESSURE: 68 MMHG | SYSTOLIC BLOOD PRESSURE: 131 MMHG | TEMPERATURE: 98 F | OXYGEN SATURATION: 97 % | HEART RATE: 67 BPM

## 2019-07-30 VITALS
HEIGHT: 71 IN | WEIGHT: 279.99 LBS | DIASTOLIC BLOOD PRESSURE: 69 MMHG | TEMPERATURE: 98 F | RESPIRATION RATE: 17 BRPM | OXYGEN SATURATION: 96 % | SYSTOLIC BLOOD PRESSURE: 135 MMHG | HEART RATE: 69 BPM

## 2019-07-30 DIAGNOSIS — Z95.1 PRESENCE OF AORTOCORONARY BYPASS GRAFT: Chronic | ICD-10-CM

## 2019-07-30 DIAGNOSIS — Z98.890 OTHER SPECIFIED POSTPROCEDURAL STATES: Chronic | ICD-10-CM

## 2019-07-30 LAB
GRAM STN FLD: SIGNIFICANT CHANGE UP
METHOD TYPE: SIGNIFICANT CHANGE UP
MSSA DNA SPEC QL NAA+PROBE: SIGNIFICANT CHANGE UP
SPECIMEN SOURCE: SIGNIFICANT CHANGE UP
SPECIMEN SOURCE: SIGNIFICANT CHANGE UP

## 2019-07-30 PROCEDURE — 99283 EMERGENCY DEPT VISIT LOW MDM: CPT

## 2019-07-30 PROCEDURE — 99284 EMERGENCY DEPT VISIT MOD MDM: CPT

## 2019-07-30 PROCEDURE — 87040 BLOOD CULTURE FOR BACTERIA: CPT

## 2019-07-30 NOTE — ED PROVIDER NOTE - OBJECTIVE STATEMENT
Patient states he is feeling better. He has urology follow up scheduled. He is doing well with the antibiotics. He was called back for + blood culture for gram Positive species. No fever or rigors.

## 2019-07-30 NOTE — ED POST DISCHARGE NOTE - ADDITIONAL DOCUMENTATION
Left message for pt to call back on both numbers in computer,  Please advise pt when CB he will need to return to ED for positive blood cultures  . Will attempt to call again Left message for pt to call back on both numbers in computer,  Please advise pt when CB he will need to return to ED for positive blood cultures  .

## 2019-07-30 NOTE — ED PROVIDER NOTE - CLINICAL SUMMARY MEDICAL DECISION MAKING FREE TEXT BOX
Patient states he is feeling better. He has urology follow up scheduled. He is doing well with the antibiotics. He was called back for + blood culture for gram Positive species. No fever or rigors.  I asked nurse to prep site well and repeat blood cultures. 2 sets from 2 sites. Vitals reviewed. Pt exhibiting no signs of sepsis. Consider contamination. Pt will be contacted if any further reports return. He has adequate follow up scheduled and knows reasons to return if needed. He is compliant with the antibiotic, cipro.

## 2019-07-31 ENCOUNTER — INPATIENT (INPATIENT)
Facility: HOSPITAL | Age: 64
LOS: 7 days | Discharge: HOME | DRG: 872 | End: 2019-08-08
Attending: HOSPITALIST | Admitting: INTERNAL MEDICINE
Payer: COMMERCIAL

## 2019-07-31 ENCOUNTER — TRANSCRIPTION ENCOUNTER (OUTPATIENT)
Age: 64
End: 2019-07-31

## 2019-07-31 VITALS
WEIGHT: 279.11 LBS | OXYGEN SATURATION: 100 % | DIASTOLIC BLOOD PRESSURE: 104 MMHG | SYSTOLIC BLOOD PRESSURE: 166 MMHG | TEMPERATURE: 100 F | HEART RATE: 82 BPM | RESPIRATION RATE: 17 BRPM | HEIGHT: 71 IN

## 2019-07-31 DIAGNOSIS — Z98.890 OTHER SPECIFIED POSTPROCEDURAL STATES: Chronic | ICD-10-CM

## 2019-07-31 DIAGNOSIS — I50.20 UNSPECIFIED SYSTOLIC (CONGESTIVE) HEART FAILURE: ICD-10-CM

## 2019-07-31 DIAGNOSIS — R10.9 UNSPECIFIED ABDOMINAL PAIN: ICD-10-CM

## 2019-07-31 DIAGNOSIS — I25.10 ATHEROSCLEROTIC HEART DISEASE OF NATIVE CORONARY ARTERY WITHOUT ANGINA PECTORIS: ICD-10-CM

## 2019-07-31 DIAGNOSIS — N17.9 ACUTE KIDNEY FAILURE, UNSPECIFIED: ICD-10-CM

## 2019-07-31 DIAGNOSIS — E11.40 TYPE 2 DIABETES MELLITUS WITH DIABETIC NEUROPATHY, UNSPECIFIED: ICD-10-CM

## 2019-07-31 DIAGNOSIS — R78.81 BACTEREMIA: ICD-10-CM

## 2019-07-31 DIAGNOSIS — I10 ESSENTIAL (PRIMARY) HYPERTENSION: ICD-10-CM

## 2019-07-31 DIAGNOSIS — Z95.1 PRESENCE OF AORTOCORONARY BYPASS GRAFT: Chronic | ICD-10-CM

## 2019-07-31 LAB
-  AMPICILLIN/SULBACTAM: SIGNIFICANT CHANGE UP
-  CEFAZOLIN: SIGNIFICANT CHANGE UP
-  GENTAMICIN: SIGNIFICANT CHANGE UP
-  OXACILLIN: SIGNIFICANT CHANGE UP
-  RIFAMPIN: SIGNIFICANT CHANGE UP
-  TETRACYCLINE: SIGNIFICANT CHANGE UP
-  TRIMETHOPRIM/SULFAMETHOXAZOLE: SIGNIFICANT CHANGE UP
-  VANCOMYCIN: SIGNIFICANT CHANGE UP
ALBUMIN SERPL ELPH-MCNC: 3 G/DL — LOW (ref 3.3–5)
ALP SERPL-CCNC: 89 U/L — SIGNIFICANT CHANGE UP (ref 40–120)
ALT FLD-CCNC: 16 U/L DA — SIGNIFICANT CHANGE UP (ref 10–45)
ANION GAP SERPL CALC-SCNC: 6 MMOL/L — SIGNIFICANT CHANGE UP (ref 5–17)
APTT BLD: 26.9 SEC — LOW (ref 27.5–36.3)
AST SERPL-CCNC: 16 U/L — SIGNIFICANT CHANGE UP (ref 10–40)
BASOPHILS # BLD AUTO: 0.02 K/UL — SIGNIFICANT CHANGE UP (ref 0–0.2)
BASOPHILS NFR BLD AUTO: 0.3 % — SIGNIFICANT CHANGE UP (ref 0–2)
BILIRUB SERPL-MCNC: 0.9 MG/DL — SIGNIFICANT CHANGE UP (ref 0.2–1.2)
BUN SERPL-MCNC: 20 MG/DL — SIGNIFICANT CHANGE UP (ref 7–23)
CALCIUM SERPL-MCNC: 8.7 MG/DL — SIGNIFICANT CHANGE UP (ref 8.4–10.5)
CHLORIDE SERPL-SCNC: 96 MMOL/L — SIGNIFICANT CHANGE UP (ref 96–108)
CO2 SERPL-SCNC: 30 MMOL/L — SIGNIFICANT CHANGE UP (ref 22–31)
CREAT SERPL-MCNC: 1.39 MG/DL — HIGH (ref 0.5–1.3)
CULTURE RESULTS: SIGNIFICANT CHANGE UP
EOSINOPHIL # BLD AUTO: 0.15 K/UL — SIGNIFICANT CHANGE UP (ref 0–0.5)
EOSINOPHIL NFR BLD AUTO: 2 % — SIGNIFICANT CHANGE UP (ref 0–6)
GLUCOSE BLDC GLUCOMTR-MCNC: 326 MG/DL — HIGH (ref 70–99)
GLUCOSE SERPL-MCNC: 363 MG/DL — HIGH (ref 70–99)
GRAM STN FLD: SIGNIFICANT CHANGE UP
HCT VFR BLD CALC: 32.9 % — LOW (ref 39–50)
HGB BLD-MCNC: 11.5 G/DL — LOW (ref 13–17)
IMM GRANULOCYTES NFR BLD AUTO: 0.4 % — SIGNIFICANT CHANGE UP (ref 0–1.5)
INR BLD: 1.45 RATIO — HIGH (ref 0.88–1.16)
LACTATE SERPL-SCNC: 1.2 MMOL/L — SIGNIFICANT CHANGE UP (ref 0.7–2)
LACTATE SERPL-SCNC: 1.4 MMOL/L — SIGNIFICANT CHANGE UP (ref 0.7–2)
LYMPHOCYTES # BLD AUTO: 1.36 K/UL — SIGNIFICANT CHANGE UP (ref 1–3.3)
LYMPHOCYTES # BLD AUTO: 17.9 % — SIGNIFICANT CHANGE UP (ref 13–44)
MCHC RBC-ENTMCNC: 31.1 PG — SIGNIFICANT CHANGE UP (ref 27–34)
MCHC RBC-ENTMCNC: 35 GM/DL — SIGNIFICANT CHANGE UP (ref 32–36)
MCV RBC AUTO: 88.9 FL — SIGNIFICANT CHANGE UP (ref 80–100)
METHOD TYPE: SIGNIFICANT CHANGE UP
MONOCYTES # BLD AUTO: 0.63 K/UL — SIGNIFICANT CHANGE UP (ref 0–0.9)
MONOCYTES NFR BLD AUTO: 8.3 % — SIGNIFICANT CHANGE UP (ref 2–14)
NEUTROPHILS # BLD AUTO: 5.42 K/UL — SIGNIFICANT CHANGE UP (ref 1.8–7.4)
NEUTROPHILS NFR BLD AUTO: 71.1 % — SIGNIFICANT CHANGE UP (ref 43–77)
NRBC # BLD: 0 /100 WBCS — SIGNIFICANT CHANGE UP (ref 0–0)
ORGANISM # SPEC MICROSCOPIC CNT: SIGNIFICANT CHANGE UP
ORGANISM # SPEC MICROSCOPIC CNT: SIGNIFICANT CHANGE UP
PLATELET # BLD AUTO: 208 K/UL — SIGNIFICANT CHANGE UP (ref 150–400)
POTASSIUM SERPL-MCNC: 4.4 MMOL/L — SIGNIFICANT CHANGE UP (ref 3.5–5.3)
POTASSIUM SERPL-SCNC: 4.4 MMOL/L — SIGNIFICANT CHANGE UP (ref 3.5–5.3)
PROT SERPL-MCNC: 7.1 G/DL — SIGNIFICANT CHANGE UP (ref 6–8.3)
PROTHROM AB SERPL-ACNC: 16.4 SEC — HIGH (ref 10–12.9)
RBC # BLD: 3.7 M/UL — LOW (ref 4.2–5.8)
RBC # FLD: 11.9 % — SIGNIFICANT CHANGE UP (ref 10.3–14.5)
SODIUM SERPL-SCNC: 132 MMOL/L — LOW (ref 135–145)
SPECIMEN SOURCE: SIGNIFICANT CHANGE UP
SPECIMEN SOURCE: SIGNIFICANT CHANGE UP
WBC # BLD: 7.61 K/UL — SIGNIFICANT CHANGE UP (ref 3.8–10.5)
WBC # FLD AUTO: 7.61 K/UL — SIGNIFICANT CHANGE UP (ref 3.8–10.5)

## 2019-07-31 PROCEDURE — 93010 ELECTROCARDIOGRAM REPORT: CPT

## 2019-07-31 PROCEDURE — 99223 1ST HOSP IP/OBS HIGH 75: CPT | Mod: GC

## 2019-07-31 PROCEDURE — 99285 EMERGENCY DEPT VISIT HI MDM: CPT

## 2019-07-31 RX ORDER — ACETAMINOPHEN 500 MG
650 TABLET ORAL ONCE
Refills: 0 | Status: COMPLETED | OUTPATIENT
Start: 2019-07-31 | End: 2019-07-31

## 2019-07-31 RX ORDER — ATORVASTATIN CALCIUM 80 MG/1
80 TABLET, FILM COATED ORAL AT BEDTIME
Refills: 0 | Status: DISCONTINUED | OUTPATIENT
Start: 2019-07-31 | End: 2019-08-08

## 2019-07-31 RX ORDER — VANCOMYCIN HCL 1 G
1000 VIAL (EA) INTRAVENOUS ONCE
Refills: 0 | Status: COMPLETED | OUTPATIENT
Start: 2019-07-31 | End: 2019-07-31

## 2019-07-31 RX ORDER — DEXTROSE 50 % IN WATER 50 %
25 SYRINGE (ML) INTRAVENOUS ONCE
Refills: 0 | Status: DISCONTINUED | OUTPATIENT
Start: 2019-07-31 | End: 2019-08-08

## 2019-07-31 RX ORDER — INSULIN GLARGINE 100 [IU]/ML
10 INJECTION, SOLUTION SUBCUTANEOUS AT BEDTIME
Refills: 0 | Status: DISCONTINUED | OUTPATIENT
Start: 2019-07-31 | End: 2019-08-01

## 2019-07-31 RX ORDER — OXYCODONE AND ACETAMINOPHEN 5; 325 MG/1; MG/1
1 TABLET ORAL EVERY 6 HOURS
Refills: 0 | Status: DISCONTINUED | OUTPATIENT
Start: 2019-07-31 | End: 2019-08-01

## 2019-07-31 RX ORDER — SODIUM CHLORIDE 9 MG/ML
1000 INJECTION, SOLUTION INTRAVENOUS
Refills: 0 | Status: DISCONTINUED | OUTPATIENT
Start: 2019-07-31 | End: 2019-08-08

## 2019-07-31 RX ORDER — SODIUM CHLORIDE 9 MG/ML
2300 INJECTION INTRAMUSCULAR; INTRAVENOUS; SUBCUTANEOUS ONCE
Refills: 0 | Status: COMPLETED | OUTPATIENT
Start: 2019-07-31 | End: 2019-07-31

## 2019-07-31 RX ORDER — METOPROLOL TARTRATE 50 MG
50 TABLET ORAL DAILY
Refills: 0 | Status: DISCONTINUED | OUTPATIENT
Start: 2019-07-31 | End: 2019-08-08

## 2019-07-31 RX ORDER — INSULIN LISPRO 100/ML
VIAL (ML) SUBCUTANEOUS AT BEDTIME
Refills: 0 | Status: DISCONTINUED | OUTPATIENT
Start: 2019-07-31 | End: 2019-08-03

## 2019-07-31 RX ORDER — DEXTROSE 50 % IN WATER 50 %
12.5 SYRINGE (ML) INTRAVENOUS ONCE
Refills: 0 | Status: DISCONTINUED | OUTPATIENT
Start: 2019-07-31 | End: 2019-08-08

## 2019-07-31 RX ORDER — ACETAMINOPHEN 500 MG
650 TABLET ORAL EVERY 6 HOURS
Refills: 0 | Status: DISCONTINUED | OUTPATIENT
Start: 2019-07-31 | End: 2019-08-08

## 2019-07-31 RX ORDER — ONDANSETRON 8 MG/1
4 TABLET, FILM COATED ORAL EVERY 8 HOURS
Refills: 0 | Status: COMPLETED | OUTPATIENT
Start: 2019-07-31 | End: 2019-08-07

## 2019-07-31 RX ORDER — INSULIN LISPRO 100/ML
VIAL (ML) SUBCUTANEOUS
Refills: 0 | Status: DISCONTINUED | OUTPATIENT
Start: 2019-07-31 | End: 2019-08-03

## 2019-07-31 RX ORDER — ASPIRIN/CALCIUM CARB/MAGNESIUM 324 MG
81 TABLET ORAL DAILY
Refills: 0 | Status: DISCONTINUED | OUTPATIENT
Start: 2019-07-31 | End: 2019-08-08

## 2019-07-31 RX ORDER — GLUCAGON INJECTION, SOLUTION 0.5 MG/.1ML
1 INJECTION, SOLUTION SUBCUTANEOUS ONCE
Refills: 0 | Status: DISCONTINUED | OUTPATIENT
Start: 2019-07-31 | End: 2019-08-08

## 2019-07-31 RX ORDER — DEXTROSE 50 % IN WATER 50 %
15 SYRINGE (ML) INTRAVENOUS ONCE
Refills: 0 | Status: DISCONTINUED | OUTPATIENT
Start: 2019-07-31 | End: 2019-08-08

## 2019-07-31 RX ORDER — ENOXAPARIN SODIUM 100 MG/ML
40 INJECTION SUBCUTANEOUS DAILY
Refills: 0 | Status: DISCONTINUED | OUTPATIENT
Start: 2019-07-31 | End: 2019-08-08

## 2019-07-31 RX ORDER — VANCOMYCIN HCL 1 G
1000 VIAL (EA) INTRAVENOUS EVERY 12 HOURS
Refills: 0 | Status: DISCONTINUED | OUTPATIENT
Start: 2019-08-01 | End: 2019-08-01

## 2019-07-31 RX ORDER — SPIRONOLACTONE 25 MG/1
25 TABLET, FILM COATED ORAL DAILY
Refills: 0 | Status: DISCONTINUED | OUTPATIENT
Start: 2019-07-31 | End: 2019-08-08

## 2019-07-31 RX ORDER — SODIUM CHLORIDE 9 MG/ML
1000 INJECTION INTRAMUSCULAR; INTRAVENOUS; SUBCUTANEOUS
Refills: 0 | Status: COMPLETED | OUTPATIENT
Start: 2019-07-31 | End: 2019-07-31

## 2019-07-31 RX ORDER — SACUBITRIL AND VALSARTAN 24; 26 MG/1; MG/1
1 TABLET, FILM COATED ORAL
Refills: 0 | Status: DISCONTINUED | OUTPATIENT
Start: 2019-07-31 | End: 2019-08-08

## 2019-07-31 RX ADMIN — OXYCODONE AND ACETAMINOPHEN 1 TABLET(S): 5; 325 TABLET ORAL at 19:11

## 2019-07-31 RX ADMIN — SODIUM CHLORIDE 2300 MILLILITER(S): 9 INJECTION INTRAMUSCULAR; INTRAVENOUS; SUBCUTANEOUS at 16:05

## 2019-07-31 RX ADMIN — Medication 650 MILLIGRAM(S): at 17:20

## 2019-07-31 RX ADMIN — Medication 650 MILLIGRAM(S): at 17:33

## 2019-07-31 RX ADMIN — SODIUM CHLORIDE 75 MILLILITER(S): 9 INJECTION INTRAMUSCULAR; INTRAVENOUS; SUBCUTANEOUS at 21:13

## 2019-07-31 RX ADMIN — INSULIN GLARGINE 10 UNIT(S): 100 INJECTION, SOLUTION SUBCUTANEOUS at 22:15

## 2019-07-31 RX ADMIN — SODIUM CHLORIDE 2300 MILLILITER(S): 9 INJECTION INTRAMUSCULAR; INTRAVENOUS; SUBCUTANEOUS at 17:29

## 2019-07-31 RX ADMIN — OXYCODONE AND ACETAMINOPHEN 1 TABLET(S): 5; 325 TABLET ORAL at 21:03

## 2019-07-31 RX ADMIN — ONDANSETRON 4 MILLIGRAM(S): 8 TABLET, FILM COATED ORAL at 22:12

## 2019-07-31 RX ADMIN — Medication 1000 MILLIGRAM(S): at 17:50

## 2019-07-31 RX ADMIN — Medication 250 MILLIGRAM(S): at 16:05

## 2019-07-31 RX ADMIN — ATORVASTATIN CALCIUM 80 MILLIGRAM(S): 80 TABLET, FILM COATED ORAL at 22:12

## 2019-07-31 RX ADMIN — Medication 4: at 22:16

## 2019-07-31 NOTE — ED ADULT NURSE NOTE - NSIMPLEMENTINTERV_GEN_ALL_ED
Implemented All Universal Safety Interventions:  [place] to call system. Call bell, personal items and telephone within reach. Instruct patient to call for assistance. Room bathroom lighting operational. Non-slip footwear when patient is off stretcher. Physically safe environment: no spills, clutter or unnecessary equipment. Stretcher in lowest position, wheels locked, appropriate side rails in place.

## 2019-07-31 NOTE — H&P ADULT - ASSESSMENT
62 yo male PMH of HTN, HLD, T2DM, CAD s/p CABGx3 (4/2018), systolic CHF, p/w staph bacteremia, with positive BCx for Staph aureus.

## 2019-07-31 NOTE — H&P ADULT - PROBLEM SELECTOR PLAN 1
Staph bacteremia with positive BCx for staph aureus 2/2 pylonephritis, febrile, no leukocytosis, lactate wnl x 3, Vitals (HR, BP, Res) wnl. Pt. received 2 L of IV NS in ER, pt. with systolic heart failure, will give 1L NS @ 75 cc/hr and re-eval   Pt. received 1 dose ov Vanco 1 Gm IV in ER, will start Vanco 1 Gm IV q12hrs  ID consult pending

## 2019-07-31 NOTE — ED PROVIDER NOTE - CLINICAL SUMMARY MEDICAL DECISION MAKING FREE TEXT BOX
Dr. Raphael: 63M h/o DM, HLD, CABG seen here 2d ago for pyelo and dc'ed on cipro, called back for +BCx, +staph aureus. Pt c/o feeling unwell, LLQ pain, fevers and chills. On exam pt is well appearing, nad, rrr, ctab, abdo soft/NT/ND, no CVAT. Will admit for IV vanco for bacteremia.

## 2019-07-31 NOTE — H&P ADULT - NSHPLABSRESULTS_GEN_ALL_CORE
Daily Height in cm: 177.8 (2019 19:08)    Daily Weight in k.1 (2019 19:08)                          11.5   7.61  )-----------( 208      ( 2019 15:23 )             32.9       07-31    132<L>  |  96  |  20  ----------------------------<  363<H>  4.4   |  30  |  1.39<H>    Ca    8.7      2019 15:23    TPro  7.1  /  Alb  3.0<L>  /  TBili  0.9  /  DBili  x   /  AST  16  /  ALT  16  /  AlkPhos  89  31            CAPILLARY BLOOD GLUCOSE          LIVER FUNCTIONS - ( 2019 15:23 )  Alb: 3.0 g/dL / Pro: 7.1 g/dL / ALK PHOS: 89 U/L / ALT: 16 U/L DA / AST: 16 U/L / GGT: x               Culture - Blood (collected 2019 16:20)  Source: .Blood Blood-Peripheral  Gram Stain (2019 13:16):    Growth in aerobic bottle: Gram Positive Cocci in Clusters  Preliminary Report (2019 13:16):    Growth in aerobic bottle: Gram Positive Cocci in Clusters    Culture - Urine (collected 2019 15:20)  Source: .Urine Clean Catch (Midstream)  Final Report (2019 19:02):    >100,000 CFU/ml Staphylococcus aureus  Organism: Staphylococcus aureus (2019 19:02)  Organism: Staphylococcus aureus (2019 19:02)    PT/INR - ( 2019 15:23 )   PT: 16.4 sec;   INR: 1.45 ratio         PTT - ( 2019 15:23 )  PTT:26.9 sec    Radiology: < from: CT Abdomen and Pelvis w/ IV Cont (19 @ 16:40) >    IMPRESSION:   CT findings suggestive of urinary tract infection/pyelonephritis, most   pronounced left kidney as discussed.    Cholelithiasis, with possible choledocholithiasis; nondilated common bile   duct. This can be further evaluated with nonemergent MRCP if there are no   clinical contraindications.

## 2019-07-31 NOTE — ED ADULT NURSE NOTE - OBJECTIVE STATEMENT
pt is a 64 y/o male with c/o dizziness and feeling warm. pt states he was called by MD Raphael for + blood cultures pt alert oriented x3 with no distress but feeling dizzy. pt skin intact airway patent denies any chestpain

## 2019-07-31 NOTE — H&P ADULT - NSICDXPASTMEDICALHX_GEN_ALL_CORE_FT
PAST MEDICAL HISTORY:  Coronary artery disease     Essential hypertension     Moderate aortic stenosis     Paroxysmal atrial fibrillation     Systolic congestive heart failure     Type 2 diabetes mellitus with diabetic neuropathy, without long-term current use of insulin

## 2019-07-31 NOTE — H&P ADULT - NSHPPHYSICALEXAM_GEN_ALL_CORE
PHYSICAL EXAM:  GENERAL: NAD, well-groomed, well-developed  HEAD:  Atraumatic, Normocephalic  EYES: EOMI, PERRLA, conjunctiva and sclera clear  ENMT: Moist mucous membranes, Good dentition  NECK: Supple, No JVD  NERVOUS SYSTEM:  A/O x3, Good concentration; CN 2-12 intact, No focal deficits  CHEST/LUNG: Clear to auscultation bilaterally; No rales, rhonchi, wheezing, or rubs  HEART: Regular rate and rhythm; S1/S2, No murmurs, rubs, or gallops  ABDOMEN: Soft, Tender LUQ and moderately distended ; Bowel sounds present  VASCULAR: Normal pulses, Normal capillary refill  EXTREMITIES:  2+ Peripheral Pulses, No clubbing, cyanosis, or edema  SKIN: Warm, Intact  PSYCH: Normal mood and affect

## 2019-07-31 NOTE — H&P ADULT - NSICDXPASTSURGICALHX_GEN_ALL_CORE_FT
PAST SURGICAL HISTORY:  H/O inguinal hernia repair     History of excision of pilonidal cyst     S/P CABG (coronary artery bypass graft)

## 2019-07-31 NOTE — H&P ADULT - NSHPREVIEWOFSYSTEMS_GEN_ALL_CORE
REVIEW OF SYSTEMS:  CONSTITUTIONAL: No fever, weight loss, or fatigue  EYES: No eye pain, visual disturbances, or discharge  ENMT:  No difficulty hearing, tinnitus, vertigo; No sinus or throat pain  NECK: No neck pain or neck stiffness  BREASTS: No pain, masses, or nipple discharge  RESPIRATORY: No cough, wheezing, chills or hemoptysis; No shortness of breath  CARDIOVASCULAR: No chest pain, palpitations, dizziness, or leg swelling  GASTROINTESTINAL: No abdominal pain, No nausea, vomiting, or hematemesis; No diarrhea or constipation  GENITOURINARY: No dysuria, frequency, hematuria, or incontinence  NEUROLOGICAL: No headaches, memory loss, loss of strength, numbness, or tremors  SKIN: No itching, burning, rashes, or lesions   LYMPH NODES: No enlarged glands  ENDOCRINE: No heat or cold intolerance; No hair loss  MUSCULOSKELETAL: No joint pain or swelling; No muscle, back, or extremity pain  PSYCHIATRIC: No depression, anxiety, mood swings, or difficulty sleeping  HEME/LYMPH: No easy bruising or bleeding  ALLERY AND IMMUNOLOGIC: No hives or eczema    All other ROS reviewed and negative except as otherwise stated

## 2019-07-31 NOTE — H&P ADULT - ATTENDING COMMENTS
62 yo M w/ pmhx as listed returned to ED for positive blood cx. Blood cx showed Staph aures. CT abd/pelvis showed pyelonephritis and cholelithiases/ possible choledocholithiasis, CBD non-dilated.     # Staph aureus Bacteremia    # pyelonephritis   # DM type2   # HFrEF    IV vancomcyin 1 g q12  ID consult  2D echo  if abd pain worsens or clinically worsens, GI consult for possible choledocholithiasis found on CT  monitor renal function    DVT ppx 62 yo M w/ pmhx as listed returned to ED for positive blood cx. Blood cx showed Staph aures. CT abd/pelvis showed pyelonephritis and cholelithiases/ possible choledocholithiasis, CBD non-dilated.     # Staph aureus Bacteremia    # pyelonephritis   # DM type2   # HFrEF    IV vancomcyin 1 g q12  ID consult  2D echo  US abd  monitor renal function    DVT ppx

## 2019-07-31 NOTE — H&P ADULT - HISTORY OF PRESENT ILLNESS
62 yo male PMH of HTN, HLD, T2DM, CAD s/p CABGx3 (4/2018),  returns to ED for positive blood cultures. Treated with cipro for pyelo, advised to return for additional cultures positive for Staph aureus since 7/30/19. Pt. states she was constipated until Thursday and then she has been having multiple BMs, but denies diarrhea. Pt. admits to feeling fevers/chill since last night, but has not checked his temperature.  Denies any abd pains, n/v , urinary sx's, diarrhea, chest discomfort, sob or any other complaints. 64 yo male PMH of HTN, HLD, T2DM, CAD s/p CABGx3 (4/2018),  returns to ED for positive blood cultures. Treated with cipro for pyelo, advised to return for additional cultures positive for Staph aureus since 7/30/19. Pt. admits to feeling fevers/chill since last night, but has not checked his temperature. Pt. states he was constipated until Thursday and then he has been having multiple BMs, but denies diarrhea. Denies any abd pains, n/v , urinary sx's, diarrhea, chest discomfort, sob or any other complaints.

## 2019-07-31 NOTE — H&P ADULT - PROBLEM SELECTOR PLAN 6
pt. c/o LUQ and RUQ pain and discomfort - abdomen distended  CT abdomen shows Cholelithiasis, with possible choledocholithiasis; nondilated common bile duct and pyleonephritis more pronounced in the left kidney   Outpatient follow up with MRCP and GI consult  Consider GI consult while in the hospital if pain persists or worsens

## 2019-07-31 NOTE — ED PROVIDER NOTE - OBJECTIVE STATEMENT
62 yo male hx htn, diabetes, high cholesterol, heart dx with CABG,  returns to ED for positive blood cultures.  Currently on cipro for pyelo, never had  fu today as was advised to return for additional cultures positive for staph since yesterdays cultures. Admits to feeling fevers/chill since last night, but has not checked his temperature.    Denies any abd pains, n/v , urinary sx's, diarrhea, c pain, sob or any other complaints.

## 2019-07-31 NOTE — H&P ADULT - PROBLEM SELECTOR PLAN 5
Not controlled, FS in the 300s, suspect 2/2 infectious process, on metformin at home  Start lantus 10 units SQ HS, correction insulin  monitor FS

## 2019-07-31 NOTE — ED POST DISCHARGE NOTE - RESULT SUMMARY
+BCx from July 30th, spoke with pt, states he is feeling better but will return to the ED after urology appointment today at 15:45

## 2019-07-31 NOTE — ED ADULT TRIAGE NOTE - CHIEF COMPLAINT QUOTE
Pt called in by Dr. Raphael for + blood cultures, pt c/o feeling dizzy/nauseous and with abdominal pain, pt on Cipro

## 2019-07-31 NOTE — ED PROVIDER NOTE - CARE PLAN
Principal Discharge DX:	Bacteremia  Secondary Diagnosis:	Sepsis  Secondary Diagnosis:	Pyelonephritis

## 2019-08-01 ENCOUNTER — TRANSCRIPTION ENCOUNTER (OUTPATIENT)
Age: 64
End: 2019-08-01

## 2019-08-01 DIAGNOSIS — N12 TUBULO-INTERSTITIAL NEPHRITIS, NOT SPECIFIED AS ACUTE OR CHRONIC: ICD-10-CM

## 2019-08-01 DIAGNOSIS — E78.5 HYPERLIPIDEMIA, UNSPECIFIED: ICD-10-CM

## 2019-08-01 DIAGNOSIS — I25.10 ATHEROSCLEROTIC HEART DISEASE OF NATIVE CORONARY ARTERY WITHOUT ANGINA PECTORIS: ICD-10-CM

## 2019-08-01 DIAGNOSIS — I50.22 CHRONIC SYSTOLIC (CONGESTIVE) HEART FAILURE: ICD-10-CM

## 2019-08-01 LAB
-  AMPICILLIN/SULBACTAM: SIGNIFICANT CHANGE UP
-  CEFAZOLIN: SIGNIFICANT CHANGE UP
-  CLINDAMYCIN: SIGNIFICANT CHANGE UP
-  ERYTHROMYCIN: SIGNIFICANT CHANGE UP
-  GENTAMICIN: SIGNIFICANT CHANGE UP
-  OXACILLIN: SIGNIFICANT CHANGE UP
-  RIFAMPIN: SIGNIFICANT CHANGE UP
-  TETRACYCLINE: SIGNIFICANT CHANGE UP
-  TRIMETHOPRIM/SULFAMETHOXAZOLE: SIGNIFICANT CHANGE UP
-  VANCOMYCIN: SIGNIFICANT CHANGE UP
ALBUMIN SERPL ELPH-MCNC: 2.5 G/DL — LOW (ref 3.3–5)
ALP SERPL-CCNC: 65 U/L — SIGNIFICANT CHANGE UP (ref 40–120)
ALT FLD-CCNC: 16 U/L DA — SIGNIFICANT CHANGE UP (ref 10–45)
ANION GAP SERPL CALC-SCNC: 8 MMOL/L — SIGNIFICANT CHANGE UP (ref 5–17)
AST SERPL-CCNC: 15 U/L — SIGNIFICANT CHANGE UP (ref 10–40)
BILIRUB SERPL-MCNC: 0.9 MG/DL — SIGNIFICANT CHANGE UP (ref 0.2–1.2)
BUN SERPL-MCNC: 15 MG/DL — SIGNIFICANT CHANGE UP (ref 7–23)
CALCIUM SERPL-MCNC: 8.2 MG/DL — LOW (ref 8.4–10.5)
CHLORIDE SERPL-SCNC: 101 MMOL/L — SIGNIFICANT CHANGE UP (ref 96–108)
CO2 SERPL-SCNC: 27 MMOL/L — SIGNIFICANT CHANGE UP (ref 22–31)
CREAT SERPL-MCNC: 1.12 MG/DL — SIGNIFICANT CHANGE UP (ref 0.5–1.3)
CULTURE RESULTS: SIGNIFICANT CHANGE UP
GLUCOSE BLDC GLUCOMTR-MCNC: 196 MG/DL — HIGH (ref 70–99)
GLUCOSE BLDC GLUCOMTR-MCNC: 271 MG/DL — HIGH (ref 70–99)
GLUCOSE BLDC GLUCOMTR-MCNC: 279 MG/DL — HIGH (ref 70–99)
GLUCOSE BLDC GLUCOMTR-MCNC: 304 MG/DL — HIGH (ref 70–99)
GLUCOSE SERPL-MCNC: 243 MG/DL — HIGH (ref 70–99)
GRAM STN FLD: SIGNIFICANT CHANGE UP
HBA1C BLD-MCNC: 11.9 % — HIGH (ref 4–5.6)
HCT VFR BLD CALC: 30.3 % — LOW (ref 39–50)
HCV AB S/CO SERPL IA: 0.08 S/CO — SIGNIFICANT CHANGE UP (ref 0–0.99)
HCV AB SERPL-IMP: SIGNIFICANT CHANGE UP
HGB BLD-MCNC: 10.4 G/DL — LOW (ref 13–17)
MCHC RBC-ENTMCNC: 30.4 PG — SIGNIFICANT CHANGE UP (ref 27–34)
MCHC RBC-ENTMCNC: 34.3 GM/DL — SIGNIFICANT CHANGE UP (ref 32–36)
MCV RBC AUTO: 88.6 FL — SIGNIFICANT CHANGE UP (ref 80–100)
METHOD TYPE: SIGNIFICANT CHANGE UP
NRBC # BLD: 0 /100 WBCS — SIGNIFICANT CHANGE UP (ref 0–0)
ORGANISM # SPEC MICROSCOPIC CNT: SIGNIFICANT CHANGE UP
PLATELET # BLD AUTO: 201 K/UL — SIGNIFICANT CHANGE UP (ref 150–400)
POTASSIUM SERPL-MCNC: 4.7 MMOL/L — SIGNIFICANT CHANGE UP (ref 3.5–5.3)
POTASSIUM SERPL-SCNC: 4.7 MMOL/L — SIGNIFICANT CHANGE UP (ref 3.5–5.3)
PROT SERPL-MCNC: 6.4 G/DL — SIGNIFICANT CHANGE UP (ref 6–8.3)
RBC # BLD: 3.42 M/UL — LOW (ref 4.2–5.8)
RBC # FLD: 12 % — SIGNIFICANT CHANGE UP (ref 10.3–14.5)
SODIUM SERPL-SCNC: 136 MMOL/L — SIGNIFICANT CHANGE UP (ref 135–145)
SPECIMEN SOURCE: SIGNIFICANT CHANGE UP
WBC # BLD: 7.16 K/UL — SIGNIFICANT CHANGE UP (ref 3.8–10.5)
WBC # FLD AUTO: 7.16 K/UL — SIGNIFICANT CHANGE UP (ref 3.8–10.5)

## 2019-08-01 PROCEDURE — 99233 SBSQ HOSP IP/OBS HIGH 50: CPT

## 2019-08-01 PROCEDURE — 99223 1ST HOSP IP/OBS HIGH 75: CPT

## 2019-08-01 PROCEDURE — 76700 US EXAM ABDOM COMPLETE: CPT | Mod: 26

## 2019-08-01 PROCEDURE — 93306 TTE W/DOPPLER COMPLETE: CPT | Mod: 26

## 2019-08-01 RX ORDER — INSULIN GLARGINE 100 [IU]/ML
5 INJECTION, SOLUTION SUBCUTANEOUS ONCE
Refills: 0 | Status: COMPLETED | OUTPATIENT
Start: 2019-08-01 | End: 2019-08-01

## 2019-08-01 RX ORDER — INSULIN GLARGINE 100 [IU]/ML
15 INJECTION, SOLUTION SUBCUTANEOUS AT BEDTIME
Refills: 0 | Status: DISCONTINUED | OUTPATIENT
Start: 2019-08-01 | End: 2019-08-02

## 2019-08-01 RX ORDER — CEFAZOLIN SODIUM 1 G
2000 VIAL (EA) INJECTION EVERY 8 HOURS
Refills: 0 | Status: DISCONTINUED | OUTPATIENT
Start: 2019-08-01 | End: 2019-08-08

## 2019-08-01 RX ORDER — POLYETHYLENE GLYCOL 3350 17 G/17G
17 POWDER, FOR SOLUTION ORAL EVERY 12 HOURS
Refills: 0 | Status: DISCONTINUED | OUTPATIENT
Start: 2019-08-01 | End: 2019-08-08

## 2019-08-01 RX ADMIN — Medication 6: at 07:47

## 2019-08-01 RX ADMIN — ONDANSETRON 4 MILLIGRAM(S): 8 TABLET, FILM COATED ORAL at 21:57

## 2019-08-01 RX ADMIN — Medication 81 MILLIGRAM(S): at 11:14

## 2019-08-01 RX ADMIN — ENOXAPARIN SODIUM 40 MILLIGRAM(S): 100 INJECTION SUBCUTANEOUS at 11:14

## 2019-08-01 RX ADMIN — INSULIN GLARGINE 15 UNIT(S): 100 INJECTION, SOLUTION SUBCUTANEOUS at 21:56

## 2019-08-01 RX ADMIN — Medication 650 MILLIGRAM(S): at 02:00

## 2019-08-01 RX ADMIN — SACUBITRIL AND VALSARTAN 1 TABLET(S): 24; 26 TABLET, FILM COATED ORAL at 17:14

## 2019-08-01 RX ADMIN — INSULIN GLARGINE 5 UNIT(S): 100 INJECTION, SOLUTION SUBCUTANEOUS at 12:05

## 2019-08-01 RX ADMIN — Medication 50 MILLIGRAM(S): at 08:40

## 2019-08-01 RX ADMIN — ATORVASTATIN CALCIUM 80 MILLIGRAM(S): 80 TABLET, FILM COATED ORAL at 21:56

## 2019-08-01 RX ADMIN — Medication 100 MILLIGRAM(S): at 14:53

## 2019-08-01 RX ADMIN — ONDANSETRON 4 MILLIGRAM(S): 8 TABLET, FILM COATED ORAL at 08:40

## 2019-08-01 RX ADMIN — Medication 250 MILLIGRAM(S): at 05:16

## 2019-08-01 RX ADMIN — Medication 6: at 17:14

## 2019-08-01 RX ADMIN — SPIRONOLACTONE 25 MILLIGRAM(S): 25 TABLET, FILM COATED ORAL at 08:40

## 2019-08-01 RX ADMIN — Medication 100 MILLIGRAM(S): at 21:56

## 2019-08-01 RX ADMIN — Medication 8: at 12:06

## 2019-08-01 RX ADMIN — ONDANSETRON 4 MILLIGRAM(S): 8 TABLET, FILM COATED ORAL at 15:08

## 2019-08-01 RX ADMIN — OXYCODONE AND ACETAMINOPHEN 1 TABLET(S): 5; 325 TABLET ORAL at 06:47

## 2019-08-01 RX ADMIN — Medication 650 MILLIGRAM(S): at 00:14

## 2019-08-01 RX ADMIN — SACUBITRIL AND VALSARTAN 1 TABLET(S): 24; 26 TABLET, FILM COATED ORAL at 08:40

## 2019-08-01 NOTE — DIETITIAN INITIAL EVALUATION ADULT. - ADD RECOMMEND
Patient today not receptive to diet instruction however spoke with patient receptive tomorrow (8/2)will follow up

## 2019-08-01 NOTE — DIETITIAN INITIAL EVALUATION ADULT. - OTHER INFO
64 yo male PMH of HTN, HLD, T2DM, CAD s/p CABGx3 (4/2018), systolic CHF, p/w staph bacteremia, with positive BCx for Staph aureus. Tolerating his diet ~75% consumed. Skin intact, (+) BM (7/30). Patient re[ports noncompliance with diet, will instruct.

## 2019-08-01 NOTE — DISCHARGE NOTE NURSING/CASE MANAGEMENT/SOCIAL WORK - NSDCDPATPORTLINK_GEN_ALL_CORE
You can access the [facility] Portal, offered by [facility], by registering with the following website: [email or web address]

## 2019-08-01 NOTE — CONSULT NOTE ADULT - SUBJECTIVE AND OBJECTIVE BOX
HPI:   Patient is a 63y male with a past history of DM,PAF,Aortic stenosis,HTN,and CABG in 2018 who was admitted 7/31 for treatment of staph aureus bacteremia.  He developed fever and chills 1 week ago associated with constipation, hesitancy,and intermittent left sided abdominal pain.He was seen in the [facility] ER on 7/29, felt to have left sided pyelo with abnormal UA and sent home on cipro.He had a blood culture sent, he was called back to ER on 7/30 for a positive result.He was stable, repeat blood cultures sent, he was called back when positive. He is now known to have MSSA in blood.His CT scan showed evidence of left sided pyelo.No prior history of any staph infections.He had a left lip lesion in early July, he has a picture, ? fever blister, it seemed  superficial as best I can tell from picture.No lower back pain.He was last hospitalized for atypical chest pain 1 year ago at [facility].Vancomycin started in ER.    REVIEW OF SYSTEMS:  All other review of systems negative (Comprehensive ROS)    PAST MEDICAL & SURGICAL HISTORY:  Moderate aortic stenosis  Systolic congestive heart failure  Paroxysmal atrial fibrillation  Coronary artery disease  Essential hypertension  Type 2 diabetes mellitus with diabetic neuropathy, without long-term current use of insulin  S/P CABG (coronary artery bypass graft)  History of excision of pilonidal cyst  H/O inguinal hernia repair      Allergies    allery to INSECT VENOM (Hives)  Novocain (Hives)  Originally Entered as [Unknown] reaction to [ENVIRONMENTAL] (Unknown)    Intolerances        Antimicrobials Day #  :day 2  vancomycin  IVPB 1000 milliGRAM(s) IV Intermittent every 12 hours    Other Medications:  acetaminophen   Tablet .. 650 milliGRAM(s) Oral every 6 hours PRN  aspirin enteric coated 81 milliGRAM(s) Oral daily  atorvastatin 80 milliGRAM(s) Oral at bedtime  dextrose 40% Gel 15 Gram(s) Oral once PRN  dextrose 5%. 1000 milliLiter(s) IV Continuous <Continuous>  dextrose 50% Injectable 12.5 Gram(s) IV Push once  dextrose 50% Injectable 25 Gram(s) IV Push once  dextrose 50% Injectable 25 Gram(s) IV Push once  enoxaparin Injectable 40 milliGRAM(s) SubCutaneous daily  glucagon  Injectable 1 milliGRAM(s) IntraMuscular once PRN  insulin glargine Injectable (LANTUS) 5 Unit(s) SubCutaneous once  insulin glargine Injectable (LANTUS) 15 Unit(s) SubCutaneous at bedtime  insulin lispro (HumaLOG) corrective regimen sliding scale   SubCutaneous three times a day before meals  insulin lispro (HumaLOG) corrective regimen sliding scale   SubCutaneous at bedtime  metoprolol succinate ER 50 milliGRAM(s) Oral daily  ondansetron Injectable 4 milliGRAM(s) IV Push every 8 hours  oxyCODONE    5 mG/acetaminophen 325 mG 1 Tablet(s) Oral every 6 hours PRN  polyethylene glycol 3350 17 Gram(s) Oral every 12 hours PRN  sacubitril 49 mG/valsartan 51 mG 1 Tablet(s) Oral two times a day  spironolactone 25 milliGRAM(s) Oral daily      FAMILY HISTORY:  Family history of acute myocardial infarction: father in 90s      SOCIAL HISTORY:  Smoking:  no   ETOH: no    Drug Use: no         T(F): 99 (08-01-19 @ 07:52), Max: 102.8 (07-31-19 @ 15:54)  HR: 71 (08-01-19 @ 07:52)  BP: 134/72 (08-01-19 @ 07:52)  RR: 15 (08-01-19 @ 07:52)  SpO2: 96% (08-01-19 @ 07:52)  Wt(kg): --    PHYSICAL EXAM:  General: alert, no acute distress  Eyes:  anicteric, no conjunctival injection, no discharge  Oropharynx: no lesions or injection 	  Neck: supple, without adenopathy  Lungs: clear to auscultation  Heart: regular rate and rhythm; no murmur,   Abdomen: soft, nondistended, nontender, without mass or organomegaly  Skin: no lesions  Extremities: no clubbing, cyanosis, or edema  Neurologic: alert, oriented, moves all extremities    LAB RESULTS:                        10.4   7.16  )-----------( 201      ( 01 Aug 2019 05:30 )             30.3     08-01    136  |  101  |  15  ----------------------------<  243<H>  4.7   |  27  |  1.12    Ca    8.2<L>      01 Aug 2019 05:30    TPro  6.4  /  Alb  2.5<L>  /  TBili  0.9  /  DBili  x   /  AST  15  /  ALT  16  /  AlkPhos  65  08-01    LIVER FUNCTIONS - ( 01 Aug 2019 05:30 )  Alb: 2.5 g/dL / Pro: 6.4 g/dL / ALK PHOS: 65 U/L / ALT: 16 U/L DA / AST: 15 U/L / GGT: x               MICROBIOLOGY:  RECENT CULTURES:  07-30 @ 16:20 .Blood Blood-Peripheral     No growth to date.    Growth in aerobic bottle: Gram Positive Cocci in Clusters    07-29 @ 15:20 .Blood Blood-Peripheral Blood Culture PCR  Staphylococcus aureus    Growth in aerobic and anaerobic bottles: Staphylococcus aureus  See previous culture [id number]    Growth in anaerobic bottle: Gram Positive Cocci in Clusters  Growth in aerobic bottle: Gram Positive Cocci in Clusters    7/29 urine culture with MSSA      RADIOLOGY REVIEWED:  < from: CT Abdomen and Pelvis w/ IV Cont (07.29.19 @ 16:40) >    EXAM:  CT ABDOMEN AND PELVIS IC      PROCEDURE DATE:  07/29/2019        INTERPRETATION:  CLINICAL INFORMATION: Left lower quadrant abdominal pain.    COMPARISON: None.    PROCEDURE:   CT of the Abdomen and Pelvis was performed with intravenous contrast.   Intravenous contrast: 90 ml Omnipaque 350. 10 ml discarded.  Oral contrast: None.  Sagittal and coronal reformats were performed.    FINDINGS:    LOWER CHEST: Median sternotomy.    LIVER: Within normal limits.  BILE DUCTS: Increased density within a nondilated common bile duct is   suggestive of biliary sludge and/or choledocholithiasis.  GALLBLADDER: Cholelithiasis, without gallbladder wall thickening.  SPLEEN: Within normal limits.  PANCREAS: Within normal limits.  ADRENALS: Within normal limits.  KIDNEYS/URETERS:   There is moderate left-sided perinephric stranding with patchy decreased   enhancement at the lower pole of the kidney.  There is mild right-sided perinephric stranding.  No hydronephrosis or obstructing ureteral calculusis noted.  No perinephric fluid collection is noted.  Findings are suggestive of pyelonephritis/urinary tract infection.    BLADDER: Within normal limits.  REPRODUCTIVE ORGANS: Unremarkable.    BOWEL: No bowel obstruction. Appendix normal in appearance.  PERITONEUM: No ascites.  VESSELS: Atherosclerotic calcification of the abdominal aorta which is   normal in caliber.  RETROPERITONEUM: No lymphadenopathy.    ABDOMINAL WALL: Fat-containing right inguinal hernia.  BONES: Degenerative changes of the lower lumbar spine.    IMPRESSION:     CT findings suggestive of urinary tract infection/pyelonephritis, most   pronounced left kidney as discussed.    Cholelithiasis, with possible choledocholithiasis; nondilated common bile   duct. This can be further evaluated with nonemergent MRCP if there are no   clinical contraindications.    < end of copied text >  < from: Xray Chest 1 View AP/PA (07.29.19 @ 16:44) >  INTERPRETATION:  INDICATION: Sepsis    PRIORS: 10/29/2018    VIEWS: Frontal radiography of the chest performed.    FINDINGS: Sternotomy wires are identified. Heart size appears within   normal limits. No hilar or superior mediastinal abnormalities are   identified. There is no evidence for focal infiltrate, lobar   consolidation or pulmonary edema. Stable scarlike opacities identified   within the left midlung field. No pleuraleffusion or pneumothorax is   demonstrated. No mediastinal shift is noted. No acute osseous fractures   identified.    IMPRESSION: No significant interval change; no evidence for interval   development of focal infiltrate or lobar consolidation.    < end of copied text >  < from: US Abdomen Complete (08.01.19 @ 09:20) >  IMPRESSION:    Uncomplicated cholelithiasis.    < end of copied text >
Chief Complaint:     62 yo male PMH of HTN, HLD, T2DM, CAD s/p CABGx3 (4/2018),  returns to ED for positive blood cultures. Treated with cipro for pyelo, advised to return for additional cultures positive for Staph aureus since 7/30/19. Pt. admits to feeling fevers/chill since last night, but has not checked his temperature. Pt. states he was constipated until Thursday and then he has been having multiple BMs, but denies diarrhea. Denies any abd pains, n/v , urinary sx's, diarrhea, chest discomfort, sob or any other complaints. A cardiology evaluation is requested for abnormal blood cultures     HPI:    PMH:   Moderate aortic stenosis  Systolic congestive heart failure  Paroxysmal atrial fibrillation  Coronary artery disease  Essential hypertension  Type 2 diabetes mellitus with diabetic neuropathy, without long-term current use of insulin    PSH:   S/P CABG (coronary artery bypass graft)  History of excision of pilonidal cyst  H/O inguinal hernia repair    Family History:  FAMILY HISTORY:  Family history of acute myocardial infarction: father in 90s      Social History:  Smoking:  Alcohol:  Drugs:    Allergies:  allery to INSECT VENOM (Hives)  Novocain (Hives)  Originally Entered as [Unknown] reaction to [ENVIRONMENTAL] (Unknown)      Medications:  acetaminophen   Tablet .. 650 milliGRAM(s) Oral every 6 hours PRN  aspirin enteric coated 81 milliGRAM(s) Oral daily  atorvastatin 80 milliGRAM(s) Oral at bedtime  ceFAZolin   IVPB 2000 milliGRAM(s) IV Intermittent every 8 hours  dextrose 40% Gel 15 Gram(s) Oral once PRN  dextrose 5%. 1000 milliLiter(s) IV Continuous <Continuous>  dextrose 50% Injectable 12.5 Gram(s) IV Push once  dextrose 50% Injectable 25 Gram(s) IV Push once  dextrose 50% Injectable 25 Gram(s) IV Push once  enoxaparin Injectable 40 milliGRAM(s) SubCutaneous daily  glucagon  Injectable 1 milliGRAM(s) IntraMuscular once PRN  insulin glargine Injectable (LANTUS) 15 Unit(s) SubCutaneous at bedtime  insulin lispro (HumaLOG) corrective regimen sliding scale   SubCutaneous three times a day before meals  insulin lispro (HumaLOG) corrective regimen sliding scale   SubCutaneous at bedtime  metoprolol succinate ER 50 milliGRAM(s) Oral daily  ondansetron Injectable 4 milliGRAM(s) IV Push every 8 hours  oxyCODONE    5 mG/acetaminophen 325 mG 1 Tablet(s) Oral every 6 hours PRN  polyethylene glycol 3350 17 Gram(s) Oral every 12 hours PRN  sacubitril 49 mG/valsartan 51 mG 1 Tablet(s) Oral two times a day  spironolactone 25 milliGRAM(s) Oral daily      REVIEW OF SYSTEMS:  CONSTITUTIONAL: No fever, weight loss, or fatigue  EYES: No eye pain, visual disturbances, or discharge  ENMT:  No difficulty hearing, tinnitus, vertigo; No sinus or throat pain  NECK: No pain or stiffness  BREASTS: No pain, masses, or nipple discharge  RESPIRATORY: No cough, wheezing, chills or hemoptysis; No shortness of breath  CARDIOVASCULAR: No chest pain, palpitations, dizziness, or leg swelling  GASTROINTESTINAL: No abdominal or epigastric pain. No nausea, vomiting, or hematemesis; No diarrhea or constipation. No melena or hematochezia.  GENITOURINARY: No dysuria, frequency, hematuria, or incontinence  NEUROLOGICAL: No headaches, memory loss, loss of strength, numbness, or tremors  SKIN: No itching, burning, rashes, or lesions   LYMPH NODES: No enlarged glands  ENDOCRINE: No heat or cold intolerance; No hair loss  MUSCULOSKELETAL: No joint pain or swelling; No muscle, back, or extremity pain  PSYCHIATRIC: No depression, anxiety, mood swings, or difficulty sleeping  HEME/LYMPH: No easy bruising, or bleeding gums  ALLERY AND IMMUNOLOGIC: No hives or eczema    Physical Exam:  T(C): 37.2 (08-01-19 @ 07:52), Max: 39.3 (07-31-19 @ 15:54)  HR: 71 (08-01-19 @ 07:52) (63 - 79)  BP: 134/72 (08-01-19 @ 07:52) (122/70 - 152/87)  RR: 15 (08-01-19 @ 07:52) (15 - 18)  SpO2: 96% (08-01-19 @ 07:52) (95% - 98%)  Wt(kg): --    GENERAL: NAD, well-groomed, well-developed moderately overweight  HEAD:  Atraumatic, Normocephalic  EYES: EOMI, conjunctiva and sclera clear  ENT: Moist mucous membranes,  NECK: Supple, No JVD, no bruits  CHEST/LUNG: Clear to percussion bilaterally; No rales, rhonchi, wheezing, or rubs  HEART: Regular rate and rhythm; No murmurs, rubs, or gallops PMI non displaced.  ABDOMEN: Soft, Nontender, Nondistended; Bowel sounds present  EXTREMITIES:  2+ Peripheral Pulses, No clubbing, cyanosis, or edema  SKIN: No rashes or lesions  NERVOUS SYSTEM:  Cranial Nerves II-XII intact     Cardiovascular Diagnostic Testing:  ECG:    < from: 12 Lead ECG (07.31.19 @ 15:45) >  Ventricular Rate 78 BPM    Atrial Rate 78 BPM    P-R Interval 182 ms    QRS Duration 98 ms    Q-T Interval 402 ms    QTC Calculation(Bezet) 458 ms    P Axis 24 degrees    R Axis 48 degrees    T Axis 75 degrees    Diagnosis Line Normal sinus rhythm  Inferior infarct (cited on or before 29-JUL-2019)  Anterolateral infarct (cited on or before 29-JUL-2019)  Poor R wave progression  Low voltage QRS  Low voltage in limb leads.  Abnormal ECG  When compared with ECG of 29-JUL-2019 15:28,  No significant change was found    Confirmed by SHAUNA POLK MD ([id number]) on 8/1/2019 8:40:25 AM    < end of copied text >      ECHO:    < from: TTE Echo Complete w/Doppler (08.01.19 @ 08:50) >  Summary:   1. Left ventricular ejection fraction, by visual estimation, is 45%.   2. Mildly decreased global left ventricular systolic function.   3. Spectral Doppler shows impaired relaxation pattern of left  ventricular myocardial filling (Grade I diastolic dysfunction).   4. There is severe concentric left ventricular hypertrophy.   5. Mild thickening and calcification of the anterior and posterior   mitral valve leaflets.   6. Dilatation of the aortic root.   7. No obvious vegitations affecting the aortic mitral or tricuspid   valve. TTE may be insensitive to endocarditis.   8. LA volume Index is 47.0 ml/m² ml/m2.   9. Peak transaortic gradient equals 25.9 mmHg, mean transaortic gradient   [id number] mmHg, the calculated aortic valve area equals 1.03 cm² by the   continuity equation consistent with moderate aortic stenosis.    [id number] Shauna Polk , Electronically signed on 8/1/2019 at 3:08:17   PM       *** Final ***        SHAUNA POLK   This document has been electronically signed. Aug  1 2019  8:50AM    < end of copied text >      Labs:                        10.4   7.16  )-----------( 201      ( 01 Aug 2019 05:30 )             30.3     08-01    136  |  101  |  15  ----------------------------<  243<H>  4.7   |  27  |  1.12    Ca    8.2<L>      01 Aug 2019 05:30    TPro  6.4  /  Alb  2.5<L>  /  TBili  0.9  /  DBili  x   /  AST  15  /  ALT  16  /  AlkPhos  65  08-01    PT/INR - ( 31 Jul 2019 15:23 )   PT: 16.4 sec;   INR: 1.45 ratio         PTT - ( 31 Jul 2019 15:23 )  PTT:26.9 sec    Hemoglobin A1C, Whole Blood: 11.9 % (08-01 @ 05:30)        Imaging:

## 2019-08-01 NOTE — PROGRESS NOTE ADULT - PROBLEM SELECTOR PLAN 1
pt found with staph aureus bacteremia s/p recent cipro for pyelonephritis, id consult pending, continue vanco 1 gram every 12 hours, tte done at bedside r/o vegetation, ct abd and pelvis and abd sono results noted.

## 2019-08-01 NOTE — DISCHARGE NOTE NURSING/CASE MANAGEMENT/SOCIAL WORK - NSDCPEWEB_GEN_ALL_CORE
[facility] for Tobacco [facility] website --- [email or web address] website --- [email or web address]

## 2019-08-01 NOTE — CONSULT NOTE ADULT - ATTENDING COMMENTS
staph bacteremia  I agree with ID service that staph requires special consideration due to it possible invasiveness. the plain tte is not diagnostic of endocarditis but does not exclude it. would agree with a low threshold for JIMMY given the nature of the organism. Jonathon is anxious to go on a trip out of the country. I advise against this given the potential seriousness of the bacteremia.

## 2019-08-01 NOTE — DISCHARGE NOTE NURSING/CASE MANAGEMENT/SOCIAL WORK - NSDCPEEMAIL_GEN_ALL_CORE
[facility] for Tobacco Control email [email or web address]

## 2019-08-01 NOTE — PROGRESS NOTE ADULT - PROBLEM SELECTOR PLAN 4
a1c 11.9, blood sugars 200-300's, will increase lantus to 15 units at bedtime and give stat dose of 5 units, continue accuchecks, monitor closely

## 2019-08-01 NOTE — CONSULT NOTE ADULT - ASSESSMENT
62 yo diabetic male with community onset MSSA bacteremia.  While it is possible this infection developed in the urine, it is also possible he seeded his kidney from the bloodstream.  He has a sustained bacteremia over at least 24 hours, and with his history of fever x 1 week he may have been bacteremic for 1 week.  He has had some hesitancy but no prior history of any  infections.  30-40% of community onset staph bacteremias do not have a clear primary focus.  Suggest:  1.Cefazolin 2 gr q8  2.repeat blood cultures to document control of bacteremia  3.Echo, would consider a JIMMY as well  4.He may ultimately need a PICC for a more extended course of treatment

## 2019-08-01 NOTE — PROGRESS NOTE ADULT - PROBLEM SELECTOR PLAN 2
imaging suggestive of pyelonphritis which is probable source of bacteremia, id consult pending, continue vanco 1 gram every 12 hours

## 2019-08-02 LAB
ALBUMIN SERPL ELPH-MCNC: 2.6 G/DL — LOW (ref 3.3–5)
ALP SERPL-CCNC: 68 U/L — SIGNIFICANT CHANGE UP (ref 40–120)
ALT FLD-CCNC: 18 U/L DA — SIGNIFICANT CHANGE UP (ref 10–45)
ANION GAP SERPL CALC-SCNC: 10 MMOL/L — SIGNIFICANT CHANGE UP (ref 5–17)
AST SERPL-CCNC: 25 U/L — SIGNIFICANT CHANGE UP (ref 10–40)
BILIRUB SERPL-MCNC: 1 MG/DL — SIGNIFICANT CHANGE UP (ref 0.2–1.2)
BUN SERPL-MCNC: 14 MG/DL — SIGNIFICANT CHANGE UP (ref 7–23)
CALCIUM SERPL-MCNC: 8.5 MG/DL — SIGNIFICANT CHANGE UP (ref 8.4–10.5)
CHLORIDE SERPL-SCNC: 101 MMOL/L — SIGNIFICANT CHANGE UP (ref 96–108)
CO2 SERPL-SCNC: 24 MMOL/L — SIGNIFICANT CHANGE UP (ref 22–31)
CREAT SERPL-MCNC: 1.1 MG/DL — SIGNIFICANT CHANGE UP (ref 0.5–1.3)
CULTURE RESULTS: NO GROWTH — SIGNIFICANT CHANGE UP
CULTURE RESULTS: SIGNIFICANT CHANGE UP
GLUCOSE BLDC GLUCOMTR-MCNC: 201 MG/DL — HIGH (ref 70–99)
GLUCOSE BLDC GLUCOMTR-MCNC: 223 MG/DL — HIGH (ref 70–99)
GLUCOSE BLDC GLUCOMTR-MCNC: 258 MG/DL — HIGH (ref 70–99)
GLUCOSE BLDC GLUCOMTR-MCNC: 327 MG/DL — HIGH (ref 70–99)
GLUCOSE SERPL-MCNC: 163 MG/DL — HIGH (ref 70–99)
GRAM STN FLD: SIGNIFICANT CHANGE UP
GRAM STN FLD: SIGNIFICANT CHANGE UP
HCT VFR BLD CALC: 31.9 % — LOW (ref 39–50)
HGB BLD-MCNC: 11.1 G/DL — LOW (ref 13–17)
MCHC RBC-ENTMCNC: 31.3 PG — SIGNIFICANT CHANGE UP (ref 27–34)
MCHC RBC-ENTMCNC: 34.8 GM/DL — SIGNIFICANT CHANGE UP (ref 32–36)
MCV RBC AUTO: 89.9 FL — SIGNIFICANT CHANGE UP (ref 80–100)
NRBC # BLD: 0 /100 WBCS — SIGNIFICANT CHANGE UP (ref 0–0)
PLATELET # BLD AUTO: 212 K/UL — SIGNIFICANT CHANGE UP (ref 150–400)
POTASSIUM SERPL-MCNC: 4.3 MMOL/L — SIGNIFICANT CHANGE UP (ref 3.5–5.3)
POTASSIUM SERPL-SCNC: 4.3 MMOL/L — SIGNIFICANT CHANGE UP (ref 3.5–5.3)
PROT SERPL-MCNC: 6.7 G/DL — SIGNIFICANT CHANGE UP (ref 6–8.3)
RBC # BLD: 3.55 M/UL — LOW (ref 4.2–5.8)
RBC # FLD: 12 % — SIGNIFICANT CHANGE UP (ref 10.3–14.5)
SODIUM SERPL-SCNC: 135 MMOL/L — SIGNIFICANT CHANGE UP (ref 135–145)
SPECIMEN SOURCE: SIGNIFICANT CHANGE UP
WBC # BLD: 7.64 K/UL — SIGNIFICANT CHANGE UP (ref 3.8–10.5)
WBC # FLD AUTO: 7.64 K/UL — SIGNIFICANT CHANGE UP (ref 3.8–10.5)

## 2019-08-02 PROCEDURE — 99233 SBSQ HOSP IP/OBS HIGH 50: CPT | Mod: GC

## 2019-08-02 RX ORDER — INSULIN GLARGINE 100 [IU]/ML
20 INJECTION, SOLUTION SUBCUTANEOUS AT BEDTIME
Refills: 0 | Status: DISCONTINUED | OUTPATIENT
Start: 2019-08-02 | End: 2019-08-03

## 2019-08-02 RX ORDER — INSULIN LISPRO 100/ML
4 VIAL (ML) SUBCUTANEOUS
Refills: 0 | Status: DISCONTINUED | OUTPATIENT
Start: 2019-08-02 | End: 2019-08-03

## 2019-08-02 RX ADMIN — POLYETHYLENE GLYCOL 3350 17 GRAM(S): 17 POWDER, FOR SOLUTION ORAL at 08:31

## 2019-08-02 RX ADMIN — Medication 4 UNIT(S): at 16:36

## 2019-08-02 RX ADMIN — ONDANSETRON 4 MILLIGRAM(S): 8 TABLET, FILM COATED ORAL at 05:28

## 2019-08-02 RX ADMIN — ENOXAPARIN SODIUM 40 MILLIGRAM(S): 100 INJECTION SUBCUTANEOUS at 11:47

## 2019-08-02 RX ADMIN — SACUBITRIL AND VALSARTAN 1 TABLET(S): 24; 26 TABLET, FILM COATED ORAL at 17:13

## 2019-08-02 RX ADMIN — Medication 50 MILLIGRAM(S): at 05:28

## 2019-08-02 RX ADMIN — Medication 100 MILLIGRAM(S): at 21:23

## 2019-08-02 RX ADMIN — Medication 4: at 07:39

## 2019-08-02 RX ADMIN — INSULIN GLARGINE 20 UNIT(S): 100 INJECTION, SOLUTION SUBCUTANEOUS at 21:21

## 2019-08-02 RX ADMIN — Medication 8: at 11:48

## 2019-08-02 RX ADMIN — SACUBITRIL AND VALSARTAN 1 TABLET(S): 24; 26 TABLET, FILM COATED ORAL at 05:28

## 2019-08-02 RX ADMIN — SPIRONOLACTONE 25 MILLIGRAM(S): 25 TABLET, FILM COATED ORAL at 05:28

## 2019-08-02 RX ADMIN — Medication 81 MILLIGRAM(S): at 11:47

## 2019-08-02 RX ADMIN — ONDANSETRON 4 MILLIGRAM(S): 8 TABLET, FILM COATED ORAL at 22:46

## 2019-08-02 RX ADMIN — Medication 100 MILLIGRAM(S): at 05:28

## 2019-08-02 RX ADMIN — Medication 6: at 16:36

## 2019-08-02 RX ADMIN — Medication 100 MILLIGRAM(S): at 14:03

## 2019-08-02 RX ADMIN — ATORVASTATIN CALCIUM 80 MILLIGRAM(S): 80 TABLET, FILM COATED ORAL at 21:23

## 2019-08-02 NOTE — PROGRESS NOTE ADULT - PROBLEM SELECTOR PLAN 2
imaging suggestive of pyelonphritis which is probable source of bacteremia, id consult pending, continue abx as per ID

## 2019-08-02 NOTE — PROGRESS NOTE ADULT - PROBLEM SELECTOR PLAN 4
a1c 11.9, blood sugars remain elevated, a1c 11.9, diabetic educator consult pending, increased lantus 20 units, added pre meal coverage, continue to monitor closely a1c 11.9, blood sugars remain elevated, a1c 11.9, diabetic educator consult pending, increased Lantus 20 units, added 4 units Lispro pre meal coverage, continue to monitor closely

## 2019-08-02 NOTE — PROGRESS NOTE ADULT - PROBLEM SELECTOR PLAN 1
pt found with staph aureus bacteremia s/p recent cipro for pyelonephritis, id consult appreciated, tte results reviewed, ct abd and pelvis and abd sono reviewed,  continue cefazolin 2 grams every 8 hours, follow up repeat blood cultures, may need a picc for extended course of treatment, consider JIMMY pt found with staph aureus bacteremia s/p recent cipro for pyelonephritis, id consult appreciated, tte results reviewed, ct abd and pelvis and abd sono reviewed,  continue cefazolin 2 grams every 8 hours, needs repeat blood cultures, may need a picc for extended course of treatment, consider JIMMY

## 2019-08-02 NOTE — PROGRESS NOTE ADULT - PROBLEM SELECTOR PLAN 7
chronic stable, cards following,  TTE ef 45% grade 1 dd, severe lvh, continue entresto, metoprolol, spirinolactone

## 2019-08-02 NOTE — CHART NOTE - NSCHARTNOTEFT_GEN_A_CORE
NUTRITION Services    Provided patient with Consistent Carbohydrate  diet material . Obtained diet history , patient consumed a Regular diet PTA and was taking Metformin, not self monitoring BS. Patient reports DM x 20yrs, diet instruction in past however noncompliant A1c 11.9%. Reviewed importance of portion control, meal planning and Carbohydrate counting. Understanding appeared fair, had to redirect patient back to  diet instruction & importance of self monitoring and encouraged weight loss. Name & number provided and encouraged patient to call for out patient follow up.

## 2019-08-03 LAB
ALBUMIN SERPL ELPH-MCNC: 2.7 G/DL — LOW (ref 3.3–5)
ALP SERPL-CCNC: 67 U/L — SIGNIFICANT CHANGE UP (ref 40–120)
ALT FLD-CCNC: 19 U/L DA — SIGNIFICANT CHANGE UP (ref 10–45)
ANION GAP SERPL CALC-SCNC: 7 MMOL/L — SIGNIFICANT CHANGE UP (ref 5–17)
AST SERPL-CCNC: 19 U/L — SIGNIFICANT CHANGE UP (ref 10–40)
BILIRUB SERPL-MCNC: 0.8 MG/DL — SIGNIFICANT CHANGE UP (ref 0.2–1.2)
BUN SERPL-MCNC: 18 MG/DL — SIGNIFICANT CHANGE UP (ref 7–23)
CALCIUM SERPL-MCNC: 8.7 MG/DL — SIGNIFICANT CHANGE UP (ref 8.4–10.5)
CHLORIDE SERPL-SCNC: 102 MMOL/L — SIGNIFICANT CHANGE UP (ref 96–108)
CO2 SERPL-SCNC: 28 MMOL/L — SIGNIFICANT CHANGE UP (ref 22–31)
CREAT SERPL-MCNC: 0.95 MG/DL — SIGNIFICANT CHANGE UP (ref 0.5–1.3)
GLUCOSE BLDC GLUCOMTR-MCNC: 112 MG/DL — HIGH (ref 70–99)
GLUCOSE BLDC GLUCOMTR-MCNC: 178 MG/DL — HIGH (ref 70–99)
GLUCOSE BLDC GLUCOMTR-MCNC: 190 MG/DL — HIGH (ref 70–99)
GLUCOSE BLDC GLUCOMTR-MCNC: 303 MG/DL — HIGH (ref 70–99)
GLUCOSE SERPL-MCNC: 171 MG/DL — HIGH (ref 70–99)
HCT VFR BLD CALC: 33.1 % — LOW (ref 39–50)
HGB BLD-MCNC: 11.3 G/DL — LOW (ref 13–17)
MCHC RBC-ENTMCNC: 30.1 PG — SIGNIFICANT CHANGE UP (ref 27–34)
MCHC RBC-ENTMCNC: 34.1 GM/DL — SIGNIFICANT CHANGE UP (ref 32–36)
MCV RBC AUTO: 88.3 FL — SIGNIFICANT CHANGE UP (ref 80–100)
NRBC # BLD: 0 /100 WBCS — SIGNIFICANT CHANGE UP (ref 0–0)
PLATELET # BLD AUTO: 246 K/UL — SIGNIFICANT CHANGE UP (ref 150–400)
POTASSIUM SERPL-MCNC: 4.3 MMOL/L — SIGNIFICANT CHANGE UP (ref 3.5–5.3)
POTASSIUM SERPL-SCNC: 4.3 MMOL/L — SIGNIFICANT CHANGE UP (ref 3.5–5.3)
PROT SERPL-MCNC: 6.7 G/DL — SIGNIFICANT CHANGE UP (ref 6–8.3)
RBC # BLD: 3.75 M/UL — LOW (ref 4.2–5.8)
RBC # FLD: 12 % — SIGNIFICANT CHANGE UP (ref 10.3–14.5)
SODIUM SERPL-SCNC: 137 MMOL/L — SIGNIFICANT CHANGE UP (ref 135–145)
WBC # BLD: 8.64 K/UL — SIGNIFICANT CHANGE UP (ref 3.8–10.5)
WBC # FLD AUTO: 8.64 K/UL — SIGNIFICANT CHANGE UP (ref 3.8–10.5)

## 2019-08-03 PROCEDURE — 99233 SBSQ HOSP IP/OBS HIGH 50: CPT | Mod: GC

## 2019-08-03 PROCEDURE — 99232 SBSQ HOSP IP/OBS MODERATE 35: CPT

## 2019-08-03 RX ORDER — INSULIN LISPRO 100/ML
VIAL (ML) SUBCUTANEOUS
Refills: 0 | Status: DISCONTINUED | OUTPATIENT
Start: 2019-08-03 | End: 2019-08-08

## 2019-08-03 RX ORDER — INSULIN GLARGINE 100 [IU]/ML
22 INJECTION, SOLUTION SUBCUTANEOUS AT BEDTIME
Refills: 0 | Status: DISCONTINUED | OUTPATIENT
Start: 2019-08-03 | End: 2019-08-04

## 2019-08-03 RX ORDER — INSULIN LISPRO 100/ML
7 VIAL (ML) SUBCUTANEOUS
Refills: 0 | Status: DISCONTINUED | OUTPATIENT
Start: 2019-08-03 | End: 2019-08-04

## 2019-08-03 RX ADMIN — Medication 100 MILLIGRAM(S): at 14:16

## 2019-08-03 RX ADMIN — ENOXAPARIN SODIUM 40 MILLIGRAM(S): 100 INJECTION SUBCUTANEOUS at 11:01

## 2019-08-03 RX ADMIN — INSULIN GLARGINE 22 UNIT(S): 100 INJECTION, SOLUTION SUBCUTANEOUS at 21:12

## 2019-08-03 RX ADMIN — SACUBITRIL AND VALSARTAN 1 TABLET(S): 24; 26 TABLET, FILM COATED ORAL at 06:05

## 2019-08-03 RX ADMIN — Medication 7 UNIT(S): at 17:21

## 2019-08-03 RX ADMIN — SACUBITRIL AND VALSARTAN 1 TABLET(S): 24; 26 TABLET, FILM COATED ORAL at 17:21

## 2019-08-03 RX ADMIN — Medication 2: at 08:30

## 2019-08-03 RX ADMIN — ONDANSETRON 4 MILLIGRAM(S): 8 TABLET, FILM COATED ORAL at 06:05

## 2019-08-03 RX ADMIN — Medication 81 MILLIGRAM(S): at 11:01

## 2019-08-03 RX ADMIN — Medication 4 UNIT(S): at 08:31

## 2019-08-03 RX ADMIN — Medication 8: at 12:25

## 2019-08-03 RX ADMIN — ATORVASTATIN CALCIUM 80 MILLIGRAM(S): 80 TABLET, FILM COATED ORAL at 21:08

## 2019-08-03 RX ADMIN — Medication 100 MILLIGRAM(S): at 06:04

## 2019-08-03 RX ADMIN — Medication 50 MILLIGRAM(S): at 06:05

## 2019-08-03 RX ADMIN — Medication 4 UNIT(S): at 12:24

## 2019-08-03 RX ADMIN — Medication 2: at 17:22

## 2019-08-03 RX ADMIN — SPIRONOLACTONE 25 MILLIGRAM(S): 25 TABLET, FILM COATED ORAL at 06:05

## 2019-08-03 RX ADMIN — Medication 100 MILLIGRAM(S): at 21:08

## 2019-08-03 NOTE — PROGRESS NOTE ADULT - PROBLEM SELECTOR PLAN 7
chronic stable, cards following,  TTE ef 45% grade 1 dd, severe lvh, continue entresto, metoprolol, spirinolactone chronic stable, cards following,  TTE ef 45% grade 1 dd, severe lvh  - continue entresto, metoprolol, spirinolactone

## 2019-08-03 NOTE — PROGRESS NOTE ADULT - PROBLEM SELECTOR PLAN 1
pt found with staph aureus bacteremia s/p recent cipro for pyelonephritis, id consult appreciated, tte results reviewed, ct abd and pelvis and abd sono reviewed,  continue cefazolin 2 grams every 8 hours, repeat BCx results pending, when BCx negative -  picc for extended course of treatment, JIMMY on Monday ([facility]) pt found with staph aureus bacteremia s/p recent cipro for pyelonephritis. TTE without vegetations  - continue cefazolin 2 grams every 8 hours  - f/u Blood Cx from 08/03 (prior Blood Cx + staph aureus)   - PICC for extended course of treatmen once blood culture clears  - JIMMY on Monday ([facility])

## 2019-08-03 NOTE — PROGRESS NOTE ADULT - PROBLEM SELECTOR PLAN 2
imaging suggestive of pyelonphritis which is probable source of bacteremia, id following, continue abx as per ID imaging suggestive of pyelonophritis which maybe 2/2 hematogenous spread  - continue Cefazolin 2g q8hrs  - ID following

## 2019-08-04 LAB
ALBUMIN SERPL ELPH-MCNC: 2.6 G/DL — LOW (ref 3.3–5)
ALP SERPL-CCNC: 73 U/L — SIGNIFICANT CHANGE UP (ref 40–120)
ALT FLD-CCNC: 18 U/L DA — SIGNIFICANT CHANGE UP (ref 10–45)
ANION GAP SERPL CALC-SCNC: 8 MMOL/L — SIGNIFICANT CHANGE UP (ref 5–17)
AST SERPL-CCNC: 23 U/L — SIGNIFICANT CHANGE UP (ref 10–40)
BILIRUB SERPL-MCNC: 0.7 MG/DL — SIGNIFICANT CHANGE UP (ref 0.2–1.2)
BUN SERPL-MCNC: 17 MG/DL — SIGNIFICANT CHANGE UP (ref 7–23)
CALCIUM SERPL-MCNC: 8.4 MG/DL — SIGNIFICANT CHANGE UP (ref 8.4–10.5)
CHLORIDE SERPL-SCNC: 103 MMOL/L — SIGNIFICANT CHANGE UP (ref 96–108)
CO2 SERPL-SCNC: 27 MMOL/L — SIGNIFICANT CHANGE UP (ref 22–31)
CREAT SERPL-MCNC: 1.06 MG/DL — SIGNIFICANT CHANGE UP (ref 0.5–1.3)
CULTURE RESULTS: SIGNIFICANT CHANGE UP
CULTURE RESULTS: SIGNIFICANT CHANGE UP
GLUCOSE BLDC GLUCOMTR-MCNC: 125 MG/DL — HIGH (ref 70–99)
GLUCOSE BLDC GLUCOMTR-MCNC: 182 MG/DL — HIGH (ref 70–99)
GLUCOSE BLDC GLUCOMTR-MCNC: 193 MG/DL — HIGH (ref 70–99)
GLUCOSE BLDC GLUCOMTR-MCNC: 287 MG/DL — HIGH (ref 70–99)
GLUCOSE SERPL-MCNC: 182 MG/DL — HIGH (ref 70–99)
GRAM STN FLD: SIGNIFICANT CHANGE UP
GRAM STN FLD: SIGNIFICANT CHANGE UP
HCT VFR BLD CALC: 32.4 % — LOW (ref 39–50)
HGB BLD-MCNC: 11.1 G/DL — LOW (ref 13–17)
MCHC RBC-ENTMCNC: 30.1 PG — SIGNIFICANT CHANGE UP (ref 27–34)
MCHC RBC-ENTMCNC: 34.3 GM/DL — SIGNIFICANT CHANGE UP (ref 32–36)
MCV RBC AUTO: 87.8 FL — SIGNIFICANT CHANGE UP (ref 80–100)
NRBC # BLD: 0 /100 WBCS — SIGNIFICANT CHANGE UP (ref 0–0)
PLATELET # BLD AUTO: 236 K/UL — SIGNIFICANT CHANGE UP (ref 150–400)
POTASSIUM SERPL-MCNC: 4.5 MMOL/L — SIGNIFICANT CHANGE UP (ref 3.5–5.3)
POTASSIUM SERPL-SCNC: 4.5 MMOL/L — SIGNIFICANT CHANGE UP (ref 3.5–5.3)
PROT SERPL-MCNC: 6.6 G/DL — SIGNIFICANT CHANGE UP (ref 6–8.3)
RBC # BLD: 3.69 M/UL — LOW (ref 4.2–5.8)
RBC # FLD: 12 % — SIGNIFICANT CHANGE UP (ref 10.3–14.5)
SODIUM SERPL-SCNC: 138 MMOL/L — SIGNIFICANT CHANGE UP (ref 135–145)
SPECIMEN SOURCE: SIGNIFICANT CHANGE UP
WBC # BLD: 8.77 K/UL — SIGNIFICANT CHANGE UP (ref 3.8–10.5)
WBC # FLD AUTO: 8.77 K/UL — SIGNIFICANT CHANGE UP (ref 3.8–10.5)

## 2019-08-04 PROCEDURE — 99233 SBSQ HOSP IP/OBS HIGH 50: CPT | Mod: GC

## 2019-08-04 RX ORDER — INSULIN LISPRO 100/ML
10 VIAL (ML) SUBCUTANEOUS
Refills: 0 | Status: DISCONTINUED | OUTPATIENT
Start: 2019-08-04 | End: 2019-08-08

## 2019-08-04 RX ORDER — INSULIN GLARGINE 100 [IU]/ML
25 INJECTION, SOLUTION SUBCUTANEOUS AT BEDTIME
Refills: 0 | Status: DISCONTINUED | OUTPATIENT
Start: 2019-08-04 | End: 2019-08-05

## 2019-08-04 RX ADMIN — ATORVASTATIN CALCIUM 80 MILLIGRAM(S): 80 TABLET, FILM COATED ORAL at 21:14

## 2019-08-04 RX ADMIN — Medication 81 MILLIGRAM(S): at 11:48

## 2019-08-04 RX ADMIN — Medication 6: at 12:31

## 2019-08-04 RX ADMIN — Medication 100 MILLIGRAM(S): at 05:46

## 2019-08-04 RX ADMIN — ONDANSETRON 4 MILLIGRAM(S): 8 TABLET, FILM COATED ORAL at 21:14

## 2019-08-04 RX ADMIN — Medication 10 UNIT(S): at 17:01

## 2019-08-04 RX ADMIN — SPIRONOLACTONE 25 MILLIGRAM(S): 25 TABLET, FILM COATED ORAL at 05:46

## 2019-08-04 RX ADMIN — POLYETHYLENE GLYCOL 3350 17 GRAM(S): 17 POWDER, FOR SOLUTION ORAL at 22:46

## 2019-08-04 RX ADMIN — SACUBITRIL AND VALSARTAN 1 TABLET(S): 24; 26 TABLET, FILM COATED ORAL at 17:00

## 2019-08-04 RX ADMIN — Medication 7 UNIT(S): at 07:54

## 2019-08-04 RX ADMIN — SACUBITRIL AND VALSARTAN 1 TABLET(S): 24; 26 TABLET, FILM COATED ORAL at 05:46

## 2019-08-04 RX ADMIN — Medication 7 UNIT(S): at 12:32

## 2019-08-04 RX ADMIN — INSULIN GLARGINE 25 UNIT(S): 100 INJECTION, SOLUTION SUBCUTANEOUS at 21:18

## 2019-08-04 RX ADMIN — Medication 2: at 07:54

## 2019-08-04 RX ADMIN — Medication 100 MILLIGRAM(S): at 21:15

## 2019-08-04 RX ADMIN — ENOXAPARIN SODIUM 40 MILLIGRAM(S): 100 INJECTION SUBCUTANEOUS at 11:48

## 2019-08-04 RX ADMIN — Medication 100 MILLIGRAM(S): at 13:30

## 2019-08-04 RX ADMIN — Medication 2: at 21:15

## 2019-08-04 RX ADMIN — Medication 50 MILLIGRAM(S): at 05:46

## 2019-08-04 NOTE — PROGRESS NOTE ADULT - PROBLEM SELECTOR PLAN 2
imaging suggestive of pyelonophritis which maybe 2/2 hematogenous spread  - continue Cefazolin 2g q8hrs  - ID following

## 2019-08-04 NOTE — PROGRESS NOTE ADULT - PROBLEM SELECTOR PLAN 4
a1c 11.9, blood sugars better controlled after lantus increased to 22 units HS  - Diabetic educator  - Monitor FS  - Increase premeal to 7 units Lispro TIDAC   - continue mISS  - consistent carb diet a1c 11.9,   - Diabetic educator  - Monitor FS  - increase Lantus to 25 units qHS  - Increase premeal to 10 units Lispro TIDAC   - continue mISS  - consistent carb diet

## 2019-08-04 NOTE — PROGRESS NOTE ADULT - PROBLEM SELECTOR PLAN 7
chronic stable, cards following,  TTE ef 45% grade 1 dd, severe lvh  - continue entresto, metoprolol, spironolactone

## 2019-08-04 NOTE — PROGRESS NOTE ADULT - PROBLEM SELECTOR PLAN 1
pt found with staph aureus bacteremia s/p recent cipro for pyelonephritis. TTE without vegetations  - continue cefazolin 2 grams every 8 hours  - Blood Cx from 08/03 + staph aureus  - PICC for extended course of treatmen once blood culture clears  - F/u BCx from 8/05  - JIMMY most likely on Wed with Dr. Singleton; unlikely Tx to [facility] for JIMMY as BCx still positive and PICC cannot be placed pt found with staph aureus bacteremia s/p recent cipro for pyelonephritis. TTE without vegetations  - continue cefazolin 2 grams every 8 hours  - Blood Cx from 08/03 + staph aureus  - PICC for extended course of treatment once blood culture clears  - F/u BCx from 8/05  - JIMMY most likely on Wed with Dr. Singleton; unlikely Tx to [facility] for JIMMY as BCx still positive and PICC cannot be placed

## 2019-08-05 ENCOUNTER — APPOINTMENT (OUTPATIENT)
Dept: CARDIOLOGY | Facility: CLINIC | Age: 64
End: 2019-08-05

## 2019-08-05 ENCOUNTER — TRANSCRIPTION ENCOUNTER (OUTPATIENT)
Age: 64
End: 2019-08-05

## 2019-08-05 DIAGNOSIS — R78.81 BACTEREMIA: ICD-10-CM

## 2019-08-05 LAB
ANION GAP SERPL CALC-SCNC: 9 MMOL/L — SIGNIFICANT CHANGE UP (ref 5–17)
BUN SERPL-MCNC: 19 MG/DL — SIGNIFICANT CHANGE UP (ref 7–23)
CALCIUM SERPL-MCNC: 8.5 MG/DL — SIGNIFICANT CHANGE UP (ref 8.4–10.5)
CHLORIDE SERPL-SCNC: 102 MMOL/L — SIGNIFICANT CHANGE UP (ref 96–108)
CO2 SERPL-SCNC: 26 MMOL/L — SIGNIFICANT CHANGE UP (ref 22–31)
CREAT SERPL-MCNC: 1.1 MG/DL — SIGNIFICANT CHANGE UP (ref 0.5–1.3)
CULTURE RESULTS: SIGNIFICANT CHANGE UP
GLUCOSE BLDC GLUCOMTR-MCNC: 139 MG/DL — HIGH (ref 70–99)
GLUCOSE BLDC GLUCOMTR-MCNC: 193 MG/DL — HIGH (ref 70–99)
GLUCOSE BLDC GLUCOMTR-MCNC: 196 MG/DL — HIGH (ref 70–99)
GLUCOSE BLDC GLUCOMTR-MCNC: 270 MG/DL — HIGH (ref 70–99)
GLUCOSE SERPL-MCNC: 190 MG/DL — HIGH (ref 70–99)
HCT VFR BLD CALC: 32.8 % — LOW (ref 39–50)
HGB BLD-MCNC: 11.2 G/DL — LOW (ref 13–17)
MCHC RBC-ENTMCNC: 30.9 PG — SIGNIFICANT CHANGE UP (ref 27–34)
MCHC RBC-ENTMCNC: 34.1 GM/DL — SIGNIFICANT CHANGE UP (ref 32–36)
MCV RBC AUTO: 90.4 FL — SIGNIFICANT CHANGE UP (ref 80–100)
NRBC # BLD: 0 /100 WBCS — SIGNIFICANT CHANGE UP (ref 0–0)
PLATELET # BLD AUTO: 268 K/UL — SIGNIFICANT CHANGE UP (ref 150–400)
POTASSIUM SERPL-MCNC: 4.5 MMOL/L — SIGNIFICANT CHANGE UP (ref 3.5–5.3)
POTASSIUM SERPL-SCNC: 4.5 MMOL/L — SIGNIFICANT CHANGE UP (ref 3.5–5.3)
RBC # BLD: 3.63 M/UL — LOW (ref 4.2–5.8)
RBC # FLD: 12 % — SIGNIFICANT CHANGE UP (ref 10.3–14.5)
SODIUM SERPL-SCNC: 137 MMOL/L — SIGNIFICANT CHANGE UP (ref 135–145)
SPECIMEN SOURCE: SIGNIFICANT CHANGE UP
WBC # BLD: 9.67 K/UL — SIGNIFICANT CHANGE UP (ref 3.8–10.5)
WBC # FLD AUTO: 9.67 K/UL — SIGNIFICANT CHANGE UP (ref 3.8–10.5)

## 2019-08-05 PROCEDURE — 99233 SBSQ HOSP IP/OBS HIGH 50: CPT

## 2019-08-05 PROCEDURE — 99233 SBSQ HOSP IP/OBS HIGH 50: CPT | Mod: GC

## 2019-08-05 RX ORDER — INSULIN GLARGINE 100 [IU]/ML
28 INJECTION, SOLUTION SUBCUTANEOUS AT BEDTIME
Refills: 0 | Status: DISCONTINUED | OUTPATIENT
Start: 2019-08-05 | End: 2019-08-06

## 2019-08-05 RX ADMIN — Medication 10 UNIT(S): at 12:34

## 2019-08-05 RX ADMIN — ATORVASTATIN CALCIUM 80 MILLIGRAM(S): 80 TABLET, FILM COATED ORAL at 21:27

## 2019-08-05 RX ADMIN — SPIRONOLACTONE 25 MILLIGRAM(S): 25 TABLET, FILM COATED ORAL at 06:02

## 2019-08-05 RX ADMIN — Medication 100 MILLIGRAM(S): at 21:23

## 2019-08-05 RX ADMIN — SACUBITRIL AND VALSARTAN 1 TABLET(S): 24; 26 TABLET, FILM COATED ORAL at 17:16

## 2019-08-05 RX ADMIN — Medication 50 MILLIGRAM(S): at 06:02

## 2019-08-05 RX ADMIN — Medication 81 MILLIGRAM(S): at 11:17

## 2019-08-05 RX ADMIN — Medication 100 MILLIGRAM(S): at 15:01

## 2019-08-05 RX ADMIN — Medication 100 MILLIGRAM(S): at 06:02

## 2019-08-05 RX ADMIN — Medication 10 UNIT(S): at 17:15

## 2019-08-05 RX ADMIN — Medication 10 UNIT(S): at 08:33

## 2019-08-05 RX ADMIN — INSULIN GLARGINE 28 UNIT(S): 100 INJECTION, SOLUTION SUBCUTANEOUS at 21:23

## 2019-08-05 RX ADMIN — Medication 6: at 17:16

## 2019-08-05 RX ADMIN — ENOXAPARIN SODIUM 40 MILLIGRAM(S): 100 INJECTION SUBCUTANEOUS at 11:17

## 2019-08-05 RX ADMIN — Medication 2: at 12:32

## 2019-08-05 RX ADMIN — POLYETHYLENE GLYCOL 3350 17 GRAM(S): 17 POWDER, FOR SOLUTION ORAL at 21:19

## 2019-08-05 RX ADMIN — Medication 2: at 08:34

## 2019-08-05 RX ADMIN — SACUBITRIL AND VALSARTAN 1 TABLET(S): 24; 26 TABLET, FILM COATED ORAL at 06:02

## 2019-08-05 NOTE — PROGRESS NOTE ADULT - PROBLEM SELECTOR PLAN 2
imaging suggestive of pyelonophritis which maybe 2/2 hematogenous spread  - continue Cefazolin 2g q8hrs  - ID following imaging suggestive of pyelonephritis which maybe 2/2 hematogenous spread  - continue Cefazolin 2g q8hrs  - ID following

## 2019-08-05 NOTE — PROGRESS NOTE ADULT - PROBLEM SELECTOR PLAN 4
a1c 11.9,   - Diabetic educator  - Monitor FS  - increase Lantus to 25 units qHS  - Increase premeal to 10 units Lispro TIDAC   - continue mISS  - consistent carb diet a1c 11.9,   - Diabetic educator  - Monitor FS  - increase Lantus to 28 units qHS  - continue premeal 10 units Lispro TIDAC   - continue mISS  - consistent carb diet

## 2019-08-05 NOTE — PROGRESS NOTE ADULT - PROBLEM SELECTOR PLAN 1
pt found with staph aureus bacteremia s/p recent cipro for pyelonephritis. TTE without vegetations  - continue cefazolin 2 grams every 8 hours  - Blood Cx from 08/03 + staph aureus  -Repeat blood cx obtained today  - PICC for extended course of treatment once blood culture clears    - JIMMY most likely on Wed at 1:45 PM with Dr. Singleton--will make NPO @ [facility] tomorrow pt found with staph aureus bacteremia s/p recent cipro for pyelonephritis. TTE without vegetations  - continue cefazolin 2 grams every 8 hours  - Blood Cx from 08/03 + staph aureus  -Repeat blood cx obtained today  - PICC for extended course of treatment once blood culture clears  - JIMMY most likely on Wed at 1:45 PM with Dr. Singleton--will make NPO @ [facility] tomorrow

## 2019-08-06 ENCOUNTER — TRANSCRIPTION ENCOUNTER (OUTPATIENT)
Age: 64
End: 2019-08-06

## 2019-08-06 LAB
-  AMPICILLIN/SULBACTAM: SIGNIFICANT CHANGE UP
-  CEFAZOLIN: SIGNIFICANT CHANGE UP
-  CLINDAMYCIN: SIGNIFICANT CHANGE UP
-  ERYTHROMYCIN: SIGNIFICANT CHANGE UP
-  GENTAMICIN: SIGNIFICANT CHANGE UP
-  OXACILLIN: SIGNIFICANT CHANGE UP
-  RIFAMPIN: SIGNIFICANT CHANGE UP
-  TETRACYCLINE: SIGNIFICANT CHANGE UP
-  TRIMETHOPRIM/SULFAMETHOXAZOLE: SIGNIFICANT CHANGE UP
-  VANCOMYCIN: SIGNIFICANT CHANGE UP
ANION GAP SERPL CALC-SCNC: 8 MMOL/L — SIGNIFICANT CHANGE UP (ref 5–17)
BUN SERPL-MCNC: 19 MG/DL — SIGNIFICANT CHANGE UP (ref 7–23)
CALCIUM SERPL-MCNC: 8.4 MG/DL — SIGNIFICANT CHANGE UP (ref 8.4–10.5)
CHLORIDE SERPL-SCNC: 103 MMOL/L — SIGNIFICANT CHANGE UP (ref 96–108)
CO2 SERPL-SCNC: 27 MMOL/L — SIGNIFICANT CHANGE UP (ref 22–31)
CREAT SERPL-MCNC: 1.02 MG/DL — SIGNIFICANT CHANGE UP (ref 0.5–1.3)
CULTURE RESULTS: SIGNIFICANT CHANGE UP
GLUCOSE BLDC GLUCOMTR-MCNC: 100 MG/DL — HIGH (ref 70–99)
GLUCOSE BLDC GLUCOMTR-MCNC: 143 MG/DL — HIGH (ref 70–99)
GLUCOSE BLDC GLUCOMTR-MCNC: 201 MG/DL — HIGH (ref 70–99)
GLUCOSE BLDC GLUCOMTR-MCNC: 250 MG/DL — HIGH (ref 70–99)
GLUCOSE SERPL-MCNC: 174 MG/DL — HIGH (ref 70–99)
HCT VFR BLD CALC: 34.8 % — LOW (ref 39–50)
HGB BLD-MCNC: 11.8 G/DL — LOW (ref 13–17)
MCHC RBC-ENTMCNC: 31.1 PG — SIGNIFICANT CHANGE UP (ref 27–34)
MCHC RBC-ENTMCNC: 33.9 GM/DL — SIGNIFICANT CHANGE UP (ref 32–36)
MCV RBC AUTO: 91.6 FL — SIGNIFICANT CHANGE UP (ref 80–100)
METHOD TYPE: SIGNIFICANT CHANGE UP
NRBC # BLD: 0 /100 WBCS — SIGNIFICANT CHANGE UP (ref 0–0)
ORGANISM # SPEC MICROSCOPIC CNT: SIGNIFICANT CHANGE UP
ORGANISM # SPEC MICROSCOPIC CNT: SIGNIFICANT CHANGE UP
PLATELET # BLD AUTO: 291 K/UL — SIGNIFICANT CHANGE UP (ref 150–400)
POTASSIUM SERPL-MCNC: 4.3 MMOL/L — SIGNIFICANT CHANGE UP (ref 3.5–5.3)
POTASSIUM SERPL-SCNC: 4.3 MMOL/L — SIGNIFICANT CHANGE UP (ref 3.5–5.3)
RBC # BLD: 3.8 M/UL — LOW (ref 4.2–5.8)
RBC # FLD: 12 % — SIGNIFICANT CHANGE UP (ref 10.3–14.5)
SODIUM SERPL-SCNC: 138 MMOL/L — SIGNIFICANT CHANGE UP (ref 135–145)
SPECIMEN SOURCE: SIGNIFICANT CHANGE UP
WBC # BLD: 10.61 K/UL — HIGH (ref 3.8–10.5)
WBC # FLD AUTO: 10.61 K/UL — HIGH (ref 3.8–10.5)

## 2019-08-06 PROCEDURE — 99233 SBSQ HOSP IP/OBS HIGH 50: CPT | Mod: GC

## 2019-08-06 RX ORDER — INSULIN GLARGINE 100 [IU]/ML
25 INJECTION, SOLUTION SUBCUTANEOUS AT BEDTIME
Refills: 0 | Status: DISCONTINUED | OUTPATIENT
Start: 2019-08-06 | End: 2019-08-08

## 2019-08-06 RX ADMIN — Medication 10 UNIT(S): at 08:32

## 2019-08-06 RX ADMIN — ENOXAPARIN SODIUM 40 MILLIGRAM(S): 100 INJECTION SUBCUTANEOUS at 11:46

## 2019-08-06 RX ADMIN — Medication 100 MILLIGRAM(S): at 22:45

## 2019-08-06 RX ADMIN — SPIRONOLACTONE 25 MILLIGRAM(S): 25 TABLET, FILM COATED ORAL at 05:34

## 2019-08-06 RX ADMIN — SACUBITRIL AND VALSARTAN 1 TABLET(S): 24; 26 TABLET, FILM COATED ORAL at 17:28

## 2019-08-06 RX ADMIN — Medication 100 MILLIGRAM(S): at 05:34

## 2019-08-06 RX ADMIN — Medication 81 MILLIGRAM(S): at 11:46

## 2019-08-06 RX ADMIN — Medication 4: at 08:32

## 2019-08-06 RX ADMIN — Medication 4: at 12:23

## 2019-08-06 RX ADMIN — Medication 10 UNIT(S): at 16:51

## 2019-08-06 RX ADMIN — INSULIN GLARGINE 25 UNIT(S): 100 INJECTION, SOLUTION SUBCUTANEOUS at 22:45

## 2019-08-06 RX ADMIN — Medication 100 MILLIGRAM(S): at 14:08

## 2019-08-06 RX ADMIN — Medication 0: at 16:50

## 2019-08-06 RX ADMIN — Medication 50 MILLIGRAM(S): at 05:34

## 2019-08-06 RX ADMIN — Medication 10 UNIT(S): at 12:23

## 2019-08-06 RX ADMIN — SACUBITRIL AND VALSARTAN 1 TABLET(S): 24; 26 TABLET, FILM COATED ORAL at 05:34

## 2019-08-06 RX ADMIN — ATORVASTATIN CALCIUM 80 MILLIGRAM(S): 80 TABLET, FILM COATED ORAL at 22:45

## 2019-08-06 NOTE — PROGRESS NOTE ADULT - PROBLEM SELECTOR PLAN 4
a1c 11.9,   - Diabetic educator  - Monitor FS  - increase Lantus to 28 units qHS  - continue premeal 10 units Lispro TIDAC   - continue mISS  - consistent carb diet  (holding premeals after MN since pt will be NPO) a1c 11.9,   - Diabetic educator  - Monitor FS  - Lantus 25units tonight  - holding premeals after MN since pt will be NPO for JIMMY  - continue mISS  - consistent carb diet

## 2019-08-06 NOTE — PROGRESS NOTE ADULT - PROBLEM SELECTOR PLAN 1
pt found with staph aureus bacteremia s/p recent cipro for pyelonephritis. TTE without vegetations  - continue cefazolin 2 grams every 8 hours  - Blood Cx from 08/03 + staph aureus  -Repeat blood cx obtained 8/5  - PICC for extended course of treatment once blood culture clears  - JIMMY tomorrow at 1:45 PM with Dr. Singleton--will make NPO @ [facility] tomorrow

## 2019-08-06 NOTE — PROGRESS NOTE ADULT - PROBLEM SELECTOR PLAN 2
imaging suggestive of pyelonephritis which maybe 2/2 hematogenous spread  - continue Cefazolin 2g q8hrs  - ID following

## 2019-08-07 ENCOUNTER — TRANSCRIPTION ENCOUNTER (OUTPATIENT)
Age: 64
End: 2019-08-07

## 2019-08-07 LAB
ANION GAP SERPL CALC-SCNC: 9 MMOL/L — SIGNIFICANT CHANGE UP (ref 5–17)
BUN SERPL-MCNC: 20 MG/DL — SIGNIFICANT CHANGE UP (ref 7–23)
CALCIUM SERPL-MCNC: 8.3 MG/DL — LOW (ref 8.4–10.5)
CHLORIDE SERPL-SCNC: 104 MMOL/L — SIGNIFICANT CHANGE UP (ref 96–108)
CO2 SERPL-SCNC: 25 MMOL/L — SIGNIFICANT CHANGE UP (ref 22–31)
CREAT SERPL-MCNC: 0.94 MG/DL — SIGNIFICANT CHANGE UP (ref 0.5–1.3)
GLUCOSE BLDC GLUCOMTR-MCNC: 107 MG/DL — HIGH (ref 70–99)
GLUCOSE BLDC GLUCOMTR-MCNC: 151 MG/DL — HIGH (ref 70–99)
GLUCOSE BLDC GLUCOMTR-MCNC: 162 MG/DL — HIGH (ref 70–99)
GLUCOSE BLDC GLUCOMTR-MCNC: 170 MG/DL — HIGH (ref 70–99)
GLUCOSE BLDC GLUCOMTR-MCNC: 173 MG/DL — HIGH (ref 70–99)
GLUCOSE BLDC GLUCOMTR-MCNC: 174 MG/DL — HIGH (ref 70–99)
GLUCOSE BLDC GLUCOMTR-MCNC: 207 MG/DL — HIGH (ref 70–99)
GLUCOSE BLDC GLUCOMTR-MCNC: 256 MG/DL — HIGH (ref 70–99)
GLUCOSE SERPL-MCNC: 197 MG/DL — HIGH (ref 70–99)
HCT VFR BLD CALC: 32.3 % — LOW (ref 39–50)
HGB BLD-MCNC: 11.1 G/DL — LOW (ref 13–17)
MCHC RBC-ENTMCNC: 31.2 PG — SIGNIFICANT CHANGE UP (ref 27–34)
MCHC RBC-ENTMCNC: 34.4 GM/DL — SIGNIFICANT CHANGE UP (ref 32–36)
MCV RBC AUTO: 90.7 FL — SIGNIFICANT CHANGE UP (ref 80–100)
NRBC # BLD: 0 /100 WBCS — SIGNIFICANT CHANGE UP (ref 0–0)
PLATELET # BLD AUTO: 282 K/UL — SIGNIFICANT CHANGE UP (ref 150–400)
POTASSIUM SERPL-MCNC: 4.5 MMOL/L — SIGNIFICANT CHANGE UP (ref 3.5–5.3)
POTASSIUM SERPL-SCNC: 4.5 MMOL/L — SIGNIFICANT CHANGE UP (ref 3.5–5.3)
RBC # BLD: 3.56 M/UL — LOW (ref 4.2–5.8)
RBC # FLD: 12.1 % — SIGNIFICANT CHANGE UP (ref 10.3–14.5)
SODIUM SERPL-SCNC: 138 MMOL/L — SIGNIFICANT CHANGE UP (ref 135–145)
WBC # BLD: 9.96 K/UL — SIGNIFICANT CHANGE UP (ref 3.8–10.5)
WBC # FLD AUTO: 9.96 K/UL — SIGNIFICANT CHANGE UP (ref 3.8–10.5)

## 2019-08-07 PROCEDURE — 93325 DOPPLER ECHO COLOR FLOW MAPG: CPT | Mod: 26

## 2019-08-07 PROCEDURE — 93312 ECHO TRANSESOPHAGEAL: CPT | Mod: 26

## 2019-08-07 PROCEDURE — 93320 DOPPLER ECHO COMPLETE: CPT | Mod: 26

## 2019-08-07 PROCEDURE — 71045 X-RAY EXAM CHEST 1 VIEW: CPT | Mod: 26

## 2019-08-07 PROCEDURE — 99233 SBSQ HOSP IP/OBS HIGH 50: CPT | Mod: GC

## 2019-08-07 RX ORDER — SODIUM CHLORIDE 9 MG/ML
10 INJECTION INTRAMUSCULAR; INTRAVENOUS; SUBCUTANEOUS
Refills: 0 | Status: DISCONTINUED | OUTPATIENT
Start: 2019-08-07 | End: 2019-08-08

## 2019-08-07 RX ORDER — CHLORHEXIDINE GLUCONATE 213 G/1000ML
1 SOLUTION TOPICAL
Refills: 0 | Status: DISCONTINUED | OUTPATIENT
Start: 2019-08-07 | End: 2019-08-08

## 2019-08-07 RX ADMIN — ENOXAPARIN SODIUM 40 MILLIGRAM(S): 100 INJECTION SUBCUTANEOUS at 12:01

## 2019-08-07 RX ADMIN — Medication 2: at 08:33

## 2019-08-07 RX ADMIN — INSULIN GLARGINE 25 UNIT(S): 100 INJECTION, SOLUTION SUBCUTANEOUS at 22:29

## 2019-08-07 RX ADMIN — Medication 4: at 18:30

## 2019-08-07 RX ADMIN — SPIRONOLACTONE 25 MILLIGRAM(S): 25 TABLET, FILM COATED ORAL at 05:30

## 2019-08-07 RX ADMIN — ATORVASTATIN CALCIUM 80 MILLIGRAM(S): 80 TABLET, FILM COATED ORAL at 22:29

## 2019-08-07 RX ADMIN — Medication 100 MILLIGRAM(S): at 22:24

## 2019-08-07 RX ADMIN — Medication 2: at 12:09

## 2019-08-07 RX ADMIN — Medication 100 MILLIGRAM(S): at 14:18

## 2019-08-07 RX ADMIN — Medication 10 UNIT(S): at 11:57

## 2019-08-07 RX ADMIN — SACUBITRIL AND VALSARTAN 1 TABLET(S): 24; 26 TABLET, FILM COATED ORAL at 05:30

## 2019-08-07 RX ADMIN — Medication 81 MILLIGRAM(S): at 12:00

## 2019-08-07 RX ADMIN — Medication 50 MILLIGRAM(S): at 05:30

## 2019-08-07 RX ADMIN — Medication 10 UNIT(S): at 18:29

## 2019-08-07 RX ADMIN — SACUBITRIL AND VALSARTAN 1 TABLET(S): 24; 26 TABLET, FILM COATED ORAL at 18:30

## 2019-08-07 RX ADMIN — Medication 100 MILLIGRAM(S): at 05:30

## 2019-08-07 NOTE — DISCHARGE NOTE PROVIDER - NSDCCPCAREPLAN_GEN_ALL_CORE_FT
PRINCIPAL DISCHARGE DIAGNOSIS  Diagnosis: Bacteremia  Assessment and Plan of Treatment:       SECONDARY DISCHARGE DIAGNOSES  Diagnosis: Pyelonephritis  Assessment and Plan of Treatment:     Diagnosis: Sepsis  Assessment and Plan of Treatment: PRINCIPAL DISCHARGE DIAGNOSIS  Diagnosis: Bacteremia  Assessment and Plan of Treatment: Complete course of antibiotics      SECONDARY DISCHARGE DIAGNOSES  Diagnosis: Pyelonephritis  Assessment and Plan of Treatment:     Diagnosis: Sepsis  Assessment and Plan of Treatment:

## 2019-08-07 NOTE — PROCEDURE NOTE - NSPROCDETAILS_GEN_ALL_CORE
sterile dressing applied/sterile technique, catheter placed/ultrasound assessment/ultrasound guidance/location identified, draped/prepped, sterile technique used

## 2019-08-07 NOTE — PROGRESS NOTE ADULT - PROBLEM SELECTOR PLAN 1
pt found with staph aureus bacteremia s/p recent cipro for pyelonephritis. TTE without vegetations  - continue cefazolin 2 grams every 8 hours  - Blood Cx from 08/03 + staph aureus  -Repeat blood cx obtained 8/5-NGTD  - PICC for extended course of treatment once blood culture clears  -JIMMY this AM

## 2019-08-07 NOTE — DISCHARGE NOTE PROVIDER - CARE PROVIDER_API CALL
Rodney Tian (MD)  Gastroenterology; Internal Medicine  [address]  [place] [address]  Phone: [phone]  Fax: [phone]  Follow Up Time:

## 2019-08-07 NOTE — DISCHARGE NOTE PROVIDER - HOSPITAL COURSE
64 yo diabetic male, prior CABG, here with community onset MSSA bacteremia.    He had+ Bld Cxs 7/29-8/3. IV therapy started 7/31.  Repeat blood cultures 8/5 negative thus far    JIMMY reportedly negative for vegetations.  PICC placed on 8/7/19 once repeat blood cx negative.  STable for discharge home on ________ should continue cefazolin 2 gm q 8 hrs x 5 weeks to complete 6 weeks total of therapy.    He should obtain CBC with diff and CMP as outpatient        < from: JIMMY Echo Doppler (08.07.19 @ 09:05) >         1. Technically difficult study.     2. Not all segments of the left ventricular endocardium are well     visualized. There appears to be mild global left ventricular systolic     dysfunction.     3. No mitral valve vegetations are visualized.     4. Moderate left atrial enlargement     5. No tricuspid valve vegetations are visualized.     6. Normally appearing aortic valve with no vegetations visualized.        < end of copied text >        < from: US Abdomen Complete (08.01.19 @ 09:20) >            IMPRESSION:        Uncomplicated cholelithiasis.                < end of copied text >            < from: CT Abdomen and Pelvis w/ IV Cont (07.29.19 @ 16:40) >            IMPRESSION:         CT findings suggestive of urinary tract infection/pyelonephritis, most     pronounced left kidney as discussed.        Cholelithiasis, with possible choledocholithiasis; nondilated common bile     duct. This can be further evaluated with nonemergent MRCP if there are no     clinical contraindications.        < end of copied text >        < from: TTE with Doppler (w/Cont) (04.09.18 @ 14:57) >        Conclusions:    Technically difficult study.    1. Mitral annular calcification. Tethered mitral valve    leaflets with normal opening.    2. Aortic valve not well visualized; appears calcified.    Peak transaortic valve gradient equals 17 mm Hg, mean    transaortic valve gradient equals 8 mm Hg, estimated aortic    valve area equals 1.1 sqcm (by continuity equation), aortic    valve velocity time integral equals 40 cm, consistent with    low gradient moderate aortic stenosis. Aortic valve and    LVOT are poorly visualized limiting assessment. Consider    additionalimaging of the aortic valve. No aortic valve    regurgitation seen.    3. Severe concentric left ventricular hypertrophy.    4. Endocardial visualization enhanced with intravenous    injection of echo contrast (Definity). Left ventricle is    suboptimtally visualized despite the use of intravenous    echo contrast; grossly moderate to severe left ventricular    systolic dysfunction. The mid septum, distal segments and    the apex are severely hypo/akinetic. EF by visual    estimation approximately 30-35%.    5. Moderate diastolic dysfunction (Stage II).    6. The right ventricle is not well visualized; grossly    normal right ventricle appears mildly enlarged with normal    systolic function.    *** No previous Echo exam.        < end of copied text > 62 yo diabetic male, prior CABG, here with community onset MSSA bacteremia.    He had+ Bld Cxs 7/29-8/3. IV therapy started 7/31.  Repeat blood cultures 8/5 negative thus far    JIMMY reportedly negative for vegetations.  PICC placed on 8/7/19 once repeat blood cx negative.  STable for discharge home on 8/8 and should continue cefazolin 2 gm q 8 hrs x 5 weeks to complete 6 weeks total of therapy.    He should obtain CBC with diff and CMP as outpatient for routine monitoring while on IV Cefazolin.         < from: JIMMY Echo Doppler (08.07.19 @ 09:05) >         1. Technically difficult study.     2. Not all segments of the left ventricular endocardium are well     visualized. There appears to be mild global left ventricular systolic     dysfunction.     3. No mitral valve vegetations are visualized.     4. Moderate left atrial enlargement     5. No tricuspid valve vegetations are visualized.     6. Normally appearing aortic valve with no vegetations visualized.        < end of copied text >        < from: US Abdomen Complete (08.01.19 @ 09:20) >            IMPRESSION:        Uncomplicated cholelithiasis.                < end of copied text >            < from: CT Abdomen and Pelvis w/ IV Cont (07.29.19 @ 16:40) >            IMPRESSION:         CT findings suggestive of urinary tract infection/pyelonephritis, most     pronounced left kidney as discussed.        Cholelithiasis, with possible choledocholithiasis; nondilated common bile     duct. This can be further evaluated with nonemergent MRCP if there are no     clinical contraindications.        < end of copied text >        < from: TTE with Doppler (w/Cont) (04.09.18 @ 14:57) >        Conclusions:    Technically difficult study.    1. Mitral annular calcification. Tethered mitral valve    leaflets with normal opening.    2. Aortic valve not well visualized; appears calcified.    Peak transaortic valve gradient equals 17 mm Hg, mean    transaortic valve gradient equals 8 mm Hg, estimated aortic    valve area equals 1.1 sqcm (by continuity equation), aortic    valve velocity time integral equals 40 cm, consistent with    low gradient moderate aortic stenosis. Aortic valve and    LVOT are poorly visualized limiting assessment. Consider    additionalimaging of the aortic valve. No aortic valve    regurgitation seen.    3. Severe concentric left ventricular hypertrophy.    4. Endocardial visualization enhanced with intravenous    injection of echo contrast (Definity). Left ventricle is    suboptimtally visualized despite the use of intravenous    echo contrast; grossly moderate to severe left ventricular    systolic dysfunction. The mid septum, distal segments and    the apex are severely hypo/akinetic. EF by visual    estimation approximately 30-35%.    5. Moderate diastolic dysfunction (Stage II).    6. The right ventricle is not well visualized; grossly    normal right ventricle appears mildly enlarged with normal    systolic function.    *** No previous Echo exam.        < end of copied text >        Patient is following up with a NEW PMD tomorrow - Dr. Tian. Called office this morning - he will be back this afternoon, at which time I will update him on the patient's medical history / course .

## 2019-08-07 NOTE — DISCHARGE NOTE PROVIDER - NSDCFUSCHEDAPPT_GEN_ALL_CORE_FT
JASE KENNEDY ; 08/09/2019 ; NPP Med Int [address] ; 09/09/2019 ; NPP Cardio [address] [facility]  JASE KENNEDY ; 10/07/2019 ; NPP Med Int [address] JASE KENNEDY ; 08/09/2019 ; NPP Med Int [address] ; 09/09/2019 ; NPP Cardio [address] [facility]  JASE KENNEDY ; 10/07/2019 ; NPP Med Int [address]

## 2019-08-07 NOTE — PROGRESS NOTE ADULT - PROBLEM SELECTOR PLAN 4
a1c 11.9,   - Diabetic educator  - Monitor FS  - Lantus 25units tonight  - continue mISS  - consistent carb diet a1c 11.9,   - Diabetic educator  - Monitor FS  - Lantus 25units qHS  - continue mISS  - consistent carb diet

## 2019-08-07 NOTE — PROGRESS NOTE ADULT - ATTENDING COMMENTS
I have personally seen and examined patient on the above date.  I discussed the case with Eleonora Randell  and I agree with findings and plan as detailed per note above, which I have amended where appropriate.
JIMMY negative for endocarditis. Blood Cx from 08/05 LEONARDO GILLESPIE to place PICC and dc planning to home with IV Abx for 5 more weeks, f/u with Dr. Olivas (ID)

## 2019-08-07 NOTE — DISCHARGE NOTE PROVIDER - CARE PROVIDERS DIRECT ADDRESSES
[email or web address]

## 2019-08-07 NOTE — DISCHARGE NOTE PROVIDER - NSDCFUADDINST_GEN_ALL_CORE_FT
Needs CBC and CMP q weekly while on abx therapy Weekly blood work while on antibiotics      An infusion company will be helping to coordinate your IV antibiotics delivery    Home care will help with teaching how to use administer the antibiotics via PICC line    Follow up with your PMD this week

## 2019-08-08 ENCOUNTER — TRANSCRIPTION ENCOUNTER (OUTPATIENT)
Age: 64
End: 2019-08-08

## 2019-08-08 VITALS
RESPIRATION RATE: 16 BRPM | OXYGEN SATURATION: 97 % | TEMPERATURE: 98 F | SYSTOLIC BLOOD PRESSURE: 125 MMHG | HEART RATE: 66 BPM | DIASTOLIC BLOOD PRESSURE: 75 MMHG

## 2019-08-08 LAB
ANION GAP SERPL CALC-SCNC: 5 MMOL/L — SIGNIFICANT CHANGE UP (ref 5–17)
BUN SERPL-MCNC: 18 MG/DL — SIGNIFICANT CHANGE UP (ref 7–23)
CALCIUM SERPL-MCNC: 8.9 MG/DL — SIGNIFICANT CHANGE UP (ref 8.4–10.5)
CHLORIDE SERPL-SCNC: 106 MMOL/L — SIGNIFICANT CHANGE UP (ref 96–108)
CO2 SERPL-SCNC: 27 MMOL/L — SIGNIFICANT CHANGE UP (ref 22–31)
CREAT SERPL-MCNC: 0.98 MG/DL — SIGNIFICANT CHANGE UP (ref 0.5–1.3)
GLUCOSE BLDC GLUCOMTR-MCNC: 143 MG/DL — HIGH (ref 70–99)
GLUCOSE BLDC GLUCOMTR-MCNC: 168 MG/DL — HIGH (ref 70–99)
GLUCOSE SERPL-MCNC: 135 MG/DL — HIGH (ref 70–99)
HCT VFR BLD CALC: 32.3 % — LOW (ref 39–50)
HGB BLD-MCNC: 10.9 G/DL — LOW (ref 13–17)
MCHC RBC-ENTMCNC: 31.1 PG — SIGNIFICANT CHANGE UP (ref 27–34)
MCHC RBC-ENTMCNC: 33.7 GM/DL — SIGNIFICANT CHANGE UP (ref 32–36)
MCV RBC AUTO: 92 FL — SIGNIFICANT CHANGE UP (ref 80–100)
NRBC # BLD: 0 /100 WBCS — SIGNIFICANT CHANGE UP (ref 0–0)
PLATELET # BLD AUTO: 290 K/UL — SIGNIFICANT CHANGE UP (ref 150–400)
POTASSIUM SERPL-MCNC: 4.3 MMOL/L — SIGNIFICANT CHANGE UP (ref 3.5–5.3)
POTASSIUM SERPL-SCNC: 4.3 MMOL/L — SIGNIFICANT CHANGE UP (ref 3.5–5.3)
RBC # BLD: 3.51 M/UL — LOW (ref 4.2–5.8)
RBC # FLD: 12.2 % — SIGNIFICANT CHANGE UP (ref 10.3–14.5)
SODIUM SERPL-SCNC: 138 MMOL/L — SIGNIFICANT CHANGE UP (ref 135–145)
WBC # BLD: 10.94 K/UL — HIGH (ref 3.8–10.5)
WBC # FLD AUTO: 10.94 K/UL — HIGH (ref 3.8–10.5)

## 2019-08-08 PROCEDURE — 82962 GLUCOSE BLOOD TEST: CPT

## 2019-08-08 PROCEDURE — 80048 BASIC METABOLIC PNL TOTAL CA: CPT

## 2019-08-08 PROCEDURE — 85027 COMPLETE CBC AUTOMATED: CPT

## 2019-08-08 PROCEDURE — 85730 THROMBOPLASTIN TIME PARTIAL: CPT

## 2019-08-08 PROCEDURE — 96374 THER/PROPH/DIAG INJ IV PUSH: CPT

## 2019-08-08 PROCEDURE — 80053 COMPREHEN METABOLIC PANEL: CPT

## 2019-08-08 PROCEDURE — 93312 ECHO TRANSESOPHAGEAL: CPT

## 2019-08-08 PROCEDURE — 99239 HOSP IP/OBS DSCHRG MGMT >30: CPT

## 2019-08-08 PROCEDURE — 87186 SC STD MICRODIL/AGAR DIL: CPT

## 2019-08-08 PROCEDURE — 93306 TTE W/DOPPLER COMPLETE: CPT

## 2019-08-08 PROCEDURE — 87086 URINE CULTURE/COLONY COUNT: CPT

## 2019-08-08 PROCEDURE — 76700 US EXAM ABDOM COMPLETE: CPT

## 2019-08-08 PROCEDURE — 71045 X-RAY EXAM CHEST 1 VIEW: CPT

## 2019-08-08 PROCEDURE — 93005 ELECTROCARDIOGRAM TRACING: CPT

## 2019-08-08 PROCEDURE — 83036 HEMOGLOBIN GLYCOSYLATED A1C: CPT

## 2019-08-08 PROCEDURE — 86803 HEPATITIS C AB TEST: CPT

## 2019-08-08 PROCEDURE — 36415 COLL VENOUS BLD VENIPUNCTURE: CPT

## 2019-08-08 PROCEDURE — 99285 EMERGENCY DEPT VISIT HI MDM: CPT | Mod: 25

## 2019-08-08 PROCEDURE — 85610 PROTHROMBIN TIME: CPT

## 2019-08-08 PROCEDURE — 87040 BLOOD CULTURE FOR BACTERIA: CPT

## 2019-08-08 PROCEDURE — 83605 ASSAY OF LACTIC ACID: CPT

## 2019-08-08 RX ORDER — ISOPROPYL ALCOHOL, BENZOCAINE .7; .06 ML/ML; ML/ML
1 SWAB TOPICAL
Qty: 100 | Refills: 1
Start: 2019-08-08 | End: 2019-09-26

## 2019-08-08 RX ORDER — ENOXAPARIN SODIUM 100 MG/ML
25 INJECTION SUBCUTANEOUS
Qty: 10 | Refills: 0
Start: 2019-08-08 | End: 2019-09-06

## 2019-08-08 RX ORDER — SPIRONOLACTONE 25 MG/1
1 TABLET, FILM COATED ORAL
Qty: 0 | Refills: 0 | DISCHARGE
Start: 2019-08-08

## 2019-08-08 RX ORDER — METFORMIN HYDROCHLORIDE 850 MG/1
1 TABLET ORAL
Qty: 0 | Refills: 0 | DISCHARGE

## 2019-08-08 RX ORDER — ACETAMINOPHEN 500 MG
2 TABLET ORAL
Qty: 0 | Refills: 0 | DISCHARGE
Start: 2019-08-08

## 2019-08-08 RX ORDER — CEFAZOLIN SODIUM 1 G
2 VIAL (EA) INJECTION
Qty: 0 | Refills: 0 | DISCHARGE
Start: 2019-08-08

## 2019-08-08 RX ORDER — INSULIN LISPRO 100/ML
10 VIAL (ML) SUBCUTANEOUS
Qty: 10 | Refills: 0
Start: 2019-08-08

## 2019-08-08 RX ADMIN — Medication 10 UNIT(S): at 08:22

## 2019-08-08 RX ADMIN — Medication 2: at 12:27

## 2019-08-08 RX ADMIN — CHLORHEXIDINE GLUCONATE 1 APPLICATION(S): 213 SOLUTION TOPICAL at 05:33

## 2019-08-08 RX ADMIN — Medication 100 MILLIGRAM(S): at 05:32

## 2019-08-08 RX ADMIN — Medication 81 MILLIGRAM(S): at 12:02

## 2019-08-08 RX ADMIN — Medication 100 MILLIGRAM(S): at 14:46

## 2019-08-08 RX ADMIN — Medication 10 UNIT(S): at 12:27

## 2019-08-08 RX ADMIN — SPIRONOLACTONE 25 MILLIGRAM(S): 25 TABLET, FILM COATED ORAL at 05:32

## 2019-08-08 RX ADMIN — ENOXAPARIN SODIUM 40 MILLIGRAM(S): 100 INJECTION SUBCUTANEOUS at 12:02

## 2019-08-08 RX ADMIN — SACUBITRIL AND VALSARTAN 1 TABLET(S): 24; 26 TABLET, FILM COATED ORAL at 05:33

## 2019-08-08 NOTE — PROGRESS NOTE ADULT - PROVIDER SPECIALTY LIST ADULT
Cardiology
Hospitalist
Infectious Disease
Cardiology
Infectious Disease
Hospitalist
Hospitalist

## 2019-08-08 NOTE — PROGRESS NOTE ADULT - PROBLEM SELECTOR PLAN 5
continue home meds, monitor blood pressure

## 2019-08-08 NOTE — PROGRESS NOTE ADULT - PROBLEM SELECTOR PROBLEM 6
Coronary artery disease involving native heart without angina pectoris, unspecified vessel or lesion type

## 2019-08-08 NOTE — PROGRESS NOTE ADULT - PROBLEM SELECTOR PLAN 7
chronic stable, cards following,  TTE ef 45% grade 1 dd, severe lvh  - continue ARB (was never on Entresto at home, and has EF > 40%) , metoprolol, spironolactone

## 2019-08-08 NOTE — PROGRESS NOTE ADULT - PROBLEM SELECTOR PROBLEM 7
Chronic systolic congestive heart failure

## 2019-08-08 NOTE — PROGRESS NOTE ADULT - SUBJECTIVE AND OBJECTIVE BOX
CC: f/u for MSSA bacteremia    Patient reports no complaints, no headache,chest pain, or shortness of breath    REVIEW OF SYSTEMS:  All other review of systems negative (Comprehensive ROS)    Antimicrobials Day #  :day 6  ceFAZolin   IVPB 2000 milliGRAM(s) IV Intermittent every 8 hours    Other Medications Reviewed    T(F): 98.4 (08-05-19 @ 05:06), Max: 98.8 (08-04-19 @ 16:04)  HR: 66 (08-05-19 @ 05:06)  BP: 119/77 (08-05-19 @ 05:06)  RR: 16 (08-05-19 @ 05:06)  SpO2: 95% (08-05-19 @ 05:06)  Wt(kg): --    PHYSICAL EXAM:  General: alert, no acute distress  Eyes:  anicteric, no conjunctival injection, no discharge  Oropharynx: no lesions or injection 	  Neck: supple, without adenopathy  Lungs: clear to auscultation  Heart: regular rate and rhythm;soft joe  Abdomen: soft, nondistended, nontender, without mass or organomegaly  Skin: no lesions  Extremities: no clubbing, cyanosis, or edema  Neurologic: alert, oriented, moves all extremities    LAB RESULTS:                        11.2   9.67  )-----------( 268      ( 05 Aug 2019 06:29 )             32.8     08-05    137  |  102  |  19  ----------------------------<  190<H>  4.5   |  26  |  1.10    Ca    8.5      05 Aug 2019 06:29    TPro  6.6  /  Alb  2.6<L>  /  TBili  0.7  /  DBili  x   /  AST  23  /  ALT  18  /  AlkPhos  73  08-04    LIVER FUNCTIONS - ( 04 Aug 2019 06:20 )  Alb: 2.6 g/dL / Pro: 6.6 g/dL / ALK PHOS: 73 U/L / ALT: 18 U/L DA / AST: 23 U/L / GGT: x             MICROBIOLOGY:  RECENT CULTURES:  08-03 @ 05:59 .Blood Blood-Peripheral     Growth in aerobic bottle: Staphylococcus aureus  Susceptibility to follow.    Growth in aerobic bottle: Gram Positive Cocci in Clusters    07-31 @ 15:23 .Blood Blood-Peripheral     Growth in aerobic and anaerobic bottles: Staphylococcus aureus  See previous culture [id number]    Growth in aerobic bottle: Gram Positive Cocci in Clusters  Growth in anaerobic bottle: Gram Positive Cocci in Clusters        RADIOLOGY REVIEWED:    < from: Xray Chest 1 View AP/PA (07.29.19 @ 16:44) >  IMPRESSION: No significant interval change; no evidence for interval   development of focal infiltrate or lobar consolidation.    < end of copied text >
CC: f/u for MSSA bacteremia    Patient reports: he is without any complaints, no headache, no lower back pain,no respiratory, GI or Gu symptoms    REVIEW OF SYSTEMS:  All other review of systems negative (Comprehensive ROS)    Antimicrobials Day #  :day 5  ceFAZolin   IVPB 2000 milliGRAM(s) IV Intermittent every 8 hours    Other Medications Reviewed    T(F): 98.7 (08-04-19 @ 09:18), Max: 98.7 (08-04-19 @ 09:18)  HR: 66 (08-04-19 @ 09:18)  BP: 114/62 (08-04-19 @ 09:18)  RR: 14 (08-04-19 @ 09:18)  SpO2: 98% (08-04-19 @ 09:18)  Wt(kg): --    PHYSICAL EXAM:  General: alert, no acute distress  Eyes:  anicteric, no conjunctival injection, no discharge  Oropharynx: no lesions or injection 	  Neck: supple, without adenopathy  Lungs: clear to auscultation  Heart: regular rate and rhythm; soft joe  Abdomen: soft, nondistended, nontender, without mass or organomegaly  Skin: no lesions  Extremities: no clubbing, cyanosis, or edema  Neurologic: alert, oriented, moves all extremities    LAB RESULTS:                        11.1   8.77  )-----------( 236      ( 04 Aug 2019 06:20 )             32.4     08-04    138  |  103  |  17  ----------------------------<  182<H>  4.5   |  27  |  1.06    Ca    8.4      04 Aug 2019 06:20    TPro  6.6  /  Alb  2.6<L>  /  TBili  0.7  /  DBili  x   /  AST  23  /  ALT  18  /  AlkPhos  73  08-04    LIVER FUNCTIONS - ( 04 Aug 2019 06:20 )  Alb: 2.6 g/dL / Pro: 6.6 g/dL / ALK PHOS: 73 U/L / ALT: 18 U/L DA / AST: 23 U/L / GGT: x             MICROBIOLOGY:  RECENT CULTURES:  08-03 @ 05:59 .Blood Blood-Peripheral     Growth in aerobic bottle: Gram Positive Cocci in Clusters    Growth in aerobic bottle: Gram Positive Cocci in Clusters    07-31 @ 15:23 .Blood Blood-Peripheral     Growth in aerobic bottle: Staphylococcus aureus  See previous culture [id number]  Growth in anaerobic bottle: Gram Positive Cocci in Clusters    Growth in aerobic bottle: Gram Positive Cocci in Clusters  Growth in anaerobic bottle: Gram Positive Cocci in Clusters    07-30 @ 16:20 .Blood Blood-Peripheral     Growth in anaerobic bottle: Staphylococcus aureus  See previous culture [id number]    Growth in anaerobic bottle: Gram Positive Cocci in Clusters        RADIOLOGY REVIEWED:    < from: Xray Chest 1 View AP/PA (07.29.19 @ 16:44) >  IMPRESSION: No significant interval change; no evidence for interval   development of focal infiltrate or lobar consolidation.    < end of copied text >
CC: f/u for MSSA bacteremia    Patient reports: no complaints, he is feeling well    REVIEW OF SYSTEMS:  All other review of systems negative (Comprehensive ROS)    Antimicrobials Day #  :day 8  ceFAZolin   IVPB 2000 milliGRAM(s) IV Intermittent every 8 hours    Other Medications Reviewed    T(F): 97.8 (08-07-19 @ 08:39), Max: 98.8 (08-06-19 @ 16:49)  HR: 66 (08-07-19 @ 08:39)  BP: 128/93 (08-07-19 @ 08:39)  RR: 16 (08-07-19 @ 08:39)  SpO2: 99% (08-07-19 @ 08:39)  Wt(kg): --    PHYSICAL EXAM:  General: alert, no acute distress  Eyes:  anicteric, no conjunctival injection, no discharge  Oropharynx: no lesions or injection 	  Neck: supple, without adenopathy  Lungs: clear to auscultation  Heart: regular rate and rhythm; no murmur, rubs or gallops  Abdomen: soft, nondistended, nontender, without mass or organomegaly  Skin: no lesions  Extremities: no clubbing, cyanosis, or edema  Neurologic: alert, oriented, moves all extremities    LAB RESULTS:                        11.1   9.96  )-----------( 282      ( 07 Aug 2019 07:05 )             32.3     08-07    138  |  104  |  20  ----------------------------<  197<H>  4.5   |  25  |  0.94    Ca    8.3<L>      07 Aug 2019 07:05          MICROBIOLOGY:  RECENT CULTURES:  08-05 @ 08:25 .Blood Blood     No growth to date.      08-03 @ 05:59 .Blood Blood-Peripheral Staphylococcus aureus    Growth in aerobic bottle: Staphylococcus aureus    Growth in aerobic bottle: Gram Positive Cocci in Clusters        RADIOLOGY REVIEWED:  JIMMY no vegetations
CC: f/u for MSSA bacteremia    Patient reports: no complaints, he is feeling well,no headache, chest pain, dyspnea of back pain.    REVIEW OF SYSTEMS:  All other review of systems negative (Comprehensive ROS)    Antimicrobials Day #  :day 4  ceFAZolin   IVPB 2000 milliGRAM(s) IV Intermittent every 8 hours    Other Medications Reviewed    T(F): 98.9 (08-03-19 @ 07:33), Max: 98.9 (08-03-19 @ 07:33)  HR: 71 (08-03-19 @ 07:33)  BP: 138/47 (08-03-19 @ 07:33)  RR: 17 (08-03-19 @ 07:33)  SpO2: 98% (08-03-19 @ 07:33)  Wt(kg): --    PHYSICAL EXAM:  General: alert, no acute distress  Eyes:  anicteric, no conjunctival injection, no discharge  Oropharynx: no lesions or injection 	  Neck: supple, without adenopathy  Lungs: clear to auscultation  Heart: regular rate and rhythm; no murmur, rubs or gallops  Abdomen: soft, nondistended, nontender, without mass or organomegaly  Skin: no lesions  Extremities: no clubbing, cyanosis, or edema  Neurologic: alert, oriented, moves all extremities    LAB RESULTS:                        11.3   8.64  )-----------( 246      ( 03 Aug 2019 05:59 )             33.1     08-03    137  |  102  |  18  ----------------------------<  171<H>  4.3   |  28  |  0.95    Ca    8.7      03 Aug 2019 05:59    TPro  6.7  /  Alb  2.7<L>  /  TBili  0.8  /  DBili  x   /  AST  19  /  ALT  19  /  AlkPhos  67  08-03    LIVER FUNCTIONS - ( 03 Aug 2019 05:59 )  Alb: 2.7 g/dL / Pro: 6.7 g/dL / ALK PHOS: 67 U/L / ALT: 19 U/L DA / AST: 19 U/L / GGT: x             MICROBIOLOGY:  RECENT CULTURES:  07-31 @ 15:23 .Blood Blood-Peripheral     Growth in aerobic bottle: Staphylococcus aureus  See previous culture [id number]    Growth in aerobic bottle: Gram Positive Cocci in Clusters    07-30 @ 16:20 .Blood Blood-Peripheral     Growth in anaerobic bottle: Staphylococcus aureus  See previous culture [id number]    Growth in anaerobic bottle: Gram Positive Cocci in Clusters    07-29 @ 15:20 .Blood Blood-Peripheral Blood Culture PCR  Staphylococcus aureus    Growth in aerobic and anaerobic bottles: Staphylococcus aureus  See previous culture [id number]    Growth in anaerobic bottle: Gram Positive Cocci in Clusters  Growth in aerobic bottle: Gram Positive Cocci in Clusters        RADIOLOGY REVIEWED:
CC: f/u for MSSA bacteremia    Patient reports: no specific complaints.He is voiding well, only complaint is of a bad tooth.He denies any headaches, chills, or back pain    REVIEW OF SYSTEMS:  All other review of systems negative (Comprehensive ROS)    Antimicrobials Day #  :day 3  ceFAZolin   IVPB 2000 milliGRAM(s) IV Intermittent every 8 hours    Other Medications Reviewed    T(F): 98.3 (08-02-19 @ 16:26), Max: 99.4 (08-01-19 @ 20:17)  HR: 62 (08-02-19 @ 16:26)  BP: 109/64 (08-02-19 @ 16:26)  RR: 18 (08-02-19 @ 16:26)  SpO2: 95% (08-02-19 @ 16:26)  Wt(kg): --    PHYSICAL EXAM:  General: alert, no acute distress  Eyes:  anicteric, no conjunctival injection, no discharge  Oropharynx: no lesions or injection 	  Neck: supple, without adenopathy  Lungs: clear to auscultation  Heart: regular rate and rhythm; no murmur, rubs or gallops  Abdomen: soft, nondistended, nontender, without mass or organomegaly  Skin: no lesions  Extremities: no clubbing, cyanosis, or edema  Neurologic: alert, oriented, moves all extremities    LAB RESULTS:                        11.1   7.64  )-----------( 212      ( 02 Aug 2019 05:31 )             31.9     08-02    135  |  101  |  14  ----------------------------<  163<H>  4.3   |  24  |  1.10    Ca    8.5      02 Aug 2019 05:31    TPro  6.7  /  Alb  2.6<L>  /  TBili  1.0  /  DBili  x   /  AST  25  /  ALT  18  /  AlkPhos  68  08-02    LIVER FUNCTIONS - ( 02 Aug 2019 05:31 )  Alb: 2.6 g/dL / Pro: 6.7 g/dL / ALK PHOS: 68 U/L / ALT: 18 U/L DA / AST: 25 U/L / GGT: x             MICROBIOLOGY:  RECENT CULTURES:  07-31 @ 15:23 .Blood Blood-Peripheral     Growth in aerobic bottle: Staphylococcus aureus  See previous culture [id number]    Growth in aerobic bottle: Gram Positive Cocci in Clusters    07-30 @ 16:20 .Blood Blood-Peripheral     Growth in anaerobic bottle: Gram Positive Cocci in Clusters    Growth in anaerobic bottle: Gram Positive Cocci in Clusters    07-29 @ 15:20 .Blood Blood-Peripheral Blood Culture PCR  Staphylococcus aureus    Growth in aerobic and anaerobic bottles: Staphylococcus aureus  See previous culture [id number]    Growth in anaerobic bottle: Gram Positive Cocci in Clusters  Growth in aerobic bottle: Gram Positive Cocci in Clusters        RADIOLOGY REVIEWED:    < from: US Abdomen Complete (08.01.19 @ 09:20) >  INTERPRETATION:  Ultrasound of the abdomen  Comparison CT performed 2 days ago  History left-sided abdominal pain, fever and chills    The liver is mildly enlarged but uniform in echotexture. There are small   mobile stones in the otherwise normal gallbladder. The spleen, right   kidney and visualized portions of the pancreas are within normal limits.   There is no biliary dilatation or ascites. There is a tiny cyst in the   otherwise normal left kidney. The aorta and IVC are patent.    IMPRESSION:    Uncomplicated cholelithiasis.    < end of copied text >  < from: CT Abdomen and Pelvis w/ IV Cont (07.29.19 @ 16:40) >  IMPRESSION:     CT findings suggestive of urinary tract infection/pyelonephritis, most   pronounced left kidney as discussed.    Cholelithiasis, with possible choledocholithiasis; nondilated common bile   duct. This can be further evaluated with nonemergent MRCP if there are no   clinical contraindications.    < end of copied text >
Patient is a 63y old  Male who presents with a chief complaint of bacteremia (01 Aug 2019 15:44)  no acute events overnight          Patient seen and examined at bedside.    ALLERGIES:  allery to INSECT VENOM (Hives)  Novocain (Hives)  Originally Entered as [Unknown] reaction to [ENVIRONMENTAL] (Unknown)        Vital Signs Last 24 Hrs  T(F): 98.1 (02 Aug 2019 10:33), Max: 101.6 (01 Aug 2019 17:05)  HR: 63 (02 Aug 2019 10:33) (63 - 83)  BP: 121/95 (02 Aug 2019 10:33) (121/73 - 159/78)  RR: 18 (02 Aug 2019 10:33) (15 - 18)  SpO2: 96% (02 Aug 2019 10:33) (92% - 96%)  I&O's Summary    01 Aug 2019 07:01  -  02 Aug 2019 07:00  --------------------------------------------------------  IN: 640 mL / OUT: 3 mL / NET: 637 mL    02 Aug 2019 07:01  -  02 Aug 2019 12:41  --------------------------------------------------------  IN: 240 mL / OUT: 0 mL / NET: 240 mL      MEDICATIONS:  acetaminophen   Tablet .. 650 milliGRAM(s) Oral every 6 hours PRN  aspirin enteric coated 81 milliGRAM(s) Oral daily  atorvastatin 80 milliGRAM(s) Oral at bedtime  ceFAZolin   IVPB 2000 milliGRAM(s) IV Intermittent every 8 hours  dextrose 40% Gel 15 Gram(s) Oral once PRN  dextrose 5%. 1000 milliLiter(s) IV Continuous <Continuous>  dextrose 50% Injectable 12.5 Gram(s) IV Push once  dextrose 50% Injectable 25 Gram(s) IV Push once  dextrose 50% Injectable 25 Gram(s) IV Push once  enoxaparin Injectable 40 milliGRAM(s) SubCutaneous daily  glucagon  Injectable 1 milliGRAM(s) IntraMuscular once PRN  insulin glargine Injectable (LANTUS) 20 Unit(s) SubCutaneous at bedtime  insulin lispro (HumaLOG) corrective regimen sliding scale   SubCutaneous three times a day before meals  insulin lispro (HumaLOG) corrective regimen sliding scale   SubCutaneous at bedtime  insulin lispro Injectable (HumaLOG) 4 Unit(s) SubCutaneous before breakfast  insulin lispro Injectable (HumaLOG) 4 Unit(s) SubCutaneous before lunch  insulin lispro Injectable (HumaLOG) 4 Unit(s) SubCutaneous before dinner  metoprolol succinate ER 50 milliGRAM(s) Oral daily  ondansetron Injectable 4 milliGRAM(s) IV Push every 8 hours  oxyCODONE    5 mG/acetaminophen 325 mG 1 Tablet(s) Oral every 6 hours PRN  polyethylene glycol 3350 17 Gram(s) Oral every 12 hours PRN  sacubitril 49 mG/valsartan 51 mG 1 Tablet(s) Oral two times a day  spironolactone 25 milliGRAM(s) Oral daily      PHYSICAL EXAM:  General: NAD, A/O x 3  ENT: MMM  Neck: Supple, No JVD  Lungs: Clear to auscultation bilaterally  Cardio: RRR, S1/S2, No murmurs  Abdomen: Soft, Nontender, Nondistended; Bowel sounds present  Extremities: No cyanosis, No edema    LABS:                        11.1   7.64  )-----------( 212      ( 02 Aug 2019 05:31 )             31.9     08-02    135  |  101  |  14  ----------------------------<  163  4.3   |  24  |  1.10    Ca    8.5      02 Aug 2019 05:31    TPro  6.7  /  Alb  2.6  /  TBili  1.0  /  DBili  x   /  AST  25  /  ALT  18  /  AlkPhos  68  08-02    eGFR if Non African American: 71 mL/min/1.73M2 (08-02-19 @ 05:31)  eGFR if African American: 83 mL/min/1.73M2 (08-02-19 @ 05:31)    PT/INR - ( 31 Jul 2019 15:23 )   PT: 16.4 sec;   INR: 1.45 ratio         PTT - ( 31 Jul 2019 15:23 )  PTT:26.9 sec  Lactate, Blood: 1.2 mmol/L (07-31 @ 18:00)  Lactate, Blood: 1.4 mmol/L (07-31 @ 15:32)                      POCT Blood Glucose.: 327 mg/dL (02 Aug 2019 11:47)  POCT Blood Glucose.: 201 mg/dL (02 Aug 2019 07:39)  POCT Blood Glucose.: 196 mg/dL (01 Aug 2019 21:35)  POCT Blood Glucose.: 271 mg/dL (01 Aug 2019 16:46)    08-01 [id number] 11.9        Culture - Urine (collected 31 Jul 2019 15:23)  Source: .Urine Clean Catch (Midstream)  Final Report (02 Aug 2019 01:48):    No growth    Culture - Blood (collected 31 Jul 2019 15:23)  Source: .Blood Blood-Peripheral  Preliminary Report (01 Aug 2019 21:01):    No growth to date.    Culture - Blood (collected 31 Jul 2019 15:23)  Source: .Blood Blood-Peripheral  Gram Stain (01 Aug 2019 19:32):    Growth in aerobic bottle: Gram Positive Cocci in Clusters  Preliminary Report (01 Aug 2019 19:32):    Growth in aerobic bottle: Gram Positive Cocci in Clusters    Culture - Blood (collected 30 Jul 2019 16:20)  Source: .Blood Blood-Peripheral  Gram Stain (02 Aug 2019 02:12):    Growth in aerobic bottle: Gram Positive Cocci in Clusters    Growth in anaerobic bottle: Gram Positive Cocci in Clusters  Final Report (02 Aug 2019 02:12):    Growth in aerobic bottle: Staphylococcus aureus See previous culture    [id number]    Growth in anaerobic bottle: Gram Positive Cocci in Clusters    Culture - Blood (collected 30 Jul 2019 16:20)  Source: .Blood Blood-Peripheral  Gram Stain (02 Aug 2019 01:06):    Growth in anaerobic bottle: Gram Positive Cocci in Clusters  Preliminary Report (02 Aug 2019 01:06):    Growth in anaerobic bottle: Gram Positive Cocci in Clusters    Culture - Urine (collected 29 Jul 2019 15:20)  Source: .Urine Clean Catch (Midstream)  Final Report (31 Jul 2019 19:02):    >100,000 CFU/ml Staphylococcus aureus  Organism: Staphylococcus aureus (31 Jul 2019 19:02)  Organism: Staphylococcus aureus (31 Jul 2019 19:02)      -  Ampicillin/Sulbactam: S <=8/4      -  Cefazolin: S <=4      -  Gentamicin: S <=1 Should not be used as monotherapy      -  Oxacillin: S <=0.25      -  RIF- Rifampin: S <=1 Should not be used as monotherapy      -  Tetra/Doxy: S <=1      -  Trimethoprim/Sulfamethoxazole: S <=0.5/9.5      -  Vancomycin: S 2      Method Type: STAR    Culture - Blood (collected 29 Jul 2019 15:20)  Source: .Blood Blood-Peripheral  Gram Stain (30 Jul 2019 11:03):    Growth in aerobic bottle: Gram Positive Cocci in Clusters    Growth in anaerobic bottle: Gram Positive Cocci in Clusters  Final Report (01 Aug 2019 08:44):    Growth in aerobic and anaerobic bottles: Staphylococcus aureus    "Due to technical problems, Proteus sp. will Not be reported as part of    the BCID panel until further notice"    ***Blood Panel PCR results on this specimen are available    approximately 3 hours after the Gram stain result.***    Gram stain, PCR, and/or culture results may not always    correspond due to difference in methodologies.    ************************************************************    This PCR assay was performed using [employer]    The following targets are tested for: Enterococcus,    vancomycin resistant enterococci, Listeria monocytogenes,    coagulase negative staphylococci, S. aureus,    methicillin resistant S. aureus, Streptococcus agalactiae    (Group B), S. pneumoniae, S. pyogenes (Group A),    Acinetobacter baumannii, Enterobacter cloacae, E. coli,    Klebsiella oxytoca, K. pneumoniae, Proteus sp.,    Serratia marcescens, Haemophilus influenzae,    Neisseria meningitidis, Pseudomonas aeruginosa, Candida    albicans, C. glabrata, C krusei, C parapsilosis,    C. tropicalis and the KPC resistance gene.  Organism: Blood Culture PCR  Staphylococcus aureus (01 Aug 2019 08:44)  Organism: Staphylococcus aureus (01 Aug 2019 08:44)      -  Ampicillin/Sulbactam: S <=8/4      -  Cefazolin: S <=4      -  Clindamycin: S 0.5      -  Erythromycin: S <=0.25      -  Gentamicin: S <=1 Should not be used as monotherapy      -  Oxacillin: S <=0.25      -  RIF- Rifampin: S <=1 Should not be used as monotherapy      -  Tetra/Doxy: S <=1      -  Trimethoprim/Sulfamethoxazole: S <=0.5/9.5      -  Vancomycin: S 2      Method Type: STAR  Organism: Blood Culture PCR (01 Aug 2019 08:44)      -  Staphylococcus aureus: Detec Any isolate of Staphylococcus aureus from a blood culture is NOT considered a contaminant.      Method Type: PCR    Culture - Blood (collected 29 Jul 2019 15:20)  Source: .Blood Blood-Peripheral  Gram Stain (30 Jul 2019 12:06):    Growth in anaerobic bottle: Gram Positive Cocci in Clusters    Growth in aerobic bottle: Gram Positive Cocci in Clusters  Final Report (01 Aug 2019 09:23):    Growth in aerobic and anaerobic bottles: Staphylococcus aureus    See previous culture [id number]        RADIOLOGY & ADDITIONAL TESTS:  < from: TTE Echo Complete w/Doppler (08.01.19 @ 08:50) >    Summary:   1. Left ventricular ejection fraction, by visual estimation, is 45%.   2. Mildly decreased global left ventricular systolic function.   3. Spectral Doppler shows impaired relaxation pattern of left  ventricular myocardial filling (Grade I diastolic dysfunction).   4. There is severe concentric left ventricular hypertrophy.   5. Mild thickening and calcification of the anterior and posterior   mitral valve leaflets.   6. Dilatation of the aortic root.   7. No obvious vegitations affecting the aortic mitral or tricuspid   valve. TTE may be insensitive to endocarditis.   8. LA volume Index is 47.0 ml/m² ml/m2.   9. Peak transaortic gradient equals 25.9 mmHg, mean transaortic gradient   [id number] mmHg, the calculated aortic valve area equals 1.03 cm² by the   continuity equation consistent with moderate aortic stenosis.    [id number] Jonas Woodard , Electronically signed on 8/1/2019 at 3:08:17   PM              *** Final ***    < end of copied text >        Care Discussed with Consultants/Other Providers:
Patient is a 63y old  Male who presents with a chief complaint of bacteremia (03 Aug 2019 10:59)      Patient seen and examined at bedside, no events overnight, in no acute distress.     ALLERGIES:  allery to INSECT VENOM (Hives)  Novocain (Hives)  Originally Entered as [Unknown] reaction to [ENVIRONMENTAL] (Unknown)    MEDICATIONS:  acetaminophen   Tablet .. 650 milliGRAM(s) Oral every 6 hours PRN  aspirin enteric coated 81 milliGRAM(s) Oral daily  atorvastatin 80 milliGRAM(s) Oral at bedtime  ceFAZolin   IVPB 2000 milliGRAM(s) IV Intermittent every 8 hours  dextrose 40% Gel 15 Gram(s) Oral once PRN  dextrose 5%. 1000 milliLiter(s) IV Continuous <Continuous>  dextrose 50% Injectable 12.5 Gram(s) IV Push once  dextrose 50% Injectable 25 Gram(s) IV Push once  dextrose 50% Injectable 25 Gram(s) IV Push once  enoxaparin Injectable 40 milliGRAM(s) SubCutaneous daily  glucagon  Injectable 1 milliGRAM(s) IntraMuscular once PRN  insulin glargine Injectable (LANTUS) 20 Unit(s) SubCutaneous at bedtime  insulin lispro (HumaLOG) corrective regimen sliding scale   SubCutaneous three times a day before meals  insulin lispro (HumaLOG) corrective regimen sliding scale   SubCutaneous at bedtime  insulin lispro Injectable (HumaLOG) 4 Unit(s) SubCutaneous before breakfast  insulin lispro Injectable (HumaLOG) 4 Unit(s) SubCutaneous before lunch  insulin lispro Injectable (HumaLOG) 4 Unit(s) SubCutaneous before dinner  metoprolol succinate ER 50 milliGRAM(s) Oral daily  ondansetron Injectable 4 milliGRAM(s) IV Push every 8 hours  oxyCODONE    5 mG/acetaminophen 325 mG 1 Tablet(s) Oral every 6 hours PRN  polyethylene glycol 3350 17 Gram(s) Oral every 12 hours PRN  sacubitril 49 mG/valsartan 51 mG 1 Tablet(s) Oral two times a day  spironolactone 25 milliGRAM(s) Oral daily    Vital Signs Last 24 Hrs  T(C): 37.2 (03 Aug 2019 07:33), Max: 37.2 (03 Aug 2019 07:33)  T(F): 98.9 (03 Aug 2019 07:33), Max: 98.9 (03 Aug 2019 07:33)  HR: 71 (03 Aug 2019 07:33) (62 - 71)  BP: 138/47 (03 Aug 2019 07:33) (109/64 - 143/75)  BP(mean): --  RR: 17 (03 Aug 2019 07:33) (16 - 18)  SpO2: 98% (03 Aug 2019 07:33) (95% - 98%)  I&O's Summary    02 Aug 2019 07:01  -  03 Aug 2019 07:00  --------------------------------------------------------  IN: 360 mL / OUT: 0 mL / NET: 360 mL    03 Aug 2019 07:01  -  03 Aug 2019 12:00  --------------------------------------------------------  IN: 200 mL / OUT: 0 mL / NET: 200 mL          PAST MEDICAL & SURGICAL HISTORY:  Moderate aortic stenosis  Systolic congestive heart failure  Paroxysmal atrial fibrillation  Coronary artery disease  Essential hypertension  Type 2 diabetes mellitus with diabetic neuropathy, without long-term current use of insulin  S/P CABG (coronary artery bypass graft)  History of excision of pilonidal cyst  H/O inguinal hernia repair      Home Medications:  Aspirin Enteric Coated 81 mg oral delayed release tablet: 1 tab(s) orally once a day (15 Jul 2018 10:14)  losartan 50 mg oral tablet: 1 tab(s) orally once a day (15 Jul 2018 10:14)  metFORMIN 500 mg oral tablet, extended release: 1 tab(s) orally 2 times a day (15 Jul 2018 10:14)      PHYSICAL EXAM:  General: NAD, A/O x3  ENT: MMM  Neck: Supple, No JVD  Lungs: Clear to percussion bilaterally  Cardio: RRR, S1/S2, No murmurs  Abdomen: Soft, Nontender, Nondistended; Bowel sounds present  Extremities: No clubbing, cyanosis, or edema    LABS:                        11.3   8.64  )-----------( 246      ( 03 Aug 2019 05:59 )             33.1     08-03    137  |  102  |  18  ----------------------------<  171  4.3   |  28  |  0.95    Ca    8.7      03 Aug 2019 05:59    TPro  6.7  /  Alb  2.7  /  TBili  0.8  /  DBili  x   /  AST  19  /  ALT  19  /  AlkPhos  67  08-03    PT/INR - ( 31 Jul 2019 15:23 )   PT: 16.4 sec;   INR: 1.45 ratio         PTT - ( 31 Jul 2019 15:23 )  PTT:26.9 sec  Lactate, Blood: 1.2 mmol/L (07-31 @ 18:00)  Lactate, Blood: 1.4 mmol/L (07-31 @ 15:32)                  POCT Blood Glucose.: 303 mg/dL (03 Aug 2019 11:33)  POCT Blood Glucose.: 178 mg/dL (03 Aug 2019 07:19)  POCT Blood Glucose.: 223 mg/dL (02 Aug 2019 21:18)  POCT Blood Glucose.: 258 mg/dL (02 Aug 2019 16:33)    08-01 [id number] 11.9        Culture - Urine (collected 31 Jul 2019 15:23)  Source: .Urine Clean Catch (Midstream)  Final Report (02 Aug 2019 01:48):    No growth    Culture - Blood (collected 31 Jul 2019 15:23)  Source: .Blood Blood-Peripheral  Preliminary Report (01 Aug 2019 21:01):    No growth to date.    Culture - Blood (collected 31 Jul 2019 15:23)  Source: .Blood Blood-Peripheral  Gram Stain (01 Aug 2019 19:32):    Growth in aerobic bottle: Gram Positive Cocci in Clusters  Final Report (02 Aug 2019 16:13):    Growth in aerobic bottle: Staphylococcus aureus    See previous culture [id number]    Culture - Blood (collected 30 Jul 2019 16:20)  Source: .Blood Blood-Peripheral  Gram Stain (02 Aug 2019 02:12):    Growth in aerobic bottle: Gram Positive Cocci in Clusters    Growth in anaerobic bottle: Gram Positive Cocci in Clusters  Final Report (02 Aug 2019 21:34):    Growth in aerobic and anaerobic bottles: Staphylococcus aureus    See previous culture [id number]    Culture - Blood (collected 30 Jul 2019 16:20)  Source: .Blood Blood-Peripheral  Gram Stain (02 Aug 2019 01:06):    Growth in anaerobic bottle: Gram Positive Cocci in Clusters  Final Report (02 Aug 2019 20:02):    Growth in anaerobic bottle: Staphylococcus aureus    See previous culture [id number]    Culture - Urine (collected 29 Jul 2019 15:20)  Source: .Urine Clean Catch (Midstream)  Final Report (31 Jul 2019 19:02):    >100,000 CFU/ml Staphylococcus aureus  Organism: Staphylococcus aureus (31 Jul 2019 19:02)  Organism: Staphylococcus aureus (31 Jul 2019 19:02)      -  Ampicillin/Sulbactam: S <=8/4      -  Cefazolin: S <=4      -  Gentamicin: S <=1 Should not be used as monotherapy      -  Oxacillin: S <=0.25      -  RIF- Rifampin: S <=1 Should not be used as monotherapy      -  Tetra/Doxy: S <=1      -  Trimethoprim/Sulfamethoxazole: S <=0.5/9.5      -  Vancomycin: S 2      Method Type: STAR    Culture - Blood (collected 29 Jul 2019 15:20)  Source: .Blood Blood-Peripheral  Gram Stain (30 Jul 2019 11:03):    Growth in aerobic bottle: Gram Positive Cocci in Clusters    Growth in anaerobic bottle: Gram Positive Cocci in Clusters  Final Report (01 Aug 2019 08:44):    Growth in aerobic and anaerobic bottles: Staphylococcus aureus    "Due to technical problems, Proteus sp. will Not be reported as part of    the BCID panel until further notice"    ***Blood Panel PCR results on this specimen are available    approximately 3 hours after the Gram stain result.***    Gram stain, PCR, and/or culture results may not always    correspond due to difference in methodologies.    ************************************************************    This PCR assay was performed using [employer].    The following targets are tested for: Enterococcus,    vancomycin resistant enterococci, Listeria monocytogenes,    coagulase negative staphylococci, S. aureus,    methicillin resistant S. aureus, Streptococcus agalactiae    (Group B), S. pneumoniae, S. pyogenes (Group A),    Acinetobacter baumannii, Enterobacter cloacae, E. coli,    Klebsiella oxytoca, K. pneumoniae, Proteus sp.,    Serratia marcescens, Haemophilus influenzae,    Neisseria meningitidis, Pseudomonas aeruginosa, Candida    albicans, C. glabrata, C krusei, C parapsilosis,    C. tropicalis and the KPC resistance gene.  Organism: Blood Culture PCR  Staphylococcus aureus (01 Aug 2019 08:44)  Organism: Staphylococcus aureus (01 Aug 2019 08:44)      -  Ampicillin/Sulbactam: S <=8/4      -  Cefazolin: S <=4      -  Clindamycin: S 0.5      -  Erythromycin: S <=0.25      -  Gentamicin: S <=1 Should not be used as monotherapy      -  Oxacillin: S <=0.25      -  RIF- Rifampin: S <=1 Should not be used as monotherapy      -  Tetra/Doxy: S <=1      -  Trimethoprim/Sulfamethoxazole: S <=0.5/9.5      -  Vancomycin: S 2      Method Type: STAR  Organism: Blood Culture PCR (01 Aug 2019 08:44)      -  Staphylococcus aureus: Detec Any isolate of Staphylococcus aureus from a blood culture is NOT considered a contaminant.      Method Type: PCR    Culture - Blood (collected 29 Jul 2019 15:20)  Source: .Blood Blood-Peripheral  Gram Stain (30 Jul 2019 12:06):    Growth in anaerobic bottle: Gram Positive Cocci in Clusters    Growth in aerobic bottle: Gram Positive Cocci in Clusters  Final Report (01 Aug 2019 09:23):    Growth in aerobic and anaerobic bottles: Staphylococcus aureus    See previous culture [id number]        RADIOLOGY & ADDITIONAL TESTS: < from: US Abdomen Complete (08.01.19 @ 09:20) >  IMPRESSION:    Uncomplicated cholelithiasis.    < end of copied text >      Care Discussed with Consultants/Other Providers:
Patient is a 63y old  Male who presents with a chief complaint of bacteremia (03 Aug 2019 11:59)      Patient seen and examined at bedside, no events overnight, in no acute distress.     ALLERGIES:  allery to INSECT VENOM (Hives)  Novocain (Hives)  Originally Entered as [Unknown] reaction to [ENVIRONMENTAL] (Unknown)    MEDICATIONS:  acetaminophen   Tablet .. 650 milliGRAM(s) Oral every 6 hours PRN  aspirin enteric coated 81 milliGRAM(s) Oral daily  atorvastatin 80 milliGRAM(s) Oral at bedtime  ceFAZolin   IVPB 2000 milliGRAM(s) IV Intermittent every 8 hours  dextrose 40% Gel 15 Gram(s) Oral once PRN  dextrose 5%. 1000 milliLiter(s) IV Continuous <Continuous>  dextrose 50% Injectable 12.5 Gram(s) IV Push once  dextrose 50% Injectable 25 Gram(s) IV Push once  dextrose 50% Injectable 25 Gram(s) IV Push once  enoxaparin Injectable 40 milliGRAM(s) SubCutaneous daily  glucagon  Injectable 1 milliGRAM(s) IntraMuscular once PRN  insulin glargine Injectable (LANTUS) 22 Unit(s) SubCutaneous at bedtime  insulin lispro (HumaLOG) corrective regimen sliding scale   SubCutaneous Before meals and at bedtime  insulin lispro Injectable (HumaLOG) 7 Unit(s) SubCutaneous three times a day before meals  metoprolol succinate ER 50 milliGRAM(s) Oral daily  ondansetron Injectable 4 milliGRAM(s) IV Push every 8 hours  oxyCODONE    5 mG/acetaminophen 325 mG 1 Tablet(s) Oral every 6 hours PRN  polyethylene glycol 3350 17 Gram(s) Oral every 12 hours PRN  sacubitril 49 mG/valsartan 51 mG 1 Tablet(s) Oral two times a day  spironolactone 25 milliGRAM(s) Oral daily    Vital Signs Last 24 Hrs  T(C): 37.1 (04 Aug 2019 09:18), Max: 37.1 (04 Aug 2019 09:18)  T(F): 98.7 (04 Aug 2019 09:18), Max: 98.7 (04 Aug 2019 09:18)  HR: 66 (04 Aug 2019 09:18) (62 - 66)  BP: 114/62 (04 Aug 2019 09:18) (111/64 - 114/69)  BP(mean): --  RR: 14 (04 Aug 2019 09:18) (14 - 14)  SpO2: 98% (04 Aug 2019 09:18) (97% - 100%)  I&O's Summary    03 Aug 2019 07:01  -  04 Aug 2019 07:00  --------------------------------------------------------  IN: 400 mL / OUT: 0 mL / NET: 400 mL          PAST MEDICAL & SURGICAL HISTORY:  Moderate aortic stenosis  Systolic congestive heart failure  Paroxysmal atrial fibrillation  Coronary artery disease  Essential hypertension  Type 2 diabetes mellitus with diabetic neuropathy, without long-term current use of insulin  S/P CABG (coronary artery bypass graft)  History of excision of pilonidal cyst  H/O inguinal hernia repair      Home Medications:  Aspirin Enteric Coated 81 mg oral delayed release tablet: 1 tab(s) orally once a day (15 Jul 2018 10:14)  losartan 50 mg oral tablet: 1 tab(s) orally once a day (15 Jul 2018 10:14)  metFORMIN 500 mg oral tablet, extended release: 1 tab(s) orally 2 times a day (15 Jul 2018 10:14)      PHYSICAL EXAM:  General: NAD, A/O x3  ENT: MMM  Neck: Supple, No JVD  Lungs: Clear to percussion bilaterally  Cardio: RRR, S1/S2, No murmurs  Abdomen: Soft, Nontender, Nondistended; Bowel sounds present  Extremities: No clubbing, cyanosis, or edema    LABS:                        11.1   8.77  )-----------( 236      ( 04 Aug 2019 06:20 )             32.4     08-04    138  |  103  |  17  ----------------------------<  182  4.5   |  27  |  1.06    Ca    8.4      04 Aug 2019 06:20    TPro  6.6  /  Alb  2.6  /  TBili  0.7  /  DBili  x   /  AST  23  /  ALT  18  /  AlkPhos  73  08-04      POCT Blood Glucose.: 182 mg/dL (04 Aug 2019 07:11)  POCT Blood Glucose.: 112 mg/dL (03 Aug 2019 21:05)  POCT Blood Glucose.: 190 mg/dL (03 Aug 2019 16:12)  POCT Blood Glucose.: 303 mg/dL (03 Aug 2019 11:33)    08-01 [id number] 11.9    Culture - Blood (collected 03 Aug 2019 05:59)  Source: .Blood Blood-Peripheral  Gram Stain (04 Aug 2019 05:32):    Growth in aerobic bottle: Gram Positive Cocci in Clusters  Preliminary Report (04 Aug 2019 05:32):    Growth in aerobic bottle: Gram Positive Cocci in Clusters    Culture - Urine (collected 31 Jul 2019 15:23)  Source: .Urine Clean Catch (Midstream)  Final Report (02 Aug 2019 01:48):    No growth    Culture - Blood (collected 31 Jul 2019 15:23)  Source: .Blood Blood-Peripheral  Preliminary Report (01 Aug 2019 21:01):    No growth to date.    Culture - Blood (collected 31 Jul 2019 15:23)  Source: .Blood Blood-Peripheral  Gram Stain (04 Aug 2019 02:01):    Growth in aerobic bottle: Gram Positive Cocci in Clusters    Growth in anaerobic bottle: Gram Positive Cocci in Clusters  Final Report (04 Aug 2019 02:02):    Growth in aerobic bottle: Staphylococcus aureus    See previous culture [id number]    Growth in anaerobic bottle: Gram Positive Cocci in Clusters    Culture - Blood (collected 30 Jul 2019 16:20)  Source: .Blood Blood-Peripheral  Gram Stain (02 Aug 2019 02:12):    Growth in aerobic bottle: Gram Positive Cocci in Clusters    Growth in anaerobic bottle: Gram Positive Cocci in Clusters  Final Report (02 Aug 2019 21:34):    Growth in aerobic and anaerobic bottles: Staphylococcus aureus    See previous culture [id number]    Culture - Blood (collected 30 Jul 2019 16:20)  Source: .Blood Blood-Peripheral  Gram Stain (02 Aug 2019 01:06):    Growth in anaerobic bottle: Gram Positive Cocci in Clusters  Final Report (02 Aug 2019 20:02):    Growth in anaerobic bottle: Staphylococcus aureus    See previous culture [id number]    RADIOLOGY & ADDITIONAL TESTS: < from: US Abdomen Complete (08.01.19 @ 09:20) >    IMPRESSION:    Uncomplicated cholelithiasis.    < end of copied text >      Care Discussed with Consultants/Other Providers: Hospitalist
Patient is a 63y old  Male who presents with a chief complaint of bacteremia (05 Aug 2019 11:20). No acute issues overnight . Afebrile       Patient seen and examined at bedside.    ALLERGIES:  allery to INSECT VENOM (Hives)  Novocain (Hives)  Originally Entered as [Unknown] reaction to [ENVIRONMENTAL] (Unknown)    MEDICATIONS  (STANDING):  aspirin enteric coated 81 milliGRAM(s) Oral daily  atorvastatin 80 milliGRAM(s) Oral at bedtime  ceFAZolin   IVPB 2000 milliGRAM(s) IV Intermittent every 8 hours  dextrose 5%. 1000 milliLiter(s) (50 mL/Hr) IV Continuous <Continuous>  dextrose 50% Injectable 12.5 Gram(s) IV Push once  dextrose 50% Injectable 25 Gram(s) IV Push once  dextrose 50% Injectable 25 Gram(s) IV Push once  enoxaparin Injectable 40 milliGRAM(s) SubCutaneous daily  insulin glargine Injectable (LANTUS) 25 Unit(s) SubCutaneous at bedtime  insulin lispro (HumaLOG) corrective regimen sliding scale   SubCutaneous Before meals and at bedtime  insulin lispro Injectable (HumaLOG) 10 Unit(s) SubCutaneous three times a day before meals  metoprolol succinate ER 50 milliGRAM(s) Oral daily  ondansetron Injectable 4 milliGRAM(s) IV Push every 8 hours  sacubitril 49 mG/valsartan 51 mG 1 Tablet(s) Oral two times a day  spironolactone 25 milliGRAM(s) Oral daily    MEDICATIONS  (PRN):  acetaminophen   Tablet .. 650 milliGRAM(s) Oral every 6 hours PRN Temp greater or equal to 38C (100.4F), Mild Pain (1 - 3)  dextrose 40% Gel 15 Gram(s) Oral once PRN Blood Glucose LESS THAN 70 milliGRAM(s)/deciliter  glucagon  Injectable 1 milliGRAM(s) IntraMuscular once PRN Glucose LESS THAN 70 milligrams/deciliter  oxyCODONE    5 mG/acetaminophen 325 mG 1 Tablet(s) Oral every 6 hours PRN Moderate Pain (4 - 6)  polyethylene glycol 3350 17 Gram(s) Oral every 12 hours PRN Constipation    Vital Signs Last 24 Hrs  T(F): 98.4 (05 Aug 2019 05:06), Max: 98.8 (04 Aug 2019 16:04)  HR: 66 (05 Aug 2019 05:06) (62 - 68)  BP: 119/77 (05 Aug 2019 05:06) (119/77 - 137/79)  RR: 16 (05 Aug 2019 05:06) (15 - 17)  SpO2: 95% (05 Aug 2019 05:06) (95% - 96%)  I&O's Summary    05 Aug 2019 07:01  -  05 Aug 2019 12:13  --------------------------------------------------------  IN: 240 mL / OUT: 0 mL / NET: 240 mL      BMI (kg/m2): 41.2 (08-04-19 @ 05:57)  PHYSICAL EXAM:  General: NAD, A/O x 3  ENT: MMM  Neck: Supple, No JVD  Lungs: Clear to auscultation bilaterally  Cardio: RRR, S1/S2, No murmurs  Abdomen: Soft, Nontender, Nondistended; Bowel sounds present  Extremities: No calf tenderness, No pitting edema    LABS:                        11.2   9.67  )-----------( 268      ( 05 Aug 2019 06:29 )             32.8       08-05    137  |  102  |  19  ----------------------------<  190  4.5   |  26  |  1.10    Ca    8.5      05 Aug 2019 06:29    TPro  6.6  /  Alb  2.6  /  TBili  0.7  /  DBili  x   /  AST  23  /  ALT  18  /  AlkPhos  73  08-04     eGFR if Non African American: 72 mL/min/1.73M2 (08-05-19 @ 06:29)  eGFR if African American: 83 mL/min/1.73M2 (08-05-19 @ 06:29)                           POCT Blood Glucose.: 196 mg/dL (05 Aug 2019 08:22)  POCT Blood Glucose.: 193 mg/dL (04 Aug 2019 21:01)  POCT Blood Glucose.: 125 mg/dL (04 Aug 2019 16:20)    08-01 [id number] 11.9        Culture - Blood (collected 03 Aug 2019 05:59)  Source: .Blood Blood-Peripheral  Gram Stain (04 Aug 2019 05:32):    Growth in aerobic bottle: Gram Positive Cocci in Clusters  Preliminary Report (05 Aug 2019 08:44):    Growth in aerobic bottle: Staphylococcus aureus    Susceptibility to follow.    Culture - Urine (collected 31 Jul 2019 15:23)  Source: .Urine Clean Catch (Midstream)  Final Report (02 Aug 2019 01:48):    No growth    Culture - Blood (collected 31 Jul 2019 15:23)  Source: .Blood Blood-Peripheral  Preliminary Report (01 Aug 2019 21:01):    No growth to date.    Culture - Blood (collected 31 Jul 2019 15:23)  Source: .Blood Blood-Peripheral  Gram Stain (04 Aug 2019 02:01):    Growth in aerobic bottle: Gram Positive Cocci in Clusters    Growth in anaerobic bottle: Gram Positive Cocci in Clusters  Final Report (04 Aug 2019 20:35):    Growth in aerobic and anaerobic bottles: Staphylococcus aureus    See previous culture [id number]        RADIOLOGY & ADDITIONAL TESTS:    Care Discussed with Consultants/Other Providers:
Patient is a 63y old  Male who presents with a chief complaint of bacteremia (07 Aug 2019 18:29)      Patient seen and examined at bedside. No overnight events reported.     ALLERGIES:  allery to INSECT VENOM (Hives)  Novocain (Hives)  Originally Entered as [Unknown] reaction to [ENVIRONMENTAL] (Unknown)    MEDICATIONS  (STANDING):  aspirin enteric coated 81 milliGRAM(s) Oral daily  atorvastatin 80 milliGRAM(s) Oral at bedtime  ceFAZolin   IVPB 2000 milliGRAM(s) IV Intermittent every 8 hours  chlorhexidine 4% Liquid 1 Application(s) Topical <User Schedule>  dextrose 5%. 1000 milliLiter(s) (50 mL/Hr) IV Continuous <Continuous>  dextrose 50% Injectable 12.5 Gram(s) IV Push once  dextrose 50% Injectable 25 Gram(s) IV Push once  dextrose 50% Injectable 25 Gram(s) IV Push once  enoxaparin Injectable 40 milliGRAM(s) SubCutaneous daily  insulin glargine Injectable (LANTUS) 25 Unit(s) SubCutaneous at bedtime  insulin lispro (HumaLOG) corrective regimen sliding scale   SubCutaneous Before meals and at bedtime  insulin lispro Injectable (HumaLOG) 10 Unit(s) SubCutaneous three times a day before meals  metoprolol succinate ER 50 milliGRAM(s) Oral daily  sacubitril 49 mG/valsartan 51 mG 1 Tablet(s) Oral two times a day  spironolactone 25 milliGRAM(s) Oral daily    MEDICATIONS  (PRN):  acetaminophen   Tablet .. 650 milliGRAM(s) Oral every 6 hours PRN Temp greater or equal to 38C (100.4F), Mild Pain (1 - 3)  dextrose 40% Gel 15 Gram(s) Oral once PRN Blood Glucose LESS THAN 70 milliGRAM(s)/deciliter  glucagon  Injectable 1 milliGRAM(s) IntraMuscular once PRN Glucose LESS THAN 70 milligrams/deciliter  polyethylene glycol 3350 17 Gram(s) Oral every 12 hours PRN Constipation  sodium chloride 0.9% lock flush 10 milliLiter(s) IV Push every 1 hour PRN Pre/post blood products, medications, blood draw, and to maintain line patency    Vital Signs Last 24 Hrs  T(F): 98 (08 Aug 2019 10:34), Max: 98.2 (07 Aug 2019 16:03)  HR: 74 (08 Aug 2019 10:34) (58 - 74)  BP: 112/72 (08 Aug 2019 10:34) (112/72 - 144/86)  RR: 16 (08 Aug 2019 10:34) (16 - 17)  SpO2: 95% (08 Aug 2019 10:34) (95% - 99%)  I&O's Summary    07 Aug 2019 07:01  -  08 Aug 2019 07:00  --------------------------------------------------------  IN: 240 mL / OUT: 0 mL / NET: 240 mL    08 Aug 2019 07:01  -  08 Aug 2019 11:08  --------------------------------------------------------  IN: 325 mL / OUT: 0 mL / NET: 325 mL      PHYSICAL EXAM:  General: NAD, A/O x 3  ENT: MMM, no thrush  Neck: Supple, No JVD  Lungs: Clear to auscultation bilaterally, good air entry, non-labored breathing  Cardio: RRR, S1/S2, No murmur  Abdomen: Soft, Nontender, Nondistended; Bowel sounds present  Extremities: No calf tenderness, No pitting edema; PICC line in place  Psych: Highly anxious    LABS:                        10.9   10.94 )-----------( 290      ( 08 Aug 2019 06:15 )             32.3     08-08    138  |  106  |  18  ----------------------------<  135  4.3   |  27  |  0.98    Ca    8.9      08 Aug 2019 06:15          eGFR if Non African American: 82 mL/min/1.73M2 (08-08-19 @ 06:15)  eGFR if African American: 95 mL/min/1.73M2 (08-08-19 @ 06:15)    POCT Blood Glucose.: 143 mg/dL (08 Aug 2019 07:59)  POCT Blood Glucose.: 107 mg/dL (07 Aug 2019 22:26)  POCT Blood Glucose.: 170 mg/dL (07 Aug 2019 20:19)  POCT Blood Glucose.: 207 mg/dL (07 Aug 2019 18:27)  POCT Blood Glucose.: 256 mg/dL (07 Aug 2019 16:43)  POCT Blood Glucose.: 173 mg/dL (07 Aug 2019 12:07)    08-01 [id number] 11.9        Culture - Blood (collected 05 Aug 2019 08:25)  Source: .Blood Blood  Preliminary Report (06 Aug 2019 15:01):    No growth to date.
SUBJ:  Patient is a 63y old  Male who presents with a chief complaint of bacteremia (03 Aug 2019 10:44)  patient seen and examined  case discussed with Dr. Damian  patient comfortable.. no complaints other than he wants to go home     PAST MEDICAL & SURGICAL HISTORY:  Moderate aortic stenosis  Systolic congestive heart failure  Paroxysmal atrial fibrillation  Coronary artery disease  Essential hypertension  Type 2 diabetes mellitus with diabetic neuropathy, without long-term current use of insulin  S/P CABG (coronary artery bypass graft)  History of excision of pilonidal cyst  H/O inguinal hernia repair      MEDICATIONS  (STANDING):  aspirin enteric coated 81 milliGRAM(s) Oral daily  atorvastatin 80 milliGRAM(s) Oral at bedtime  ceFAZolin   IVPB 2000 milliGRAM(s) IV Intermittent every 8 hours  dextrose 5%. 1000 milliLiter(s) (50 mL/Hr) IV Continuous <Continuous>  dextrose 50% Injectable 12.5 Gram(s) IV Push once  dextrose 50% Injectable 25 Gram(s) IV Push once  dextrose 50% Injectable 25 Gram(s) IV Push once  enoxaparin Injectable 40 milliGRAM(s) SubCutaneous daily  insulin glargine Injectable (LANTUS) 20 Unit(s) SubCutaneous at bedtime  insulin lispro (HumaLOG) corrective regimen sliding scale   SubCutaneous three times a day before meals  insulin lispro (HumaLOG) corrective regimen sliding scale   SubCutaneous at bedtime  insulin lispro Injectable (HumaLOG) 4 Unit(s) SubCutaneous before breakfast  insulin lispro Injectable (HumaLOG) 4 Unit(s) SubCutaneous before lunch  insulin lispro Injectable (HumaLOG) 4 Unit(s) SubCutaneous before dinner  metoprolol succinate ER 50 milliGRAM(s) Oral daily  ondansetron Injectable 4 milliGRAM(s) IV Push every 8 hours  sacubitril 49 mG/valsartan 51 mG 1 Tablet(s) Oral two times a day  spironolactone 25 milliGRAM(s) Oral daily    MEDICATIONS  (PRN):  acetaminophen   Tablet .. 650 milliGRAM(s) Oral every 6 hours PRN Temp greater or equal to 38C (100.4F), Mild Pain (1 - 3)  dextrose 40% Gel 15 Gram(s) Oral once PRN Blood Glucose LESS THAN 70 milliGRAM(s)/deciliter  glucagon  Injectable 1 milliGRAM(s) IntraMuscular once PRN Glucose LESS THAN 70 milligrams/deciliter  oxyCODONE    5 mG/acetaminophen 325 mG 1 Tablet(s) Oral every 6 hours PRN Moderate Pain (4 - 6)  polyethylene glycol 3350 17 Gram(s) Oral every 12 hours PRN Constipation          Vital Signs Last 24 Hrs  T(C): 37.2 (03 Aug 2019 07:33), Max: 37.2 (03 Aug 2019 07:33)  T(F): 98.9 (03 Aug 2019 07:33), Max: 98.9 (03 Aug 2019 07:33)  HR: 71 (03 Aug 2019 07:33) (62 - 71)  BP: 138/47 (03 Aug 2019 07:33) (109/64 - 143/75)  BP(mean): --  RR: 17 (03 Aug 2019 07:33) (16 - 18)  SpO2: 98% (03 Aug 2019 07:33) (95% - 98%)    REVIEW OF SYSTEMS:  CONSTITUTIONAL:  fatigue   RESPIRATORY: No cough, wheezing, chills or hemoptysis; No shortness of breath  CARDIOVASCULAR: No chest pain or chest pressure.  No shortness of breath or dyspnea on exertion.  No palpitations, dizziness, light headedness, syncope or near syncope.  No edema, no orthopnea.   NEUROLOGICAL: No headaches, memory loss, loss of strength, numbness, or tremors      PHYSICAL EXAM  Constitutional:  WDWN. No acute distress  HEENT: normocephalic, atraumatic.  PERRLA. EOMI  Neck : No JVD. no carotid bruits  Lungs:  clear to auscultation bilaterally. no rhonchi. no wheezing  Heart:  S1 and S2. No S3, S4. I/VI systolic murmur.  Abdomen:  soft, non tender.  Extremities: No clubbing, cyanoisis or edema  Nuerologic:  A+O x 3. No focal deficits  Skin:  no rashes        LABS:                        11.3   8.64  )-----------( 246      ( 03 Aug 2019 05:59 )             33.1     08-03    137  |  102  |  18  ----------------------------<  171<H>  4.3   |  28  |  0.95    Ca    8.7      03 Aug 2019 05:59    TPro  6.7  /  Alb  2.7<L>  /  TBili  0.8  /  DBili  x   /  AST  19  /  ALT  19  /  AlkPhos  67  08-03            acetaminophen   Tablet .. 650 milliGRAM(s) Oral every 6 hours PRN  aspirin enteric coated 81 milliGRAM(s) Oral daily  atorvastatin 80 milliGRAM(s) Oral at bedtime  ceFAZolin   IVPB 2000 milliGRAM(s) IV Intermittent every 8 hours  dextrose 40% Gel 15 Gram(s) Oral once PRN  dextrose 5%. 1000 milliLiter(s) IV Continuous <Continuous>  dextrose 50% Injectable 12.5 Gram(s) IV Push once  dextrose 50% Injectable 25 Gram(s) IV Push once  dextrose 50% Injectable 25 Gram(s) IV Push once  enoxaparin Injectable 40 milliGRAM(s) SubCutaneous daily  glucagon  Injectable 1 milliGRAM(s) IntraMuscular once PRN  insulin glargine Injectable (LANTUS) 20 Unit(s) SubCutaneous at bedtime  insulin lispro (HumaLOG) corrective regimen sliding scale   SubCutaneous three times a day before meals  insulin lispro (HumaLOG) corrective regimen sliding scale   SubCutaneous at bedtime  insulin lispro Injectable (HumaLOG) 4 Unit(s) SubCutaneous before breakfast  insulin lispro Injectable (HumaLOG) 4 Unit(s) SubCutaneous before lunch  insulin lispro Injectable (HumaLOG) 4 Unit(s) SubCutaneous before dinner  metoprolol succinate ER 50 milliGRAM(s) Oral daily  ondansetron Injectable 4 milliGRAM(s) IV Push every 8 hours  oxyCODONE    5 mG/acetaminophen 325 mG 1 Tablet(s) Oral every 6 hours PRN  polyethylene glycol 3350 17 Gram(s) Oral every 12 hours PRN  sacubitril 49 mG/valsartan 51 mG 1 Tablet(s) Oral two times a day  spironolactone 25 milliGRAM(s) Oral daily    I&O's Summary    02 Aug 2019 07:01  -  03 Aug 2019 07:00  --------------------------------------------------------  IN: 360 mL / OUT: 0 mL / NET: 360 mL    03 Aug 2019 07:01  -  03 Aug 2019 10:59  --------------------------------------------------------  IN: 200 mL / OUT: 0 mL / NET: 200 mL    < from: MR Cardiac w/wo IV Cont (03.20.19 @ 13:03) >  Diffusely hypokinetic LV with severe hypokinesis of the anterior wall and   septum from the base to the apex. There is late gadolinium enhancement   throughout the LAD territory consistent with prior LAD territory infarct.   This enhancement is less than 50% of the wall thickness. There is   resultant mild thinning of the ventricular septum. LVEF = 32 %     Post CABG     < end of copied text >    < from: TTE Echo Complete w/Doppler (08.01.19 @ 08:50) >   1. Left ventricular ejection fraction, by visual estimation, is 45%.   2. Mildly decreased global left ventricular systolic function.   3. Spectral Doppler shows impaired relaxation pattern of left  ventricular myocardial filling (Grade I diastolic dysfunction).   4. There is severe concentric left ventricular hypertrophy.   5. Mild thickening and calcification of the anterior and posterior   mitral valve leaflets.   6. Dilatation of the aortic root.   7. No obvious vegitations affecting the aortic mitral or tricuspid   valve. TTE may be insensitive to endocarditis.   8. LA volume Index is 47.0 ml/m² ml/m2.   9. Peak transaortic gradient equals 25.9 mmHg, mean transaortic gradient   [id number] mmHg, the calculated aortic valve area equals 1.03 cm² by the   continuity equation consistent with moderate aortic stenosis.    < end of copied text >
CC: f/u for MSSA bacteremia    Patient reports no complaints, feeling better in general, no GARCÍA    REVIEW OF SYSTEMS:  All other review of systems negative (Comprehensive ROS)    Antimicrobials Day # 7  ceFAZolin   IVPB 2000 milliGRAM(s) IV Intermittent every 8 hours    Other Medications Reviewed    Vital Signs Last 24 Hrs  T(F): 98.8 (06 Aug 2019 16:49), Max: 98.8 (06 Aug 2019 16:49)  HR: 70 (06 Aug 2019 16:49) (65 - 70)  BP: 131/70 (06 Aug 2019 16:49) (111/62 - 131/70)  BP(mean): --  RR: 15 (06 Aug 2019 16:49) (14 - 15)  SpO2: 100% (06 Aug 2019 16:49) (99% - 100%)    PHYSICAL EXAM:  General: alert, no acute distress  Eyes: anicteric, no conjunctival injection, no discharge  Oropharynx: no lesions or injection 	  Neck: supple, without adenopathy  Lungs: clear to auscultation  Heart: regular rate and rhythm; systolic murmur  Abdomen: soft, nondistended, nontender, without mass or organomegaly  Skin: no lesions  Extremities: no clubbing, cyanosis, or edema  Neurologic: alert, oriented, moves all extremities    LAB RESULTS:                        11.8   10.61 )-----------( 291      ( 06 Aug 2019 05:59 )             34.8   08-06    138  |  103  |  19  ----------------------------<  174<H>  4.3   |  27  |  1.02    Ca    8.4      06 Aug 2019 05:59    MICROBIOLOGY:  RECENT CULTURES:  Culture - Blood in AM (08.05.19 @ 08:25)    Specimen Source: .Blood Blood    Culture Results:   No growth to date.    08-03 @ 05:59 .Blood Blood-Peripheral     Growth in aerobic bottle: Staphylococcus aureus    07-31 @ 15:23 .Blood Blood-Peripheral     Growth in aerobic and anaerobic bottles: Staphylococcus aureus    RADIOLOGY REVIEWED:  Xray Chest 1 View AP/PA (07.29.19 @ 16:44) >  IMPRESSION: No significant interval change; no evidence for interval   development of focal infiltrate or lobar consolidation.    TTE Echo Complete w/Doppler (08.01.19 @ 08:50) >   1. Left ventricular ejection fraction, by visual estimation, is 45%.   2. Mildly decreased global left ventricular systolic function.   3. Spectral Doppler shows impaired relaxation pattern of left  ventricular myocardial filling (Grade I diastolic dysfunction).   4. There is severe concentric left ventricular hypertrophy.   5. Mild thickening and calcification of the anterior and posterior   mitral valve leaflets.   6. Dilatation of the aortic root.   7. No obvious vegitations affecting the aortic mitral or tricuspid   valve. TTE may be insensitive to endocarditis.   8. LA volume Index is 47.0 ml/m² ml/m2.   9. Peak transaortic gradient equals 25.9 mmHg, mean transaortic gradient   [id number] mmHg, the calculated aortic valve area equals 1.03 cm² by the   continuity equation consistent with moderate aortic stenosis.
Follow up for: bacteremia    SUBJ: no complaints    PMH  Moderate aortic stenosis  Systolic congestive heart failure  Paroxysmal atrial fibrillation  Coronary artery disease  Essential hypertension  Type 2 diabetes mellitus with diabetic neuropathy, without long-term current use of insulin      MEDICATIONS  (STANDING):  aspirin enteric coated 81 milliGRAM(s) Oral daily  atorvastatin 80 milliGRAM(s) Oral at bedtime  ceFAZolin   IVPB 2000 milliGRAM(s) IV Intermittent every 8 hours  dextrose 5%. 1000 milliLiter(s) (50 mL/Hr) IV Continuous <Continuous>  dextrose 50% Injectable 12.5 Gram(s) IV Push once  dextrose 50% Injectable 25 Gram(s) IV Push once  dextrose 50% Injectable 25 Gram(s) IV Push once  enoxaparin Injectable 40 milliGRAM(s) SubCutaneous daily  insulin glargine Injectable (LANTUS) 25 Unit(s) SubCutaneous at bedtime  insulin lispro (HumaLOG) corrective regimen sliding scale   SubCutaneous Before meals and at bedtime  insulin lispro Injectable (HumaLOG) 10 Unit(s) SubCutaneous three times a day before meals  metoprolol succinate ER 50 milliGRAM(s) Oral daily  ondansetron Injectable 4 milliGRAM(s) IV Push every 8 hours  sacubitril 49 mG/valsartan 51 mG 1 Tablet(s) Oral two times a day  spironolactone 25 milliGRAM(s) Oral daily    MEDICATIONS  (PRN):  acetaminophen   Tablet .. 650 milliGRAM(s) Oral every 6 hours PRN Temp greater or equal to 38C (100.4F), Mild Pain (1 - 3)  dextrose 40% Gel 15 Gram(s) Oral once PRN Blood Glucose LESS THAN 70 milliGRAM(s)/deciliter  glucagon  Injectable 1 milliGRAM(s) IntraMuscular once PRN Glucose LESS THAN 70 milligrams/deciliter  oxyCODONE    5 mG/acetaminophen 325 mG 1 Tablet(s) Oral every 6 hours PRN Moderate Pain (4 - 6)  polyethylene glycol 3350 17 Gram(s) Oral every 12 hours PRN Constipation        PHYSICAL EXAM:  Vital Signs Last 24 Hrs  T(C): 36.9 (05 Aug 2019 05:06), Max: 37.1 (04 Aug 2019 16:04)  T(F): 98.4 (05 Aug 2019 05:06), Max: 98.8 (04 Aug 2019 16:04)  HR: 66 (05 Aug 2019 05:06) (62 - 68)  BP: 119/77 (05 Aug 2019 05:06) (119/77 - 137/79)  BP(mean): --  RR: 16 (05 Aug 2019 05:06) (15 - 17)  SpO2: 95% (05 Aug 2019 05:06) (95% - 96%)    GENERAL: NAD, well-groomed, well-developed  HEAD:  Atraumatic, Normocephalic  EYES: EOMI, PERRLA, conjunctiva and sclera clear  ENT: Moist mucous membranes,  NECK: Supple, No JVD, no bruits  CHEST/LUNG: Clear to percussion bilaterally; No rales, rhonchi, wheezing, or rubs  HEART: Regular rate and rhythm; No murmurs, rubs, or gallops PMI non displaced.  ABDOMEN: Soft, Nontender, Nondistended; Bowel sounds present  EXTREMITIES:  2+ Peripheral Pulses, No clubbing, cyanosis, or edema  SKIN: No rashes or lesions  NERVOUS SYSTEM:  Alert & Oriented X3, Good concentration; Motor Strength 5/5 B/L upper and lower extremities; DTRs 2+ intact and symmetric      TELEMETRY:  nsr        LABS:                        11.2   9.67  )-----------( 268      ( 05 Aug 2019 06:29 )             32.8     08-05    137  |  102  |  19  ----------------------------<  190<H>  4.5   |  26  |  1.10    Ca    8.5      05 Aug 2019 06:29    TPro  6.6  /  Alb  2.6<L>  /  TBili  0.7  /  DBili  x   /  AST  23  /  ALT  18  /  AlkPhos  73  08-04            I&O's Summary    05 Aug 2019 07:01  -  05 Aug 2019 11:21  --------------------------------------------------------  IN: 240 mL / OUT: 0 mL / NET: 240 mL        ASSESMENT AND PLAN:    Continue current management
Patient is a 63y old  Male who presents with a chief complaint of bacteremia (05 Aug 2019 12:13). No acute compaints overnight       Patient seen and examined at bedside.    ALLERGIES:  allery to INSECT VENOM (Hives)  Novocain (Hives)  Originally Entered as [Unknown] reaction to [ENVIRONMENTAL] (Unknown)    MEDICATIONS  (STANDING):  aspirin enteric coated 81 milliGRAM(s) Oral daily  atorvastatin 80 milliGRAM(s) Oral at bedtime  ceFAZolin   IVPB 2000 milliGRAM(s) IV Intermittent every 8 hours  dextrose 5%. 1000 milliLiter(s) (50 mL/Hr) IV Continuous <Continuous>  dextrose 50% Injectable 12.5 Gram(s) IV Push once  dextrose 50% Injectable 25 Gram(s) IV Push once  dextrose 50% Injectable 25 Gram(s) IV Push once  enoxaparin Injectable 40 milliGRAM(s) SubCutaneous daily  insulin glargine Injectable (LANTUS) 28 Unit(s) SubCutaneous at bedtime  insulin lispro (HumaLOG) corrective regimen sliding scale   SubCutaneous Before meals and at bedtime  insulin lispro Injectable (HumaLOG) 10 Unit(s) SubCutaneous three times a day before meals  metoprolol succinate ER 50 milliGRAM(s) Oral daily  ondansetron Injectable 4 milliGRAM(s) IV Push every 8 hours  sacubitril 49 mG/valsartan 51 mG 1 Tablet(s) Oral two times a day  spironolactone 25 milliGRAM(s) Oral daily    MEDICATIONS  (PRN):  acetaminophen   Tablet .. 650 milliGRAM(s) Oral every 6 hours PRN Temp greater or equal to 38C (100.4F), Mild Pain (1 - 3)  dextrose 40% Gel 15 Gram(s) Oral once PRN Blood Glucose LESS THAN 70 milliGRAM(s)/deciliter  glucagon  Injectable 1 milliGRAM(s) IntraMuscular once PRN Glucose LESS THAN 70 milligrams/deciliter  oxyCODONE    5 mG/acetaminophen 325 mG 1 Tablet(s) Oral every 6 hours PRN Moderate Pain (4 - 6)  polyethylene glycol 3350 17 Gram(s) Oral every 12 hours PRN Constipation    Vital Signs Last 24 Hrs  T(F): 97.9 (06 Aug 2019 11:50), Max: 98.7 (05 Aug 2019 12:24)  HR: 65 (06 Aug 2019 11:50) (65 - 73)  BP: 111/62 (06 Aug 2019 11:50) (108/64 - 117/69)  RR: 15 (06 Aug 2019 11:50) (14 - 16)  SpO2: 99% (06 Aug 2019 11:50) (97% - 100%)  I&O's Summary    05 Aug 2019 07:01  -  06 Aug 2019 07:00  --------------------------------------------------------  IN: 360 mL / OUT: 0 mL / NET: 360 mL        PHYSICAL EXAM:  General: NAD, A/O x 3  ENT: MMM  Neck: Supple, No JVD  Lungs: Clear to auscultation bilaterally  Cardio: RRR, S1/S2, No murmurs  Abdomen: Soft, Nontender, Nondistended; Bowel sounds present  Extremities: No calf tenderness, No pitting edema    LABS:                        11.8   10.61 )-----------( 291      ( 06 Aug 2019 05:59 )             34.8       08-06    138  |  103  |  19  ----------------------------<  174  4.3   |  27  |  1.02    Ca    8.4      06 Aug 2019 05:59    TPro  6.6  /  Alb  2.6  /  TBili  0.7  /  DBili  x   /  AST  23  /  ALT  18  /  AlkPhos  73  08-04     eGFR if Non African American: 79 mL/min/1.73M2 (08-06-19 @ 05:59)  eGFR if : 91 mL/min/1.73M2 (08-06-19 @ 05:59)                           POCT Blood Glucose.: 250 mg/dL (06 Aug 2019 11:47)  POCT Blood Glucose.: 201 mg/dL (06 Aug 2019 07:49)  POCT Blood Glucose.: 139 mg/dL (05 Aug 2019 21:17)  POCT Blood Glucose.: 270 mg/dL (05 Aug 2019 17:14)  POCT Blood Glucose.: 193 mg/dL (05 Aug 2019 12:20)    08-01 [id number] 11.9        Culture - Blood (collected 03 Aug 2019 05:59)  Source: .Blood Blood-Peripheral  Gram Stain (04 Aug 2019 05:32):    Growth in aerobic bottle: Gram Positive Cocci in Clusters  Final Report (06 Aug 2019 06:28):    Growth in aerobic bottle: Staphylococcus aureus  Organism: Staphylococcus aureus (06 Aug 2019 06:28)  Organism: Staphylococcus aureus (06 Aug 2019 06:28)      -  Ampicillin/Sulbactam: S <=8/4      -  Cefazolin: S <=4      -  Clindamycin: S 0.5      -  Erythromycin: S <=0.25      -  Gentamicin: S 2 Should not be used as monotherapy      -  Oxacillin: S <=0.25      -  RIF- Rifampin: S <=1 Should not be used as monotherapy      -  Tetra/Doxy: S <=1      -  Trimethoprim/Sulfamethoxazole: S <=0.5/9.5      -  Vancomycin: S 2      Method Type: STAR        RADIOLOGY & ADDITIONAL TESTS:    Care Discussed with Consultants/Other Providers:
Patient is a 63y old  Male who presents with a chief complaint of bacteremia (06 Aug 2019 17:54). Scheduled for JIMMY this AM.  No acute complaints      Patient seen and examined at bedside.    ALLERGIES:  allery to INSECT VENOM (Hives)  Novocain (Hives)  Originally Entered as [Unknown] reaction to [ENVIRONMENTAL] (Unknown)    MEDICATIONS  (STANDING):  aspirin enteric coated 81 milliGRAM(s) Oral daily  atorvastatin 80 milliGRAM(s) Oral at bedtime  ceFAZolin   IVPB 2000 milliGRAM(s) IV Intermittent every 8 hours  dextrose 5%. 1000 milliLiter(s) (50 mL/Hr) IV Continuous <Continuous>  dextrose 50% Injectable 12.5 Gram(s) IV Push once  dextrose 50% Injectable 25 Gram(s) IV Push once  dextrose 50% Injectable 25 Gram(s) IV Push once  enoxaparin Injectable 40 milliGRAM(s) SubCutaneous daily  insulin glargine Injectable (LANTUS) 25 Unit(s) SubCutaneous at bedtime  insulin lispro (HumaLOG) corrective regimen sliding scale   SubCutaneous Before meals and at bedtime  insulin lispro Injectable (HumaLOG) 10 Unit(s) SubCutaneous three times a day before meals  metoprolol succinate ER 50 milliGRAM(s) Oral daily  ondansetron Injectable 4 milliGRAM(s) IV Push every 8 hours  sacubitril 49 mG/valsartan 51 mG 1 Tablet(s) Oral two times a day  spironolactone 25 milliGRAM(s) Oral daily    MEDICATIONS  (PRN):  acetaminophen   Tablet .. 650 milliGRAM(s) Oral every 6 hours PRN Temp greater or equal to 38C (100.4F), Mild Pain (1 - 3)  dextrose 40% Gel 15 Gram(s) Oral once PRN Blood Glucose LESS THAN 70 milliGRAM(s)/deciliter  glucagon  Injectable 1 milliGRAM(s) IntraMuscular once PRN Glucose LESS THAN 70 milligrams/deciliter  oxyCODONE    5 mG/acetaminophen 325 mG 1 Tablet(s) Oral every 6 hours PRN Moderate Pain (4 - 6)  polyethylene glycol 3350 17 Gram(s) Oral every 12 hours PRN Constipation    Vital Signs Last 24 Hrs  T(F): 97.8 (07 Aug 2019 08:39), Max: 98.8 (06 Aug 2019 16:49)  HR: 63 (07 Aug 2019 06:45) (63 - 70)  BP: 128/- (07 Aug 2019 08:39) (102/50 - 140/73)  RR: 15 (07 Aug 2019 06:45) (15 - 15)  SpO2: 98% (07 Aug 2019 06:45) (98% - 100%)  I&O's Summary    06 Aug 2019 07:01  -  07 Aug 2019 07:00  --------------------------------------------------------  IN: 240 mL / OUT: 2 mL / NET: 238 mL        PHYSICAL EXAM:  General: NAD, A/O x 3  ENT: MMM  Neck: Supple, No JVD  Lungs: Clear to auscultation bilaterally  Cardio: RRR, S1/S2, No murmurs  Abdomen: Soft, Nontender, Nondistended; Bowel sounds present  Extremities: No calf tenderness, No pitting edema    LABS:                        11.1   9.96  )-----------( 282      ( 07 Aug 2019 07:05 )             32.3       08-07    138  |  104  |  20  ----------------------------<  197  4.5   |  25  |  0.94    Ca    8.3      07 Aug 2019 07:05       eGFR if Non African American: 86 mL/min/1.73M2 (08-07-19 @ 07:05)  eGFR if African American: 100 mL/min/1.73M2 (08-07-19 @ 07:05)                           POCT Blood Glucose.: 174 mg/dL (07 Aug 2019 08:05)  POCT Blood Glucose.: 143 mg/dL (06 Aug 2019 21:57)  POCT Blood Glucose.: 100 mg/dL (06 Aug 2019 16:03)  POCT Blood Glucose.: 250 mg/dL (06 Aug 2019 11:47)    08-01 [id number] 11.9        Culture - Blood (collected 05 Aug 2019 08:25)  Source: .Blood Blood  Preliminary Report (06 Aug 2019 15:01):    No growth to date.    Culture - Blood (collected 03 Aug 2019 05:59)  Source: .Blood Blood-Peripheral  Gram Stain (04 Aug 2019 05:32):    Growth in aerobic bottle: Gram Positive Cocci in Clusters  Final Report (06 Aug 2019 06:28):    Growth in aerobic bottle: Staphylococcus aureus  Organism: Staphylococcus aureus (06 Aug 2019 06:28)  Organism: Staphylococcus aureus (06 Aug 2019 06:28)      -  Ampicillin/Sulbactam: S <=8/4      -  Cefazolin: S <=4      -  Clindamycin: S 0.5      -  Erythromycin: S <=0.25      -  Gentamicin: S 2 Should not be used as monotherapy      -  Oxacillin: S <=0.25      -  RIF- Rifampin: S <=1 Should not be used as monotherapy      -  Tetra/Doxy: S <=1      -  Trimethoprim/Sulfamethoxazole: S <=0.5/9.5      -  Vancomycin: S 2      Method Type: STAR        RADIOLOGY & ADDITIONAL TESTS:    Care Discussed with Consultants/Other Providers:
Patient is a 63y old  Male who presents with a chief complaint of bacteremia (2019 18:50)  No acute events          Patient seen and examined at bedside.    ALLERGIES:  allery to INSECT VENOM (Hives)  Novocain (Hives)  Originally Entered as [Unknown] reaction to [ENVIRONMENTAL] (Unknown)        Vital Signs Last 24 Hrs  T(F): 99 (01 Aug 2019 07:52), Max: 102.8 (2019 15:54)  HR: 71 (01 Aug 2019 07:52) (63 - 82)  BP: 134/72 (01 Aug 2019 07:52) (122/70 - 166/104)  RR: 15 (01 Aug 2019 07:52) (15 - 18)  SpO2: 96% (01 Aug 2019 07:52) (95% - 100%)  I&O's Summary    MEDICATIONS:  acetaminophen   Tablet .. 650 milliGRAM(s) Oral every 6 hours PRN  aspirin enteric coated 81 milliGRAM(s) Oral daily  atorvastatin 80 milliGRAM(s) Oral at bedtime  dextrose 40% Gel 15 Gram(s) Oral once PRN  dextrose 5%. 1000 milliLiter(s) IV Continuous <Continuous>  dextrose 50% Injectable 12.5 Gram(s) IV Push once  dextrose 50% Injectable 25 Gram(s) IV Push once  dextrose 50% Injectable 25 Gram(s) IV Push once  enoxaparin Injectable 40 milliGRAM(s) SubCutaneous daily  glucagon  Injectable 1 milliGRAM(s) IntraMuscular once PRN  insulin glargine Injectable (LANTUS) 5 Unit(s) SubCutaneous once  insulin glargine Injectable (LANTUS) 15 Unit(s) SubCutaneous at bedtime  insulin lispro (HumaLOG) corrective regimen sliding scale   SubCutaneous three times a day before meals  insulin lispro (HumaLOG) corrective regimen sliding scale   SubCutaneous at bedtime  metoprolol succinate ER 50 milliGRAM(s) Oral daily  ondansetron Injectable 4 milliGRAM(s) IV Push every 8 hours  oxyCODONE    5 mG/acetaminophen 325 mG 1 Tablet(s) Oral every 6 hours PRN  sacubitril 49 mG/valsartan 51 mG 1 Tablet(s) Oral two times a day  spironolactone 25 milliGRAM(s) Oral daily  vancomycin  IVPB 1000 milliGRAM(s) IV Intermittent every 12 hours      PHYSICAL EXAM:  General: NAD, A/O x 3  ENT: MMM  Neck: Supple, No JVD  Lungs: Clear to auscultation bilaterally  Cardio: RRR, S1/S2, No murmurs  Abdomen: Soft, Nontender, Nondistended; Bowel sounds present  Extremities: No cyanosis, No edema    LABS:                        10.4   7.16  )-----------( 201      ( 01 Aug 2019 05:30 )             30.3         136  |  101  |  15  ----------------------------<  243  4.7   |  27  |  1.12    Ca    8.2      01 Aug 2019 05:30    TPro  6.4  /  Alb  2.5  /  TBili  0.9  /  DBili  x   /  AST  15  /  ALT  16  /  AlkPhos  65      eGFR if Non African American: 70 mL/min/1.73M2 (19 @ 05:30)  eGFR if African American: 81 mL/min/1.73M2 (19 @ 05:30)    PT/INR - ( 2019 15:23 )   PT: 16.4 sec;   INR: 1.45 ratio         PTT - ( 2019 15:23 )  PTT:26.9 sec  Lactate, Blood: 1.2 mmol/L ( @ 18:00)  Lactate, Blood: 1.4 mmol/L ( @ 15:32)  Lactate, Blood: 1.2 mmol/L ( @ 15:20)                      POCT Blood Glucose.: 279 mg/dL (01 Aug 2019 07:17)  POCT Blood Glucose.: 326 mg/dL (2019 22:14)     [id number] 11.9    Urinalysis Basic - ( 2019 15:20 )    Color: Yellow / Appearance: Slightly Turbid / S.020 / pH: x  Gluc: x / Ketone: Negative  / Bili: Negative / Urobili: 1   Blood: x / Protein: 100 / Nitrite: Negative   Leuk Esterase: Moderate / RBC: 26-50 /HPF / WBC 11-25 /HPF   Sq Epi: x / Non Sq Epi: Neg.-Few / Bacteria: Many /HPF        Culture - Blood (collected 2019 16:20)  Source: .Blood Blood-Peripheral  Gram Stain (2019 13:16):    Growth in aerobic bottle: Gram Positive Cocci in Clusters  Preliminary Report (2019 13:16):    Growth in aerobic bottle: Gram Positive Cocci in Clusters    Culture - Blood (collected 2019 16:20)  Source: .Blood Blood-Peripheral  Preliminary Report (2019 19:01):    No growth to date.    Culture - Urine (collected 2019 15:20)  Source: .Urine Clean Catch (Midstream)  Final Report (2019 19:02):    >100,000 CFU/ml Staphylococcus aureus  Organism: Staphylococcus aureus (2019 19:02)  Organism: Staphylococcus aureus (2019 19:02)      -  Ampicillin/Sulbactam: S <=8/4      -  Cefazolin: S <=4      -  Gentamicin: S <=1 Should not be used as monotherapy      -  Oxacillin: S <=0.25      -  RIF- Rifampin: S <=1 Should not be used as monotherapy      -  Tetra/Doxy: S <=1      -  Trimethoprim/Sulfamethoxazole: S <=0.5/9.5      -  Vancomycin: S 2      Method Type: STAR    Culture - Blood (collected 2019 15:20)  Source: .Blood Blood-Peripheral  Gram Stain (2019 11:03):    Growth in aerobic bottle: Gram Positive Cocci in Clusters    Growth in anaerobic bottle: Gram Positive Cocci in Clusters  Final Report (01 Aug 2019 08:44):    Growth in aerobic and anaerobic bottles: Staphylococcus aureus    "Due to technical problems, Proteus sp. will Not be reported as part of    the BCID panel until further notice"    ***Blood Panel PCR results on this specimen are available    approximately 3 hours after the Gram stain result.***    Gram stain, PCR, and/or culture results may not always    correspond due to difference in methodologies.    ************************************************************    This PCR assay was performed using [employer].    The following targets are tested for: Enterococcus,    vancomycin resistant enterococci, Listeria monocytogenes,    coagulase negative staphylococci, S. aureus,    methicillin resistant S. aureus, Streptococcus agalactiae    (Group B), S. pneumoniae, S. pyogenes (Group A),    Acinetobacter baumannii, Enterobacter cloacae, E. coli,    Klebsiella oxytoca, K. pneumoniae, Proteus sp.,    Serratia marcescens, Haemophilus influenzae,    Neisseria meningitidis, Pseudomonas aeruginosa, Candida    albicans, C. glabrata, C krusei, C parapsilosis,    C. tropicalis and the KPC resistance gene.  Organism: Blood Culture PCR  Staphylococcus aureus (01 Aug 2019 08:44)  Organism: Staphylococcus aureus (01 Aug 2019 08:44)      -  Ampicillin/Sulbactam: S <=8/4      -  Cefazolin: S <=4      -  Clindamycin: S 0.5      -  Erythromycin: S <=0.25      -  Gentamicin: S <=1 Should not be used as monotherapy      -  Oxacillin: S <=0.25      -  RIF- Rifampin: S <=1 Should not be used as monotherapy      -  Tetra/Doxy: S <=1      -  Trimethoprim/Sulfamethoxazole: S <=0.5/9.5      -  Vancomycin: S 2      Method Type: STAR  Organism: Blood Culture PCR (01 Aug 2019 08:44)      -  Staphylococcus aureus: Detec Any isolate of Staphylococcus aureus from a blood culture is NOT considered a contaminant.      Method Type: PCR    Culture - Blood (collected 2019 15:20)  Source: .Blood Blood-Peripheral  Gram Stain (2019 12:06):    Growth in anaerobic bottle: Gram Positive Cocci in Clusters    Growth in aerobic bottle: Gram Positive Cocci in Clusters  Final Report (01 Aug 2019 09:23):    Growth in aerobic and anaerobic bottles: Staphylococcus aureus    See previous culture [id number]        RADIOLOGY & ADDITIONAL TESTS:  < from: CT Abdomen and Pelvis w/ IV Cont (19 @ 16:40) >  IMPRESSION:     CT findings suggestive of urinary tract infection/pyelonephritis, most   pronounced left kidney as discussed.    Cholelithiasis, with possible choledocholithiasis; nondilated common bile   duct. This can be further evaluated with nonemergent MRCP if there are no   clinical contraindications.                  MARICEL DE SOUZA M.D., ATTENDING RADIOLOGIST  This document has been electronically signed. 2019  4:54PM    < end of copied text >  < from: US Abdomen Complete (19 @ 09:20) >  IMPRESSION:    Uncomplicated cholelithiasis.                  IVAN CHINCHILLA M.D., ATTENDING RADIOLOGIST  This document has been electronically signed. Aug  1 2019  9:39AM    < end of copied text >        Care Discussed with Consultants/Other Providers:

## 2019-08-08 NOTE — PROGRESS NOTE ADULT - PROBLEM SELECTOR PLAN 4
a1c 11.9,   - Diabetic educator  - Monitor FS  - Lantus 25units qHS with 10 U tid with meals  - continue ISS  - consistent carb diet  -D/c home on insulin

## 2019-08-08 NOTE — CHART NOTE - NSCHARTNOTEFT_GEN_A_CORE
Nutrition Follow Up Note  Hospital Course (Per Electronic Medical Record):   Source: Medical Record [X] Patient [X]  Nursing Staff [X]     Diet: Consistent Carbohydrate , DASH/TLC    Patient noted consuming % of meals. POCT noted. Reinforced diet compliance.    Current Weight: (8/8) 282.1/128kg, - weight stable with ? 4 lb weight change since admission.     Pertinent Medications: MEDICATIONS  (STANDING):  aspirin enteric coated 81 milliGRAM(s) Oral daily  atorvastatin 80 milliGRAM(s) Oral at bedtime  ceFAZolin   IVPB 2000 milliGRAM(s) IV Intermittent every 8 hours  chlorhexidine 4% Liquid 1 Application(s) Topical <User Schedule>  dextrose 5%. 1000 milliLiter(s) (50 mL/Hr) IV Continuous <Continuous>  dextrose 50% Injectable 12.5 Gram(s) IV Push once  dextrose 50% Injectable 25 Gram(s) IV Push once  dextrose 50% Injectable 25 Gram(s) IV Push once  enoxaparin Injectable 40 milliGRAM(s) SubCutaneous daily  insulin glargine Injectable (LANTUS) 25 Unit(s) SubCutaneous at bedtime  insulin lispro (HumaLOG) corrective regimen sliding scale   SubCutaneous Before meals and at bedtime  insulin lispro Injectable (HumaLOG) 10 Unit(s) SubCutaneous three times a day before meals  metoprolol succinate ER 50 milliGRAM(s) Oral daily  sacubitril 49 mG/valsartan 51 mG 1 Tablet(s) Oral two times a day  spironolactone 25 milliGRAM(s) Oral daily    MEDICATIONS  (PRN):  acetaminophen   Tablet .. 650 milliGRAM(s) Oral every 6 hours PRN Temp greater or equal to 38C (100.4F), Mild Pain (1 - 3)  dextrose 40% Gel 15 Gram(s) Oral once PRN Blood Glucose LESS THAN 70 milliGRAM(s)/deciliter  glucagon  Injectable 1 milliGRAM(s) IntraMuscular once PRN Glucose LESS THAN 70 milligrams/deciliter  polyethylene glycol 3350 17 Gram(s) Oral every 12 hours PRN Constipation  sodium chloride 0.9% lock flush 10 milliLiter(s) IV Push every 1 hour PRN Pre/post blood products, medications, blood draw, and to maintain line patency      Pertinent Labs:  08-08 Na138 mmol/L Glu 135 mg/dL<H> K+ 4.3 mmol/L Cr  0.98 mg/dL BUN 18 mg/dL 08-04 Alb 2.6 g/dL<L> 08-01 [id number] 11.9 %<H>        Skin: intact    Edema: none    Last BM: on (8/7)    Estimated Needs:   [X] No Change since Previous Assessment  [X] Recalculated:     Previous Nutrition Diagnosis: Food & Nutrition Knowledge deficit    Nutrition Diagnosis is [X] Ongoing         New Nutrition Diagnosis: [X] Not Applicable      Interventions:   1. Recommend continue current diet       Monitoring & Evaluation: will monitor:  [X] Weights   [X] PO Intake   [X] Follow Up (Per Protocol)  [X] Tolerance to Diet Prescription       RD to follow as per Nutrition protocol  Lucille Mast RDN

## 2019-08-08 NOTE — PROGRESS NOTE ADULT - ASSESSMENT
62 yo diabetic male, prior CABG, here with community onset MSSA bacteremia.  Sustained bacteremia and history of fever x 1 week   + Bld Cxs 7/29-8/3, IV therapy started 7/31  30-40% of community onset staph bacteremias do not have a clear primary focus.  Most likely seeded kidneys from blood, accounting for initial + urine Cx; would approach as MSSA bacteremia of cryptogenic origin.  Afebrile, feeling well, repeat blood cultures 8/5 negative thus far    Suggest:  Continue.Cefazolin 2 g q 8  If repeat Bld Cxs negative, can place PICC  Await JIMMY, endocarditis possible  D/w pt at length
Assessment and Plan:   · Assessment		  63 year old with staph bacteremia.  CAD s/p CABG, chronic systolic CHF    Plan  - followup blood cultures   - antibiotics as per Medicine  - JIMMY pending   - continue current CHF medications    discussed with patient and Medicine team
63 male with htn, hld, t2dm, cad s/p cabg, chronic systolic chf admitted with staph aureus bacteremia 2/2 pyelonephritis
62 yo diabetic male with community onset MSSA bacteremia.  While it is possible this infection developed in the urine, it is also possible he seeded his kidney from the bloodstream.  He has a sustained bacteremia over at least 72 hours, and with his history of fever x 1 week he may have been bacteremic for 1 week.  He has had some hesitancy but no prior history of any  infections.  30-40% of community onset staph bacteremias do not have a clear primary focus.  He has documented bacteremia  from 7/29-7/31, all positive blood cultures predated IV therapy  I suspect he seeded his kidneys from the blood, I would approach as MSSA bacteremia of cryptogenic origin.  His 8/3 blood culture has a GPC, ? staph aureus vs coag negative staph.  Repeat blood cultures ordered for AM  Suggest:  1.Cefazolin 2 gr q8  2.repeat blood cultures to document control of bacteremia in AM  3.Echo, would consider a JIMMY as well, this is being arranged at [facility]  4.Hold off on PICC until we have cleared bloodstream  5.eventual home IV therapy will be needed.
62 yo diabetic male with community onset MSSA bacteremia.  While it is possible this infection developed in the urine, it is also possible he seeded his kidney from the bloodstream.  He has a sustained bacteremia over at least 72 hours, and with his history of fever x 1 week he may have been bacteremic for 1 week.  He has had some hesitancy but no prior history of any  infections.  30-40% of community onset staph bacteremias do not have a clear primary focus.  He has documented bacteremia  from 7/29-7/31, all positive blood cultures predated IV therapy  I suspect he seeded his kidneys from the blood, I would approach as MSSA bacteremia of cryptogenic origin.  Suggest:  1.Cefazolin 2 gr q8  2.repeat blood cultures to document control of bacteremia sent earlier   3.Echo, would consider a JIMMY as well, this is being arranged at [facility]  4.I would place a PICC on 8/5 if blood cultures stay negative  5.eventual home IV therapy will be needed.
62 yo diabetic male, prior CABG, here with community onset MSSA bacteremia.  Sustained bacteremia and history of fever x 1 week   + Bld Cxs 7/29-8/3, IV therapy started 7/31  30-40% of community onset staph bacteremias do not have a clear primary focus.  Most likely seeded kidneys from blood, accounting for initial + urine Cx; would approach as MSSA bacteremia of cryptogenic origin.  Afebrile, feeling well, repeat blood cultures 8/5 negative thus far  JIMMY reportedly negative, reviewwed with Dr Singleton  Suggest:  Continue.Cefazolin 2 g q 8   can place PICC line today  Advise home care involvement, I would like to complete 42 days total of cefazolin, day 8/42  D/w pt at length,   Weekly cbc,diff and CMP as outpatient
63 M with HTN, hld, t2dm, cad s/p cabg, chronic systolic CHF (EF 45%)  admitted with MSSA bacteremia of cryptogenic etiology, JIMMY negative, on day #9 of Cefazolin today.
63 M with htn, hld, t2dm, cad s/p cabg, chronic systolic CHF  admitted with MSSA bacteremia 2/2 possible pyelonephritis. On Day 6 of Cefazolin
63 male with htn, hld, t2dm, cad s/p cabg, chronic systolic chf admitted with staph aureus bacteremia 2/2 possible pyelonephritis
63 male with htn, hld, t2dm, cad s/p cabg, chronic systolic chf admitted with staph aureus bacteremia 2/2 possible pyelonephritis
63 male with htn, hld, t2dm, cad s/p cabg, chronic systolic chf admitted with staph aureus bacteremia 2/2 possible pyelonephritis.
63 year old with staph bacteremia.  CAD s/p CABG, chronic systolic CHF    Plan  - followup blood cultures   - antibiotics as per Medicine  - JIMMY pending   - continue current CHF medications    discussed with patient and Medicine team
64 yo diabetic male with community onset MSSA bacteremia.  While it is possible this infection developed in the urine, it is also possible he seeded his kidney from the bloodstream.  He has a sustained bacteremia over at least 24 hours, and with his history of fever x 1 week he may have been bacteremic for 1 week.  He has had some hesitancy but no prior history of any  infections.  30-40% of community onset staph bacteremias do not have a clear primary focus.  He has documented bacteremia  from 7/29-7/31, all positive blood cultures predated IV therapy  Suggest:  1.Cefazolin 2 gr q8  2.repeat blood cultures to document control of bacteremia for AM  3.Echo, would consider a JIMMY as well  4.He may ultimately need a PICC for a more extended course of treatment, would wait until we clear bloodstream  5.He will need to have teeth addressed when discharged.  6.ID issues reviewed with Dr Damian
64 yo diabetic male with community onset MSSA bacteremia.  While it is possible this infection developed in the urine, it is also possible he seeded his kidney from the bloodstream.  He has a sustained bacteremia over at least 72 hours, and with his history of fever x 1 week he may have been bacteremic for 1 week.  He has had some hesitancy but no prior history of any  infections.  30-40% of community onset staph bacteremias do not have a clear primary focus.  He has documented bacteremia  from 7/29-8/3, IV therapy started 7/31  I suspect he seeded his kidneys from the blood, I would approach as MSSA bacteremia of cryptogenic origin.  His 8/3 blood culture has now been conformed as staph aureus  Repeat blood cultures sent today  Suggest:  1.Cefazolin 2 gr q8  2.repeat blood cultures to document control of bacteremiaent today  3 JIMMY needed, either at [facility] or [place]  4.Hold off on PICC until we have cleared bloodstream  5.eventual home IV therapy will be needed.  6.Case reviewed with Dr Jett Stallings, his outpatient cardiologist
63 M with HTN, hld, t2dm, cad s/p cabg, chronic systolic CHF  admitted with MSSA bacteremia 2/2 possible pyelonephritis. On Day 7 of Cefazolin
63 M with HTN, hld, t2dm, cad s/p cabg, chronic systolic CHF  admitted with MSSA bacteremia 2/2 possible pyelonephritis. On Day 8 of Cefazolin. JIMMY today

## 2019-08-08 NOTE — PROGRESS NOTE ADULT - REASON FOR ADMISSION
bacteremia

## 2019-08-08 NOTE — PROGRESS NOTE ADULT - PROBLEM SELECTOR PROBLEM 4
Type 2 diabetes mellitus with diabetic neuropathy, without long-term current use of insulin

## 2019-08-08 NOTE — PROGRESS NOTE ADULT - PROBLEM SELECTOR PLAN 1
pt found with staph aureus bacteremia after having been treated with cipro for pyelonephritis. TTE and JIMMY without vegetations  - continue cefazolin 2 grams every 8 hours to treat this cryptogenic infection  -ENDOCARDITIS RULED OUT  -Repeat blood cx obtained 8/5-NGTD  - PICC for extended course of treatment - last dose of antibiotics 9-11

## 2019-08-08 NOTE — PROGRESS NOTE ADULT - PROBLEM SELECTOR PLAN 8
continue atorvastatin    Dispo: home with PICC and extended abx when blood cx negative
continue atorvastatin    Dispo: home with PICC and extended abx when blood cx negative
continue atorvastatin
continue atorvastatin    Dispo: home with PICC and extended abx when blood cx negative
continue atorvastatin    Dispo: home with PICC and extended antibiotics today

## 2019-08-08 NOTE — PROGRESS NOTE ADULT - PROBLEM SELECTOR PROBLEM 2
Pyelonephritis

## 2019-08-08 NOTE — PROGRESS NOTE ADULT - PROBLEM SELECTOR PROBLEM 3
ILENE (acute kidney injury)

## 2019-08-08 NOTE — PROGRESS NOTE ADULT - PROBLEM SELECTOR PLAN 6
chronic stable, continue asa and atorvastatin
chronic stable, continue asa, atorvastatin, beta blocker

## 2019-08-09 ENCOUNTER — APPOINTMENT (OUTPATIENT)
Dept: INTERNAL MEDICINE | Facility: CLINIC | Age: 64
End: 2019-08-09
Payer: COMMERCIAL

## 2019-08-09 VITALS
SYSTOLIC BLOOD PRESSURE: 124 MMHG | HEART RATE: 87 BPM | HEIGHT: 71 IN | OXYGEN SATURATION: 98 % | BODY MASS INDEX: 38.5 KG/M2 | DIASTOLIC BLOOD PRESSURE: 80 MMHG | WEIGHT: 275 LBS

## 2019-08-09 DIAGNOSIS — R78.81 BACTEREMIA: ICD-10-CM

## 2019-08-09 PROCEDURE — 36415 COLL VENOUS BLD VENIPUNCTURE: CPT

## 2019-08-09 PROCEDURE — 99495 TRANSJ CARE MGMT MOD F2F 14D: CPT | Mod: 25

## 2019-08-09 RX ORDER — LISINOPRIL 10 MG/1
10 TABLET ORAL DAILY
Qty: 30 | Refills: 3 | Status: DISCONTINUED | COMMUNITY
End: 2019-08-09

## 2019-08-09 NOTE — PHYSICAL EXAM
[No Acute Distress] : no acute distress [Well Nourished] : well nourished [Normal Oropharynx] : the oropharynx was normal [Normal Outer Ear/Nose] : the outer ears and nose were normal in appearance [No JVD] : no jugular venous distention [No Lymphadenopathy] : no lymphadenopathy [Normal Rate] : normal rate  [No Accessory Muscle Use] : no accessory muscle use [No Respiratory Distress] : no respiratory distress  [Regular Rhythm] : with a regular rhythm [No Edema] : there was no peripheral edema [Soft] : abdomen soft [No Palpable Aorta] : no palpable aorta [de-identified] : Picc right forarm [Stool Occult Blood] : stool negative for occult blood [86551 - Moderate Complexity requires multiple possible diagnoses and/or the management options, moderate complexity of the medical data (tests, etc.) to be reviewed, and moderate risk of significant complications, morbidity, and/or mortality as well as co] : Moderate Complexity

## 2019-08-09 NOTE — PLAN
[FreeTextEntry1] : s/p staph bacteremia\par No clear source \par Boil on face vs other\par Cardiomyopathy with low ef but excellent exercise tolerance\par Diabetes with hx of poor control now on Insulin\par PICC vlad on iv cefzolon 2gm q 8 for 6 week ID  Brief\par Obtain old records from primary care\par resolve medicine discrepancies \par

## 2019-08-09 NOTE — REVIEW OF SYSTEMS
[Fever] : no fever [Earache] : no earache [Chest Pain] : no chest pain [Nasal Discharge] : no nasal discharge [Shortness Of Breath] : no shortness of breath [Wheezing] : no wheezing [Orthopena] : no orthopnea [Abdominal Pain] : no abdominal pain

## 2019-08-09 NOTE — HISTORY OF PRESENT ILLNESS
[Post-hospitalization from ___ Hospital] : Post-hospitalization from [unfilled] Hospital [Admitted on: ___] : The patient was admitted on [unfilled] [Discharged on ___] : discharged on [unfilled] [FreeTextEntry2] : Post hospitalization\par staph in blood will  need 6 week iv cefzolin  ID Dr Santoyo\par hx of cardiomyopathy ef 30-35 on entresto\par s/p cabg dr Downs 2018\par diabetes with eye issue Dr Irene\par colon 2017 Dr Cuba\par \par

## 2019-08-09 NOTE — HEALTH RISK ASSESSMENT
[Patient reported colonoscopy was normal] : Patient reported colonoscopy was normal [ColonoscopyDate] : 1/17

## 2019-08-10 LAB
CULTURE RESULTS: SIGNIFICANT CHANGE UP
SPECIMEN SOURCE: SIGNIFICANT CHANGE UP

## 2019-08-11 LAB
25(OH)D3 SERPL-MCNC: 21.8 NG/ML
ALBUMIN SERPL ELPH-MCNC: 4.1 G/DL
ALP BLD-CCNC: 83 U/L
ALT SERPL-CCNC: 12 U/L
ANION GAP SERPL CALC-SCNC: 13 MMOL/L
AST SERPL-CCNC: 14 U/L
BASOPHILS # BLD AUTO: 0.06 K/UL
BASOPHILS NFR BLD AUTO: 0.6 %
BILIRUB SERPL-MCNC: 0.6 MG/DL
BUN SERPL-MCNC: 19 MG/DL
CALCIUM SERPL-MCNC: 9.5 MG/DL
CHLORIDE SERPL-SCNC: 100 MMOL/L
CHOLEST SERPL-MCNC: 106 MG/DL
CHOLEST/HDLC SERPL: 5.1 RATIO
CO2 SERPL-SCNC: 24 MMOL/L
CREAT SERPL-MCNC: 0.91 MG/DL
CRP SERPL-MCNC: 1.43 MG/DL
EOSINOPHIL # BLD AUTO: 0.33 K/UL
EOSINOPHIL NFR BLD AUTO: 3.4 %
ERYTHROCYTE [SEDIMENTATION RATE] IN BLOOD BY WESTERGREN METHOD: 49 MM/HR
ESTIMATED AVERAGE GLUCOSE: 275 MG/DL
GLUCOSE SERPL-MCNC: 311 MG/DL
HBA1C MFR BLD HPLC: 11.2 %
HCT VFR BLD CALC: 35.6 %
HDLC SERPL-MCNC: 21 MG/DL
HGB BLD-MCNC: 11.6 G/DL
IMM GRANULOCYTES NFR BLD AUTO: 0.5 %
LDH SERPL-CCNC: 202 U/L
LDLC SERPL CALC-MCNC: 58 MG/DL
LYMPHOCYTES # BLD AUTO: 2.66 K/UL
LYMPHOCYTES NFR BLD AUTO: 27.1 %
MAN DIFF?: NORMAL
MCHC RBC-ENTMCNC: 30.3 PG
MCHC RBC-ENTMCNC: 32.6 GM/DL
MCV RBC AUTO: 93 FL
MONOCYTES # BLD AUTO: 0.6 K/UL
MONOCYTES NFR BLD AUTO: 6.1 %
NEUTROPHILS # BLD AUTO: 6.1 K/UL
NEUTROPHILS NFR BLD AUTO: 62.3 %
NT-PROBNP SERPL-MCNC: 742 PG/ML
PLATELET # BLD AUTO: 346 K/UL
POTASSIUM SERPL-SCNC: 5 MMOL/L
PROT SERPL-MCNC: 7 G/DL
PSA SERPL-MCNC: 2.2 NG/ML
RBC # BLD: 3.83 M/UL
RBC # FLD: 12.6 %
SODIUM SERPL-SCNC: 137 MMOL/L
T4 FREE SERPL-MCNC: 1.3 NG/DL
TRIGL SERPL-MCNC: 137 MG/DL
TSH SERPL-ACNC: 2.93 UIU/ML
WBC # FLD AUTO: 9.8 K/UL

## 2019-08-12 ENCOUNTER — TRANSCRIPTION ENCOUNTER (OUTPATIENT)
Age: 64
End: 2019-08-12

## 2019-08-13 ENCOUNTER — TRANSCRIPTION ENCOUNTER (OUTPATIENT)
Age: 64
End: 2019-08-13

## 2019-08-14 ENCOUNTER — TRANSCRIPTION ENCOUNTER (OUTPATIENT)
Age: 64
End: 2019-08-14

## 2019-09-07 ENCOUNTER — APPOINTMENT (OUTPATIENT)
Dept: INTERNAL MEDICINE | Facility: CLINIC | Age: 64
End: 2019-09-07
Payer: COMMERCIAL

## 2019-09-07 VITALS
WEIGHT: 275 LBS | BODY MASS INDEX: 38.5 KG/M2 | HEIGHT: 71 IN | HEART RATE: 66 BPM | DIASTOLIC BLOOD PRESSURE: 90 MMHG | TEMPERATURE: 98.2 F | OXYGEN SATURATION: 97 % | SYSTOLIC BLOOD PRESSURE: 150 MMHG

## 2019-09-07 LAB
GLUCOSE BLDC GLUCOMTR-MCNC: 216
HBA1C MFR BLD HPLC: 8.9

## 2019-09-07 PROCEDURE — 83036 HEMOGLOBIN GLYCOSYLATED A1C: CPT | Mod: QW

## 2019-09-07 PROCEDURE — 99213 OFFICE O/P EST LOW 20 MIN: CPT | Mod: 25

## 2019-09-07 PROCEDURE — 82962 GLUCOSE BLOOD TEST: CPT

## 2019-09-07 NOTE — PHYSICAL EXAM
[No Acute Distress] : no acute distress [Well Nourished] : well nourished [No JVD] : no jugular venous distention [No Lymphadenopathy] : no lymphadenopathy [No Respiratory Distress] : no respiratory distress  [Normal Rate] : normal rate  [Regular Rhythm] : with a regular rhythm [No Accessory Muscle Use] : no accessory muscle use [Soft] : abdomen soft [No Edema] : there was no peripheral edema [No HSM] : no HSM

## 2019-09-07 NOTE — PLAN
[FreeTextEntry1] : a1c 8.9 down from 11\par sugar 211\par Excellent progress diabetes\par cardiac stable\par f/u 2 months

## 2019-09-07 NOTE — HISTORY OF PRESENT ILLNESS
[FreeTextEntry1] : diabetes and staph infection [de-identified] : daibetes on lantus 25 and reg 10 good sugars\par eating better\par no sob\par on kefzol\par bowels slighly prolonged?\par some constipation and [place]\par Dr Stallings Cardiology\par Hx of cardiomyopathy and cabg on enestro\par \par [place] endocrine\par DR Wolff Opthmology\par DR Dell Gallego [facility]\par Dell Santoyof ID\par \par Dr Cuba colon 2017\par \par

## 2019-09-07 NOTE — REVIEW OF SYSTEMS
[Fever] : no fever [Night Sweats] : no night sweats [Earache] : no earache [Nasal Discharge] : no nasal discharge [Chest Pain] : no chest pain [Orthopena] : no orthopnea [Shortness Of Breath] : no shortness of breath [Wheezing] : no wheezing [Abdominal Pain] : no abdominal pain [Vomiting] : no vomiting

## 2019-09-09 ENCOUNTER — APPOINTMENT (OUTPATIENT)
Dept: CARDIOLOGY | Facility: CLINIC | Age: 64
End: 2019-09-09
Payer: COMMERCIAL

## 2019-09-09 ENCOUNTER — NON-APPOINTMENT (OUTPATIENT)
Age: 64
End: 2019-09-09

## 2019-09-09 VITALS
HEIGHT: 71 IN | BODY MASS INDEX: 38.5 KG/M2 | OXYGEN SATURATION: 99 % | WEIGHT: 275 LBS | TEMPERATURE: 97.7 F | HEART RATE: 63 BPM | SYSTOLIC BLOOD PRESSURE: 138 MMHG | RESPIRATION RATE: 14 BRPM | DIASTOLIC BLOOD PRESSURE: 77 MMHG

## 2019-09-09 PROCEDURE — 99215 OFFICE O/P EST HI 40 MIN: CPT

## 2019-09-09 PROCEDURE — 93000 ELECTROCARDIOGRAM COMPLETE: CPT

## 2019-09-13 ENCOUNTER — TRANSCRIPTION ENCOUNTER (OUTPATIENT)
Age: 64
End: 2019-09-13

## 2019-10-01 ENCOUNTER — APPOINTMENT (OUTPATIENT)
Dept: CARDIOLOGY | Facility: CLINIC | Age: 64
End: 2019-10-01
Payer: COMMERCIAL

## 2019-10-01 PROCEDURE — 93798 PHYS/QHP OP CAR RHAB W/ECG: CPT

## 2019-10-07 ENCOUNTER — APPOINTMENT (OUTPATIENT)
Dept: INTERNAL MEDICINE | Facility: CLINIC | Age: 64
End: 2019-10-07

## 2019-10-10 ENCOUNTER — APPOINTMENT (OUTPATIENT)
Dept: CARDIOLOGY | Facility: CLINIC | Age: 64
End: 2019-10-10
Payer: COMMERCIAL

## 2019-10-10 PROCEDURE — 93798 PHYS/QHP OP CAR RHAB W/ECG: CPT

## 2019-10-16 ENCOUNTER — APPOINTMENT (OUTPATIENT)
Dept: CARDIOLOGY | Facility: CLINIC | Age: 64
End: 2019-10-16
Payer: COMMERCIAL

## 2019-10-16 PROCEDURE — 93798 PHYS/QHP OP CAR RHAB W/ECG: CPT

## 2019-10-19 ENCOUNTER — APPOINTMENT (OUTPATIENT)
Dept: INTERNAL MEDICINE | Facility: CLINIC | Age: 64
End: 2019-10-19
Payer: COMMERCIAL

## 2019-10-19 VITALS
OXYGEN SATURATION: 98 % | SYSTOLIC BLOOD PRESSURE: 132 MMHG | WEIGHT: 275 LBS | DIASTOLIC BLOOD PRESSURE: 78 MMHG | BODY MASS INDEX: 38.5 KG/M2 | HEART RATE: 63 BPM | HEIGHT: 71 IN

## 2019-10-19 PROCEDURE — 99214 OFFICE O/P EST MOD 30 MIN: CPT | Mod: 25

## 2019-10-19 PROCEDURE — 36415 COLL VENOUS BLD VENIPUNCTURE: CPT

## 2019-10-19 NOTE — HISTORY OF PRESENT ILLNESS
[FreeTextEntry1] : f/u [de-identified] : Diabetes on insulin\par s/p sepsis\par s/p picc line with some pain at site\par to get procedure for dental abscess\par

## 2019-10-19 NOTE — REVIEW OF SYSTEMS
[Negative] : Constitutional [Earache] : no earache [Chest Pain] : no chest pain [Nasal Discharge] : no nasal discharge [Shortness Of Breath] : no shortness of breath [Orthopena] : no orthopnea [Wheezing] : no wheezing

## 2019-10-19 NOTE — PLAN
[FreeTextEntry1] : Diabetes to check number\par cardiomyopathy to check pro bnp\par dental issue no value to crp\par

## 2019-10-20 LAB
ALBUMIN SERPL ELPH-MCNC: 4.6 G/DL
ALP BLD-CCNC: 89 U/L
ALT SERPL-CCNC: 15 U/L
ANION GAP SERPL CALC-SCNC: 13 MMOL/L
AST SERPL-CCNC: 12 U/L
BASOPHILS # BLD AUTO: 0.07 K/UL
BASOPHILS NFR BLD AUTO: 0.8 %
BILIRUB SERPL-MCNC: 0.9 MG/DL
BUN SERPL-MCNC: 24 MG/DL
CALCIUM SERPL-MCNC: 10.1 MG/DL
CHLORIDE SERPL-SCNC: 102 MMOL/L
CO2 SERPL-SCNC: 23 MMOL/L
CREAT SERPL-MCNC: 0.85 MG/DL
EOSINOPHIL # BLD AUTO: 0.38 K/UL
EOSINOPHIL NFR BLD AUTO: 4.1 %
ESTIMATED AVERAGE GLUCOSE: 192 MG/DL
GLUCOSE SERPL-MCNC: 172 MG/DL
HBA1C MFR BLD HPLC: 8.3 %
HCT VFR BLD CALC: 43.6 %
HGB BLD-MCNC: 14.6 G/DL
IMM GRANULOCYTES NFR BLD AUTO: 0.2 %
LYMPHOCYTES # BLD AUTO: 2.85 K/UL
LYMPHOCYTES NFR BLD AUTO: 30.7 %
MAN DIFF?: NORMAL
MCHC RBC-ENTMCNC: 31.2 PG
MCHC RBC-ENTMCNC: 33.5 GM/DL
MCV RBC AUTO: 93.2 FL
MONOCYTES # BLD AUTO: 0.62 K/UL
MONOCYTES NFR BLD AUTO: 6.7 %
NEUTROPHILS # BLD AUTO: 5.34 K/UL
NEUTROPHILS NFR BLD AUTO: 57.5 %
NT-PROBNP SERPL-MCNC: 476 PG/ML
PLATELET # BLD AUTO: 253 K/UL
POTASSIUM SERPL-SCNC: 5 MMOL/L
PROT SERPL-MCNC: 7.2 G/DL
RBC # BLD: 4.68 M/UL
RBC # FLD: 14 %
SODIUM SERPL-SCNC: 138 MMOL/L
URATE SERPL-MCNC: 6.7 MG/DL
WBC # FLD AUTO: 9.28 K/UL

## 2019-10-21 ENCOUNTER — APPOINTMENT (OUTPATIENT)
Dept: CARDIOLOGY | Facility: CLINIC | Age: 64
End: 2019-10-21
Payer: COMMERCIAL

## 2019-10-21 PROCEDURE — 93798 PHYS/QHP OP CAR RHAB W/ECG: CPT

## 2019-10-23 ENCOUNTER — APPOINTMENT (OUTPATIENT)
Dept: CARDIOLOGY | Facility: CLINIC | Age: 64
End: 2019-10-23
Payer: COMMERCIAL

## 2019-10-23 PROCEDURE — 93798 PHYS/QHP OP CAR RHAB W/ECG: CPT

## 2019-10-24 ENCOUNTER — APPOINTMENT (OUTPATIENT)
Dept: CARDIOLOGY | Facility: CLINIC | Age: 64
End: 2019-10-24
Payer: COMMERCIAL

## 2019-10-24 PROCEDURE — 93798 PHYS/QHP OP CAR RHAB W/ECG: CPT

## 2019-10-28 ENCOUNTER — TRANSCRIPTION ENCOUNTER (OUTPATIENT)
Age: 64
End: 2019-10-28

## 2019-10-28 ENCOUNTER — APPOINTMENT (OUTPATIENT)
Dept: CARDIOLOGY | Facility: CLINIC | Age: 64
End: 2019-10-28
Payer: COMMERCIAL

## 2019-10-28 PROCEDURE — 93798 PHYS/QHP OP CAR RHAB W/ECG: CPT

## 2019-10-28 NOTE — PROGRESS NOTE ADULT - PROBLEM SELECTOR PLAN 3
Regardinyr/ 12 weeks, vaginal bleeding   ----- Message from Margo Duarte sent at 10/27/2019  9:34 PM CDT -----  Patient Name: Brenda SMITH  Specialist or PCP Full Name:Dr May Leblanc   Pregnant (If Yes, how long?):12 weeks   Symptoms:vaginal bleeding   Call Back #:[phone]  Is the patient’s permanent residence located in [place], [place], or a [facility]? Yes [facility] [address]  Call Center Account #:[phone]         On meds primary team following up

## 2019-10-30 ENCOUNTER — TRANSCRIPTION ENCOUNTER (OUTPATIENT)
Age: 64
End: 2019-10-30

## 2019-10-30 ENCOUNTER — APPOINTMENT (OUTPATIENT)
Dept: PULMONOLOGY | Facility: CLINIC | Age: 64
End: 2019-10-30
Payer: COMMERCIAL

## 2019-10-30 ENCOUNTER — APPOINTMENT (OUTPATIENT)
Dept: CARDIOLOGY | Facility: CLINIC | Age: 64
End: 2019-10-30
Payer: COMMERCIAL

## 2019-10-30 VITALS
DIASTOLIC BLOOD PRESSURE: 72 MMHG | OXYGEN SATURATION: 98 % | RESPIRATION RATE: 17 BRPM | HEART RATE: 62 BPM | WEIGHT: 281 LBS | BODY MASS INDEX: 40.23 KG/M2 | HEIGHT: 70 IN | SYSTOLIC BLOOD PRESSURE: 110 MMHG

## 2019-10-30 DIAGNOSIS — Z86.69 PERSONAL HISTORY OF OTHER DISEASES OF THE NERVOUS SYSTEM AND SENSE ORGANS: ICD-10-CM

## 2019-10-30 DIAGNOSIS — Z83.79 FAMILY HISTORY OF OTHER DISEASES OF THE DIGESTIVE SYSTEM: ICD-10-CM

## 2019-10-30 DIAGNOSIS — Z23 ENCOUNTER FOR IMMUNIZATION: ICD-10-CM

## 2019-10-30 PROCEDURE — G0008: CPT

## 2019-10-30 PROCEDURE — 93798 PHYS/QHP OP CAR RHAB W/ECG: CPT

## 2019-10-30 PROCEDURE — 99204 OFFICE O/P NEW MOD 45 MIN: CPT | Mod: 25

## 2019-10-30 PROCEDURE — 94727 GAS DIL/WSHOT DETER LNG VOL: CPT

## 2019-10-30 PROCEDURE — 94729 DIFFUSING CAPACITY: CPT

## 2019-10-30 PROCEDURE — 94060 EVALUATION OF WHEEZING: CPT

## 2019-10-30 PROCEDURE — 94618 PULMONARY STRESS TESTING: CPT

## 2019-10-30 PROCEDURE — 71046 X-RAY EXAM CHEST 2 VIEWS: CPT

## 2019-10-30 PROCEDURE — 90682 RIV4 VACC RECOMBINANT DNA IM: CPT

## 2019-10-30 PROCEDURE — ZZZZZ: CPT

## 2019-10-30 RX ORDER — CEPHALEXIN 500 MG/1
500 CAPSULE ORAL 3 TIMES DAILY
Qty: 12 | Refills: 0 | Status: DISCONTINUED | COMMUNITY
Start: 2019-09-24 | End: 2019-10-30

## 2019-10-30 NOTE — HISTORY OF PRESENT ILLNESS
[FreeTextEntry1] : Mr. KENNEDY is a 64 year old male presenting to the office today for initial pulmonary evaluation. His chief complaint is JUANI and SOB.\par -he reports snoring, and was given a device by Dr. Valente, but had difficulty keeping it sanitized, and he got sick from it (October 2018)\par -he states he had seen Dr. Dell Santoyo for his staph bacteremia\par -he states he kept getting sick from his CPAP mask, and had a full facemask over his nose and mouth\par -he reports he is still snoring. He states he often only gets \par -he states his neck size is 17.5-18\par -he could NOT fall asleep while watching a boring TV show\par -he could NOT fall asleep in a car \par -he reports feeling fatigued, and can't get focused and become fully awake\par -he state s Dr. Sellers (his previous cardiologist) wanted to have a monitor placed at [place], then went to [facility] who sent him to Dr Castillo, who put a monitor on him\par -he states he has been going to cardiac rehab (10th visit in)\par -he states he drinks water at night as his mouth is dry\par -he states having retinal issues, and uses Restasis drops\par -he states having a pick line in his arm, that is painful\par -he reports his bowels switch between constipation and \par -he states he is off ABx now\par -he reports having neck pain due to sleeping in a strange position\par -he reports his sleep study showed RLS, but he doesn't feel restless\par -he reports having GARCÍA\par -he reports having gained 15 pounds in the past year\par -he states he gave back his CPAP machine\par -he denies any hoarseness, orthopnea, SOB, post nasal drip, chest pain, chest pressure, diarrhea, constipation, dysphagia, dizziness, sour taste in the mouth

## 2019-10-30 NOTE — ADDENDUM
[FreeTextEntry1] : Documented by Norm Thibodeaux acting as a scribe for Dr. Israel Levy on 10/30/2019.\par \par All medical record entries made by the Scribe were at my, Dr. Israel Levy's, direction and personally dictated by me on 10/30/2019. I have reviewed the chart and agree that the record accurately reflects my personal performance of the history, physical exam, assessment and plan. I have also personally directed, reviewed, and agree with the discharge instructions.

## 2019-10-30 NOTE — REVIEW OF SYSTEMS
[Fatigue] : fatigue [Recent Wt Gain (___ Lbs)] : recent [unfilled] ~Ulb weight gain [As Noted in HPI] : as noted in HPI [Difficulty Initiating Sleep] : difficulty falling asleep [Difficulty Maintaining Sleep] : difficulty maintaining sleep [Unusual Movements] : involuntary movements during sleep [Snoring] : snoring [Witnessed Apneas] : demonstrated ~M apnea [Nonrestorative Sleep] : nonrestorative sleep [Hypersomnolence] : sleeping much more than usual [Negative] : Sleep Disorder [Nasal Congestion] : no nasal congestion [Postnasal Drip] : no postnasal drip [Sinus Problems] : no sinus problems [Dyspnea] : no dyspnea [Chest Discomfort] : no chest discomfort [Orthopnea] : no orthopnea [Dysphagia] : no dysphagia [Constipation] : no constipation [Diarrhea] : no diarrhea [Dizziness] : no dizziness

## 2019-10-30 NOTE — PHYSICAL EXAM
[General Appearance - Well Developed] : well developed [Normal Appearance] : normal appearance [Well Groomed] : well groomed [General Appearance - Well Nourished] : well nourished [No Deformities] : no deformities [General Appearance - In No Acute Distress] : no acute distress [Normal Conjunctiva] : the conjunctiva exhibited no abnormalities [Eyelids - No Xanthelasma] : the eyelids demonstrated no xanthelasmas [Normal Oropharynx] : normal oropharynx [III] : III [Neck Appearance] : the appearance of the neck was normal [Neck Cervical Mass (___cm)] : no neck mass was observed [Jugular Venous Distention Increased] : there was no jugular-venous distention [Thyroid Diffuse Enlargement] : the thyroid was not enlarged [Thyroid Nodule] : there were no palpable thyroid nodules [Heart Rate And Rhythm] : heart rate and rhythm were normal [Heart Sounds] : normal S1 and S2 [Murmurs] : no murmurs present [Respiration, Rhythm And Depth] : normal respiratory rhythm and effort [Exaggerated Use Of Accessory Muscles For Inspiration] : no accessory muscle use [Auscultation Breath Sounds / Voice Sounds] : lungs were clear to auscultation bilaterally [Abdomen Soft] : soft [Abdomen Tenderness] : non-tender [Abdomen Mass (___ Cm)] : no abdominal mass palpated [Abnormal Walk] : normal gait [Gait - Sufficient For Exercise Testing] : the gait was sufficient for exercise testing [Nail Clubbing] : no clubbing of the fingernails [Cyanosis, Localized] : no localized cyanosis [Petechial Hemorrhages (___cm)] : no petechial hemorrhages [Skin Color & Pigmentation] : normal skin color and pigmentation [Skin Turgor] : normal skin turgor [] : no rash [Deep Tendon Reflexes (DTR)] : deep tendon reflexes were 2+ and symmetric [Sensation] : the sensory exam was normal to light touch and pinprick [No Focal Deficits] : no focal deficits [Oriented To Time, Place, And Person] : oriented to person, place, and time [Impaired Insight] : insight and judgment were intact [Affect] : the affect was normal [FreeTextEntry1] : I:E ratio 1:3; clear

## 2019-10-30 NOTE — PROCEDURE
[FreeTextEntry1] : CXR reveals moderate cardiomegaly; no evidence of infiltrate or effusion, s/p median sternotomy and open heart surgery\par \par Full PFT revealed normal flows, with a FEV1 of 3.22L, which is 92% of predicted, normal lung volumes, and a diffusion of 24.2, which is 83% of predicted, with a normal flow volume loop\par \par 6 minute walk test reveals a low saturation of 95% with no evidence of dyspnea or fatigue; walked 603.1 meters\par \par Sleep study (10.30.19) revealed severe sleep apnea with an RDI of 68.4, and a low oxygen saturation of 82%

## 2019-10-30 NOTE — ASSESSMENT
[FreeTextEntry1] : Mr. KENNEDY is a 64 year old male with a history of CAD s/p open, HTN, HLD, overweight, DM, glaucoma, moderate AS, nonsmoker, PAF, staph bacteremia, JUANI who comes into the office today for pulmonary evaluation for SOB and JUANI.\par \par The patient's shortness of breath is multifactorial due to:\par -poor breathing mechanics \par -overweight/out of shape\par -CAD (Leopoldo Castillo)\par \par Problem 1: JUANI\par -recommended to get a dental device consultation with Dr. Charles\par -Recommended to use Oxy-Aid nasal clip and the Chin Strap together\par -Studies to complete: iron study, thyroid function test, free and total testosterone level \par \par Sleep apnea is associated with adverse clinical consequences which an affect most organ systems. Cardiovascular disease risk includes arrhythmias, atrial fibrillation, hypertension, coronary artery disease, and stroke. Metabolic disorders include diabetes type 2, non-alcoholic fatty liver disease. Mood disorder especially depression; and cognitive decline especially in the elderly. Associations with chronic reflux/Charles’s esophagus some but not all inclusive. \par -Reasons include arousal consistent with hypopnea; respiratory events most prominent in REM sleep or supine position; therefore sleep staging and body position are important for accurate diagnosis and estimation of AHI. \par \par Problem 2: RLS\par -Recommended to consider using Mirapex or Requip\par \par Problem 3: Poor Mechanics of Breathing\par - Proper breathing techniques were reviewed with an emphasis of exhalation. Patient instructed to breath in for 1 second and out for four seconds. Patient was encouraged to not talk while walking. \par \par Problem 4: Overweight\par -Weight loss, exercise, and diet control were discussed and are highly encouraged. Treatment options were given such as, aqua therapy, and contacting a nutritionist. Recommended to use the elliptical, stationary bike, less use of treadmill. Mindful eating was explained to the patient Obesity is associated with worsening asthma, shortness of breath, and potential for cardiac disease, diabetes, and other underlying medical conditions. \par \par Problem 5: Cardiac\par -recommended to follow up with a cardiologist (Leopoldo Castillo)\par \par Problem 6: health maintenance\par -s/p influenza vaccine (10/30/19)\par -recommended strep pneumonia vaccines: Prevnar-13 vaccine, followed by Pneumo vaccine 23 one year following\par -recommended early intervention for URIs\par -recommended regular osteoporosis evaluations\par -recommended early dermatological evaluations\par -recommended after the age of 50 to the age of 70, colonoscopy every 5 years \par \par  Follow up in 6-8 weeks\par -he  is recommended to call with any changes, questions, or concerns.

## 2019-11-04 ENCOUNTER — APPOINTMENT (OUTPATIENT)
Dept: CARDIOLOGY | Facility: CLINIC | Age: 64
End: 2019-11-04
Payer: COMMERCIAL

## 2019-11-04 PROCEDURE — 93798 PHYS/QHP OP CAR RHAB W/ECG: CPT

## 2019-11-06 ENCOUNTER — APPOINTMENT (OUTPATIENT)
Dept: CARDIOLOGY | Facility: CLINIC | Age: 64
End: 2019-11-06
Payer: COMMERCIAL

## 2019-11-06 PROCEDURE — 93798 PHYS/QHP OP CAR RHAB W/ECG: CPT

## 2019-11-07 ENCOUNTER — MEDICATION RENEWAL (OUTPATIENT)
Age: 64
End: 2019-11-07

## 2019-11-07 ENCOUNTER — APPOINTMENT (OUTPATIENT)
Dept: CARDIOLOGY | Facility: CLINIC | Age: 64
End: 2019-11-07
Payer: COMMERCIAL

## 2019-11-07 PROCEDURE — 93798 PHYS/QHP OP CAR RHAB W/ECG: CPT

## 2019-11-08 ENCOUNTER — TRANSCRIPTION ENCOUNTER (OUTPATIENT)
Age: 64
End: 2019-11-08

## 2019-11-11 ENCOUNTER — NON-APPOINTMENT (OUTPATIENT)
Age: 64
End: 2019-11-11

## 2019-11-11 ENCOUNTER — APPOINTMENT (OUTPATIENT)
Dept: CARDIOLOGY | Facility: CLINIC | Age: 64
End: 2019-11-11
Payer: COMMERCIAL

## 2019-11-11 VITALS
DIASTOLIC BLOOD PRESSURE: 79 MMHG | BODY MASS INDEX: 40.52 KG/M2 | HEART RATE: 63 BPM | OXYGEN SATURATION: 98 % | WEIGHT: 283 LBS | HEIGHT: 70 IN | SYSTOLIC BLOOD PRESSURE: 139 MMHG

## 2019-11-11 PROCEDURE — 99215 OFFICE O/P EST HI 40 MIN: CPT | Mod: 25

## 2019-11-11 PROCEDURE — 93798 PHYS/QHP OP CAR RHAB W/ECG: CPT

## 2019-11-11 PROCEDURE — 93000 ELECTROCARDIOGRAM COMPLETE: CPT

## 2019-11-13 ENCOUNTER — APPOINTMENT (OUTPATIENT)
Dept: CARDIOLOGY | Facility: CLINIC | Age: 64
End: 2019-11-13
Payer: COMMERCIAL

## 2019-11-13 LAB
ALBUMIN SERPL ELPH-MCNC: 4.4 G/DL
ALP BLD-CCNC: 79 U/L
ALT SERPL-CCNC: 15 U/L
ANION GAP SERPL CALC-SCNC: 13 MMOL/L
AST SERPL-CCNC: 14 U/L
BASOPHILS # BLD AUTO: 0.06 K/UL
BASOPHILS NFR BLD AUTO: 0.7 %
BILIRUB SERPL-MCNC: 0.8 MG/DL
BUN SERPL-MCNC: 21 MG/DL
CALCIUM SERPL-MCNC: 9.7 MG/DL
CHLORIDE SERPL-SCNC: 103 MMOL/L
CHOLEST SERPL-MCNC: 121 MG/DL
CHOLEST/HDLC SERPL: 3.6 RATIO
CO2 SERPL-SCNC: 25 MMOL/L
CREAT SERPL-MCNC: 0.89 MG/DL
EOSINOPHIL # BLD AUTO: 0.42 K/UL
EOSINOPHIL NFR BLD AUTO: 4.9 %
ESTIMATED AVERAGE GLUCOSE: 189 MG/DL
GLUCOSE SERPL-MCNC: 143 MG/DL
HBA1C MFR BLD HPLC: 8.2 %
HCT VFR BLD CALC: 42.7 %
HDLC SERPL-MCNC: 34 MG/DL
HGB BLD-MCNC: 14 G/DL
IMM GRANULOCYTES NFR BLD AUTO: 0.3 %
LDLC SERPL CALC-MCNC: 62 MG/DL
LYMPHOCYTES # BLD AUTO: 2.53 K/UL
LYMPHOCYTES NFR BLD AUTO: 29.2 %
MAN DIFF?: NORMAL
MCHC RBC-ENTMCNC: 30.9 PG
MCHC RBC-ENTMCNC: 32.8 GM/DL
MCV RBC AUTO: 94.3 FL
MONOCYTES # BLD AUTO: 0.54 K/UL
MONOCYTES NFR BLD AUTO: 6.2 %
NEUTROPHILS # BLD AUTO: 5.07 K/UL
NEUTROPHILS NFR BLD AUTO: 58.7 %
NT-PROBNP SERPL-MCNC: 673 PG/ML
PLATELET # BLD AUTO: 198 K/UL
POTASSIUM SERPL-SCNC: 5 MMOL/L
PROT SERPL-MCNC: 7.1 G/DL
RBC # BLD: 4.53 M/UL
RBC # FLD: 13.8 %
SODIUM SERPL-SCNC: 141 MMOL/L
TRIGL SERPL-MCNC: 125 MG/DL
TSH SERPL-ACNC: 2.57 UIU/ML
WBC # FLD AUTO: 8.65 K/UL

## 2019-11-13 PROCEDURE — 93798 PHYS/QHP OP CAR RHAB W/ECG: CPT

## 2019-11-14 ENCOUNTER — APPOINTMENT (OUTPATIENT)
Dept: CARDIOLOGY | Facility: CLINIC | Age: 64
End: 2019-11-14
Payer: COMMERCIAL

## 2019-11-14 LAB — APO LP(A) SERPL-MCNC: <9 NMOL/L

## 2019-11-14 PROCEDURE — 93798 PHYS/QHP OP CAR RHAB W/ECG: CPT

## 2019-11-18 ENCOUNTER — APPOINTMENT (OUTPATIENT)
Dept: CARDIOLOGY | Facility: CLINIC | Age: 64
End: 2019-11-18
Payer: COMMERCIAL

## 2019-11-18 PROCEDURE — 93798 PHYS/QHP OP CAR RHAB W/ECG: CPT

## 2019-11-18 NOTE — ED PROVIDER NOTE - OBJECTIVE STATEMENT
64 y/o M of CABG, DM, HTN, HLD was sent to the ED by urgent care for LLQ abd pain x 4 days with frequent episodes of formed stool and nausea. Reports subjective fevers x today. At urgent care he had a temp of 100.4, he also received 8mg of zofran ODT which helped with pain. Also reports freq urination. Denies dysuria, hematuria, vomiting, CP, SOB or all other complaints. Denies h/o kidney stones or diverticulitis
Admission Reconciliation is Completed  Discharge Reconciliation is Completed

## 2019-11-22 ENCOUNTER — APPOINTMENT (OUTPATIENT)
Dept: INTERNAL MEDICINE | Facility: CLINIC | Age: 64
End: 2019-11-22
Payer: COMMERCIAL

## 2019-11-22 ENCOUNTER — APPOINTMENT (OUTPATIENT)
Dept: ENDOCRINOLOGY | Facility: CLINIC | Age: 64
End: 2019-11-22
Payer: COMMERCIAL

## 2019-11-22 VITALS
OXYGEN SATURATION: 95 % | TEMPERATURE: 98.4 F | SYSTOLIC BLOOD PRESSURE: 136 MMHG | BODY MASS INDEX: 40.37 KG/M2 | HEIGHT: 70 IN | DIASTOLIC BLOOD PRESSURE: 81 MMHG | HEART RATE: 68 BPM | WEIGHT: 282 LBS

## 2019-11-22 VITALS
DIASTOLIC BLOOD PRESSURE: 80 MMHG | BODY MASS INDEX: 40.37 KG/M2 | SYSTOLIC BLOOD PRESSURE: 140 MMHG | HEIGHT: 70 IN | HEART RATE: 66 BPM | OXYGEN SATURATION: 99 % | WEIGHT: 282 LBS

## 2019-11-22 DIAGNOSIS — Z86.79 PERSONAL HISTORY OF OTHER DISEASES OF THE CIRCULATORY SYSTEM: ICD-10-CM

## 2019-11-22 DIAGNOSIS — R79.89 OTHER SPECIFIED ABNORMAL FINDINGS OF BLOOD CHEMISTRY: ICD-10-CM

## 2019-11-22 PROCEDURE — 99213 OFFICE O/P EST LOW 20 MIN: CPT

## 2019-11-22 PROCEDURE — 99204 OFFICE O/P NEW MOD 45 MIN: CPT

## 2019-11-22 RX ORDER — INSULIN ASPART 100 [IU]/ML
100 INJECTION, SOLUTION INTRAVENOUS; SUBCUTANEOUS 3 TIMES DAILY
Qty: 3 | Refills: 3 | Status: DISCONTINUED | COMMUNITY
Start: 2019-11-07 | End: 2019-11-22

## 2019-11-22 NOTE — PLAN
[FreeTextEntry1] : Doing well\par need blood  evaluation\par including diabetes, chf and other\par need testosterone levels check\par to consider jardiance\par

## 2019-11-22 NOTE — PHYSICAL EXAM
[No Acute Distress] : no acute distress [Well Nourished] : well nourished [Normal Sclera/Conjunctiva] : normal sclera/conjunctiva [PERRL] : pupils equal round and reactive to light [Normal Outer Ear/Nose] : the outer ears and nose were normal in appearance [Normal Oropharynx] : the oropharynx was normal [No JVD] : no jugular venous distention [No Lymphadenopathy] : no lymphadenopathy [No Respiratory Distress] : no respiratory distress  [No Accessory Muscle Use] : no accessory muscle use [Normal Rate] : normal rate  [Regular Rhythm] : with a regular rhythm

## 2019-11-22 NOTE — HISTORY OF PRESENT ILLNESS
[FreeTextEntry1] : blood pressure [de-identified] : Blood pressure on med\par cardiomyopathy under treatment'\par diabetes with a1c improving\par need cataract\par on lantus 25 and 10 unit before meals\par sugars usually 160 last a1c 8.2\par no sob or chest pain\par on entresto, spironlocatone and other

## 2019-11-24 PROBLEM — R79.89 ELEVATED TSH: Status: RESOLVED | Noted: 2019-11-24 | Resolved: 2019-11-24

## 2019-11-24 PROBLEM — Z86.79 HISTORY OF AORTIC VALVE STENOSIS: Status: RESOLVED | Noted: 2019-11-24 | Resolved: 2019-11-24

## 2019-11-24 NOTE — HISTORY OF PRESENT ILLNESS
[FreeTextEntry1] : 63 yo M with PMH DM2, HTN, HLD, CABG ( 2018), moderate AS, sCHF, A fib, JUANI.\par \par he was diagnosed with DM2 at least 20 years ago\par Microvascular complications: + retinopathy\par he is on humalog 10 u tidac and lantus 10 u qhs\par does not monitor FS values at home\par B: egg + diggs + 2 toasts  + banana + coffee\par L: cken + salad no bread no croutons\par D: full plate of pasta 2 days per week with bread + vit C water\par snacks on lemon pie, pretzels, cookies chips\par Prior Endo: Dr. Ayla Camacho [facility]\par \par \par

## 2019-11-24 NOTE — ASSESSMENT
[FreeTextEntry1] : 65 yo M with PMH DM2, HTN, HLD, CABG ( 2018), moderate AS, sCHF, A fib, JUANI.\par \par 1. DM2 - will change to lantus 20 u qhs and humalog 7 u tidac. given ozempic sample to hold on to\par \par 2. HTN - continue metoprolol, entresto, also on spironolactone\par \par 3. HLD - continue atorvastatin

## 2019-11-29 ENCOUNTER — INPATIENT (INPATIENT)
Facility: HOSPITAL | Age: 64
LOS: 2 days | Discharge: HOME | DRG: 292 | End: 2019-12-02
Attending: INTERNAL MEDICINE | Admitting: INTERNAL MEDICINE
Payer: COMMERCIAL

## 2019-11-29 ENCOUNTER — TRANSCRIPTION ENCOUNTER (OUTPATIENT)
Age: 64
End: 2019-11-29

## 2019-11-29 VITALS
RESPIRATION RATE: 19 BRPM | WEIGHT: 279.99 LBS | TEMPERATURE: 98 F | HEIGHT: 70 IN | DIASTOLIC BLOOD PRESSURE: 110 MMHG | HEART RATE: 72 BPM | SYSTOLIC BLOOD PRESSURE: 191 MMHG | OXYGEN SATURATION: 93 %

## 2019-11-29 DIAGNOSIS — I50.9 HEART FAILURE, UNSPECIFIED: ICD-10-CM

## 2019-11-29 DIAGNOSIS — Z95.1 PRESENCE OF AORTOCORONARY BYPASS GRAFT: Chronic | ICD-10-CM

## 2019-11-29 DIAGNOSIS — Z98.890 OTHER SPECIFIED POSTPROCEDURAL STATES: Chronic | ICD-10-CM

## 2019-11-29 LAB
ALBUMIN SERPL ELPH-MCNC: 3.5 G/DL — SIGNIFICANT CHANGE UP (ref 3.3–5)
ALP SERPL-CCNC: 110 U/L — SIGNIFICANT CHANGE UP (ref 40–120)
ALT FLD-CCNC: 30 U/L — SIGNIFICANT CHANGE UP (ref 10–45)
ANION GAP SERPL CALC-SCNC: 6 MMOL/L — SIGNIFICANT CHANGE UP (ref 5–17)
AST SERPL-CCNC: 26 U/L — SIGNIFICANT CHANGE UP (ref 10–40)
BASOPHILS # BLD AUTO: 0.03 K/UL — SIGNIFICANT CHANGE UP (ref 0–0.2)
BASOPHILS NFR BLD AUTO: 0.3 % — SIGNIFICANT CHANGE UP (ref 0–2)
BILIRUB SERPL-MCNC: 1 MG/DL — SIGNIFICANT CHANGE UP (ref 0.2–1.2)
BUN SERPL-MCNC: 23 MG/DL — SIGNIFICANT CHANGE UP (ref 7–23)
CALCIUM SERPL-MCNC: 8.9 MG/DL — SIGNIFICANT CHANGE UP (ref 8.4–10.5)
CHLORIDE SERPL-SCNC: 102 MMOL/L — SIGNIFICANT CHANGE UP (ref 96–108)
CK SERPL-CCNC: 169 U/L — SIGNIFICANT CHANGE UP (ref 30–200)
CK SERPL-CCNC: 186 U/L — SIGNIFICANT CHANGE UP (ref 30–200)
CO2 SERPL-SCNC: 28 MMOL/L — SIGNIFICANT CHANGE UP (ref 22–31)
CREAT SERPL-MCNC: 1.12 MG/DL — SIGNIFICANT CHANGE UP (ref 0.5–1.3)
EOSINOPHIL # BLD AUTO: 0.39 K/UL — SIGNIFICANT CHANGE UP (ref 0–0.5)
EOSINOPHIL NFR BLD AUTO: 3.8 % — SIGNIFICANT CHANGE UP (ref 0–6)
GLUCOSE BLDC GLUCOMTR-MCNC: 176 MG/DL — HIGH (ref 70–99)
GLUCOSE BLDC GLUCOMTR-MCNC: 249 MG/DL — HIGH (ref 70–99)
GLUCOSE BLDC GLUCOMTR-MCNC: 272 MG/DL — HIGH (ref 70–99)
GLUCOSE BLDC GLUCOMTR-MCNC: 301 MG/DL — HIGH (ref 70–99)
GLUCOSE BLDC GLUCOMTR-MCNC: 408 MG/DL — HIGH (ref 70–99)
GLUCOSE BLDC GLUCOMTR-MCNC: 420 MG/DL — HIGH (ref 70–99)
GLUCOSE SERPL-MCNC: 321 MG/DL — HIGH (ref 70–99)
HCT VFR BLD CALC: 35 % — LOW (ref 39–50)
HGB BLD-MCNC: 12.1 G/DL — LOW (ref 13–17)
IMM GRANULOCYTES NFR BLD AUTO: 0.5 % — SIGNIFICANT CHANGE UP (ref 0–1.5)
LYMPHOCYTES # BLD AUTO: 3.26 K/UL — SIGNIFICANT CHANGE UP (ref 1–3.3)
LYMPHOCYTES # BLD AUTO: 32.1 % — SIGNIFICANT CHANGE UP (ref 13–44)
MCHC RBC-ENTMCNC: 30.9 PG — SIGNIFICANT CHANGE UP (ref 27–34)
MCHC RBC-ENTMCNC: 34.6 GM/DL — SIGNIFICANT CHANGE UP (ref 32–36)
MCV RBC AUTO: 89.5 FL — SIGNIFICANT CHANGE UP (ref 80–100)
MONOCYTES # BLD AUTO: 0.64 K/UL — SIGNIFICANT CHANGE UP (ref 0–0.9)
MONOCYTES NFR BLD AUTO: 6.3 % — SIGNIFICANT CHANGE UP (ref 2–14)
NEUTROPHILS # BLD AUTO: 5.77 K/UL — SIGNIFICANT CHANGE UP (ref 1.8–7.4)
NEUTROPHILS NFR BLD AUTO: 57 % — SIGNIFICANT CHANGE UP (ref 43–77)
NRBC # BLD: 0 /100 WBCS — SIGNIFICANT CHANGE UP (ref 0–0)
NT-PROBNP SERPL-SCNC: 3512 PG/ML — HIGH (ref 0–300)
PLATELET # BLD AUTO: 191 K/UL — SIGNIFICANT CHANGE UP (ref 150–400)
POTASSIUM SERPL-MCNC: 4.3 MMOL/L — SIGNIFICANT CHANGE UP (ref 3.5–5.3)
POTASSIUM SERPL-SCNC: 4.3 MMOL/L — SIGNIFICANT CHANGE UP (ref 3.5–5.3)
PROT SERPL-MCNC: 7.4 G/DL — SIGNIFICANT CHANGE UP (ref 6–8.3)
RBC # BLD: 3.91 M/UL — LOW (ref 4.2–5.8)
RBC # FLD: 13 % — SIGNIFICANT CHANGE UP (ref 10.3–14.5)
SODIUM SERPL-SCNC: 136 MMOL/L — SIGNIFICANT CHANGE UP (ref 135–145)
TROPONIN I SERPL-MCNC: 0.07 NG/ML — HIGH (ref 0.02–0.06)
TROPONIN I SERPL-MCNC: 0.1 NG/ML — HIGH (ref 0.02–0.06)
TROPONIN I SERPL-MCNC: 0.1 NG/ML — HIGH (ref 0.02–0.06)
WBC # BLD: 10.14 K/UL — SIGNIFICANT CHANGE UP (ref 3.8–10.5)
WBC # FLD AUTO: 10.14 K/UL — SIGNIFICANT CHANGE UP (ref 3.8–10.5)

## 2019-11-29 PROCEDURE — 71046 X-RAY EXAM CHEST 2 VIEWS: CPT | Mod: 26

## 2019-11-29 PROCEDURE — 99285 EMERGENCY DEPT VISIT HI MDM: CPT

## 2019-11-29 PROCEDURE — 93971 EXTREMITY STUDY: CPT | Mod: 26,RT

## 2019-11-29 PROCEDURE — 99255 IP/OBS CONSLTJ NEW/EST HI 80: CPT

## 2019-11-29 PROCEDURE — 93010 ELECTROCARDIOGRAM REPORT: CPT

## 2019-11-29 PROCEDURE — 99223 1ST HOSP IP/OBS HIGH 75: CPT

## 2019-11-29 PROCEDURE — 93306 TTE W/DOPPLER COMPLETE: CPT | Mod: 26

## 2019-11-29 RX ORDER — LOSARTAN POTASSIUM 100 MG/1
1 TABLET, FILM COATED ORAL
Qty: 0 | Refills: 0 | DISCHARGE

## 2019-11-29 RX ORDER — FUROSEMIDE 40 MG
40 TABLET ORAL DAILY
Refills: 0 | Status: DISCONTINUED | OUTPATIENT
Start: 2019-11-30 | End: 2019-12-01

## 2019-11-29 RX ORDER — FUROSEMIDE 40 MG
40 TABLET ORAL ONCE
Refills: 0 | Status: COMPLETED | OUTPATIENT
Start: 2019-11-29 | End: 2019-11-29

## 2019-11-29 RX ORDER — DEXTROSE 50 % IN WATER 50 %
25 SYRINGE (ML) INTRAVENOUS ONCE
Refills: 0 | Status: DISCONTINUED | OUTPATIENT
Start: 2019-11-29 | End: 2019-12-02

## 2019-11-29 RX ORDER — SACUBITRIL AND VALSARTAN 24; 26 MG/1; MG/1
1 TABLET, FILM COATED ORAL
Qty: 0 | Refills: 0 | DISCHARGE

## 2019-11-29 RX ORDER — METOPROLOL TARTRATE 50 MG
50 TABLET ORAL DAILY
Refills: 0 | Status: DISCONTINUED | OUTPATIENT
Start: 2019-11-29 | End: 2019-12-02

## 2019-11-29 RX ORDER — IPRATROPIUM/ALBUTEROL SULFATE 18-103MCG
3 AEROSOL WITH ADAPTER (GRAM) INHALATION ONCE
Refills: 0 | Status: COMPLETED | OUTPATIENT
Start: 2019-11-29 | End: 2019-11-29

## 2019-11-29 RX ORDER — SODIUM CHLORIDE 9 MG/ML
1000 INJECTION, SOLUTION INTRAVENOUS
Refills: 0 | Status: DISCONTINUED | OUTPATIENT
Start: 2019-11-29 | End: 2019-12-02

## 2019-11-29 RX ORDER — INSULIN LISPRO 100/ML
12 VIAL (ML) SUBCUTANEOUS
Refills: 0 | Status: DISCONTINUED | OUTPATIENT
Start: 2019-11-29 | End: 2019-12-02

## 2019-11-29 RX ORDER — NITROGLYCERIN 6.5 MG
0.4 CAPSULE, EXTENDED RELEASE ORAL ONCE
Refills: 0 | Status: COMPLETED | OUTPATIENT
Start: 2019-11-29 | End: 2019-11-29

## 2019-11-29 RX ORDER — INSULIN LISPRO 100/ML
VIAL (ML) SUBCUTANEOUS AT BEDTIME
Refills: 0 | Status: DISCONTINUED | OUTPATIENT
Start: 2019-11-29 | End: 2019-12-02

## 2019-11-29 RX ORDER — SPIRONOLACTONE 25 MG/1
25 TABLET, FILM COATED ORAL DAILY
Refills: 0 | Status: DISCONTINUED | OUTPATIENT
Start: 2019-11-29 | End: 2019-12-02

## 2019-11-29 RX ORDER — DEXTROSE 50 % IN WATER 50 %
15 SYRINGE (ML) INTRAVENOUS ONCE
Refills: 0 | Status: DISCONTINUED | OUTPATIENT
Start: 2019-11-29 | End: 2019-12-02

## 2019-11-29 RX ORDER — BENZOCAINE AND MENTHOL 5; 1 G/100ML; G/100ML
1 LIQUID ORAL ONCE
Refills: 0 | Status: COMPLETED | OUTPATIENT
Start: 2019-11-29 | End: 2019-11-30

## 2019-11-29 RX ORDER — DEXTROSE 50 % IN WATER 50 %
12.5 SYRINGE (ML) INTRAVENOUS ONCE
Refills: 0 | Status: DISCONTINUED | OUTPATIENT
Start: 2019-11-29 | End: 2019-12-02

## 2019-11-29 RX ORDER — INSULIN GLARGINE 100 [IU]/ML
15 INJECTION, SOLUTION SUBCUTANEOUS AT BEDTIME
Refills: 0 | Status: DISCONTINUED | OUTPATIENT
Start: 2019-11-29 | End: 2019-12-01

## 2019-11-29 RX ORDER — ACETAMINOPHEN 500 MG
650 TABLET ORAL EVERY 6 HOURS
Refills: 0 | Status: DISCONTINUED | OUTPATIENT
Start: 2019-11-29 | End: 2019-12-02

## 2019-11-29 RX ORDER — INSULIN LISPRO 100/ML
10 VIAL (ML) SUBCUTANEOUS
Refills: 0 | Status: DISCONTINUED | OUTPATIENT
Start: 2019-11-29 | End: 2019-11-29

## 2019-11-29 RX ORDER — IPRATROPIUM/ALBUTEROL SULFATE 18-103MCG
3 AEROSOL WITH ADAPTER (GRAM) INHALATION ONCE
Refills: 0 | Status: DISCONTINUED | OUTPATIENT
Start: 2019-11-29 | End: 2019-11-29

## 2019-11-29 RX ORDER — SACUBITRIL AND VALSARTAN 24; 26 MG/1; MG/1
1 TABLET, FILM COATED ORAL
Refills: 0 | Status: DISCONTINUED | OUTPATIENT
Start: 2019-11-29 | End: 2019-12-02

## 2019-11-29 RX ORDER — INSULIN LISPRO 100/ML
VIAL (ML) SUBCUTANEOUS
Refills: 0 | Status: DISCONTINUED | OUTPATIENT
Start: 2019-11-29 | End: 2019-12-02

## 2019-11-29 RX ORDER — INSULIN GLARGINE 100 [IU]/ML
10 INJECTION, SOLUTION SUBCUTANEOUS
Qty: 0 | Refills: 0 | DISCHARGE

## 2019-11-29 RX ORDER — PANTOPRAZOLE SODIUM 20 MG/1
40 TABLET, DELAYED RELEASE ORAL
Refills: 0 | Status: DISCONTINUED | OUTPATIENT
Start: 2019-11-29 | End: 2019-12-02

## 2019-11-29 RX ORDER — ENOXAPARIN SODIUM 100 MG/ML
40 INJECTION SUBCUTANEOUS DAILY
Refills: 0 | Status: DISCONTINUED | OUTPATIENT
Start: 2019-11-29 | End: 2019-12-02

## 2019-11-29 RX ORDER — INSULIN LISPRO 100/ML
2 VIAL (ML) SUBCUTANEOUS ONCE
Refills: 0 | Status: COMPLETED | OUTPATIENT
Start: 2019-11-29 | End: 2019-11-29

## 2019-11-29 RX ORDER — ATORVASTATIN CALCIUM 80 MG/1
80 TABLET, FILM COATED ORAL AT BEDTIME
Refills: 0 | Status: DISCONTINUED | OUTPATIENT
Start: 2019-11-29 | End: 2019-12-02

## 2019-11-29 RX ORDER — ASPIRIN/CALCIUM CARB/MAGNESIUM 324 MG
81 TABLET ORAL DAILY
Refills: 0 | Status: DISCONTINUED | OUTPATIENT
Start: 2019-11-29 | End: 2019-12-02

## 2019-11-29 RX ORDER — GLUCAGON INJECTION, SOLUTION 0.5 MG/.1ML
1 INJECTION, SOLUTION SUBCUTANEOUS ONCE
Refills: 0 | Status: DISCONTINUED | OUTPATIENT
Start: 2019-11-29 | End: 2019-12-02

## 2019-11-29 RX ORDER — INSULIN GLARGINE 100 [IU]/ML
10 INJECTION, SOLUTION SUBCUTANEOUS AT BEDTIME
Refills: 0 | Status: DISCONTINUED | OUTPATIENT
Start: 2019-11-29 | End: 2019-11-29

## 2019-11-29 RX ADMIN — Medication 4: at 08:23

## 2019-11-29 RX ADMIN — Medication 10 UNIT(S): at 08:23

## 2019-11-29 RX ADMIN — SACUBITRIL AND VALSARTAN 1 TABLET(S): 24; 26 TABLET, FILM COATED ORAL at 16:52

## 2019-11-29 RX ADMIN — Medication 3 MILLILITER(S): at 02:42

## 2019-11-29 RX ADMIN — Medication 40 MILLIGRAM(S): at 03:48

## 2019-11-29 RX ADMIN — ATORVASTATIN CALCIUM 80 MILLIGRAM(S): 80 TABLET, FILM COATED ORAL at 20:24

## 2019-11-29 RX ADMIN — Medication 60 MILLIGRAM(S): at 05:03

## 2019-11-29 RX ADMIN — Medication 81 MILLIGRAM(S): at 11:38

## 2019-11-29 RX ADMIN — Medication 0.4 MILLIGRAM(S): at 03:48

## 2019-11-29 RX ADMIN — Medication 50 MILLIGRAM(S): at 05:50

## 2019-11-29 RX ADMIN — Medication 12 UNIT(S): at 11:37

## 2019-11-29 RX ADMIN — Medication 12 UNIT(S): at 16:52

## 2019-11-29 RX ADMIN — SPIRONOLACTONE 25 MILLIGRAM(S): 25 TABLET, FILM COATED ORAL at 05:50

## 2019-11-29 RX ADMIN — Medication 100 MILLIGRAM(S): at 22:11

## 2019-11-29 RX ADMIN — Medication 6: at 11:38

## 2019-11-29 RX ADMIN — SACUBITRIL AND VALSARTAN 1 TABLET(S): 24; 26 TABLET, FILM COATED ORAL at 05:51

## 2019-11-29 RX ADMIN — Medication 3 MILLILITER(S): at 02:59

## 2019-11-29 RX ADMIN — PANTOPRAZOLE SODIUM 40 MILLIGRAM(S): 20 TABLET, DELAYED RELEASE ORAL at 05:50

## 2019-11-29 RX ADMIN — INSULIN GLARGINE 15 UNIT(S): 100 INJECTION, SOLUTION SUBCUTANEOUS at 21:04

## 2019-11-29 RX ADMIN — ENOXAPARIN SODIUM 40 MILLIGRAM(S): 100 INJECTION SUBCUTANEOUS at 11:38

## 2019-11-29 RX ADMIN — Medication 2: at 16:52

## 2019-11-29 RX ADMIN — Medication 2 UNIT(S): at 04:55

## 2019-11-29 NOTE — ED ADULT TRIAGE NOTE - CHIEF COMPLAINT QUOTE
Pt presents to ED w/ c/o wheezing for 2 hrs, with associated productive cough, yellow phelgm. Pt had cough for about 1 week, took Dayquil about 3hrs PTA. Pt also c/o generalized weakness.

## 2019-11-29 NOTE — CONSULT NOTE ADULT - ASSESSMENT
64 year old man presents with complaints of shortness of breath and cough.... significant improvement post diuresis.  He had not been feeling well for the week prior to admission, productive cough, but does not think he had a fever.  No chest pain or chest pressure.  Admits to dietary indiscretion   Cardiac history includes CAD, s/p CABG, CHF with reduced LVEF, recent MSSA bacteremia.  PAF post CABG. Moderate AS   Elevated troponin noted  Chest X ray suggests possible pneumonia  Diabetes     Plan  - continue diuresis  - continue sacubitril/valsartan  - continue spironolactone  - continue metoprolol succinate.  - continue to trend cardiac enzymes.. suspect troponin elevation is due to CHF vs demand ischemia   - ischemia evaluation to be considered pending hospital course  - consider Pulmonary evaluation   - he will followup with his primary cardiologist as outpatient     discussed with patient and with Medicine team

## 2019-11-29 NOTE — ED PROVIDER NOTE - OBJECTIVE STATEMENT
pt reports 3-4 hours of gradual onset of cough, wheezing and sob.  no h/o asthma, no fever, but states that he's starting to feel a bit run down.  pt denies any chest pain or tightness,  s/p CABG 2 years ago. pt reports 3-4 hours of gradual onset of cough, wheezing and sob.  no h/o asthma, no fever, but states that he's starting to feel a bit run down.  pt denies any chest pain or tightness,  s/p CABG 2 years ago.  sob worse with some mild exertion.  sob woke him up from sleep. h/o htn, dm

## 2019-11-29 NOTE — CONSULT NOTE ADULT - SUBJECTIVE AND OBJECTIVE BOX
PULMONARY CONSULT  Location of Patient :  [id number] ( [facility])  Attending requesting Consult:Merary Mathews  Chief Complaint :  SOB, wheezing  Reason For consult : eval for pna      Initial HPI on admission:  HPI:  64 year old male with h/o CAD s/p CABG 2018, former cigar smoker, VHD, CHF - Systolic, PAF not on a/c, DM2,  HTN, Obesity ? JUANI who p/w sudden onset of SOB last night (thanksgiving) where it woke him from sleep with diffuse wheezing. States he googled his symptoms and stated can have cardiac causes so he drove himself to ED.  Patient was complaining of cough productive x 1 week prior to that, no fever, chills, Cough is yellow/green and ? purulent. Symptoms associated with sore thoat.  he denies fever/chills, sick contacts, n/v, abd pain, LE Edema.     In ED, noted diffuse wheezing on exam, hypertensive 191/110, P:72 sat 93% on RA. s/p duonebs with partial relief, CXR with CHF and given Lasix 40mg IV and sL nitro x 1. Labs: trop: 0.099, bnp: 3512, gluc: 312     This am pt symptoms much improved, no longer wheezing  cough and sore throat continues.     PAST MEDICAL & SURGICAL HISTORY:  Moderate aortic stenosis  Systolic congestive heart failure  Paroxysmal atrial fibrillation  Coronary artery disease  Essential hypertension  Type 2 diabetes mellitus with diabetic neuropathy, without long-term current use of insulin  S/P CABG (coronary artery bypass graft)  History of excision of pilonidal cyst  H/O inguinal hernia repair    Allergies    allery to INSECT VENOM (Hives)  Novocain (Hives)  Originally Entered as [Unknown] reaction to [ENVIRONMENTAL] (Unknown)    Intolerances      FAMILY HISTORY:  Family history of acute myocardial infarction: father in 90s    Social history:      Smoking: Ex Cigar smoke     Drinking: Ex etoh     Drug use: denies    Review of Systems: as stated above    CONSTITUTIONAL: No fever, No chills, No fatigue  EYES: No eye pain, No visual disturbances, No discharge  ENMT:  No difficulty hearing, No tinnitus, No vertigo; No sinus +throat pain  NECK: No pain, No stiffness  RESPIRATORY: +Cough, No SOB, +Secretions  CARDIOVASCULAR: No chest pain, No palpitations, No dizziness, or +leg swelling  GASTROINTESTINAL: No abdominal or epigastric pain. No nausea, No vomiting, No hematemesis; No diarrhea, No constipation. No melena, No hematochezia.  GENITOURINARY: No dysuria, No frequency, No hematuria, No incontinence  NEUROLOGICAL: No headaches, No memory loss, No loss of strength, No numbness, No tremors  SKIN: No itching, No burning, No rashes, No lesions   MUSCULOSKELETAL: No joint pain or swelling; No muscle, back, No extremity pain  PSYCHIATRIC: No depression, No anxiety, No mood swings, No difficulty sleeping      Medications:  MEDICATIONS  (STANDING):  aspirin enteric coated 81 milliGRAM(s) Oral daily  atorvastatin 80 milliGRAM(s) Oral at bedtime  dextrose 5%. 1000 milliLiter(s) (50 mL/Hr) IV Continuous <Continuous>  dextrose 50% Injectable 12.5 Gram(s) IV Push once  dextrose 50% Injectable 25 Gram(s) IV Push once  dextrose 50% Injectable 25 Gram(s) IV Push once  enoxaparin Injectable 40 milliGRAM(s) SubCutaneous daily  insulin glargine Injectable (LANTUS) 10 Unit(s) SubCutaneous at bedtime  insulin lispro (HumaLOG) corrective regimen sliding scale   SubCutaneous three times a day before meals  insulin lispro (HumaLOG) corrective regimen sliding scale   SubCutaneous at bedtime  insulin lispro Injectable (HumaLOG) 12 Unit(s) SubCutaneous before breakfast  insulin lispro Injectable (HumaLOG) 12 Unit(s) SubCutaneous before dinner  insulin lispro Injectable (HumaLOG) 12 Unit(s) SubCutaneous before lunch  metoprolol succinate ER 50 milliGRAM(s) Oral daily  pantoprazole    Tablet 40 milliGRAM(s) Oral before breakfast  sacubitril 49 mG/valsartan 51 mG 1 Tablet(s) Oral two times a day  spironolactone 25 milliGRAM(s) Oral daily    MEDICATIONS  (PRN):  acetaminophen   Tablet .. 650 milliGRAM(s) Oral every 6 hours PRN Temp greater or equal to 38C (100.4F), Mild Pain (1 - 3)  dextrose 40% Gel 15 Gram(s) Oral once PRN Blood Glucose LESS THAN 70 milliGRAM(s)/deciliter  glucagon  Injectable 1 milliGRAM(s) IntraMuscular once PRN Glucose LESS THAN 70 milligrams/deciliter      Home Medications:  Last Order Reconciliation Date: 11-29-19 @ 04:35 (Admission Reconciliation)  acetaminophen 325 mg oral tablet: 2 tab(s) orally every 6 hours, As needed, Temp greater or equal to 38C (100.4F), Mild Pain (1 - 3) (08-08-19 @ 10:51)  Aspirin Enteric Coated 81 mg oral delayed release tablet: 1 tab(s) orally once a day (07-15-18 @ 10:14)  atorvastatin 80 mg oral tablet: 1 tab(s) orally once a day (at bedtime) (07-15-18 @ 10:14)  Entresto 49 mg-51 mg oral tablet: 1 tab(s) orally 2 times a day (11-29-19 @ 04:21)  HumaLOG KwikPen 100 units/mL injectable solution: 10 unit(s) injectable 3 times a day (before meals)  (08-08-19 @ 10:52)  Lantus 100 units/mL subcutaneous solution: 10 unit(s) subcutaneous once a day (at bedtime) (11-29-19 @ 04:22)  metoprolol succinate 50 mg oral tablet, extended release: 1 tab(s) orally once a day (07-15-18 @ 10:14)  spironolactone 25 mg oral tablet: 1 tab(s) orally once a day (08-08-19 @ 10:51)      LABS:                        12.1   10.14 )-----------( 191      ( 29 Nov 2019 03:35 )             35.0     11-29    136  |  102  |  23  ----------------------------<  321<H>  4.3   |  28  |  1.12    Ca    8.9      29 Nov 2019 03:35    TPro  7.4  /  Alb  3.5  /  TBili  1.0  /  DBili  x   /  AST  26  /  ALT  30  /  AlkPhos  110  11-29      CARDIAC MARKERS ( 29 Nov 2019 09:10 )  .100 ng/mL / x     / 186 U/L / x     / x      CARDIAC MARKERS ( 29 Nov 2019 03:35 )  .099 ng/mL / x     / x     / x     / x          Serum Pro-Brain Natriuretic Peptide: 3512 pg/mL (11-29-19 @ 03:35)          CAPILLARY BLOOD GLUCOSE      POCT Blood Glucose.: 408 mg/dL (29 Nov 2019 11:36)      RADIOLOGY  CXR:  < from: Xray Chest 2 Views PA/Lat (11.29.19 @ 03:33) >  INTERPRETATION:  Frontal and lateral chest on November 29, 2019 at 3:05   AM. Patient has cough and is short of breath.    Heart enlargement and sternotomy again noted.    Present film shows diffuse infiltration the mid lower lung fields right   greater than left. Pneumonia versus CHF are considerations.    IMPRESSION: There are bilateral mid lower lung field infiltrates new   since prior right greater than left.    < end of copied text >      CT:    ECHO:  < from: JIMMY Echo Doppler (08.07.19 @ 09:05) >  Summary:   1. Technically difficult study.   2. Not all segments of the left ventricular endocardium are well   visualized. There appears to be mild global left ventricular systolic dysfunction.   3. No mitral valve vegetations are visualized.   4. Moderate left atrial enlargement   5. No tricuspid valve vegetations are visualized.   6. Normally appearing aortic valve with no vegetations visualized.    < end of copied text >      VITALS:  T(C): 36.7 (11-29-19 @ 08:30), Max: 36.7 (11-29-19 @ 02:41)  T(F): 98 (11-29-19 @ 08:30), Max: 98.1 (11-29-19 @ 02:41)  HR: 71 (11-29-19 @ 08:30) (71 - 80)  BP: 152/89 (11-29-19 @ 08:30) (152/82 - 191/110)  BP(mean): --  ABP: --  ABP(mean): --  RR: 18 (11-29-19 @ 08:30) (18 - 20)  SpO2: 96% (11-29-19 @ 08:30) (93% - 96%)  CVP(mm Hg): --  CVP(cm H2O): --    Ins and Outs       Height (cm): 177.8 (11-29-19 @ 02:41)  Weight (kg): 127 (11-29-19 @ 08:30)  BMI (kg/m2): 40.2 (11-29-19 @ 08:30)        I&O's Detail      Physical Examination:  GENERAL:               Alert, Oriented, No acute distress.    HEENT:                  No JVD, Moist MM phaynx clear, no pnd no erythema   PULM:                     Bilateral air entry, Clear to auscultation bilaterally, no significant sputum production, No Rales, No Rhonchi, No Wheezing  CVS:                         S1, S2,  +Murmur  ABD:                        Soft, nondistended, nontender, normoactive bowel sounds,   EXT:                         Mild LE edema, nontender, No Cyanosis or Clubbing   Vascular:                Warm Extremities, Normal Capillary refill, Normal Distal Pulses  NEURO:                  Alert, oriented, interactive, nonfocal, follows commands  PSYC:                      Calm, + Insight.

## 2019-11-29 NOTE — H&P ADULT - NSHPLABSRESULTS_GEN_ALL_CORE
12.1   10.14 )-----------( 191      ( 29 Nov 2019 03:35 )             35.0       11-29    136  |  102  |  23  ----------------------------<  321<H>  4.3   |  28  |  1.12    Ca    8.9      29 Nov 2019 03:35    TPro  7.4  /  Alb  3.5  /  TBili  1.0  /  DBili  x   /  AST  26  /  ALT  30  /  AlkPhos  110  11-29         LIVER FUNCTIONS - ( 29 Nov 2019 03:35 )  Alb: 3.5 g/dL / Pro: 7.4 g/dL / ALK PHOS: 110 U/L / ALT: 30 U/L / AST: 26 U/L / GGT: x                   CARDIAC MARKERS ( 29 Nov 2019 03:35 )  .099 ng/mL / x     / x     / x     / x          Serum Pro-Brain Natriuretic Peptide: 3512 pg/mL (11-29-19 @ 03:35)      EKG:  sinus rhythm at 71bpm, Q II,III, avf, Q V4-6      CXR: wet read CHF

## 2019-11-29 NOTE — ED PROVIDER NOTE - CLINICAL SUMMARY MEDICAL DECISION MAKING FREE TEXT BOX
pt with wheezing and cough, feeling much better after duo neb. pt with wheezing and cough and cxr c/w acute CHF exacerbation

## 2019-11-29 NOTE — H&P ADULT - NSHPPHYSICALEXAM_GEN_ALL_CORE
Vital Signs Last 24 Hrs  T(C): 36.7 (29 Nov 2019 03:49), Max: 36.7 (29 Nov 2019 02:41)  T(F): 98.1 (29 Nov 2019 03:49), Max: 98.1 (29 Nov 2019 02:41)  HR: 80 (29 Nov 2019 03:49) (72 - 80)  BP: 154/116 (29 Nov 2019 03:49) (154/116 - 191/110)  BP(mean): --  RR: 20 (29 Nov 2019 03:49) (19 - 20)  SpO2: 94% (29 Nov 2019 03:49) (93% - 94%)  Daily Height in cm: 177.8 (29 Nov 2019 02:41)    Daily   CAPILLARY BLOOD GLUCOSE        I&O's Summary      GENERAL: NAD, speaking full sentences  HEAD:  Normocephalic  EYES: EOMI, PERRLA, conjunctiva and sclera clear  ENMT: No tonsillar erythema, exudates, or enlargement; Moist mucous membranes, No lesions  NECK: Supple, No JVD, no bruit, normal thyroid  NERVOUS SYSTEM:  Alert & Oriented X3, Good concentration; grossly  Motor Strength 5/5 B/L upper and lower extremities; DTRs 2+ intact and symmetric  CHEST/LUNG: diffuse rales up to mid lung bl, +exp  wheezing  HEART: Regular rate and rhythm; No murmurs, rubs, or gallops  ABDOMEN: Soft, Nontender, Nondistended; Bowel sounds present  EXTREMITIES:  2+ Peripheral Pulses, No clubbing, cyanosis, or edema  LYMPH: No lymphadenopathy noted  SKIN: No rashes or lesions

## 2019-11-29 NOTE — ED ADULT NURSE NOTE - OBJECTIVE STATEMENT
Pt presents to ED w/ c/o wheezing & SOB for 2 hrs & productive cough with yellow phlegm x 1 week. Pt also c/o feeling week over the past few days, has been taking Dayquil however no relief. Pt denies chest pain, fevers, nausea, or chills.

## 2019-11-29 NOTE — H&P ADULT - NSHPREVIEWOFSYSTEMS_GEN_ALL_CORE
CONSTITUTIONAL: No fever, weight loss, or fatigue  EYES: No eye pain, visual disturbances, or discharge  ENMT:  No difficulty hearing, tinnitus, vertigo; No sinus or throat pain  NECK: No pain or stiffness  RESPIRATORY: + cough, +wheezing, no chills or hemoptysis; + shortness of breath  CARDIOVASCULAR: No chest pain, palpitations, dizziness, or leg swelling  GASTROINTESTINAL: No abdominal or epigastric pain. No nausea, vomiting, or hematemesis; No diarrhea or constipation. No melena or hematochezia.  GENITOURINARY: No dysuria, frequency, hematuria, or incontinence  NEUROLOGICAL: No headaches, memory loss, loss of strength, numbness, or tremors  SKIN: No itching, burning, rashes, or lesions   LYMPH NODES: No enlarged glands  ENDOCRINE: No heat or cold intolerance; No hair loss  MUSCULOSKELETAL: No joint pain or swelling; No muscle, back, or extremity pain  PSYCHIATRIC: No depression, anxiety, mood swings, or difficulty sleeping  HEME/LYMPH: No easy bruising, or bleeding gums  ALLERY AND IMMUNOLOGIC: No hives or eczema

## 2019-11-29 NOTE — CONSULT NOTE ADULT - SUBJECTIVE AND OBJECTIVE BOX
[facility] Consultation    CHIEF COMPLAINT: Patient is a 64y old  Male who presents with a chief complaint of CHF (29 Nov 2019 04:36)  patient seen and examined  history from patient... good reliability    HPI:  64M hx of CAD/3vl CABG, HTN, HLD, PAF not on A/C, T2IRDM, CHFrEF (last ECHO 8/2019 EF: 45%), hx of MSSA bacteremia 8/2019 pw SOB. Pt related he has been coughing for about a week - productive occ of yellow sputum but for the last 2 hrs prior to arrival has been wheezing straight for 2 hrs and came to ED. It was not assoc with HA, dizziness, palps, SOB, diaphoresis, nausea or CP. Denied any fevers, chills, NVD, dysuria or flank pain. In ED, noted diffuse wheezing on exam, hypertensive 191/110, P:72 sat 93% on RA. s/p duonebs with partial relief, CXR with CHF and given Lasix 40mg IV and sL nitro x 1. Labs: trop: 0.099, bnp: 3512, gluc: 312 (29 Nov 2019 04:36)  As above... he was given IV lasix and had significant improvement in symptoms  No in bed.. no distress, feels well.   No complaints of chest pain or chest pressure.     PAST MEDICAL & SURGICAL HISTORY:  Moderate aortic stenosis  Systolic congestive heart failure  Paroxysmal atrial fibrillation  Coronary artery disease  Essential hypertension  Type 2 diabetes mellitus with diabetic neuropathy, without long-term current use of insulin  S/P CABG (coronary artery bypass graft)  History of excision of pilonidal cyst  H/O inguinal hernia repair      SOCIAL HISTORY: non smoker, social alcohol    FAMILY HISTORY:  Family history of acute myocardial infarction: father in 90s    Home Medications:  acetaminophen 325 mg oral tablet: 2 tab(s) orally every 6 hours, As needed, Temp greater or equal to 38C (100.4F), Mild Pain (1 - 3) (08 Aug 2019 10:51)  Aspirin Enteric Coated 81 mg oral delayed release tablet: 1 tab(s) orally once a day (15 Jul 2018 10:14)  Entresto 49 mg-51 mg oral tablet: 1 tab(s) orally 2 times a day (29 Nov 2019 04:21)  Lantus 100 units/mL subcutaneous solution: 10 unit(s) subcutaneous once a day (at bedtime) (29 Nov 2019 04:22)  spironolactone 25 mg oral tablet: 1 tab(s) orally once a day (08 Aug 2019 10:51)    MEDICATIONS  (STANDING):  aspirin enteric coated 81 milliGRAM(s) Oral daily  atorvastatin 80 milliGRAM(s) Oral at bedtime  dextrose 5%. 1000 milliLiter(s) (50 mL/Hr) IV Continuous <Continuous>  dextrose 50% Injectable 12.5 Gram(s) IV Push once  dextrose 50% Injectable 25 Gram(s) IV Push once  dextrose 50% Injectable 25 Gram(s) IV Push once  enoxaparin Injectable 40 milliGRAM(s) SubCutaneous daily  insulin glargine Injectable (LANTUS) 10 Unit(s) SubCutaneous at bedtime  insulin lispro (HumaLOG) corrective regimen sliding scale   SubCutaneous three times a day before meals  insulin lispro (HumaLOG) corrective regimen sliding scale   SubCutaneous at bedtime  insulin lispro Injectable (HumaLOG) 12 Unit(s) SubCutaneous before breakfast  insulin lispro Injectable (HumaLOG) 12 Unit(s) SubCutaneous before dinner  insulin lispro Injectable (HumaLOG) 12 Unit(s) SubCutaneous before lunch  metoprolol succinate ER 50 milliGRAM(s) Oral daily  pantoprazole    Tablet 40 milliGRAM(s) Oral before breakfast  sacubitril 49 mG/valsartan 51 mG 1 Tablet(s) Oral two times a day  spironolactone 25 milliGRAM(s) Oral daily    MEDICATIONS  (PRN):  acetaminophen   Tablet .. 650 milliGRAM(s) Oral every 6 hours PRN Temp greater or equal to 38C (100.4F), Mild Pain (1 - 3)  dextrose 40% Gel 15 Gram(s) Oral once PRN Blood Glucose LESS THAN 70 milliGRAM(s)/deciliter  glucagon  Injectable 1 milliGRAM(s) IntraMuscular once PRN Glucose LESS THAN 70 milligrams/deciliter      Allergies    allery to INSECT VENOM (Hives)  Novocain (Hives)  Originally Entered as [Unknown] reaction to [ENVIRONMENTAL] (Unknown)    Intolerances        REVIEW OF SYSTEMS:    CONSTITUTIONAL: No weakness, fevers or chills  EYES: No visual changes, No diplopia  ENMT: No throat pain , No exudate  NECK: No pain or stiffness  RESPIRATORY: No hemoptysis  CARDIOVASCULAR: No chest pain or chest pressure.    No palpitations, dizziness, light headedness, syncope or near syncope.  No edema, no orthopnea.   GASTROINTESTINAL: No abdominal pain. No nausea, vomiting, or hematemesis; No diarrhea or constipation. No melena or hematochezia.  GENITOURINARY: No dysuria, frequency or hematuria  NEUROLOGICAL: No numbness or weakness  SKIN: No itching or rash  All other review of systems is negative unless indicated above    VITAL SIGNS:   Vital Signs Last 24 Hrs  T(C): 36.7 (29 Nov 2019 08:30), Max: 36.7 (29 Nov 2019 02:41)  T(F): 98 (29 Nov 2019 08:30), Max: 98.1 (29 Nov 2019 02:41)  HR: 71 (29 Nov 2019 08:30) (71 - 80)  BP: 152/89 (29 Nov 2019 08:30) (152/82 - 191/110)  BP(mean): --  RR: 18 (29 Nov 2019 08:30) (18 - 20)  SpO2: 96% (29 Nov 2019 08:30) (93% - 96%)    I&O's Summary      PHYSICAL EXAM:    Constitutional: NAD, awake and alert, well-developed  Eyes:  EOMI,  Pupils round, no lesions  ENMT: no exudate or erythema  Pulmonary: Non-labored, breath sounds are clear bilaterally, No wheezing, rales or rhonchi  Cardiovascular: PMI not palpable non-displaced Regular S1 and S2 l/l systolic murmur   Gastrointestinal: Bowel Sounds present, soft, nontender.   Lymph: No peripheral edema. No cervical lymphadenopathy.  Neurological: Alert, no focal deficits  Skin: No rashes. Changes of chronic venous stasis. No cyanosis.  Psych:  Mood & affect appropriate    LABS: All Labs Reviewed:                        12.1   10.14 )-----------( 191      ( 29 Nov 2019 03:35 )             35.0     29 Nov 2019 03:35    136    |  102    |  23     ----------------------------<  321    4.3     |  28     |  1.12     Ca    8.9        29 Nov 2019 03:35    TPro  7.4    /  Alb  3.5    /  TBili  1.0    /  DBili  x      /  AST  26     /  ALT  30     /  AlkPhos  110    29 Nov 2019 03:35      CARDIAC MARKERS ( 29 Nov 2019 09:10 )  .100 ng/mL / x     / 186 U/L / x     / x      CARDIAC MARKERS ( 29 Nov 2019 03:35 )  .099 ng/mL / x     / x     / x     / x            11-29 @ 03:35  Pro Bnp 3512        RADIOLOGY:  < from: Xray Chest 2 Views PA/Lat (11.29.19 @ 03:33) >  There are bilateral mid lower lung field infiltrates new   since prior right greater than left.    < end of copied text >    EKG:  < from: 12 Lead ECG (11.29.19 @ 02:56) >  Sinus rhythm with frequent premature ventricular complexes  Inferior infarct (cited on or before 29-JUL-2019)  Anterolateral infarct (cited on or before 29-JUL-2019)  Prolonged QT  Non specific ST changes   Abnormal ECG  When compared with ECG of 31-JUL-2019 15:45,  premature ventricular complexes are now present    < end of copied text >

## 2019-11-29 NOTE — CHART NOTE - NSCHARTNOTEFT_GEN_A_CORE
64M hx of CAD/3vl CABG, HTN, HLD, PAF not on A/C, T2IRDM, CHFrEF (last ECHO 8/2019 EF: 45%), hx of MSSA bacteremia 8/2019 pw SOB with acute on chronic CHF exac. Mild troponin elevation which may be due to CHF vs demand ischemia. Plan for nuclear stress test Monday    # acute CHF with elevated trop  suspect elevated trop due to demand ischemia  Cont IV diuresis, daily weights, I/O  trend trops, serial EKG,  TTE with EF 35-40%, moderate LV function, grade III diastolic dysfunction  cont asa, bb, entresto, spironolactone  Scheduled for nuclear stress test Monday  tele monitoring  Cardio following    # T2IRDM  increase lantus 15 iu q HS   inc premeals 12 iu TID  check hga1c    Dispo: home pending further workup  Plan d/w Dr Singleton 64M hx of CAD/3vl CABG, HTN, HLD, PAF not on A/C, T2IRDM, CHFrEF (last ECHO 8/2019 EF: 45%), hx of MSSA bacteremia 8/2019 pw SOB with acute on chronic CHF exac. Mild troponin elevation which may be due to CHF vs demand ischemia. Plan for nuclear stress test Monday    # acute CHF with elevated trop  suspect elevated trop due to demand ischemia. r/o ACS  Cont IV diuresis, daily weights, I/O  trend trops, serial EKG,  TTE with EF 35-40%, moderate LV function, grade III diastolic dysfunction  cont asa, bb, entresto, spironolactone  Scheduled for nuclear stress test Monday  tele monitoring  Cardio following    # h/o CAD s/p CABG with elevated trop  - r/o ACS  - nuclear stress test on monday  - c/w ASA, BB, lipitor    # T2IRDM  increase lantus 15 iu q HS   inc premeals 12 iu TID  check hga1c    Dispo: home pending further workup  Plan d/w Dr Singleton

## 2019-11-29 NOTE — ED PROVIDER NOTE - PHYSICAL EXAMINATION
Gen:  alert, awake, no acute distress  HEENT:  atraumatic head, airway clear, pupils equal and round  CV:  rrr, nl S1, S2, no m/r/g  Pulm:  lungs with mild diffuse wheezing  Abd: s/nt/nd, +BS  Ext:  moving all extremities  Neuro:  grossly intact, no focal deficits  Skin:  clear, dry, intact  Psych: AOx3, normal affect, no apparent risk to self or others

## 2019-11-29 NOTE — H&P ADULT - ASSESSMENT
64M hx of CAD/3vl CABG, HTN, HLD, PAF not on A/C, T2IRDM, CHFrEF (last ECHO 2019 EF: 45%), hx of MSSA bacteremia 2019 pw SOB with acute on chronic CHF exac.     # acute CHF with initial elevated trop ?sec to CHF vs acute ischemia  IV diuresis, daily weights, I/O  trend trops, serial EKG, ECHO, card consult.   cont asa, bb, entresto, spironolactone,     # T2IRDM  cont lantus 10 U QHs (but suspects he may need more)  cont Humalog 10 U TID  check hga1c    #DVT/GI proph    CAPRINI SCORE [CLOT]    AGE RELATED RISK FACTORS                                                       MOBILITY RELATED FACTORS  [ ] Age 41-60 years                                            (1 Point)                  [ ] Bed rest                                                        (1 Point)  [2 ] Age: 61-74 years                                           (2 Points)                 [ ] Plaster cast                                                   (2 Points)  [ ] Age= 75 years                                              (3 Points)                 [ ] Bed bound for more than 72 hours                 (2 Points)    DISEASE RELATED RISK FACTORS                                               GENDER SPECIFIC FACTORS  [ ] Edema in the lower extremities                       (1 Point)                  [ ] Pregnancy                                                     (1 Point)  [ ] Varicose veins                                               (1 Point)                  [ ] Post-partum < 6 weeks                                   (1 Point)             [1 ] BMI > 25 Kg/m2                                            (1 Point)                  [ ] Hormonal therapy  or oral contraception          (1 Point)                 [ ] Sepsis (in the previous month)                        (1 Point)                  [ ] History of pregnancy complications                 (1 point)  [ ] Pneumonia or serious lung disease                                               [ ] Unexplained or recurrent                     (1 Point)           (in the previous month)                               (1 Point)  [ ] Abnormal pulmonary function test                     (1 Point)                 SURGERY RELATED RISK FACTORS  [ ] Acute myocardial infarction                              (1 Point)                 [ ]  Section                                             (1 Point)  [1 ] Congestive heart failure (in the previous month)  (1 Point)               [ ] Minor surgery                                                  (1 Point)   [ ] Inflammatory bowel disease                             (1 Point)                 [ ] Arthroscopic surgery                                        (2 Points)  [ ] Central venous access                                      (2 Points)                [ ] General surgery lasting more than 45 minutes   (2 Points)       [ ] Stroke (in the previous month)                          (5 Points)               [ ] Elective arthroplasty                                         (5 Points)                                                                                                                                               HEMATOLOGY RELATED FACTORS                                                 TRAUMA RELATED RISK FACTORS  [ ] Prior episodes of VTE                                     (3 Points)                [ ] Fracture of the hip, pelvis, or leg                       (5 Points)  [ ] Positive family history for VTE                         (3 Points)                 [ ] Acute spinal cord injury (in the previous month)  (5 Points)  [ ] Prothrombin 86388 A                                     (3 Points)                 [ ] Paralysis  (less than 1 month)                             (5 Points)  [ ] Factor V Leiden                                             (3 Points)                  [ ] Multiple Trauma within 1 month                        (5 Points)  [ ] Lupus anticoagulants                                     (3 Points)                                                           [ ] Anticardiolipin antibodies                               (3 Points)                                                       [ ] High homocysteine in the blood                      (3 Points)                                             [ ] Other congenital or acquired thrombophilia      (3 Points)                                                [ ] Heparin induced thrombocytopenia                  (3 Points)                                          Total Score [       4   ]    Caprini Score 0 - 2:  Low Risk, No VTE Prophylaxis required for most patients, encourage ambulation  Caprini Score 3 - 6:  At Risk, pharmacologic VTE prophylaxis is indicated for most patients (in the absence of a contraindication)  Caprini Score Greater than or = 7:  High Risk, pharmacologic VTE prophylaxis is indicated for most patients (in the absence of a contraindication)

## 2019-11-29 NOTE — CONSULT NOTE ADULT - ASSESSMENT
Assessment  1. Acute on chronic Systolic CHF exacerbation / Acute pulmonary Edema likely due to poor diet and over eating for thanksgiving.  2. Elevated trop in pt with CAD s/p CABG,   3. Cough productive, doubt PNA  4. Possible JUANI follows with Dr. Levy, in pt with obesity  6 DM2 with hyperglycemia  7. Underlying HTN, P. Afib not on a/c    Plan  - Continue aggressive diuresis  - Repeat CXR and check procalcitonin in AM   - Cardio to optimize cardiac meds, ? Role in stress test vs Cath   - Pt to follow up with Dr. Levy for pending dental implant for JUANI.  - Optimize glycemic control  - BP control  - d/w Primary team. Assessment  1. Acute on chronic Systolic CHF exacerbation / Acute pulmonary Edema likely due to poor diet and over eating for thanksgiving.  2. Elevated trop in pt with CAD s/p CABG,   3. Cough productive, doubt PNA  4. Possible JUANI follows with Dr. Levy, in pt with obesity  6 DM2 with hyperglycemia  7. Underlying HTN, P. Afib not on a/c    Plan  - Continue aggressive diuresis  - Repeat CXR and check procalcitonin in AM   - Cardio to optimize cardiac meds, ? Role in stress test vs Cath   - Pt to follow up with Dr. Levy for pending dental implant for JUANI.  - Check Echo  - Optimize glycemic control  - BP control  - d/w Primary team.

## 2019-11-29 NOTE — ED ADULT NURSE NOTE - NSIMPLEMENTINTERV_GEN_ALL_ED
Implemented All Universal Safety Interventions:  [place] to call system. Call bell, personal items and telephone within reach. Instruct patient to call for assistance. Room bathroom lighting operational. Non-slip footwear when patient is off stretcher. Physically safe environment: no spills, clutter or unnecessary equipment. Stretcher in lowest position, wheels locked, appropriate side rails in place.

## 2019-11-30 LAB
ALBUMIN SERPL ELPH-MCNC: 3.7 G/DL — SIGNIFICANT CHANGE UP (ref 3.3–5)
ALP SERPL-CCNC: 87 U/L — SIGNIFICANT CHANGE UP (ref 40–120)
ALT FLD-CCNC: 22 U/L — SIGNIFICANT CHANGE UP (ref 10–45)
ANION GAP SERPL CALC-SCNC: 10 MMOL/L — SIGNIFICANT CHANGE UP (ref 5–17)
AST SERPL-CCNC: 15 U/L — SIGNIFICANT CHANGE UP (ref 10–40)
BILIRUB SERPL-MCNC: 1.6 MG/DL — HIGH (ref 0.2–1.2)
BUN SERPL-MCNC: 26 MG/DL — HIGH (ref 7–23)
CALCIUM SERPL-MCNC: 9.6 MG/DL — SIGNIFICANT CHANGE UP (ref 8.4–10.5)
CHLORIDE SERPL-SCNC: 99 MMOL/L — SIGNIFICANT CHANGE UP (ref 96–108)
CHOLEST SERPL-MCNC: 147 MG/DL — SIGNIFICANT CHANGE UP (ref 10–199)
CO2 SERPL-SCNC: 30 MMOL/L — SIGNIFICANT CHANGE UP (ref 22–31)
CREAT SERPL-MCNC: 0.99 MG/DL — SIGNIFICANT CHANGE UP (ref 0.5–1.3)
GLUCOSE BLDC GLUCOMTR-MCNC: 165 MG/DL — HIGH (ref 70–99)
GLUCOSE BLDC GLUCOMTR-MCNC: 181 MG/DL — HIGH (ref 70–99)
GLUCOSE BLDC GLUCOMTR-MCNC: 187 MG/DL — HIGH (ref 70–99)
GLUCOSE BLDC GLUCOMTR-MCNC: 289 MG/DL — HIGH (ref 70–99)
GLUCOSE SERPL-MCNC: 181 MG/DL — HIGH (ref 70–99)
HBA1C BLD-MCNC: 8.7 % — HIGH (ref 4–5.6)
HCT VFR BLD CALC: 39.1 % — SIGNIFICANT CHANGE UP (ref 39–50)
HDLC SERPL-MCNC: 37 MG/DL — LOW
HGB BLD-MCNC: 13.1 G/DL — SIGNIFICANT CHANGE UP (ref 13–17)
LIPID PNL WITH DIRECT LDL SERPL: 88 MG/DL — SIGNIFICANT CHANGE UP
MAGNESIUM SERPL-MCNC: 1.7 MG/DL — SIGNIFICANT CHANGE UP (ref 1.6–2.6)
MCHC RBC-ENTMCNC: 30.2 PG — SIGNIFICANT CHANGE UP (ref 27–34)
MCHC RBC-ENTMCNC: 33.5 GM/DL — SIGNIFICANT CHANGE UP (ref 32–36)
MCV RBC AUTO: 90.1 FL — SIGNIFICANT CHANGE UP (ref 80–100)
NRBC # BLD: 0 /100 WBCS — SIGNIFICANT CHANGE UP (ref 0–0)
NT-PROBNP SERPL-SCNC: 2068 PG/ML — HIGH (ref 0–300)
PLATELET # BLD AUTO: 255 K/UL — SIGNIFICANT CHANGE UP (ref 150–400)
POTASSIUM SERPL-MCNC: 4.3 MMOL/L — SIGNIFICANT CHANGE UP (ref 3.5–5.3)
POTASSIUM SERPL-SCNC: 4.3 MMOL/L — SIGNIFICANT CHANGE UP (ref 3.5–5.3)
PROCALCITONIN SERPL-MCNC: 0.11 NG/ML — HIGH
PROT SERPL-MCNC: 7.9 G/DL — SIGNIFICANT CHANGE UP (ref 6–8.3)
RBC # BLD: 4.34 M/UL — SIGNIFICANT CHANGE UP (ref 4.2–5.8)
RBC # FLD: 13.2 % — SIGNIFICANT CHANGE UP (ref 10.3–14.5)
SODIUM SERPL-SCNC: 139 MMOL/L — SIGNIFICANT CHANGE UP (ref 135–145)
TOTAL CHOLESTEROL/HDL RATIO MEASUREMENT: 4 RATIO — SIGNIFICANT CHANGE UP (ref 3.4–9.6)
TRIGL SERPL-MCNC: 109 MG/DL — SIGNIFICANT CHANGE UP (ref 10–149)
TROPONIN I SERPL-MCNC: 0.08 NG/ML — HIGH (ref 0.02–0.06)
WBC # BLD: 16.9 K/UL — HIGH (ref 3.8–10.5)
WBC # FLD AUTO: 16.9 K/UL — HIGH (ref 3.8–10.5)

## 2019-11-30 PROCEDURE — 99233 SBSQ HOSP IP/OBS HIGH 50: CPT | Mod: GC

## 2019-11-30 PROCEDURE — 71045 X-RAY EXAM CHEST 1 VIEW: CPT | Mod: 26

## 2019-11-30 PROCEDURE — 99233 SBSQ HOSP IP/OBS HIGH 50: CPT

## 2019-11-30 PROCEDURE — 93010 ELECTROCARDIOGRAM REPORT: CPT

## 2019-11-30 RX ORDER — AZITHROMYCIN 500 MG/1
500 TABLET, FILM COATED ORAL DAILY
Refills: 0 | Status: DISCONTINUED | OUTPATIENT
Start: 2019-11-30 | End: 2019-11-30

## 2019-11-30 RX ORDER — MAGNESIUM SULFATE 500 MG/ML
1 VIAL (ML) INJECTION
Refills: 0 | Status: COMPLETED | OUTPATIENT
Start: 2019-11-30 | End: 2019-11-30

## 2019-11-30 RX ORDER — MAGNESIUM SULFATE 500 MG/ML
2 VIAL (ML) INJECTION ONCE
Refills: 0 | Status: DISCONTINUED | OUTPATIENT
Start: 2019-11-30 | End: 2019-11-30

## 2019-11-30 RX ORDER — CEFTRIAXONE 500 MG/1
1000 INJECTION, POWDER, FOR SOLUTION INTRAMUSCULAR; INTRAVENOUS EVERY 24 HOURS
Refills: 0 | Status: DISCONTINUED | OUTPATIENT
Start: 2019-11-30 | End: 2019-11-30

## 2019-11-30 RX ADMIN — SPIRONOLACTONE 25 MILLIGRAM(S): 25 TABLET, FILM COATED ORAL at 05:18

## 2019-11-30 RX ADMIN — Medication 12 UNIT(S): at 11:23

## 2019-11-30 RX ADMIN — AZITHROMYCIN 500 MILLIGRAM(S): 500 TABLET, FILM COATED ORAL at 11:25

## 2019-11-30 RX ADMIN — SACUBITRIL AND VALSARTAN 1 TABLET(S): 24; 26 TABLET, FILM COATED ORAL at 05:18

## 2019-11-30 RX ADMIN — BENZOCAINE AND MENTHOL 1 LOZENGE: 5; 1 LIQUID ORAL at 08:09

## 2019-11-30 RX ADMIN — ENOXAPARIN SODIUM 40 MILLIGRAM(S): 100 INJECTION SUBCUTANEOUS at 11:24

## 2019-11-30 RX ADMIN — Medication 40 MILLIGRAM(S): at 05:18

## 2019-11-30 RX ADMIN — Medication 100 MILLIGRAM(S): at 21:20

## 2019-11-30 RX ADMIN — Medication 12 UNIT(S): at 08:08

## 2019-11-30 RX ADMIN — Medication 100 MILLIGRAM(S): at 18:01

## 2019-11-30 RX ADMIN — Medication 1: at 17:10

## 2019-11-30 RX ADMIN — Medication 81 MILLIGRAM(S): at 11:24

## 2019-11-30 RX ADMIN — Medication 50 MILLIGRAM(S): at 05:18

## 2019-11-30 RX ADMIN — Medication 100 GRAM(S): at 13:04

## 2019-11-30 RX ADMIN — SACUBITRIL AND VALSARTAN 1 TABLET(S): 24; 26 TABLET, FILM COATED ORAL at 17:11

## 2019-11-30 RX ADMIN — Medication 100 GRAM(S): at 11:22

## 2019-11-30 RX ADMIN — INSULIN GLARGINE 15 UNIT(S): 100 INJECTION, SOLUTION SUBCUTANEOUS at 21:10

## 2019-11-30 RX ADMIN — PANTOPRAZOLE SODIUM 40 MILLIGRAM(S): 20 TABLET, DELAYED RELEASE ORAL at 05:18

## 2019-11-30 RX ADMIN — Medication 1: at 08:08

## 2019-11-30 RX ADMIN — ATORVASTATIN CALCIUM 80 MILLIGRAM(S): 80 TABLET, FILM COATED ORAL at 21:11

## 2019-11-30 RX ADMIN — Medication 12 UNIT(S): at 17:10

## 2019-11-30 RX ADMIN — Medication 3: at 11:23

## 2019-11-30 NOTE — PROGRESS NOTE ADULT - SUBJECTIVE AND OBJECTIVE BOX
Follow up for: cmp    SUBJ: no new complaints    PMH  Moderate aortic stenosis  Systolic congestive heart failure  Paroxysmal atrial fibrillation  Coronary artery disease  Essential hypertension  Type 2 diabetes mellitus with diabetic neuropathy, without long-term current use of insulin      MEDICATIONS  (STANDING):  aspirin enteric coated 81 milliGRAM(s) Oral daily  atorvastatin 80 milliGRAM(s) Oral at bedtime  azithromycin   Tablet 500 milliGRAM(s) Oral daily  dextrose 5%. 1000 milliLiter(s) (50 mL/Hr) IV Continuous <Continuous>  dextrose 50% Injectable 12.5 Gram(s) IV Push once  dextrose 50% Injectable 25 Gram(s) IV Push once  dextrose 50% Injectable 25 Gram(s) IV Push once  enoxaparin Injectable 40 milliGRAM(s) SubCutaneous daily  furosemide   Injectable 40 milliGRAM(s) IV Push daily  insulin glargine Injectable (LANTUS) 15 Unit(s) SubCutaneous at bedtime  insulin lispro (HumaLOG) corrective regimen sliding scale   SubCutaneous three times a day before meals  insulin lispro (HumaLOG) corrective regimen sliding scale   SubCutaneous at bedtime  insulin lispro Injectable (HumaLOG) 12 Unit(s) SubCutaneous before breakfast  insulin lispro Injectable (HumaLOG) 12 Unit(s) SubCutaneous before dinner  insulin lispro Injectable (HumaLOG) 12 Unit(s) SubCutaneous before lunch  magnesium sulfate  IVPB 1 Gram(s) IV Intermittent every 1 hour  metoprolol succinate ER 50 milliGRAM(s) Oral daily  pantoprazole    Tablet 40 milliGRAM(s) Oral before breakfast  sacubitril 49 mG/valsartan 51 mG 1 Tablet(s) Oral two times a day  spironolactone 25 milliGRAM(s) Oral daily    MEDICATIONS  (PRN):  acetaminophen   Tablet .. 650 milliGRAM(s) Oral every 6 hours PRN Temp greater or equal to 38C (100.4F), Mild Pain (1 - 3)  dextrose 40% Gel 15 Gram(s) Oral once PRN Blood Glucose LESS THAN 70 milliGRAM(s)/deciliter  glucagon  Injectable 1 milliGRAM(s) IntraMuscular once PRN Glucose LESS THAN 70 milligrams/deciliter  guaiFENesin    Syrup 100 milliGRAM(s) Oral every 6 hours PRN Cough        PHYSICAL EXAM:  Vital Signs Last 24 Hrs  T(C): 36.7 (30 Nov 2019 09:52), Max: 36.9 (29 Nov 2019 18:57)  T(F): 98 (30 Nov 2019 09:52), Max: 98.4 (29 Nov 2019 18:57)  HR: 53 (30 Nov 2019 09:52) (53 - 74)  BP: 137/79 (30 Nov 2019 09:52) (129/65 - 147/83)  BP(mean): --  RR: 16 (30 Nov 2019 09:52) (16 - 18)  SpO2: 94% (30 Nov 2019 09:52) (94% - 98%)    GENERAL: NAD, well-groomed, well-developed  HEAD:  Atraumatic, Normocephalic  EYES: EOMI, PERRLA, conjunctiva and sclera clear  ENT: Moist mucous membranes,  NECK: Supple, No JVD, no bruits  CHEST/LUNG: Clear to percussion bilaterally; No rales, rhonchi, wheezing, or rubs  HEART: Regular rate and rhythm; No murmurs, rubs, or gallops PMI non displaced.  ABDOMEN: Soft, Nontender, Nondistended; Bowel sounds present  EXTREMITIES:  2+ Peripheral Pulses, No clubbing, cyanosis, or edema  SKIN: No rashes or lesions  NERVOUS SYSTEM:  Alert & Oriented X3, Good concentration; Motor Strength 5/5 B/L upper and lower extremities; DTRs 2+ intact and symmetric        LABS:                        13.1   16.90 )-----------( 255      ( 30 Nov 2019 06:30 )             39.1     11-30    139  |  99  |  26<H>  ----------------------------<  181<H>  4.3   |  30  |  0.99    Ca    9.6      30 Nov 2019 06:30  Mg     1.7     11-30    TPro  7.9  /  Alb  3.7  /  TBili  1.6<H>  /  DBili  x   /  AST  15  /  ALT  22  /  AlkPhos  87  11-30    CARDIAC MARKERS ( 30 Nov 2019 06:30 )  .079 ng/mL / x     / x     / x     / x      CARDIAC MARKERS ( 29 Nov 2019 15:10 )  .066 ng/mL / x     / 169 U/L / x     / x      CARDIAC MARKERS ( 29 Nov 2019 09:10 )  .100 ng/mL / x     / 186 U/L / x     / x      CARDIAC MARKERS ( 29 Nov 2019 03:35 )  .099 ng/mL / x     / x     / x     / x              I&O's Summary    30 Nov 2019 07:01  -  30 Nov 2019 12:41  --------------------------------------------------------  IN: 220 mL / OUT: 0 mL / NET: 220 mL      BNPSerum Pro-Brain Natriuretic Peptide: 2068 pg/mL (11-30 @ 06:30)            ASSESMENT AND PLAN:    Continue current management

## 2019-11-30 NOTE — PROGRESS NOTE ADULT - ASSESSMENT
65yo M hx of CAD/3vl CABG, HTN, HLD, PAF not on A/C, T2IRDM, CHFrEF (last ECHO 8/2019 EF: 45%), hx of MSSA bacteremia 8/2019 pw SOB secondary acute on chronic CHF exac and probable CAP. Mild troponin elevation which may be due to CHF vs demand ischemia. Plan for nuclear stress test Monday    # Acute CHF with elevated troponin:  suspect elevated trop due to demand ischemia.  Cont IV diuresis, daily weights, I/O  trend trops-- last trop trend 0.1-->0.066--> 0.079  TTE with EF 35-40%, moderate LV function, grade III diastolic dysfunction  cont asa, bb, entresto, spironolactone  Scheduled for nuclear stress test Monday  tele monitoring  Cardio following  BNP 3512--> 2068    #Leukocytosis:  WBC 16 today  PCT 0.11  suspect secondary to CAP  start CTX/azithro  appreciate pulm consult  Obtain blood cx  Given hx MSSA bacteremia, pt requesting ID consult   Cont to monitor       #CAP:  see above  fup sputum cx    # h/o CAD s/p CABG with elevated trop  -cont to trend trops  -tele monitoring--no acute events   - nuclear stress test on monday  - c/w ASA, BB, lipitor    # T2IRDM  FS more acceptable today   increasd lantus 15 iu q HS   increased premeals 12 iu TID  cont to monitor   HgbA1c pending    Dispo: home pending clinical improvement  DVT ppx: lovenox 63yo M hx of CAD/3vl CABG, HTN, HLD, PAF not on A/C, T2IRDM, CHFrEF (last ECHO 8/2019 EF: 45%), hx of MSSA bacteremia 8/2019 pw SOB secondary acute on chronic CHF exac and probable CAP. Mild troponin elevation which may be due to CHF vs demand ischemia. Plan for nuclear stress test Monday    # Acute decompensated HFrEF with elevated troponin:  suspect elevated trop due to demand ischemia  Cont IV diuresis, daily weights, I/O  trend trops-- last trop trend 0.1-->0.066--> 0.079  TTE 11/29/19 with EF 35-40%, grade III DD. mild to mod AS.  TTE 8/1/19: EF 45%. Grade I DD. moderate AS  cont asa, bb, entresto, spironolactone  Scheduled for pharmacological nuclear stress test Monday  tele monitoring  Cardio following  BNP 3512--> 2068    #Leukocytosis   WBC 16 today  PCT 0.11  per pulmonary: suspect bronchitis  start doxycyline 100mg q12. avoid QT prolonging medications. (QTc prolongation: 525ms)  follow up blood cx and sputum culture  Given hx MSSA bacteremia, pt requesting ID consult   Cont to monitor     # h/o CAD s/p CABG with elevated trop  -cont to trend trops  -tele monitoring--no acute events   - nuclear stress test on monday  - c/w ASA, BB, lipitor    # T2IRDM  FS more acceptable today   increasd lantus 15 iu q HS   increased premeals 12 iu TID  cont to monitor   HgbA1c pending    Dispo: home pending clinical improvement  DVT ppx: lovenox

## 2019-11-30 NOTE — PROGRESS NOTE ADULT - SUBJECTIVE AND OBJECTIVE BOX
Follow-up Pulmonary Progress Note  Chief Complaint : Heart failure    pt seen and examined  comfortable  no cp, sob, palp, n/v  continues to complain of cough productive worse at night.         Allergies :allery to INSECT VENOM (Hives)  Novocain (Hives)  Originally Entered as [Unknown] reaction to [ENVIRONMENTAL] (Unknown)      PAST MEDICAL & SURGICAL HISTORY:  Moderate aortic stenosis  Systolic congestive heart failure  Paroxysmal atrial fibrillation  Coronary artery disease  Essential hypertension  Type 2 diabetes mellitus with diabetic neuropathy, without long-term current use of insulin  S/P CABG (coronary artery bypass graft)  History of excision of pilonidal cyst  H/O inguinal hernia repair      Medications:  MEDICATIONS  (STANDING):  aspirin enteric coated 81 milliGRAM(s) Oral daily  atorvastatin 80 milliGRAM(s) Oral at bedtime  azithromycin   Tablet 500 milliGRAM(s) Oral daily  cefTRIAXone   IVPB 1000 milliGRAM(s) IV Intermittent every 24 hours  dextrose 5%. 1000 milliLiter(s) (50 mL/Hr) IV Continuous <Continuous>  dextrose 50% Injectable 12.5 Gram(s) IV Push once  dextrose 50% Injectable 25 Gram(s) IV Push once  dextrose 50% Injectable 25 Gram(s) IV Push once  enoxaparin Injectable 40 milliGRAM(s) SubCutaneous daily  furosemide   Injectable 40 milliGRAM(s) IV Push daily  insulin glargine Injectable (LANTUS) 15 Unit(s) SubCutaneous at bedtime  insulin lispro (HumaLOG) corrective regimen sliding scale   SubCutaneous three times a day before meals  insulin lispro (HumaLOG) corrective regimen sliding scale   SubCutaneous at bedtime  insulin lispro Injectable (HumaLOG) 12 Unit(s) SubCutaneous before breakfast  insulin lispro Injectable (HumaLOG) 12 Unit(s) SubCutaneous before dinner  insulin lispro Injectable (HumaLOG) 12 Unit(s) SubCutaneous before lunch  magnesium sulfate  IVPB 1 Gram(s) IV Intermittent every 1 hour  metoprolol succinate ER 50 milliGRAM(s) Oral daily  pantoprazole    Tablet 40 milliGRAM(s) Oral before breakfast  sacubitril 49 mG/valsartan 51 mG 1 Tablet(s) Oral two times a day  spironolactone 25 milliGRAM(s) Oral daily    MEDICATIONS  (PRN):  acetaminophen   Tablet .. 650 milliGRAM(s) Oral every 6 hours PRN Temp greater or equal to 38C (100.4F), Mild Pain (1 - 3)  dextrose 40% Gel 15 Gram(s) Oral once PRN Blood Glucose LESS THAN 70 milliGRAM(s)/deciliter  glucagon  Injectable 1 milliGRAM(s) IntraMuscular once PRN Glucose LESS THAN 70 milligrams/deciliter  guaiFENesin    Syrup 100 milliGRAM(s) Oral every 6 hours PRN Cough      LABS:                        13.1   16.90 )-----------( 255      ( 30 Nov 2019 06:30 )             39.1     11-30    139  |  99  |  26<H>  ----------------------------<  181<H>  4.3   |  30  |  0.99    Ca    9.6      30 Nov 2019 06:30  Mg     1.7     11-30    TPro  7.9  /  Alb  3.7  /  TBili  1.6<H>  /  DBili  x   /  AST  15  /  ALT  22  /  AlkPhos  87  11-30      CARDIAC MARKERS ( 30 Nov 2019 06:30 )  .079 ng/mL / x     / x     / x     / x      CARDIAC MARKERS ( 29 Nov 2019 15:10 )  .066 ng/mL / x     / 169 U/L / x     / x      CARDIAC MARKERS ( 29 Nov 2019 09:10 )  .100 ng/mL / x     / 186 U/L / x     / x      CARDIAC MARKERS ( 29 Nov 2019 03:35 )  .099 ng/mL / x     / x     / x     / x                Procalcitonin, Serum: 0.11 ng/mL (11-30-19 @ 06:30)    Serum Pro-Brain Natriuretic Peptide: 2068 pg/mL (11-30-19 @ 06:30)  Serum Pro-Brain Natriuretic Peptide: 3512 pg/mL (11-29-19 @ 03:35)        CULTURES: (if applicable)          CAPILLARY BLOOD GLUCOSE      POCT Blood Glucose.: 187 mg/dL (30 Nov 2019 08:06)      < from: Xray Chest 1 View- PORTABLE-Routine (11.30.19 @ 06:18) >  EXAM:  XR CHEST PORTABLE ROUTINE 1V      PROCEDURE DATE:  11/30/2019        INTERPRETATION:  Single view chest    History CHF    Comparison yesterday    There has been median sternotomy. Parahilar edema previously described   has improved with slight residual. There is no lobar consolidation or   layering effusion. There is moderate cardiomegaly.    < end of copied text >      VITALS:  T(C): 36.7 (11-30-19 @ 09:52), Max: 36.9 (11-29-19 @ 18:57)  T(F): 98 (11-30-19 @ 09:52), Max: 98.4 (11-29-19 @ 18:57)  HR: 53 (11-30-19 @ 09:52) (53 - 74)  BP: 137/79 (11-30-19 @ 09:52) (129/65 - 147/83)  BP(mean): --  ABP: --  ABP(mean): --  RR: 16 (11-30-19 @ 09:52) (16 - 18)  SpO2: 94% (11-30-19 @ 09:52) (94% - 98%)  CVP(mm Hg): --  CVP(cm H2O): --    Ins and Outs     11-30-19 @ 07:01  -  11-30-19 @ 11:05  --------------------------------------------------------  IN: 120 mL / OUT: 0 mL / NET: 120 mL        Height (cm): 177.8 (11-29-19 @ 02:41)  Weight (kg): 127 (11-29-19 @ 08:30)  BMI (kg/m2): 40.2 (11-29-19 @ 08:30)        I&O's Detail    30 Nov 2019 07:01  -  30 Nov 2019 11:05  --------------------------------------------------------  IN:    Oral Fluid: 120 mL  Total IN: 120 mL    OUT:  Total OUT: 0 mL    Total NET: 120 mL

## 2019-11-30 NOTE — PROGRESS NOTE ADULT - ASSESSMENT
Physical Examination:  GENERAL:               Alert, Oriented, No acute distress.    HEENT:                  No JVD, Moist MM phaynx clear, no pnd no erythema   PULM:                     Bilateral air entry, Clear to auscultation bilaterally, no significant sputum production, No Rales, No Rhonchi, No Wheezing  CVS:                         S1, S2,  +Murmur  ABD:                        Soft, nondistended, nontender, normoactive bowel sounds,   EXT:                         Mild LE edema, nontender, No Cyanosis or Clubbing   NEURO:                  Alert, oriented, interactive, nonfocal,  PSYC:                      Calm, + Insight.      Assessment  1. Acute on chronic Systolic CHF exacerbation / Acute pulmonary Edema likely due to poor diet and over eating for thanksgiving.  2. Elevated trop in pt with CAD s/p CABG,   3. Cough productive, doubt PNA possible bronchitis with productive cough, CXR improved with diuresis   4. Possible JUANI follows with Dr. Levy, in pt with obesity  6 DM2 with hyperglycemia  7. Underlying HTN, P. Afib not on a/c    Plan  - Continue aggressive diuresis  - Stress test as per cardio   - Noted leukocytosis ? from prednisone dose, will check sputum culture, will treat 5 days with zithromax for ? bronchitis. will hold off on ceftriaxone at this time as pt does not have PNA.     - Pt to follow up with Dr. Levy for pending dental implant for JUANI.  - Optimize glycemic control  - BP control  - d/w Dr. Shine

## 2019-11-30 NOTE — PROVIDER CONTACT NOTE (OTHER) - ACTION/TREATMENT ORDERED:
Pt already on Lantus and pre meal standing insulin, humalog, as well as sliding scale.  No further interventions at this time, will continue to monitor.
Will continue to monitor
sliding scale, give premeal. DM education.

## 2019-11-30 NOTE — PROGRESS NOTE ADULT - ASSESSMENT
· Assessment		  64 year old man presents with complaints of shortness of breath and cough.... significant improvement post diuresis.  He had not been feeling well for the week prior to admission, productive cough, but does not think he had a fever.  No chest pain or chest pressure.  Admits to dietary indiscretion   Cardiac history includes CAD, s/p CABG, CHF with reduced LVEF, recent MSSA bacteremia.  PAF post CABG. Moderate AS   Elevated troponin noted  Chest X ray suggests possible pneumonia  Diabetes     Plan  - continue diuresis  - continue sacubitril/valsartan  - continue spironolactone  - continue metoprolol succinate.  - continue to trend cardiac enzymes.. suspect troponin elevation is due to CHF vs demand ischemia   - ischemia evaluation -pharm nuclear stress  - consider Pulmonary evaluation   - he will followup with his primary cardiologist as outpatient     discussed with patient and with Medicine team

## 2019-11-30 NOTE — PROGRESS NOTE ADULT - SUBJECTIVE AND OBJECTIVE BOX
Patient is a 64y old  Male who presents with a chief complaint of CHF (30 Nov 2019 10:24). Coughing a lot overnight with thick yellow sputum.  Denies any fever or chills but does report generalized fatigue . + throat pain       Patient seen and examined at bedside.    ALLERGIES:  allery to INSECT VENOM (Hives)  Novocain (Hives)  Originally Entered as [Unknown] reaction to [ENVIRONMENTAL] (Unknown)    MEDICATIONS  (STANDING):  aspirin enteric coated 81 milliGRAM(s) Oral daily  atorvastatin 80 milliGRAM(s) Oral at bedtime  azithromycin   Tablet 500 milliGRAM(s) Oral daily  cefTRIAXone   IVPB 1000 milliGRAM(s) IV Intermittent every 24 hours  dextrose 5%. 1000 milliLiter(s) (50 mL/Hr) IV Continuous <Continuous>  dextrose 50% Injectable 12.5 Gram(s) IV Push once  dextrose 50% Injectable 25 Gram(s) IV Push once  dextrose 50% Injectable 25 Gram(s) IV Push once  enoxaparin Injectable 40 milliGRAM(s) SubCutaneous daily  furosemide   Injectable 40 milliGRAM(s) IV Push daily  insulin glargine Injectable (LANTUS) 15 Unit(s) SubCutaneous at bedtime  insulin lispro (HumaLOG) corrective regimen sliding scale   SubCutaneous three times a day before meals  insulin lispro (HumaLOG) corrective regimen sliding scale   SubCutaneous at bedtime  insulin lispro Injectable (HumaLOG) 12 Unit(s) SubCutaneous before breakfast  insulin lispro Injectable (HumaLOG) 12 Unit(s) SubCutaneous before dinner  insulin lispro Injectable (HumaLOG) 12 Unit(s) SubCutaneous before lunch  magnesium sulfate  IVPB 1 Gram(s) IV Intermittent every 1 hour  metoprolol succinate ER 50 milliGRAM(s) Oral daily  pantoprazole    Tablet 40 milliGRAM(s) Oral before breakfast  sacubitril 49 mG/valsartan 51 mG 1 Tablet(s) Oral two times a day  spironolactone 25 milliGRAM(s) Oral daily    MEDICATIONS  (PRN):  acetaminophen   Tablet .. 650 milliGRAM(s) Oral every 6 hours PRN Temp greater or equal to 38C (100.4F), Mild Pain (1 - 3)  dextrose 40% Gel 15 Gram(s) Oral once PRN Blood Glucose LESS THAN 70 milliGRAM(s)/deciliter  glucagon  Injectable 1 milliGRAM(s) IntraMuscular once PRN Glucose LESS THAN 70 milligrams/deciliter  guaiFENesin    Syrup 100 milliGRAM(s) Oral every 6 hours PRN Cough    Vital Signs Last 24 Hrs  T(F): 98 (30 Nov 2019 09:52), Max: 98.4 (29 Nov 2019 18:57)  HR: 53 (30 Nov 2019 09:52) (53 - 74)  BP: 137/79 (30 Nov 2019 09:52) (129/65 - 147/83)  RR: 16 (30 Nov 2019 09:52) (16 - 18)  SpO2: 94% (30 Nov 2019 09:52) (94% - 98%)  I&O's Summary    30 Nov 2019 07:01  -  30 Nov 2019 10:27  --------------------------------------------------------  IN: 120 mL / OUT: 0 mL / NET: 120 mL      BMI (kg/m2): 40.2 (11-29-19 @ 08:30)  PHYSICAL EXAM:  General: NAD, A/O x 3  ENT: MMM, throat injected but no exudates appreciated   Neck: Supple, No JVD  Lungs: decreased BS bases bilaterally  Cardio: RRR, S1/S2,+ systolic murmur , no pitting edema b/l   Abdomen: Soft, obese Nontender, Nondistended; Bowel sounds present  Extremities: No calf tenderness,     LABS:                        13.1   16.90 )-----------( 255      ( 30 Nov 2019 06:30 )             39.1       11-30    139  |  99  |  26  ----------------------------<  181  4.3   |  30  |  0.99    Ca    9.6      30 Nov 2019 06:30  Mg     1.7     11-30    TPro  7.9  /  Alb  3.7  /  TBili  1.6  /  DBili  x   /  AST  15  /  ALT  22  /  AlkPhos  87  11-30     eGFR if Non African American: 81 mL/min/1.73M2 (11-30-19 @ 06:30)  eGFR if African American: 94 mL/min/1.73M2 (11-30-19 @ 06:30)         CARDIAC MARKERS ( 30 Nov 2019 06:30 )  .079 ng/mL / x     / x     / x     / x      CARDIAC MARKERS ( 29 Nov 2019 15:10 )  .066 ng/mL / x     / 169 U/L / x     / x      CARDIAC MARKERS ( 29 Nov 2019 09:10 )  .100 ng/mL / x     / 186 U/L / x     / x      CARDIAC MARKERS ( 29 Nov 2019 03:35 )  .099 ng/mL / x     / x     / x     / x                        POCT Blood Glucose.: 187 mg/dL (30 Nov 2019 08:06)  POCT Blood Glucose.: 176 mg/dL (29 Nov 2019 20:02)  POCT Blood Glucose.: 249 mg/dL (29 Nov 2019 16:49)  POCT Blood Glucose.: 408 mg/dL (29 Nov 2019 11:36)  POCT Blood Glucose.: 420 mg/dL (29 Nov 2019 11:33)            RADIOLOGY & ADDITIONAL TESTS:    Care Discussed with Consultants/Other Providers:

## 2019-12-01 ENCOUNTER — TRANSCRIPTION ENCOUNTER (OUTPATIENT)
Age: 64
End: 2019-12-01

## 2019-12-01 LAB
ANION GAP SERPL CALC-SCNC: 9 MMOL/L — SIGNIFICANT CHANGE UP (ref 5–17)
BASOPHILS # BLD AUTO: 0.09 K/UL — SIGNIFICANT CHANGE UP (ref 0–0.2)
BASOPHILS NFR BLD AUTO: 0.7 % — SIGNIFICANT CHANGE UP (ref 0–2)
BUN SERPL-MCNC: 26 MG/DL — HIGH (ref 7–23)
CALCIUM SERPL-MCNC: 9.1 MG/DL — SIGNIFICANT CHANGE UP (ref 8.4–10.5)
CHLORIDE SERPL-SCNC: 99 MMOL/L — SIGNIFICANT CHANGE UP (ref 96–108)
CO2 SERPL-SCNC: 27 MMOL/L — SIGNIFICANT CHANGE UP (ref 22–31)
CREAT SERPL-MCNC: 0.98 MG/DL — SIGNIFICANT CHANGE UP (ref 0.5–1.3)
EOSINOPHIL # BLD AUTO: 0.53 K/UL — HIGH (ref 0–0.5)
EOSINOPHIL NFR BLD AUTO: 3.9 % — SIGNIFICANT CHANGE UP (ref 0–6)
GLUCOSE BLDC GLUCOMTR-MCNC: 121 MG/DL — HIGH (ref 70–99)
GLUCOSE BLDC GLUCOMTR-MCNC: 183 MG/DL — HIGH (ref 70–99)
GLUCOSE BLDC GLUCOMTR-MCNC: 208 MG/DL — HIGH (ref 70–99)
GLUCOSE BLDC GLUCOMTR-MCNC: 96 MG/DL — SIGNIFICANT CHANGE UP (ref 70–99)
GLUCOSE SERPL-MCNC: 182 MG/DL — HIGH (ref 70–99)
GRAM STN FLD: SIGNIFICANT CHANGE UP
HCT VFR BLD CALC: 39.4 % — SIGNIFICANT CHANGE UP (ref 39–50)
HGB BLD-MCNC: 13.5 G/DL — SIGNIFICANT CHANGE UP (ref 13–17)
IMM GRANULOCYTES NFR BLD AUTO: 0.4 % — SIGNIFICANT CHANGE UP (ref 0–1.5)
LYMPHOCYTES # BLD AUTO: 26.6 % — SIGNIFICANT CHANGE UP (ref 13–44)
LYMPHOCYTES # BLD AUTO: 3.66 K/UL — HIGH (ref 1–3.3)
MAGNESIUM SERPL-MCNC: 1.9 MG/DL — SIGNIFICANT CHANGE UP (ref 1.6–2.6)
MCHC RBC-ENTMCNC: 30.8 PG — SIGNIFICANT CHANGE UP (ref 27–34)
MCHC RBC-ENTMCNC: 34.3 GM/DL — SIGNIFICANT CHANGE UP (ref 32–36)
MCV RBC AUTO: 89.7 FL — SIGNIFICANT CHANGE UP (ref 80–100)
MONOCYTES # BLD AUTO: 1.13 K/UL — HIGH (ref 0–0.9)
MONOCYTES NFR BLD AUTO: 8.2 % — SIGNIFICANT CHANGE UP (ref 2–14)
NEUTROPHILS # BLD AUTO: 8.27 K/UL — HIGH (ref 1.8–7.4)
NEUTROPHILS NFR BLD AUTO: 60.2 % — SIGNIFICANT CHANGE UP (ref 43–77)
NRBC # BLD: 0 /100 WBCS — SIGNIFICANT CHANGE UP (ref 0–0)
PLATELET # BLD AUTO: 230 K/UL — SIGNIFICANT CHANGE UP (ref 150–400)
POTASSIUM SERPL-MCNC: 4.5 MMOL/L — SIGNIFICANT CHANGE UP (ref 3.5–5.3)
POTASSIUM SERPL-SCNC: 4.5 MMOL/L — SIGNIFICANT CHANGE UP (ref 3.5–5.3)
RAPID RVP RESULT: SIGNIFICANT CHANGE UP
RBC # BLD: 4.39 M/UL — SIGNIFICANT CHANGE UP (ref 4.2–5.8)
RBC # FLD: 13 % — SIGNIFICANT CHANGE UP (ref 10.3–14.5)
SODIUM SERPL-SCNC: 135 MMOL/L — SIGNIFICANT CHANGE UP (ref 135–145)
SPECIMEN SOURCE: SIGNIFICANT CHANGE UP
WBC # BLD: 13.74 K/UL — HIGH (ref 3.8–10.5)
WBC # FLD AUTO: 13.74 K/UL — HIGH (ref 3.8–10.5)

## 2019-12-01 PROCEDURE — 99233 SBSQ HOSP IP/OBS HIGH 50: CPT | Mod: GC

## 2019-12-01 PROCEDURE — 71250 CT THORAX DX C-: CPT | Mod: 26

## 2019-12-01 RX ORDER — BENZOCAINE AND MENTHOL 5; 1 G/100ML; G/100ML
1 LIQUID ORAL ONCE
Refills: 0 | Status: COMPLETED | OUTPATIENT
Start: 2019-12-01 | End: 2019-12-01

## 2019-12-01 RX ORDER — INSULIN GLARGINE 100 [IU]/ML
10 INJECTION, SOLUTION SUBCUTANEOUS AT BEDTIME
Refills: 0 | Status: DISCONTINUED | OUTPATIENT
Start: 2019-12-01 | End: 2019-12-02

## 2019-12-01 RX ORDER — FUROSEMIDE 40 MG
40 TABLET ORAL DAILY
Refills: 0 | Status: DISCONTINUED | OUTPATIENT
Start: 2019-12-02 | End: 2019-12-02

## 2019-12-01 RX ORDER — INSULIN GLARGINE 100 [IU]/ML
22 INJECTION, SOLUTION SUBCUTANEOUS AT BEDTIME
Refills: 0 | Status: DISCONTINUED | OUTPATIENT
Start: 2019-12-01 | End: 2019-12-01

## 2019-12-01 RX ADMIN — Medication 100 MILLIGRAM(S): at 17:06

## 2019-12-01 RX ADMIN — INSULIN GLARGINE 10 UNIT(S): 100 INJECTION, SOLUTION SUBCUTANEOUS at 20:36

## 2019-12-01 RX ADMIN — SPIRONOLACTONE 25 MILLIGRAM(S): 25 TABLET, FILM COATED ORAL at 05:55

## 2019-12-01 RX ADMIN — Medication 100 MILLIGRAM(S): at 07:29

## 2019-12-01 RX ADMIN — Medication 40 MILLIGRAM(S): at 05:55

## 2019-12-01 RX ADMIN — Medication 1: at 07:35

## 2019-12-01 RX ADMIN — SACUBITRIL AND VALSARTAN 1 TABLET(S): 24; 26 TABLET, FILM COATED ORAL at 17:06

## 2019-12-01 RX ADMIN — ATORVASTATIN CALCIUM 80 MILLIGRAM(S): 80 TABLET, FILM COATED ORAL at 20:37

## 2019-12-01 RX ADMIN — Medication 81 MILLIGRAM(S): at 11:49

## 2019-12-01 RX ADMIN — Medication 100 MILLIGRAM(S): at 05:55

## 2019-12-01 RX ADMIN — Medication 100 MILLIGRAM(S): at 20:37

## 2019-12-01 RX ADMIN — BENZOCAINE AND MENTHOL 1 LOZENGE: 5; 1 LIQUID ORAL at 11:43

## 2019-12-01 RX ADMIN — Medication 2: at 11:54

## 2019-12-01 RX ADMIN — ENOXAPARIN SODIUM 40 MILLIGRAM(S): 100 INJECTION SUBCUTANEOUS at 11:49

## 2019-12-01 RX ADMIN — SACUBITRIL AND VALSARTAN 1 TABLET(S): 24; 26 TABLET, FILM COATED ORAL at 05:55

## 2019-12-01 RX ADMIN — Medication 12 UNIT(S): at 11:50

## 2019-12-01 RX ADMIN — Medication 12 UNIT(S): at 07:34

## 2019-12-01 RX ADMIN — Medication 50 MILLIGRAM(S): at 05:55

## 2019-12-01 RX ADMIN — Medication 12 UNIT(S): at 17:08

## 2019-12-01 RX ADMIN — PANTOPRAZOLE SODIUM 40 MILLIGRAM(S): 20 TABLET, DELAYED RELEASE ORAL at 05:55

## 2019-12-01 RX ADMIN — Medication 100 MILLIGRAM(S): at 02:26

## 2019-12-01 NOTE — DIETITIAN INITIAL EVALUATION ADULT. - ETIOLOGY
related to low desire to change PTA, limited exposure to nutrition recommendations for DM, CHF management

## 2019-12-01 NOTE — DIETITIAN INITIAL EVALUATION ADULT. - OTHER INFO
63yo M hx of CAD/3vl CABG, HTN, HLD, PAF not on A/C, T2IRDM, CHFrEF (last ECHO 8/2019 EF: 45%), hx of MSSA bacteremia 8/2019 pw SOB secondary acute on chronic CHF exac and probable bronchitis. Mild troponin elevation which may be due to CHF vs demand ischemia. Plan for nuclear stress test Monday.     Pt tolerating diet with report of good appetite. Recent weight gain due to fluid retention with CHF. Pt states he was up to 290lbs. Denies chewing/swallowing difficulty, NKFA. Pt states that he was non-compliant with diet recommendations PTA, but seems motivated to make changes now. States he was eating a lot of "Italian provisions", cured meats etc. He has a plan to increase physical activity, strength training. Provided in-depth diabetes nutrition therapy and CHF nutrition therapy education with written material. Pt encouraged to follow up for outpatient consult for long term compliance. Contact information provided.    Height: 5'10", Weight: (12/1) 282lbs  Skin: intact, Edema: none  IBW range: 149-183lbs

## 2019-12-01 NOTE — CONSULT NOTE ADULT - SUBJECTIVE AND OBJECTIVE BOX
HPI:   Patient is a 64y male with dm, cad, cabg , mssa bacteremia no source a few months ago, afib , recent active smoker who was admitted 2 days ago with report of 1-2 weeks of cough productive of green phlegm then 1 day of sob, wheeze. He admits to eating very salty food of late and his sugars have been out of control with recent diet as well. He recently travelled to [place], no fever, chest pain, n,v,d,ha. He had a tooth pulled in past few weeks. no myalgia.     REVIEW OF SYSTEMS:  All other review of systems negative (Comprehensive ROS)    PAST MEDICAL & SURGICAL HISTORY:  Moderate aortic stenosis  Systolic congestive heart failure  Paroxysmal atrial fibrillation  Coronary artery disease  Essential hypertension  Type 2 diabetes mellitus with diabetic neuropathy, without long-term current use of insulin  S/P CABG (coronary artery bypass graft)  History of excision of pilonidal cyst  H/O inguinal hernia repair      Allergies    allery to INSECT VENOM (Hives)  Novocain (Hives)  Originally Entered as [Unknown] reaction to [ENVIRONMENTAL] (Unknown)    Intolerances        Antimicrobials Day #  :2  doxycycline hyclate Capsule 100 milliGRAM(s) Oral two times a day    Other Medications:  acetaminophen   Tablet .. 650 milliGRAM(s) Oral every 6 hours PRN  aspirin enteric coated 81 milliGRAM(s) Oral daily  atorvastatin 80 milliGRAM(s) Oral at bedtime  benzocaine 15 mG/menthol 3.6 mG Lozenge 1 Lozenge Oral once  dextrose 40% Gel 15 Gram(s) Oral once PRN  dextrose 5%. 1000 milliLiter(s) IV Continuous <Continuous>  dextrose 50% Injectable 12.5 Gram(s) IV Push once  dextrose 50% Injectable 25 Gram(s) IV Push once  dextrose 50% Injectable 25 Gram(s) IV Push once  enoxaparin Injectable 40 milliGRAM(s) SubCutaneous daily  furosemide   Injectable 40 milliGRAM(s) IV Push daily  glucagon  Injectable 1 milliGRAM(s) IntraMuscular once PRN  guaiFENesin    Syrup 100 milliGRAM(s) Oral every 6 hours PRN  insulin glargine Injectable (LANTUS) 15 Unit(s) SubCutaneous at bedtime  insulin lispro (HumaLOG) corrective regimen sliding scale   SubCutaneous three times a day before meals  insulin lispro (HumaLOG) corrective regimen sliding scale   SubCutaneous at bedtime  insulin lispro Injectable (HumaLOG) 12 Unit(s) SubCutaneous before breakfast  insulin lispro Injectable (HumaLOG) 12 Unit(s) SubCutaneous before dinner  insulin lispro Injectable (HumaLOG) 12 Unit(s) SubCutaneous before lunch  metoprolol succinate ER 50 milliGRAM(s) Oral daily  pantoprazole    Tablet 40 milliGRAM(s) Oral before breakfast  sacubitril 49 mG/valsartan 51 mG 1 Tablet(s) Oral two times a day  spironolactone 25 milliGRAM(s) Oral daily      FAMILY HISTORY:  Family history of acute myocardial infarction: father in 90s      SOCIAL HISTORY:  Smoking: [ ]Yes [x ]No  ETOH: [ ]Yes x[ ]No  Drug Use: [ ]Yes [x ]No   [x ] Single[ ]    T(F): 98.3 (19 @ 05:24), Max: 98.3 (19 @ 05:24)  HR: 72 (19 @ 05:24)  BP: 126/85 (19 @ 05:24)  RR: 16 (19 @ 05:24)  SpO2: 100% (19 @ 05:24)  Wt(kg): --    PHYSICAL EXAM:  General: alert, no acute distress  Eyes:  anicteric, no conjunctival injection, no discharge  Oropharynx: no lesions or injection 	  Neck: supple, without adenopathy  Lungs: clear to auscultation  Heart: regular rate and rhythm; 2/6 sys m  Abdomen: soft, nondistended, nontender, without mass or organomegaly  Skin: no lesions  Extremities: no clubbing, cyanosis,. trace pretibial edema  Neurologic: alert, oriented, moves all extremities    LAB RESULTS:                        13.5   13.74 )-----------( 230      ( 01 Dec 2019 05:15 )             39.4         135  |  99  |  26<H>  ----------------------------<  182<H>  4.5   |  27  |  0.98    Ca    9.1      01 Dec 2019 05:15  Mg     1.9         TPro  7.9  /  Alb  3.7  /  TBili  1.6<H>  /  DBili  x   /  AST  15  /  ALT  22  /  AlkPhos  87      LIVER FUNCTIONS - ( 2019 06:30 )  Alb: 3.7 g/dL / Pro: 7.9 g/dL / ALK PHOS: 87 U/L / ALT: 22 U/L / AST: 15 U/L / GGT: x               MICROBIOLOGY:  RECENT CULTURES:   @ 15:34 .Sputum Sputum       Numerous polymorphonuclear leukocytes per low power field  Moderate Squamous epithelial cells per low power field  Numerous Gram Positive Cocci in Pairs and Chains seen per oil power field  Rare Gram Negative Rods seen per oil power field          RADIOLOGY REVIEWED:    < from: Xray Chest 1 View- PORTABLE-Routine (19 @ 06:18) >    EXAM:  XR CHEST PORTABLE ROUTINE 1V      PROCEDURE DATE:  2019        INTERPRETATION:  Single view chest    History CHF    Comparison yesterday    There has been median sternotomy. Parahilar edema previously described   has improved with slight residual. There is no lobar consolidation or   layering effusion. There is moderate cardiomegaly.      < end of copied text >  < from: TTE Echo Complete w/Doppler (19 @ 07:44) >  EXAM:  ECHO TTE WO CON COMP [id number]      PROCEDURE DATE:  2019        INTERPRETATION:  REPORT:    TRANSTHORACIC ECHOCARDIOGRAM REPORT         Patient Name:   JASE KENNEDY [facility] Rec #:  [id number]         Accession #:      [id number]  Account #:      [id number]          Height:           70.0 in 177.8 cm  YOB: 1955       Weight:           280.0 lb 127.00 kg  Patient Age:    64 years        BSA:              2.41 m²  Patient Gender: M               BP:               152/89 mmHg       Date of Exam:        2019 7:44:32 AM  Sonographer:         ALMA  Referring Physician: SASHA    Procedure:     2D Echo/Doppler/Color Doppler Complete.  Indications:   Heart failure, unspecified - I50.9  Diagnosis:     Heart failure, unspecified - I50.9  Study Details: Technically adequate study. Study quality was adversely   affected                 due to body habitus.         2D AND M-MODE MEASUREMENTS (normal ranges within parentheses):  Left Ventricle:              Normal   Aorta/Left Atrium:               Normal  IVSd (2D):              1.44 cm (0.7-1.1) Aortic Root (Mmode): 4.18 cm   (2.4-3.7)  LVPWd (2D):             1.26 cm (0.7-1.1) AoV Cusp Separation: 1.07 cm   (1.5-2.6)  LVIDd (2D):     6.70 cm (3.4-5.7) Left Atrium (Mmode): 4.22 cm   (1.9-4.0)  LVIDs (2D):             5.22 cm  LV FS (2D):             22.1 %   (>25%)  Relative Wall Thickness  0.38    (<0.42)    LV DIASTOLIC FUNCTION:  MV Peak E: 1.21 m/s Decel Time: 138 msec  MV Peak A: 0.54 m/s  E/A Ratio: 2.25    SPECTRAL DOPPLER ANALYSIS (where applicable):  Mitral Valve:  MV P1/2 Time: 40.06 msec  MV Area, PHT: 5.49 cm²    Aortic Valve: AoV Max Alejo: 2.18 m/s AoV Peak P.9 mmHg AoV Mean PG:   10.8 mmHg    LVOT Vmax: 0.80 m/s LVOT VTI: 0.163 m LVOT Diameter: 2.37 cm    AoV Area, Vmax: 1.62 cm² AoV Area, VTI: 1.51 cm² AoV Area, Vmn: 1.44 cm²  Ao VTI: 0.478  Tricuspid Valve and PA/RV Systolic Pressure: TR Max Velocity: 1.57 m/s RA   Pressure:  RVSP/PASP:       PHYSICIAN INTERPRETATION:  Left Ventricle: The left ventricular internal cavity size is moderately   increased. Left ventricular wall thickness is mildly increased.  Global LV systolic function was moderately decreased. Left ventricular   ejection fraction, by visual estimation, is 35 to 40%. Spectral Doppler   shows restrictive pattern of left ventricular myocardial filling (Grade   III diastolic dysfunction).  Right Ventricle: Normal right ventricular size and function. The right   ventricular size is mildly enlarged. RV systolic function is normal.  Left Atrium: Moderately enlarged left atrium. LA volume Index is 51.0   ml/m² ml/m2.  Right Atrium: The right atrium is normal in size. Mildly enlarged right   atrium.  Pericardium: There is no evidence of pericardial effusion.  Mitral Valve: Mild thickening and calcification of the anterior and   posterior mitral valve leaflets. Moderate mitral valve regurgitation is   seen.  Tricuspid Valve: Structurally normal tricuspid valve, with normal leaflet   excursion. Mild tricuspid regurgitation is visualized.  Aortic Valve: Mild to moderate aortic stenosis is present. Peak   transaortic gradient equals 18.9 mmHg, mean transaortic gradient equals   10.8 mmHg, the calculated aortic valve area equals 1.51 cm² by the   continuity equation consistent with mild aortic stenosis.  Pulmonic Valve: The pulmonic valve was not well visualized.  Pulmonary Artery: The main pulmonary artery is normal in size.       Summary:   1. Left ventricular ejection fraction, by visual estimation, is 35 to   40%.   2. Moderately decreased global left ventricular systolic function.   3. Mildly increased LV wall thickness.   4. Spectral Doppler shows restrictive pattern of left ventricular   myocardial filling (GradeIII diastolic dysfunction).   5. There is mild concentric left ventricular hypertrophy.   6. Mild thickening and calcification of the anterior and posterior   mitral valve leaflets.   7. Mild to moderate aortic valve stenosis.   8. LA volume Index is51.0 ml/m² ml/m2.   9. Peak transaortic gradient equals 18.9 mmHg, mean transaortic gradient   equals 10.8 mmHg, the calculated aortic valve area equals 1.51 cm² by the   continuity equation consistent with mild aortic stenosis.    [id number] Catarino Singleton MD, [facility] , Electronically signed on 2019 at   2:34:30 PM    < end of copied text >  < from: US Duplex Venous Upper Ext Ltd, Right (11.29.19 @ 12:59) >    EXAM:  US DPLX UPR EXT VEINS LTD RT      PROCEDURE DATE:  2019        INTERPRETATION:  CLINICAL INFORMATION: Pain right arm    COMPARISON: None available.    TECHNIQUE: Duplex sonography of the RIGHT UPPER extremity veins with   color and spectral Doppler, with and without compression.      FINDINGS:    The right internal jugular, subclavian, axillary and brachial veins are   patent and compressible where applicable.  The basilic and cephalic veins   (superficial veins) are patent and without thrombus.     Doppler examination shows normal spontaneous and phasic flow.    IMPRESSION:     No evidence of right upper extremity deep venous thrombosis.          < end of copied text >        Impression:  65 y/o man with dm, cad/cabg, recent mssa bacteremia treated for 4 weeks comes in with cough for a couple of weeks and 1 day pta very sob with wheeze, pedal edema, cxr with pulmonary edema now improved after diuresis. Tend to doubt active pneumonia as cause for chf exacerbation but may have some component of a post viral exacerbation of bronchitis given recent smoker. Leukocytosis may be reactive to chf  Recommendations:  will check ct chest, rvp  continue doxycycline  await sputum cx  f.u blood cultures

## 2019-12-01 NOTE — PROGRESS NOTE ADULT - SUBJECTIVE AND OBJECTIVE BOX
Patient is a 64y old  Male who presents with a chief complaint of CHF (30 Nov 2019 12:41). No acute issues overnight. Cough with thick yellow sputum.   Denies any chest pain, palps or SOB. Urinating frequently       Patient seen and examined at bedside.    ALLERGIES:  allery to INSECT VENOM (Hives)  Novocain (Hives)  Originally Entered as [Unknown] reaction to [ENVIRONMENTAL] (Unknown)    MEDICATIONS  (STANDING):  aspirin enteric coated 81 milliGRAM(s) Oral daily  atorvastatin 80 milliGRAM(s) Oral at bedtime  benzocaine 15 mG/menthol 3.6 mG Lozenge 1 Lozenge Oral once  dextrose 5%. 1000 milliLiter(s) (50 mL/Hr) IV Continuous <Continuous>  dextrose 50% Injectable 12.5 Gram(s) IV Push once  dextrose 50% Injectable 25 Gram(s) IV Push once  dextrose 50% Injectable 25 Gram(s) IV Push once  doxycycline hyclate Capsule 100 milliGRAM(s) Oral two times a day  enoxaparin Injectable 40 milliGRAM(s) SubCutaneous daily  furosemide   Injectable 40 milliGRAM(s) IV Push daily  insulin glargine Injectable (LANTUS) 15 Unit(s) SubCutaneous at bedtime  insulin lispro (HumaLOG) corrective regimen sliding scale   SubCutaneous three times a day before meals  insulin lispro (HumaLOG) corrective regimen sliding scale   SubCutaneous at bedtime  insulin lispro Injectable (HumaLOG) 12 Unit(s) SubCutaneous before breakfast  insulin lispro Injectable (HumaLOG) 12 Unit(s) SubCutaneous before dinner  insulin lispro Injectable (HumaLOG) 12 Unit(s) SubCutaneous before lunch  metoprolol succinate ER 50 milliGRAM(s) Oral daily  pantoprazole    Tablet 40 milliGRAM(s) Oral before breakfast  sacubitril 49 mG/valsartan 51 mG 1 Tablet(s) Oral two times a day  spironolactone 25 milliGRAM(s) Oral daily    MEDICATIONS  (PRN):  acetaminophen   Tablet .. 650 milliGRAM(s) Oral every 6 hours PRN Temp greater or equal to 38C (100.4F), Mild Pain (1 - 3)  dextrose 40% Gel 15 Gram(s) Oral once PRN Blood Glucose LESS THAN 70 milliGRAM(s)/deciliter  glucagon  Injectable 1 milliGRAM(s) IntraMuscular once PRN Glucose LESS THAN 70 milligrams/deciliter  guaiFENesin    Syrup 100 milliGRAM(s) Oral every 6 hours PRN Cough    Vital Signs Last 24 Hrs  T(F): 98.3 (01 Dec 2019 05:24), Max: 98.3 (01 Dec 2019 05:24)  HR: 72 (01 Dec 2019 05:24) (62 - 72)  BP: 126/85 (01 Dec 2019 05:24) (126/85 - 131/77)  RR: 16 (01 Dec 2019 05:24) (15 - 16)  SpO2: 100% (01 Dec 2019 05:24) (95% - 100%)  I&O's Summary    30 Nov 2019 07:01  -  01 Dec 2019 07:00  --------------------------------------------------------  IN: 560 mL / OUT: 0 mL / NET: 560 mL      BMI (kg/m2): 40.2 (11-29-19 @ 08:30)  PHYSICAL EXAM:  General: NAD, A/O x 3  ENT: MMM  Neck: Supple, No JVD  Lungs: Clear to auscultation bilaterally  Cardio: RRR, S1/S2, + systolic murmur , no pitting edema bilaterally  Abdomen: Soft, Nontender, Nondistended; Bowel sounds present  Extremities: No calf tenderness,    LABS:                        13.5   13.74 )-----------( 230      ( 01 Dec 2019 05:15 )             39.4       12-01    135  |  99  |  26  ----------------------------<  182  4.5   |  27  |  0.98    Ca    9.1      01 Dec 2019 05:15  Mg     1.9     12-01    TPro  7.9  /  Alb  3.7  /  TBili  1.6  /  DBili  x   /  AST  15  /  ALT  22  /  AlkPhos  87  11-30     eGFR if Non African American: 81 mL/min/1.73M2 (12-01-19 @ 05:15)  eGFR if African American: 94 mL/min/1.73M2 (12-01-19 @ 05:15)         CARDIAC MARKERS ( 30 Nov 2019 06:30 )  .079 ng/mL / x     / x     / x     / x      CARDIAC MARKERS ( 29 Nov 2019 15:10 )  .066 ng/mL / x     / 169 U/L / x     / x      CARDIAC MARKERS ( 29 Nov 2019 09:10 )  .100 ng/mL / x     / 186 U/L / x     / x      CARDIAC MARKERS ( 29 Nov 2019 03:35 )  .099 ng/mL / x     / x     / x     / x          11-30 Chol 147 mg/dL LDL 88 mg/dL HDL 37 mg/dL Trig 109 mg/dL              POCT Blood Glucose.: 183 mg/dL (01 Dec 2019 07:33)  POCT Blood Glucose.: 165 mg/dL (30 Nov 2019 21:09)  POCT Blood Glucose.: 181 mg/dL (30 Nov 2019 17:09)  POCT Blood Glucose.: 289 mg/dL (30 Nov 2019 11:08)    11-30 [id number] 8.7        Culture - Sputum (collected 30 Nov 2019 15:34)  Source: .Sputum Sputum  Gram Stain (01 Dec 2019 03:48):    Numerous polymorphonuclear leukocytes per low power field    Moderate Squamous epithelial cells per low power field    Numerous Gram Positive Cocci in Pairs and Chains seen per oil power field    Rare Gram Negative Rods seen per oil power field        RADIOLOGY & ADDITIONAL TESTS:    Care Discussed with Consultants/Other Providers: Patient is a 64y old  Male who presents with a chief complaint of CHF (30 Nov 2019 12:41). No acute issues overnight. Cough with thick yellow sputum, improved from yesterday. Denies any chest pain, palps or SOB. Urinating frequently       Patient seen and examined at bedside.    ALLERGIES:  allery to INSECT VENOM (Hives)  Novocain (Hives)  Originally Entered as [Unknown] reaction to [ENVIRONMENTAL] (Unknown)    MEDICATIONS  (STANDING):  aspirin enteric coated 81 milliGRAM(s) Oral daily  atorvastatin 80 milliGRAM(s) Oral at bedtime  benzocaine 15 mG/menthol 3.6 mG Lozenge 1 Lozenge Oral once  dextrose 5%. 1000 milliLiter(s) (50 mL/Hr) IV Continuous <Continuous>  dextrose 50% Injectable 12.5 Gram(s) IV Push once  dextrose 50% Injectable 25 Gram(s) IV Push once  dextrose 50% Injectable 25 Gram(s) IV Push once  doxycycline hyclate Capsule 100 milliGRAM(s) Oral two times a day  enoxaparin Injectable 40 milliGRAM(s) SubCutaneous daily  furosemide   Injectable 40 milliGRAM(s) IV Push daily  insulin glargine Injectable (LANTUS) 15 Unit(s) SubCutaneous at bedtime  insulin lispro (HumaLOG) corrective regimen sliding scale   SubCutaneous three times a day before meals  insulin lispro (HumaLOG) corrective regimen sliding scale   SubCutaneous at bedtime  insulin lispro Injectable (HumaLOG) 12 Unit(s) SubCutaneous before breakfast  insulin lispro Injectable (HumaLOG) 12 Unit(s) SubCutaneous before dinner  insulin lispro Injectable (HumaLOG) 12 Unit(s) SubCutaneous before lunch  metoprolol succinate ER 50 milliGRAM(s) Oral daily  pantoprazole    Tablet 40 milliGRAM(s) Oral before breakfast  sacubitril 49 mG/valsartan 51 mG 1 Tablet(s) Oral two times a day  spironolactone 25 milliGRAM(s) Oral daily    MEDICATIONS  (PRN):  acetaminophen   Tablet .. 650 milliGRAM(s) Oral every 6 hours PRN Temp greater or equal to 38C (100.4F), Mild Pain (1 - 3)  dextrose 40% Gel 15 Gram(s) Oral once PRN Blood Glucose LESS THAN 70 milliGRAM(s)/deciliter  glucagon  Injectable 1 milliGRAM(s) IntraMuscular once PRN Glucose LESS THAN 70 milligrams/deciliter  guaiFENesin    Syrup 100 milliGRAM(s) Oral every 6 hours PRN Cough    Vital Signs Last 24 Hrs  T(F): 98.3 (01 Dec 2019 05:24), Max: 98.3 (01 Dec 2019 05:24)  HR: 72 (01 Dec 2019 05:24) (62 - 72)  BP: 126/85 (01 Dec 2019 05:24) (126/85 - 131/77)  RR: 16 (01 Dec 2019 05:24) (15 - 16)  SpO2: 100% (01 Dec 2019 05:24) (95% - 100%)  I&O's Summary    30 Nov 2019 07:01  -  01 Dec 2019 07:00  --------------------------------------------------------  IN: 560 mL / OUT: 0 mL / NET: 560 mL      BMI (kg/m2): 40.2 (11-29-19 @ 08:30)  PHYSICAL EXAM:  General: NAD, A/O x 3  ENT: MMM  Neck: Supple, No JVD  Lungs: Clear to auscultation bilaterally  Cardio: RRR, S1/S2, + systolic murmur , no pitting edema bilaterally  Abdomen: Soft, Nontender, Nondistended; Bowel sounds present  Extremities: No calf tenderness,    LABS:                        13.5   13.74 )-----------( 230      ( 01 Dec 2019 05:15 )             39.4       12-01    135  |  99  |  26  ----------------------------<  182  4.5   |  27  |  0.98    Ca    9.1      01 Dec 2019 05:15  Mg     1.9     12-01    TPro  7.9  /  Alb  3.7  /  TBili  1.6  /  DBili  x   /  AST  15  /  ALT  22  /  AlkPhos  87  11-30     eGFR if Non African American: 81 mL/min/1.73M2 (12-01-19 @ 05:15)  eGFR if African American: 94 mL/min/1.73M2 (12-01-19 @ 05:15)         CARDIAC MARKERS ( 30 Nov 2019 06:30 )  .079 ng/mL / x     / x     / x     / x      CARDIAC MARKERS ( 29 Nov 2019 15:10 )  .066 ng/mL / x     / 169 U/L / x     / x      CARDIAC MARKERS ( 29 Nov 2019 09:10 )  .100 ng/mL / x     / 186 U/L / x     / x      CARDIAC MARKERS ( 29 Nov 2019 03:35 )  .099 ng/mL / x     / x     / x     / x          11-30 Chol 147 mg/dL LDL 88 mg/dL HDL 37 mg/dL Trig 109 mg/dL      POCT Blood Glucose.: 183 mg/dL (01 Dec 2019 07:33)  POCT Blood Glucose.: 165 mg/dL (30 Nov 2019 21:09)  POCT Blood Glucose.: 181 mg/dL (30 Nov 2019 17:09)  POCT Blood Glucose.: 289 mg/dL (30 Nov 2019 11:08)    11-30 [id number] 8.7        Culture - Sputum (collected 30 Nov 2019 15:34)  Source: .Sputum Sputum  Gram Stain (01 Dec 2019 03:48):    Numerous polymorphonuclear leukocytes per low power field    Moderate Squamous epithelial cells per low power field    Numerous Gram Positive Cocci in Pairs and Chains seen per oil power field    Rare Gram Negative Rods seen per oil power field        RADIOLOGY & ADDITIONAL TESTS:    Care Discussed with Consultants/Other Providers:

## 2019-12-01 NOTE — PROGRESS NOTE ADULT - ASSESSMENT
Physical Examination:  GENERAL:               Alert, Oriented, No acute distress.    HEENT:                  No JVD, Moist MM phaynx clear, no pnd no erythema   PULM:                     Bilateral air entry, Clear to auscultation bilaterally, no significant sputum production, trace Rales, No Rhonchi, No Wheezing  CVS:                         S1, S2,  +Murmur  ABD:                        Soft, nondistended, nontender, normoactive bowel sounds,   EXT:                         Mild LE edema, nontender, No Cyanosis or Clubbing   NEURO:                  Alert, oriented, interactive, nonfocal,  PSYC:                      Calm, + Insight.      Assessment  1. Acute on chronic Systolic CHF exacerbation / Acute pulmonary Edema likely due to poor diet and over eating for thanksgiving.  2. Elevated trop in pt with CAD s/p CABG,   3. Cough productive, doubt PNA possible bronchitis with productive cough, CXR improved with diuresis CT scan has GGO consistent with CHF   4. Possible JUANI follows with Dr. Levy, in pt with obesity  6 DM2 with hyperglycemia  7. Underlying HTN, P. Afib not on a/c    Plan  - Continue iuresis  - Stress test as per cardio   - Short course of Doxy.     - Pt to follow up with Dr. Levy for pending dental implant for JUANI.  - Optimize glycemic control  - BP control  - d/w Dr. Shine, Dr. Bautista

## 2019-12-01 NOTE — PROGRESS NOTE ADULT - ASSESSMENT
65yo M hx of CAD/3vl CABG, HTN, HLD, PAF not on A/C, T2IRDM, CHFrEF (last ECHO 8/2019 EF: 45%), hx of MSSA bacteremia 8/2019 pw SOB secondary acute on chronic CHF exac and probable bronchitis. Mild troponin elevation which may be due to CHF vs demand ischemia. Plan for nuclear stress test Monday    # Acute decompensated HFrEF with elevated troponin:  suspect elevated trop due to demand ischemia  Cont IV diuresis 40 mg daily , daily weights, I/O  trend trops-- last trop trend 0.1-->0.066--> 0.079  TTE 11/29/19 with EF 35-40%, grade III DD. mild to mod AS.  TTE 8/1/19: EF 45%. Grade I DD. moderate AS  cont asa, bb, entresto, spironolactone  Scheduled for pharmacological nuclear stress test tomorrow-NPO @ [place]  tele monitoring  Cardio following  BNP 3512--> 2068    #Leukocytosis   WBC 13 today (16 yesterday)  PCT 0.11  per pulmonary: suspect bronchitis  doxycyline 100mg q12. avoid QT prolonging medications. (QTc prolongation: 525ms)  follow up blood cx   Sputum cx with gram + cocci  Fup CT chest r.o PNA  Fup RVP  ID following  Cont to monitor     # h/o CAD s/p CABG with elevated trop  -cont to trend trops  -tele monitoring--no acute events   - nuclear stress test on monday  - c/w ASA, BB, lipitor    # T2IRDM  FS acceptable today   increased lantus 15 iu q HS   increased premeals 12 iu TID  cont to monitor   HgbA1c 8.7--takes lantus 10 iu at home-should increase to 15 iu at discharge     Dispo: home pending clinical improvement  DVT ppx: lovenox 63yo M hx of CAD/3vl CABG, HTN, HLD, PAF not on A/C, T2IRDM, CHFrEF (last ECHO 8/2019 EF: 45%), hx of MSSA bacteremia 8/2019 pw SOB secondary acute on chronic CHF exac and probable bronchitis. Mild troponin elevation which may be due to CHF vs demand ischemia. Plan for nuclear stress test Monday    # Acute decompensated HFrEF with elevated troponin:  suspect elevated trop due to demand ischemia  Appears euvolemic now--change IV lasix to PO lasix   trend trops-- last trop trend 0.1-->0.066--> 0.079  TTE 11/29/19 with EF 35-40%, grade III DD. mild to mod AS.  TTE 8/1/19: EF 45%. Grade I DD. moderate AS  cont asa, bb, entresto, spironolactone  Scheduled for pharmacological nuclear stress test tomorrow-NPO @ [place]  tele monitoring  Cardio following  BNP 3512--> 2068    #Leukocytosis   WBC 13 today (16 yesterday)  PCT 0.11  per pulmonary: suspect bronchitis  doxycyline 100mg q12. avoid QT prolonging medications. (QTc prolongation: 525ms)  follow up blood cx   Sputum cx with gram + cocci  Fup CT chest r.o PNA  Fup RVP  ID following  Cont to monitor     # h/o CAD s/p CABG with elevated trop  -cont to trend trops  -tele monitoring--no acute events   - nuclear stress test on monday  - c/w ASA, BB, lipitor    # T2IRDM  FS acceptable today   increased lantus 15 iu q HS   increased premeals 12 iu TID  cont to monitor   HgbA1c 8.7--takes lantus 10 iu at home-should increase to 15 iu at discharge     Dispo: home pending clinical improvement  DVT ppx: lovenox 65yo M hx of CAD/3vl CABG, HTN, HLD, PAF not on A/C, T2IRDM, CHFrEF (last ECHO 8/2019 EF: 45%), hx of MSSA bacteremia 8/2019 pw SOB secondary acute on chronic CHF exac and probable bronchitis. Mild troponin elevation which may be due to CHF vs demand ischemia. Plan for nuclear stress test Monday    # Acute decompensated HFrEF with elevated troponin - improved  suspect elevated trop due to demand ischemia  Appears euvolemic now--change IV lasix to PO lasix   trend trops-- last trop trend 0.1-->0.066--> 0.079  TTE 11/29/19 with EF 35-40%, grade III DD. mild to mod AS.  TTE 8/1/19: EF 45%. Grade I DD. moderate AS  cont asa, bb, entresto, spironolactone  Scheduled for pharmacological nuclear stress test tomorrow-NPO @ [place]  tele monitoring  Cardio following  BNP 3512--> 2068    #Leukocytosis   WBC 13 today (16 yesterday)  PCT 0.11  per pulmonary: suspect bronchitis  doxycyline 100mg q12. avoid QT prolonging medications. (QTc prolongation: 525ms)  follow up blood cx   Sputum cx: pending   Fup CT chest r.o PNA  Fup RVP  ID following  Cont to monitor     # h/o CAD s/p CABG with elevated trop  -cont to trend trops  -tele monitoring--no acute events   - nuclear stress test on monday  - c/w ASA, BB, lipitor    # T2IRDM  monitor FS  increased lantus to 22 iu q HS   increased premeals 12 iu TID  cont to monitor   HgbA1c 8.7--takes lantus 10 iu at home-should increase lantus on discharge     Dispo: home pending clinical improvement  DVT ppx: lovenox 63yo M hx of CAD/3vl CABG, HTN, HLD, PAF not on A/C, T2IRDM, CHFrEF (last ECHO 8/2019 EF: 45%), hx of MSSA bacteremia 8/2019 pw SOB secondary acute on chronic CHF exac and probable bronchitis. Mild troponin elevation which may be due to CHF vs demand ischemia. Plan for nuclear stress test Monday    # Acute decompensated HFrEF with elevated troponin - improved  suspect elevated trop due to demand ischemia  Appears euvolemic now--change IV lasix to PO lasix   trend trops-- last trop trend 0.1-->0.066--> 0.079  TTE 11/29/19 with EF 35-40%, grade III DD. mild to mod AS.  TTE 8/1/19: EF 45%. Grade I DD. moderate AS  cont asa, bb, entresto, spironolactone  Scheduled for pharmacological nuclear stress test tomorrow-NPO @ [place]  tele monitoring  Cardio following  BNP 3512--> 2068    #Leukocytosis   WBC 13 today (16 yesterday)  PCT 0.11  per pulmonary: suspect bronchitis  doxycyline 100mg q12. avoid QT prolonging medications. (QTc prolongation: 525ms)  follow up blood cx   Sputum cx: pending   Fup CT chest r.o PNA  Fup RVP  ID following  Cont to monitor     # h/o CAD s/p CABG with elevated trop  -cont to trend trops  -tele monitoring--no acute events   - nuclear stress test on monday  - c/w ASA, BB, lipitor    # T2IRDM  monitor FS  Cut lantus in half as patient NPO -will give 10 iu tonight   increased premeals 12 iu TID  cont to monitor   HgbA1c 8.7--takes lantus 10 iu at home-should increase lantus on discharge     Dispo: home pending clinical improvement  DVT ppx: lovenox

## 2019-12-01 NOTE — PROGRESS NOTE ADULT - SUBJECTIVE AND OBJECTIVE BOX
Follow-up Pulmonary Progress Note  Chief Complaint : Heart failure      cough improving  seen by ID - requested CT scan-  and RVP  on doxy now.    pt hos no cp, sob, palp  comfortable on RA o2       Allergies :allery to INSECT VENOM (Hives)  Novocain (Hives)  Originally Entered as [Unknown] reaction to [ENVIRONMENTAL] (Unknown)      PAST MEDICAL & SURGICAL HISTORY:  Moderate aortic stenosis  Systolic congestive heart failure  Paroxysmal atrial fibrillation  Coronary artery disease  Essential hypertension  Type 2 diabetes mellitus with diabetic neuropathy, without long-term current use of insulin  S/P CABG (coronary artery bypass graft)  History of excision of pilonidal cyst  H/O inguinal hernia repair      Medications:  MEDICATIONS  (STANDING):  aspirin enteric coated 81 milliGRAM(s) Oral daily  atorvastatin 80 milliGRAM(s) Oral at bedtime  dextrose 5%. 1000 milliLiter(s) (50 mL/Hr) IV Continuous <Continuous>  dextrose 50% Injectable 12.5 Gram(s) IV Push once  dextrose 50% Injectable 25 Gram(s) IV Push once  dextrose 50% Injectable 25 Gram(s) IV Push once  doxycycline hyclate Capsule 100 milliGRAM(s) Oral two times a day  enoxaparin Injectable 40 milliGRAM(s) SubCutaneous daily  furosemide   Injectable 40 milliGRAM(s) IV Push daily  insulin glargine Injectable (LANTUS) 15 Unit(s) SubCutaneous at bedtime  insulin lispro (HumaLOG) corrective regimen sliding scale   SubCutaneous three times a day before meals  insulin lispro (HumaLOG) corrective regimen sliding scale   SubCutaneous at bedtime  insulin lispro Injectable (HumaLOG) 12 Unit(s) SubCutaneous before breakfast  insulin lispro Injectable (HumaLOG) 12 Unit(s) SubCutaneous before dinner  insulin lispro Injectable (HumaLOG) 12 Unit(s) SubCutaneous before lunch  metoprolol succinate ER 50 milliGRAM(s) Oral daily  pantoprazole    Tablet 40 milliGRAM(s) Oral before breakfast  sacubitril 49 mG/valsartan 51 mG 1 Tablet(s) Oral two times a day  spironolactone 25 milliGRAM(s) Oral daily    MEDICATIONS  (PRN):  acetaminophen   Tablet .. 650 milliGRAM(s) Oral every 6 hours PRN Temp greater or equal to 38C (100.4F), Mild Pain (1 - 3)  dextrose 40% Gel 15 Gram(s) Oral once PRN Blood Glucose LESS THAN 70 milliGRAM(s)/deciliter  glucagon  Injectable 1 milliGRAM(s) IntraMuscular once PRN Glucose LESS THAN 70 milligrams/deciliter  guaiFENesin    Syrup 100 milliGRAM(s) Oral every 6 hours PRN Cough      LABS:                        13.5   13.74 )-----------( 230      ( 01 Dec 2019 05:15 )             39.4     12-01    135  |  99  |  26<H>  ----------------------------<  182<H>  4.5   |  27  |  0.98    Ca    9.1      01 Dec 2019 05:15  Mg     1.9     12-01    TPro  7.9  /  Alb  3.7  /  TBili  1.6<H>  /  DBili  x   /  AST  15  /  ALT  22  /  AlkPhos  87  11-30      CARDIAC MARKERS ( 30 Nov 2019 06:30 )  .079 ng/mL / x     / x     / x     / x      CARDIAC MARKERS ( 29 Nov 2019 15:10 )  .066 ng/mL / x     / 169 U/L / x     / x                Procalcitonin, Serum: 0.11 ng/mL (11-30-19 @ 06:30)    Serum Pro-Brain Natriuretic Peptide: 2068 pg/mL (11-30-19 @ 06:30)  Serum Pro-Brain Natriuretic Peptide: 3512 pg/mL (11-29-19 @ 03:35)        CULTURES: (if applicable)    Culture - Sputum (collected 11-30-19 @ 15:34)  Source: .Sputum Sputum  Gram Stain (12-01-19 @ 03:48):    Numerous polymorphonuclear leukocytes per low power field    Moderate Squamous epithelial cells per low power field    Numerous Gram Positive Cocci in Pairs and Chains seen per oil power field    Rare Gram Negative Rods seen per oil power field        < from: CT Chest No Cont (12.01.19 @ 10:51) >    EXAM:  CT CHEST      PROCEDURE DATE:  12/01/2019        INTERPRETATION:  CLINICAL INFORMATION: Leukocytosis    COMPARISON: CT abdomen 7/29/2019    PROCEDURE:   CT of the Chest was performed without intravenous contrast.  Sagittal and coronal reformats were performed.      FINDINGS:    LUNGS AND AIRWAYS: Patent central airways.  Scattered alveolar   groundglass opacities throughout the lung fields most prominent in the   lower lobes may represent mild pulmonary edema versus multifocal   infectious or inflammatory process, correlate clinically and short-term   follow-up. No segmental consolidations.    PLEURA: Calcified pleural plaques. No significant pleural effusions.    MEDIASTINUM AND VANITA: Up to 1 cm prevascular pretracheal and subcarinal   lymph nodes.    VESSELS: Atherosclerotic changes of the aorta and coronary vasculature.   No aortic aneurysm. Prior CABG.    HEART: Heart is mildly enlarged. No pericardial effusion.    CHEST WALL AND LOWER NECK: Within normal limits.    VISUALIZED UPPER ABDOMEN: Gallstones.    BONES: Prior sternotomy. Degenerative changes.    IMPRESSION:     Scattered alveolar groundglass opacities throughout the lung fields most   prominent in the lower lobes may represent mild pulmonary edema versus   multifocal infectious or inflammatory process, correlate clinically and   short-term follow-up. No segmental consolidations      < end of copied text >        VITALS:  T(C): 36.7 (12-01-19 @ 11:04), Max: 36.8 (12-01-19 @ 05:24)  T(F): 98 (12-01-19 @ 11:04), Max: 98.3 (12-01-19 @ 05:24)  HR: 75 (12-01-19 @ 11:04) (62 - 75)  BP: 96/60 (12-01-19 @ 11:04) (96/60 - 131/77)  BP(mean): --  ABP: --  ABP(mean): --  RR: 16 (12-01-19 @ 11:04) (15 - 16)  SpO2: 97% (12-01-19 @ 11:04) (95% - 100%)  CVP(mm Hg): --  CVP(cm H2O): --    Ins and Outs     11-30-19 @ 07:01  -  12-01-19 @ 07:00  --------------------------------------------------------  IN: 560 mL / OUT: 0 mL / NET: 560 mL        Height (cm): 177.8 (11-29-19 @ 02:41)  Weight (kg): 127 (11-29-19 @ 08:30)  BMI (kg/m2): 40.2 (11-29-19 @ 08:30)        I&O's Detail    30 Nov 2019 07:01  -  01 Dec 2019 07:00  --------------------------------------------------------  IN:    Oral Fluid: 360 mL    Solution: 200 mL  Total IN: 560 mL    OUT:  Total OUT: 0 mL    Total NET: 560 mL

## 2019-12-02 ENCOUNTER — TRANSCRIPTION ENCOUNTER (OUTPATIENT)
Age: 64
End: 2019-12-02

## 2019-12-02 VITALS
DIASTOLIC BLOOD PRESSURE: 81 MMHG | RESPIRATION RATE: 15 BRPM | SYSTOLIC BLOOD PRESSURE: 122 MMHG | TEMPERATURE: 98 F | OXYGEN SATURATION: 99 % | HEART RATE: 79 BPM

## 2019-12-02 LAB
ANION GAP SERPL CALC-SCNC: 6 MMOL/L — SIGNIFICANT CHANGE UP (ref 5–17)
BASOPHILS # BLD AUTO: 0.06 K/UL — SIGNIFICANT CHANGE UP (ref 0–0.2)
BASOPHILS NFR BLD AUTO: 0.5 % — SIGNIFICANT CHANGE UP (ref 0–2)
BUN SERPL-MCNC: 31 MG/DL — HIGH (ref 7–23)
CALCIUM SERPL-MCNC: 9.1 MG/DL — SIGNIFICANT CHANGE UP (ref 8.4–10.5)
CHLORIDE SERPL-SCNC: 99 MMOL/L — SIGNIFICANT CHANGE UP (ref 96–108)
CO2 SERPL-SCNC: 31 MMOL/L — SIGNIFICANT CHANGE UP (ref 22–31)
CREAT SERPL-MCNC: 1.12 MG/DL — SIGNIFICANT CHANGE UP (ref 0.5–1.3)
CREAT SPEC-SCNC: 129 MG/DL
CULTURE RESULTS: SIGNIFICANT CHANGE UP
EOSINOPHIL # BLD AUTO: 0.48 K/UL — SIGNIFICANT CHANGE UP (ref 0–0.5)
EOSINOPHIL NFR BLD AUTO: 4.1 % — SIGNIFICANT CHANGE UP (ref 0–6)
GLUCOSE BLDC GLUCOMTR-MCNC: 213 MG/DL — HIGH (ref 70–99)
GLUCOSE BLDC GLUCOMTR-MCNC: 291 MG/DL — HIGH (ref 70–99)
GLUCOSE SERPL-MCNC: 239 MG/DL — HIGH (ref 70–99)
HCT VFR BLD CALC: 38.1 % — LOW (ref 39–50)
HGB BLD-MCNC: 12.7 G/DL — LOW (ref 13–17)
IMM GRANULOCYTES NFR BLD AUTO: 0.5 % — SIGNIFICANT CHANGE UP (ref 0–1.5)
LYMPHOCYTES # BLD AUTO: 3.9 K/UL — HIGH (ref 1–3.3)
LYMPHOCYTES # BLD AUTO: 33.1 % — SIGNIFICANT CHANGE UP (ref 13–44)
MCHC RBC-ENTMCNC: 30 PG — SIGNIFICANT CHANGE UP (ref 27–34)
MCHC RBC-ENTMCNC: 33.3 GM/DL — SIGNIFICANT CHANGE UP (ref 32–36)
MCV RBC AUTO: 90.1 FL — SIGNIFICANT CHANGE UP (ref 80–100)
MICROALBUMIN 24H UR DL<=1MG/L-MCNC: 7.3 MG/DL
MICROALBUMIN/CREAT 24H UR-RTO: 57 MG/G
MONOCYTES # BLD AUTO: 0.93 K/UL — HIGH (ref 0–0.9)
MONOCYTES NFR BLD AUTO: 7.9 % — SIGNIFICANT CHANGE UP (ref 2–14)
NEUTROPHILS # BLD AUTO: 6.34 K/UL — SIGNIFICANT CHANGE UP (ref 1.8–7.4)
NEUTROPHILS NFR BLD AUTO: 53.9 % — SIGNIFICANT CHANGE UP (ref 43–77)
NRBC # BLD: 0 /100 WBCS — SIGNIFICANT CHANGE UP (ref 0–0)
PLATELET # BLD AUTO: 244 K/UL — SIGNIFICANT CHANGE UP (ref 150–400)
POTASSIUM SERPL-MCNC: 4.2 MMOL/L — SIGNIFICANT CHANGE UP (ref 3.5–5.3)
POTASSIUM SERPL-SCNC: 4.2 MMOL/L — SIGNIFICANT CHANGE UP (ref 3.5–5.3)
RBC # BLD: 4.23 M/UL — SIGNIFICANT CHANGE UP (ref 4.2–5.8)
RBC # FLD: 13 % — SIGNIFICANT CHANGE UP (ref 10.3–14.5)
SODIUM SERPL-SCNC: 136 MMOL/L — SIGNIFICANT CHANGE UP (ref 135–145)
SPECIMEN SOURCE: SIGNIFICANT CHANGE UP
TROPONIN I SERPL-MCNC: 0.04 NG/ML — SIGNIFICANT CHANGE UP (ref 0.02–0.06)
WBC # BLD: 11.77 K/UL — HIGH (ref 3.8–10.5)
WBC # FLD AUTO: 11.77 K/UL — HIGH (ref 3.8–10.5)

## 2019-12-02 PROCEDURE — 78452 HT MUSCLE IMAGE SPECT MULT: CPT | Mod: 26

## 2019-12-02 PROCEDURE — 93971 EXTREMITY STUDY: CPT

## 2019-12-02 PROCEDURE — 99285 EMERGENCY DEPT VISIT HI MDM: CPT | Mod: 25

## 2019-12-02 PROCEDURE — 87633 RESP VIRUS 12-25 TARGETS: CPT

## 2019-12-02 PROCEDURE — 80061 LIPID PANEL: CPT

## 2019-12-02 PROCEDURE — 83880 ASSAY OF NATRIURETIC PEPTIDE: CPT

## 2019-12-02 PROCEDURE — 71250 CT THORAX DX C-: CPT

## 2019-12-02 PROCEDURE — 36415 COLL VENOUS BLD VENIPUNCTURE: CPT

## 2019-12-02 PROCEDURE — 93017 CV STRESS TEST TRACING ONLY: CPT

## 2019-12-02 PROCEDURE — 80053 COMPREHEN METABOLIC PANEL: CPT

## 2019-12-02 PROCEDURE — A9500: CPT

## 2019-12-02 PROCEDURE — 93016 CV STRESS TEST SUPVJ ONLY: CPT

## 2019-12-02 PROCEDURE — 85027 COMPLETE CBC AUTOMATED: CPT

## 2019-12-02 PROCEDURE — 94640 AIRWAY INHALATION TREATMENT: CPT

## 2019-12-02 PROCEDURE — 82550 ASSAY OF CK (CPK): CPT

## 2019-12-02 PROCEDURE — 83735 ASSAY OF MAGNESIUM: CPT

## 2019-12-02 PROCEDURE — 82962 GLUCOSE BLOOD TEST: CPT

## 2019-12-02 PROCEDURE — 87798 DETECT AGENT NOS DNA AMP: CPT

## 2019-12-02 PROCEDURE — 71045 X-RAY EXAM CHEST 1 VIEW: CPT

## 2019-12-02 PROCEDURE — 80048 BASIC METABOLIC PNL TOTAL CA: CPT

## 2019-12-02 PROCEDURE — 84145 PROCALCITONIN (PCT): CPT

## 2019-12-02 PROCEDURE — 99232 SBSQ HOSP IP/OBS MODERATE 35: CPT | Mod: 25

## 2019-12-02 PROCEDURE — 71046 X-RAY EXAM CHEST 2 VIEWS: CPT

## 2019-12-02 PROCEDURE — 87486 CHLMYD PNEUM DNA AMP PROBE: CPT

## 2019-12-02 PROCEDURE — 87581 M.PNEUMON DNA AMP PROBE: CPT

## 2019-12-02 PROCEDURE — 93005 ELECTROCARDIOGRAM TRACING: CPT

## 2019-12-02 PROCEDURE — 87040 BLOOD CULTURE FOR BACTERIA: CPT

## 2019-12-02 PROCEDURE — 84484 ASSAY OF TROPONIN QUANT: CPT

## 2019-12-02 PROCEDURE — 93018 CV STRESS TEST I&R ONLY: CPT

## 2019-12-02 PROCEDURE — 93306 TTE W/DOPPLER COMPLETE: CPT

## 2019-12-02 PROCEDURE — 96374 THER/PROPH/DIAG INJ IV PUSH: CPT

## 2019-12-02 PROCEDURE — 99239 HOSP IP/OBS DSCHRG MGMT >30: CPT | Mod: GC

## 2019-12-02 PROCEDURE — 87070 CULTURE OTHR SPECIMN AEROBIC: CPT

## 2019-12-02 PROCEDURE — 83036 HEMOGLOBIN GLYCOSYLATED A1C: CPT

## 2019-12-02 PROCEDURE — 78452 HT MUSCLE IMAGE SPECT MULT: CPT

## 2019-12-02 RX ORDER — REGADENOSON 0.08 MG/ML
0.4 INJECTION, SOLUTION INTRAVENOUS ONCE
Refills: 0 | Status: COMPLETED | OUTPATIENT
Start: 2019-12-02 | End: 2019-12-02

## 2019-12-02 RX ORDER — ISOPROPYL ALCOHOL, BENZOCAINE .7; .06 ML/ML; ML/ML
1 SWAB TOPICAL
Qty: 100 | Refills: 1
Start: 2019-12-02 | End: 2020-01-20

## 2019-12-02 RX ORDER — FUROSEMIDE 40 MG
1 TABLET ORAL
Qty: 30 | Refills: 0
Start: 2019-12-02 | End: 2019-12-31

## 2019-12-02 RX ADMIN — Medication 3: at 16:10

## 2019-12-02 RX ADMIN — ENOXAPARIN SODIUM 40 MILLIGRAM(S): 100 INJECTION SUBCUTANEOUS at 16:07

## 2019-12-02 RX ADMIN — SACUBITRIL AND VALSARTAN 1 TABLET(S): 24; 26 TABLET, FILM COATED ORAL at 17:50

## 2019-12-02 RX ADMIN — SACUBITRIL AND VALSARTAN 1 TABLET(S): 24; 26 TABLET, FILM COATED ORAL at 06:15

## 2019-12-02 RX ADMIN — Medication 100 MILLIGRAM(S): at 06:15

## 2019-12-02 RX ADMIN — SPIRONOLACTONE 25 MILLIGRAM(S): 25 TABLET, FILM COATED ORAL at 06:15

## 2019-12-02 RX ADMIN — Medication 100 MILLIGRAM(S): at 17:50

## 2019-12-02 RX ADMIN — Medication 81 MILLIGRAM(S): at 16:07

## 2019-12-02 RX ADMIN — Medication 40 MILLIGRAM(S): at 06:15

## 2019-12-02 RX ADMIN — REGADENOSON 0.4 MILLIGRAM(S): 0.08 INJECTION, SOLUTION INTRAVENOUS at 12:25

## 2019-12-02 RX ADMIN — Medication 12 UNIT(S): at 16:10

## 2019-12-02 RX ADMIN — PANTOPRAZOLE SODIUM 40 MILLIGRAM(S): 20 TABLET, DELAYED RELEASE ORAL at 06:16

## 2019-12-02 NOTE — PROGRESS NOTE ADULT - ASSESSMENT
63 yo M, DM, CABG, Afib, recent episode of MSSA bacteremia  Here with cough, GARCÍA, leg edema, improved with diuresis  Afebrile, mild leukocytosis, normal diff  PCT 0.11 less typical for bacterial infection  Bld Cxs negative, RVP negative, sputum normal kiley only  Clinical course and CT findings c/w CHF  Doubt active infection    Plan:  Limit Doxy to another 48 hrs  CHF management as outlined

## 2019-12-02 NOTE — PROGRESS NOTE ADULT - SUBJECTIVE AND OBJECTIVE BOX
CC: f/u for cough    Patient reports feeling much better, no SOB at rest, leg swelling less    REVIEW OF SYSTEMS:  All other review of systems negative (Comprehensive ROS)    Antimicrobials Day # 3   doxycycline hyclate Capsule 100 milliGRAM(s) Oral two times a day    Other Medications Reviewed    T(F): 97.9 (12-02-19 @ 17:02), Max: 98.3 (12-01-19 @ 20:29)  HR: 79 (12-02-19 @ 17:02)  BP: 122/81 (12-02-19 @ 17:02)  RR: 15 (12-02-19 @ 17:02)  SpO2: 99% (12-02-19 @ 17:02)  Wt(kg): --    PHYSICAL EXAM:  General: alert, no acute distress  Eyes:  anicteric, no conjunctival injection, no discharge  Oropharynx: no lesions or injection 	  Neck: supple, without adenopathy  Lungs: clear to auscultation  Heart: regular rate and rhythm; no murmur, rubs or gallops  Abdomen: soft, nondistended, nontender, without mass or organomegaly  Skin: no lesions  Extremities: mild leg edema  Neurologic: alert, oriented, moves all extremities    LAB RESULTS:                        12.7   11.77 )-----------( 244      ( 02 Dec 2019 05:50 )             38.1     12-02    136  |  99  |  31<H>  ----------------------------<  239<H>  4.2   |  31  |  1.12    Ca    9.1      02 Dec 2019 05:50  Mg     1.9     12-01    Trend Procalcitonin  11-30-19 @ 06:30   -  0.11<H>    Serum Pro-Brain Natriuretic Peptide (11.30.19 @ 06:30)    Serum Pro-Brain Natriuretic Peptide: 2068 pg/mL    MICROBIOLOGY:  RECENT CULTURES:  11-30 @ 15:34 .Sputum Sputum     Normal Respiratory Chantel present    11-30 @ 11:20 .Blood Blood-Peripheral     No growth to date.    Rapid Respiratory Viral Panel (12.01.19 @ 10:55)    Rapid RVP Result: [facility]      RADIOLOGY REVIEWED:  CT Chest No Cont (12.01.19 @ 10:51) >  Scattered alveolar groundglass opacities throughout the lung fields most   prominent in the lower lobes may represent mild pulmonary edema versus   multifocal infectious or inflammatory process, correlate clinically and   short-term follow-up. No segmental consolidations

## 2019-12-02 NOTE — DISCHARGE NOTE NURSING/CASE MANAGEMENT/SOCIAL WORK - PATIENT PORTAL LINK FT
You can access the FollowMyHealth Patient Portal offered by [facility] by registering at the following website: [email or web address]. By joining [employer]’s FollowMyHealth portal, you will also be able to view your health information using other applications (apps) compatible with our system.

## 2019-12-02 NOTE — PROGRESS NOTE ADULT - SUBJECTIVE AND OBJECTIVE BOX
Patient is a 64y old  Male who presents with a chief complaint of CHF.  Pt doing much better today  Cough has improved.  Pt without chest pain, or sob   Pt awaiting cardio stress test.       Patient seen and examined at bedside.    ALLERGIES:  allery to INSECT VENOM (Hives)  Novocain (Hives)  Originally Entered as [Unknown] reaction to [ENVIRONMENTAL] (Unknown)    MEDICATIONS  (STANDING):  aspirin enteric coated 81 milliGRAM(s) Oral daily  atorvastatin 80 milliGRAM(s) Oral at bedtime  dextrose 5%. 1000 milliLiter(s) (50 mL/Hr) IV Continuous <Continuous>  dextrose 50% Injectable 12.5 Gram(s) IV Push once  dextrose 50% Injectable 25 Gram(s) IV Push once  dextrose 50% Injectable 25 Gram(s) IV Push once  doxycycline hyclate Capsule 100 milliGRAM(s) Oral two times a day  enoxaparin Injectable 40 milliGRAM(s) SubCutaneous daily  furosemide    Tablet 40 milliGRAM(s) Oral daily  insulin glargine Injectable (LANTUS) 10 Unit(s) SubCutaneous at bedtime  insulin lispro (HumaLOG) corrective regimen sliding scale   SubCutaneous three times a day before meals  insulin lispro (HumaLOG) corrective regimen sliding scale   SubCutaneous at bedtime  insulin lispro Injectable (HumaLOG) 12 Unit(s) SubCutaneous before breakfast  insulin lispro Injectable (HumaLOG) 12 Unit(s) SubCutaneous before dinner  insulin lispro Injectable (HumaLOG) 12 Unit(s) SubCutaneous before lunch  metoprolol succinate ER 50 milliGRAM(s) Oral daily  pantoprazole    Tablet 40 milliGRAM(s) Oral before breakfast  regadenoson Injectable 0.4 milliGRAM(s) IV Push once  sacubitril 49 mG/valsartan 51 mG 1 Tablet(s) Oral two times a day  spironolactone 25 milliGRAM(s) Oral daily    MEDICATIONS  (PRN):  acetaminophen   Tablet .. 650 milliGRAM(s) Oral every 6 hours PRN Temp greater or equal to 38C (100.4F), Mild Pain (1 - 3)  dextrose 40% Gel 15 Gram(s) Oral once PRN Blood Glucose LESS THAN 70 milliGRAM(s)/deciliter  glucagon  Injectable 1 milliGRAM(s) IntraMuscular once PRN Glucose LESS THAN 70 milligrams/deciliter  guaiFENesin    Syrup 100 milliGRAM(s) Oral every 6 hours PRN Cough    Vital Signs Last 24 Hrs  T(F): 98 (02 Dec 2019 06:01), Max: 98.3 (01 Dec 2019 20:29)  HR: 66 (02 Dec 2019 06:01) (66 - 75)  BP: 122/61 (02 Dec 2019 06:01) (96/60 - 122/61)  RR: 16 (02 Dec 2019 06:01) (14 - 16)  SpO2: 97% (02 Dec 2019 06:01) (97% - 100%)  I&O's Summary    PHYSICAL EXAM:  General: NAD, A/O x 3  ENT: MMM  Neck: Supple, No JVD  Lungs: decrease BS at bases bilaterally, Non labored breathing   Cardio: RRR, S1/S2, No murmurs  Abdomen: Soft, Nontender, Nondistended; Bowel sounds present  Extremities: No calf tenderness, No pitting edema    LABS:                        12.7   11.77 )-----------( 244      ( 02 Dec 2019 05:50 )             38.1     12-02    136  |  99  |  31  ----------------------------<  239  4.2   |  31  |  1.12    Ca    9.1      02 Dec 2019 05:50  Mg     1.9     12-01    TPro  7.9  /  Alb  3.7  /  TBili  1.6  /  DBili  x   /  AST  15  /  ALT  22  /  AlkPhos  87  11-30    eGFR if Non : 69 mL/min/1.73M2 (12-02-19 @ 05:50)  eGFR if African American: 80 mL/min/1.73M2 (12-02-19 @ 05:50)        CARDIAC MARKERS ( 30 Nov 2019 06:30 )  .079 ng/mL / x     / x     / x     / x      CARDIAC MARKERS ( 29 Nov 2019 15:10 )  .066 ng/mL / x     / 169 U/L / x     / x          11-30 Chol 147 mg/dL LDL 88 mg/dL HDL 37 mg/dL Trig 109 mg/dL              POCT Blood Glucose.: 213 mg/dL (02 Dec 2019 08:02)  POCT Blood Glucose.: 96 mg/dL (01 Dec 2019 20:35)  POCT Blood Glucose.: 121 mg/dL (01 Dec 2019 17:10)  POCT Blood Glucose.: 208 mg/dL (01 Dec 2019 11:52)    11-30 [id number] 8.7        Culture - Sputum (collected 30 Nov 2019 15:34)  Source: .Sputum Sputum  Gram Stain (01 Dec 2019 03:48):    Numerous polymorphonuclear leukocytes per low power field    Moderate Squamous epithelial cells per low power field    Numerous Gram Positive Cocci in Pairs and Chains seen per oil power field    Rare Gram Negative Rods seen per oil power field  Preliminary Report (01 Dec 2019 19:43):    Normal Respiratory Chantel present    Culture - Blood (collected 30 Nov 2019 11:20)  Source: .Blood Blood-Peripheral  Preliminary Report (02 Dec 2019 02:02):    No growth to date.    Culture - Blood (collected 30 Nov 2019 11:20)  Source: .Blood Blood-Peripheral  Preliminary Report (02 Dec 2019 02:02):    No growth to date.      RADIOLOGY & ADDITIONAL TESTS:    Care Discussed with Consultants/Other Providers:     EXAM:  CT CHEST      PROCEDURE DATE:  12/01/2019        INTERPRETATION:  CLINICAL INFORMATION: Leukocytosis    COMPARISON: CT abdomen 7/29/2019    PROCEDURE:   CT of the Chest was performed without intravenous contrast.  Sagittal and coronal reformats were performed.      FINDINGS:    LUNGS AND AIRWAYS: Patent central airways.  Scattered alveolar   groundglass opacities throughout the lung fields most prominent in the   lower lobes may represent mild pulmonary edema versus multifocal   infectious or inflammatory process, correlate clinically and short-term   follow-up. No segmental consolidations.    PLEURA: Calcified pleural plaques. No significant pleural effusions.    MEDIASTINUM AND VANITA: Up to 1 cm prevascular pretracheal and subcarinal   lymph nodes.    VESSELS: Atherosclerotic changes of the aorta and coronary vasculature.   No aortic aneurysm. Prior CABG.    HEART: Heart is mildly enlarged. No pericardial effusion.    CHEST WALL AND LOWER NECK: Within normal limits.    VISUALIZED UPPER ABDOMEN: Gallstones.    BONES: Prior sternotomy. Degenerative changes.    IMPRESSION:     Scattered alveolar groundglass opacities throughout the lung fields most   prominent in the lower lobes may represent mild pulmonary edema versus   multifocal infectious or inflammatory process, correlate clinically and   short-term follow-up. No segmental consolidations Patient is a 64y old  Male who presents with a chief complaint of CHF. Pt doing much better today Cough has improved. Pt without chest pain, or sob   Pt awaiting cardio stress test.     Patient seen and examined at bedside.    ALLERGIES:  allery to INSECT VENOM (Hives)  Novocain (Hives)  Originally Entered as [Unknown] reaction to [ENVIRONMENTAL] (Unknown)    MEDICATIONS  (STANDING):  aspirin enteric coated 81 milliGRAM(s) Oral daily  atorvastatin 80 milliGRAM(s) Oral at bedtime  dextrose 5%. 1000 milliLiter(s) (50 mL/Hr) IV Continuous <Continuous>  dextrose 50% Injectable 12.5 Gram(s) IV Push once  dextrose 50% Injectable 25 Gram(s) IV Push once  dextrose 50% Injectable 25 Gram(s) IV Push once  doxycycline hyclate Capsule 100 milliGRAM(s) Oral two times a day  enoxaparin Injectable 40 milliGRAM(s) SubCutaneous daily  furosemide    Tablet 40 milliGRAM(s) Oral daily  insulin glargine Injectable (LANTUS) 10 Unit(s) SubCutaneous at bedtime  insulin lispro (HumaLOG) corrective regimen sliding scale   SubCutaneous three times a day before meals  insulin lispro (HumaLOG) corrective regimen sliding scale   SubCutaneous at bedtime  insulin lispro Injectable (HumaLOG) 12 Unit(s) SubCutaneous before breakfast  insulin lispro Injectable (HumaLOG) 12 Unit(s) SubCutaneous before dinner  insulin lispro Injectable (HumaLOG) 12 Unit(s) SubCutaneous before lunch  metoprolol succinate ER 50 milliGRAM(s) Oral daily  pantoprazole    Tablet 40 milliGRAM(s) Oral before breakfast  regadenoson Injectable 0.4 milliGRAM(s) IV Push once  sacubitril 49 mG/valsartan 51 mG 1 Tablet(s) Oral two times a day  spironolactone 25 milliGRAM(s) Oral daily    MEDICATIONS  (PRN):  acetaminophen   Tablet .. 650 milliGRAM(s) Oral every 6 hours PRN Temp greater or equal to 38C (100.4F), Mild Pain (1 - 3)  dextrose 40% Gel 15 Gram(s) Oral once PRN Blood Glucose LESS THAN 70 milliGRAM(s)/deciliter  glucagon  Injectable 1 milliGRAM(s) IntraMuscular once PRN Glucose LESS THAN 70 milligrams/deciliter  guaiFENesin    Syrup 100 milliGRAM(s) Oral every 6 hours PRN Cough    Vital Signs Last 24 Hrs  T(F): 98 (02 Dec 2019 06:01), Max: 98.3 (01 Dec 2019 20:29)  HR: 66 (02 Dec 2019 06:01) (66 - 75)  BP: 122/61 (02 Dec 2019 06:01) (96/60 - 122/61)  RR: 16 (02 Dec 2019 06:01) (14 - 16)  SpO2: 97% (02 Dec 2019 06:01) (97% - 100%)  I&O's Summary    PHYSICAL EXAM:  General: NAD, A/O x 3  ENT: MMM  Neck: Supple, No JVD  Lungs: decrease BS at bases bilaterally, Non labored breathing   Cardio: RRR, S1/S2, No murmurs  Abdomen: Soft, Nontender, Nondistended; Bowel sounds present  Extremities: No calf tenderness, trace lower leg edema    LABS:                        12.7   11.77 )-----------( 244      ( 02 Dec 2019 05:50 )             38.1     12-02    136  |  99  |  31  ----------------------------<  239  4.2   |  31  |  1.12    Ca    9.1      02 Dec 2019 05:50  Mg     1.9     12-01    TPro  7.9  /  Alb  3.7  /  TBili  1.6  /  DBili  x   /  AST  15  /  ALT  22  /  AlkPhos  87  11-30    eGFR if Non : 69 mL/min/1.73M2 (12-02-19 @ 05:50)  eGFR if African American: 80 mL/min/1.73M2 (12-02-19 @ 05:50)      CARDIAC MARKERS ( 30 Nov 2019 06:30 )  .079 ng/mL / x     / x     / x     / x      CARDIAC MARKERS ( 29 Nov 2019 15:10 )  .066 ng/mL / x     / 169 U/L / x     / x          11-30 Chol 147 mg/dL LDL 88 mg/dL HDL 37 mg/dL Trig 109 mg/dL    POCT Blood Glucose.: 213 mg/dL (02 Dec 2019 08:02)  POCT Blood Glucose.: 96 mg/dL (01 Dec 2019 20:35)  POCT Blood Glucose.: 121 mg/dL (01 Dec 2019 17:10)  POCT Blood Glucose.: 208 mg/dL (01 Dec 2019 11:52)    11-30 [id number] 8.7    Culture - Sputum (collected 30 Nov 2019 15:34)  Source: .Sputum Sputum  Gram Stain (01 Dec 2019 03:48):    Numerous polymorphonuclear leukocytes per low power field    Moderate Squamous epithelial cells per low power field    Numerous Gram Positive Cocci in Pairs and Chains seen per oil power field    Rare Gram Negative Rods seen per oil power field  Preliminary Report (01 Dec 2019 19:43):    Normal Respiratory Chantel present    Culture - Blood (collected 30 Nov 2019 11:20)  Source: .Blood Blood-Peripheral  Preliminary Report (02 Dec 2019 02:02):    No growth to date.    Culture - Blood (collected 30 Nov 2019 11:20)  Source: .Blood Blood-Peripheral  Preliminary Report (02 Dec 2019 02:02):    No growth to date.    RADIOLOGY & ADDITIONAL TESTS:    Care Discussed with Consultants/Other Providers:     EXAM:  CT CHEST      PROCEDURE DATE:  12/01/2019        INTERPRETATION:  CLINICAL INFORMATION: Leukocytosis    COMPARISON: CT abdomen 7/29/2019    PROCEDURE:   CT of the Chest was performed without intravenous contrast.  Sagittal and coronal reformats were performed.      FINDINGS:    LUNGS AND AIRWAYS: Patent central airways.  Scattered alveolar   groundglass opacities throughout the lung fields most prominent in the   lower lobes may represent mild pulmonary edema versus multifocal   infectious or inflammatory process, correlate clinically and short-term   follow-up. No segmental consolidations.    PLEURA: Calcified pleural plaques. No significant pleural effusions.    MEDIASTINUM AND VANITA: Up to 1 cm prevascular pretracheal and subcarinal   lymph nodes.    VESSELS: Atherosclerotic changes of the aorta and coronary vasculature.   No aortic aneurysm. Prior CABG.    HEART: Heart is mildly enlarged. No pericardial effusion.    CHEST WALL AND LOWER NECK: Within normal limits.    VISUALIZED UPPER ABDOMEN: Gallstones.    BONES: Prior sternotomy. Degenerative changes.    IMPRESSION:     Scattered alveolar groundglass opacities throughout the lung fields most   prominent in the lower lobes may represent mild pulmonary edema versus   multifocal infectious or inflammatory process, correlate clinically and   short-term follow-up. No segmental consolidations

## 2019-12-02 NOTE — DISCHARGE NOTE PROVIDER - NSDCFUSCHEDAPPT_GEN_ALL_CORE_FT
MARCIA, JASE ; 12/02/2019 ; [facility] [address]  MRACIA, JASE ; 12/04/2019 ; [facility] [address]  MARCIA, JASE ; 12/05/2019 ; [facility] [address]  MARCIA, JASE ; 12/09/2019 ; [facility] [address]  MARCIA, JASE ; 12/11/2019 ; [facility] [address]  MARCIA, JASE ; 12/12/2019 ; [facility] [address]  MARCIA, JASE ; 12/16/2019 ; [facility] [address]  MARCIA, JASE ; 12/18/2019 ; [facility] [address]  MARCIA, JASE ; 12/19/2019 ; [facility] [address]  MARCIA, JASE ; 12/23/2019 ; [facility] [address]  MARCIA, JASE ; 12/24/2019 ; [facility] [address]  MARCIA, JASE ; 12/26/2019 ; [facility] [address]  MARCIA, JASE ; 12/30/2019 ; [facility] [address]  MARCIA, JASE ; 01/02/2020 ; [facility] [address]  MARCIA, JASE ; 01/06/2020 ; [facility] [address]  MARCIA, JASE ; 01/07/2020 ; [facility] [address]  MARCIA, JASE ; 01/08/2020 ; [facility] [address]  MARCIA, JASE ; 01/09/2020 ; [facility] [address]  MARCIA, JASE ; 01/13/2020 ; [facility] [address]  MARCIA, JASE ; 01/13/2020 ; [facility] [address]  MARCIA, JASE ; 01/15/2020 ; [facility] [address]  MARCIA, JASE ; 01/16/2020 ; [facility] [address]  MARCIA, JASE ; 01/20/2020 ; [facility] [address]  MARCIA, JASE ; 01/22/2020 ; [facility] [address] MARCIA, JASE ; 12/04/2019 ; [facility] [address]  MARCIA, JASE ; 12/05/2019 ; [facility] [address]  MARCIA, JASE ; 12/09/2019 ; [facility] [address]  MARCIA, JASE ; 12/11/2019 ; [facility] [address]  MARCIA, JASE ; 12/12/2019 ; [facility] [address]  MARCIA, JASE ; 12/16/2019 ; [facility] [address]  MARCIA, JASE ; 12/18/2019 ; [facility] [address]  MARCIA, JASE ; 12/19/2019 ; [facility] [address]  MARCIA, JASE ; 12/23/2019 ; [facility] [address]  MARCIA, JASE ; 12/24/2019 ; [facility] [address]  MARCIA, JASE ; 12/26/2019 ; [facility] [address]  MARCIA, JASE ; 12/30/2019 ; [facility] [address]  MARCIA, JASE ; 01/02/2020 ; [facility] [address]  MARCIA, JASE ; 01/06/2020 ; [facility] [address]  MARCIA, JASE ; 01/07/2020 ; [facility] [address]  MARCIA, JASE ; 01/08/2020 ; [facility] [address]  MARCIA, JASE ; 01/09/2020 ; [facility] [address]  MARCIA, JASE ; 01/13/2020 ; [facility] [address]  MARCIA, JASE ; 01/13/2020 ; [facility] [address]  MARCIA, JASE ; 01/15/2020 ; [facility] [address]  MARCIA, JASE ; 01/16/2020 ; [facility] [address]  MARCIA, JASE ; 01/20/2020 ; [facility] [address]  MARCIA, JASE ; 01/22/2020 ; [facility] [address]  MARCIA, JASE ; 01/23/2020 ; [facility] [address]

## 2019-12-02 NOTE — DISCHARGE NOTE PROVIDER - HOSPITAL COURSE
64M hx of CAD/3vl CABG, HTN, HLD, PAF not on A/C, T2IRDM, CHFrEF (last ECHO 8/2019 EF: 45%), hx of MSSA bacteremia 8/2019 pw SOB. Pt related he has been coughing for about a week - productive occ of yellow sputum but for the last 2 hrs prior to arrival has been wheezing straight for 2 hrs and came to ED.  Pt admitted to and treat for acute exac systolic CHF and  acute pronchitis.  Pt treated for bronchitis with course of abx , prednisone taper and respitory inhalors.  Pt worked up and treated for chf HAd Trending trops, echo and nuclear stress test.  Pt given IV lasix initially and then switched to p.  Pt to follow up with PMD Dr Tian, and  Dr Jett Castillo within the week. 64M hx of CAD/3vl CABG, HTN, HLD, PAF not on A/C, T2IRDM, CHFrEF (last ECHO 8/2019 EF: 45%), hx of MSSA bacteremia 8/2019 pw SOB. Pt related he has been coughing for about a week - productive occ of yellow sputum but for the last 2 hrs prior to arrival has been wheezing straight for 2 hrs and came to ED.  Pt admitted to and treat for acute exac systolic CHF and  acute bronchitis.  Pt treated for bronchitis with course of abx , prednisone taper and respitory inhalors.  Pt worked up and treated for chf with trending trops, echo and nuclear stress test.  Pt given IV lasix initially and then switched to po.  Pt to follow up with PMD Dr Tian, and  Dr Jett Castillo within the week. 64M hx of CAD/3vl CABG, HTN, HLD, PAF not on A/C, T2IRDM, CHFrEF (last ECHO 8/2019 EF: 45%), hx of MSSA bacteremia 8/2019 pw SOB. Pt related he has been coughing for about a week - productive occ of yellow sputum but for the last 2 hrs prior to arrival has been wheezing straight for 2 hrs and came to ED.  Pt admitted to and treated for acute exac systolic CHF and  acute bronchitis.  Pt treated for bronchitis with course of abx , prednisone taper and respitory inhalors.  Pt worked up and treated for chf with trending trops, echo and nuclear stress test.  Pt given IV lasix initially and then switched to po.  Nuclear stress test showed large fixed defect and reduced LVEF. Pt to follow up with PMD Dr Tian, and  Dr Jett Castillo within the week. 64M hx of CAD/3vl CABG, HTN, HLD, PAF not on A/C, T2IRDM, CHFrEF (last ECHO 8/2019 EF: 45%), hx of MSSA bacteremia 8/2019 pw SOB. Pt related he has been coughing for about a week - productive occ of yellow sputum but for the last 2 hrs prior to arrival has been wheezing straight for 2 hrs and came to ED.  Pt admitted to and treated for acute exac systolic CHF and  acute bronchitis.  Pt treated for bronchitis with course of abx , prednisone taper and respitory inhalors.  Pt worked up and treated for chf with trending trops, echo and nuclear stress test.  Pt given IV lasix initially and then switched to po.  Nuclear stress test showed large fixed defect and reduced LVEF. Cleared for discharge by cardiology. Pt to follow up with PMD Dr Tian, and  Dr Jett Castillo within the week.

## 2019-12-02 NOTE — DISCHARGE NOTE PROVIDER - NSDCMRMEDTOKEN_GEN_ALL_CORE_FT
acetaminophen 325 mg oral tablet: 2 tab(s) orally every 6 hours, As needed, Temp greater or equal to 38C (100.4F), Mild Pain (1 - 3)  alcohol swabs : Apply topically to affected area 4 times a day   Aspirin Enteric Coated 81 mg oral delayed release tablet: 1 tab(s) orally once a day  atorvastatin 80 mg oral tablet: 1 tab(s) orally once a day (at bedtime)  doxycycline monohydrate 100 mg oral capsule: 1 cap(s) orally 2 times a day  Entresto 49 mg-51 mg oral tablet: 1 tab(s) orally 2 times a day  furosemide 40 mg oral tablet: 1 tab(s) orally once a day  glucometer (per patient&#x27;s insurance): 1 application subcutaneous 4 times a day   glucose tablets: 1 tab(s) chewed once a day   guaiFENesin 100 mg/5 mL oral liquid: 5 milliliter(s) orally every 6 hours, As needed, Cough  HumaLOG KwikPen 100 units/mL injectable solution: 10 unit(s) injectable 3 times a day (before meals)   Insulin Pen Needles, 4mm: 1 application subcutaneously 4 times a day . ** Use with insulin pen **   lancets: 1 application subcutaneously 4 times a day   Lantus 100 units/mL subcutaneous solution: 10 unit(s) subcutaneous once a day (at bedtime)  metoprolol succinate 50 mg oral tablet, extended release: 1 tab(s) orally once a day  spironolactone 25 mg oral tablet: 1 tab(s) orally once a day  test strips (per patient&#x27;s insurance): 1 application subcutaneously 4 times a day . ** Compatible with patient&#x27;s glucometer **

## 2019-12-02 NOTE — DISCHARGE NOTE PROVIDER - CARE PROVIDER_API CALL
Jett Castillo)  Cardiovascular Disease; Internal Medicine  [address] [place] [address], [address] [place] [address]  Phone: [phone]  Fax: [phone]  Follow Up Time:

## 2019-12-02 NOTE — PROGRESS NOTE ADULT - ASSESSMENT
Imp    PATIENT WITH CAD PRESENTED WITH SOB AFTER DIETARY DISCRETION  'SLIGHTLY ELEVATED TROPONINS    AWAIT RESULTS OF VMPI

## 2019-12-02 NOTE — DISCHARGE NOTE PROVIDER - NSDCCPCAREPLAN_GEN_ALL_CORE_FT
PRINCIPAL DISCHARGE DIAGNOSIS  Diagnosis: Acute on chronic systolic heart failure  Assessment and Plan of Treatment: added lasix to pt medication regimen      SECONDARY DISCHARGE DIAGNOSES  Diagnosis: PAF (paroxysmal atrial fibrillation)  Assessment and Plan of Treatment:     Diagnosis: HTN (hypertension)  Assessment and Plan of Treatment:     Diagnosis: DM2 (diabetes mellitus, type 2)  Assessment and Plan of Treatment:     Diagnosis: Bronchitis, asthmatic  Assessment and Plan of Treatment: complete course of antbiotics as recommenaded

## 2019-12-02 NOTE — PROGRESS NOTE ADULT - SUBJECTIVE AND OBJECTIVE BOX
Follow up for chf  SUBJ: patient feeling better. no cp no sob  PMH  Moderate aortic stenosis  Systolic congestive heart failure  Paroxysmal atrial fibrillation  Coronary artery disease  Essential hypertension  Type 2 diabetes mellitus with diabetic neuropathy, without long-term current use of insulin      MEDICATIONS  (STANDING):  aspirin enteric coated 81 milliGRAM(s) Oral daily  atorvastatin 80 milliGRAM(s) Oral at bedtime  dextrose 5%. 1000 milliLiter(s) (50 mL/Hr) IV Continuous <Continuous>  dextrose 50% Injectable 12.5 Gram(s) IV Push once  dextrose 50% Injectable 25 Gram(s) IV Push once  dextrose 50% Injectable 25 Gram(s) IV Push once  doxycycline hyclate Capsule 100 milliGRAM(s) Oral two times a day  enoxaparin Injectable 40 milliGRAM(s) SubCutaneous daily  furosemide    Tablet 40 milliGRAM(s) Oral daily  insulin glargine Injectable (LANTUS) 10 Unit(s) SubCutaneous at bedtime  insulin lispro (HumaLOG) corrective regimen sliding scale   SubCutaneous three times a day before meals  insulin lispro (HumaLOG) corrective regimen sliding scale   SubCutaneous at bedtime  insulin lispro Injectable (HumaLOG) 12 Unit(s) SubCutaneous before breakfast  insulin lispro Injectable (HumaLOG) 12 Unit(s) SubCutaneous before dinner  insulin lispro Injectable (HumaLOG) 12 Unit(s) SubCutaneous before lunch  metoprolol succinate ER 50 milliGRAM(s) Oral daily  pantoprazole    Tablet 40 milliGRAM(s) Oral before breakfast  sacubitril 49 mG/valsartan 51 mG 1 Tablet(s) Oral two times a day  spironolactone 25 milliGRAM(s) Oral daily    MEDICATIONS  (PRN):  acetaminophen   Tablet .. 650 milliGRAM(s) Oral every 6 hours PRN Temp greater or equal to 38C (100.4F), Mild Pain (1 - 3)  dextrose 40% Gel 15 Gram(s) Oral once PRN Blood Glucose LESS THAN 70 milliGRAM(s)/deciliter  glucagon  Injectable 1 milliGRAM(s) IntraMuscular once PRN Glucose LESS THAN 70 milligrams/deciliter  guaiFENesin    Syrup 100 milliGRAM(s) Oral every 6 hours PRN Cough        PHYSICAL EXAM:  Vital Signs Last 24 Hrs  T(C): 36.6 (02 Dec 2019 10:38), Max: 36.8 (01 Dec 2019 20:29)  T(F): 97.8 (02 Dec 2019 10:38), Max: 98.3 (01 Dec 2019 20:29)  HR: 67 (02 Dec 2019 10:38) (66 - 74)  BP: 117/93 (02 Dec 2019 10:38) (108/77 - 122/61)  BP(mean): --  RR: 16 (02 Dec 2019 10:38) (14 - 16)  SpO2: 97% (02 Dec 2019 10:38) (97% - 100%)    GENERAL: NAD, well-groomed, well-developed  HEAD:  Atraumatic, Normocephalic  EYES: EOMI, PERRLA, conjunctiva and sclera clear  ENT: Moist mucous membranes,  NECK: Supple, No JVD, no bruits  CHEST/LUNG: Clear to percussion bilaterally; No rales, rhonchi, wheezing, or rubs  HEART: i/vi joe.  ABDOMEN: Soft, Nontender, Nondistended; Bowel sounds present  EXTREMITIES:  2+ Peripheral Pulses, No clubbing, cyanosis, or edema  SKIN: No rashes or lesions  NERVOUS SYSTEM:  Alert & Oriented X3, Good concentration; Motor Strength 5/5 B/L upper and lower extremities; DTRs 2+ intact and symmetric      TELEMETRY:rsr    ECG    LABS:                        12.7   11.77 )-----------( 244      ( 02 Dec 2019 05:50 )             38.1     12-02    136  |  99  |  31<H>  ----------------------------<  239<H>  4.2   |  31  |  1.12    Ca    9.1      02 Dec 2019 05:50  Mg     1.9     12-01      CARDIAC MARKERS ( 02 Dec 2019 05:50 )  .042 ng/mL / x     / x     / x     / x              I&O's Summary    BNP    RADIOLOGY & ADDITIONAL STUDIES:    ECHO:

## 2019-12-02 NOTE — PROGRESS NOTE ADULT - ASSESSMENT
65yo M hx of CAD/3vl CABG, HTN, HLD, PAF not on A/C, T2IRDM, CHFrEF (last ECHO 8/2019 EF: 45%), hx of MSSA bacteremia 8/2019 pw SOB secondary acute on chronic CHF exac and probable bronchitis. Mild troponin elevation which may be due to CHF vs demand ischemia. Plan for nuclear stress test Monday    # Acute decompensated HFrEF with elevated troponin - improved  suspect elevated trop due to demand ischemia  c/w  PO lasix   trend trops-- last trop trend 0.1-->0.066--> 0.079  TTE 11/29/19 with EF 35-40%, grade III DD. mild to mod AS.  TTE 8/1/19: EF 45%. Grade I DD. moderate AS  cont asa, bb, entresto, spironolactone  Scheduled for pharmacological nuclear stress test today   tele monitoring  Cardio following  BNP 3512--> 2068    #Leukocytosis   WBC 11.77 today (16 yesterday)  PCT 0.11  per pulmonary: suspect bronchitis  c/w doxycyline 100mg q12. avoid QT prolonging medications. (QTc prolongation: 525ms)  blood cx - cultures no growth to date   Sputum cx:  normal resp kiley   CT chest as above   RVP-- negative  ID following  Cont to monitor     # h/o CAD s/p CABG with elevated trop  -f/u  trops  -tele monitoring--no acute events   - nuclear stress test today  - c/w ASA, BB, lipitor    # T2IRDM  lantus at night   and pre meal coverage    cont to monitor sugars   POCT Blood Glucose.: 213 mg/dL (02 Dec 2019 08:02)  POCT Blood Glucose.: 96 mg/dL (01 Dec 2019 20:35)  POCT Blood Glucose.: 121 mg/dL (01 Dec 2019 17:10)  POCT Blood Glucose.: 208 mg/dL (01 Dec 2019 11:52)  HgbA1c 8.7--takes lantus 10 iu at home-should increase lantus on discharge     Dispo: home pending out come of stress test   DVT ppx: lovenox 63yo M hx of CAD/3vl CABG, HTN, HLD, PAF not on A/C, T2IRDM, CHFrEF (last ECHO 8/2019 EF: 45%), hx of MSSA bacteremia 8/2019 pw SOB secondary acute on chronic CHF exac and probable bronchitis. Mild troponin elevation which may be due to CHF vs demand ischemia. Plan for nuclear stress test Monday    # Acute decompensated HFrEF with elevated troponin - improved  suspect elevated trop due to demand ischemia  c/w PO lasix   trend trops-- last trop trend 0.1-->0.066--> 0.079  TTE 11/29/19 with EF 35-40%, grade III DD. mild to mod AS.  TTE 8/1/19: EF 45%. Grade I DD. moderate AS  cont asa, bb, entresto, spironolactone  Scheduled for pharmacological nuclear stress test today   tele monitoring  Cardio following  BNP 3512--> 2068    #Leukocytosis   WBC 11.77 today (16 yesterday)  PCT 0.11  per pulmonary: suspect bronchitis  c/w doxycyline 100mg q12 for 5 days total. avoid QT prolonging medications. (QTc prolongation: 525ms)  blood cx - cultures no growth to date   Sputum cx:  normal resp kiley   CT chest as above   RVP-- negative  ID following  Cont to monitor     # h/o CAD s/p CABG with elevated trop.   - f/u  trops  - tele monitoring--no acute events   - nuclear stress test today  - c/w ASA, BB, lipitor    # T2IRDM  lantus at night   and pre meal coverage    cont to monitor sugars   POCT Blood Glucose.: 213 mg/dL (02 Dec 2019 08:02)  POCT Blood Glucose.: 96 mg/dL (01 Dec 2019 20:35)  POCT Blood Glucose.: 121 mg/dL (01 Dec 2019 17:10)  POCT Blood Glucose.: 208 mg/dL (01 Dec 2019 11:52)  HgbA1c 8.7--takes lantus 10 iu at home-should increase lantus on discharge     Dispo: home pending outcome of stress test   DVT ppx: lovenox

## 2019-12-04 ENCOUNTER — TRANSCRIPTION ENCOUNTER (OUTPATIENT)
Age: 64
End: 2019-12-04

## 2019-12-04 ENCOUNTER — APPOINTMENT (OUTPATIENT)
Dept: CARDIOLOGY | Facility: CLINIC | Age: 64
End: 2019-12-04

## 2019-12-05 ENCOUNTER — APPOINTMENT (OUTPATIENT)
Dept: CARDIOLOGY | Facility: CLINIC | Age: 64
End: 2019-12-05
Payer: COMMERCIAL

## 2019-12-05 ENCOUNTER — NON-APPOINTMENT (OUTPATIENT)
Age: 64
End: 2019-12-05

## 2019-12-05 VITALS
DIASTOLIC BLOOD PRESSURE: 76 MMHG | WEIGHT: 282 LBS | HEIGHT: 70 IN | HEART RATE: 62 BPM | BODY MASS INDEX: 40.37 KG/M2 | SYSTOLIC BLOOD PRESSURE: 124 MMHG | OXYGEN SATURATION: 100 %

## 2019-12-05 LAB
ALBUMIN SERPL ELPH-MCNC: 4.4 G/DL
ALP BLD-CCNC: 80 U/L
ALT SERPL-CCNC: 25 U/L
ANION GAP SERPL CALC-SCNC: 13 MMOL/L
AST SERPL-CCNC: 20 U/L
BASOPHILS # BLD AUTO: 0.09 K/UL
BASOPHILS NFR BLD AUTO: 0.8 %
BILIRUB SERPL-MCNC: 0.7 MG/DL
BUN SERPL-MCNC: 40 MG/DL
CALCIUM SERPL-MCNC: 9.5 MG/DL
CHLORIDE SERPL-SCNC: 99 MMOL/L
CO2 SERPL-SCNC: 25 MMOL/L
CREAT SERPL-MCNC: 1.22 MG/DL
EOSINOPHIL # BLD AUTO: 0.26 K/UL
EOSINOPHIL NFR BLD AUTO: 2.2 %
ESTIMATED AVERAGE GLUCOSE: 206 MG/DL
GLUCOSE SERPL-MCNC: 153 MG/DL
HBA1C MFR BLD HPLC: 8.8 %
HCT VFR BLD CALC: 39.4 %
HGB BLD-MCNC: 13.4 G/DL
IMM GRANULOCYTES NFR BLD AUTO: 0.6 %
LYMPHOCYTES # BLD AUTO: 3.94 K/UL
LYMPHOCYTES NFR BLD AUTO: 33 %
MAN DIFF?: NORMAL
MCHC RBC-ENTMCNC: 30.4 PG
MCHC RBC-ENTMCNC: 34 GM/DL
MCV RBC AUTO: 89.3 FL
MONOCYTES # BLD AUTO: 0.92 K/UL
MONOCYTES NFR BLD AUTO: 7.7 %
NEUTROPHILS # BLD AUTO: 6.67 K/UL
NEUTROPHILS NFR BLD AUTO: 55.7 %
NT-PROBNP SERPL-MCNC: 485 PG/ML
PLATELET # BLD AUTO: 339 K/UL
POTASSIUM SERPL-SCNC: 5.1 MMOL/L
PROT SERPL-MCNC: 7.3 G/DL
RBC # BLD: 4.41 M/UL
RBC # FLD: 12.7 %
SODIUM SERPL-SCNC: 137 MMOL/L
TSH SERPL-ACNC: 2.06 UIU/ML
WBC # FLD AUTO: 11.95 K/UL

## 2019-12-05 PROCEDURE — 99215 OFFICE O/P EST HI 40 MIN: CPT

## 2019-12-05 PROCEDURE — 93000 ELECTROCARDIOGRAM COMPLETE: CPT

## 2019-12-06 LAB
CULTURE RESULTS: SIGNIFICANT CHANGE UP
CULTURE RESULTS: SIGNIFICANT CHANGE UP
SPECIMEN SOURCE: SIGNIFICANT CHANGE UP
SPECIMEN SOURCE: SIGNIFICANT CHANGE UP

## 2019-12-09 ENCOUNTER — APPOINTMENT (OUTPATIENT)
Dept: CARDIOLOGY | Facility: CLINIC | Age: 64
End: 2019-12-09
Payer: COMMERCIAL

## 2019-12-09 PROCEDURE — 93798 PHYS/QHP OP CAR RHAB W/ECG: CPT

## 2019-12-11 ENCOUNTER — APPOINTMENT (OUTPATIENT)
Dept: CARDIOLOGY | Facility: CLINIC | Age: 64
End: 2019-12-11
Payer: COMMERCIAL

## 2019-12-11 PROCEDURE — 93798 PHYS/QHP OP CAR RHAB W/ECG: CPT

## 2019-12-12 ENCOUNTER — APPOINTMENT (OUTPATIENT)
Dept: CARDIOLOGY | Facility: CLINIC | Age: 64
End: 2019-12-12
Payer: COMMERCIAL

## 2019-12-12 PROCEDURE — 93798 PHYS/QHP OP CAR RHAB W/ECG: CPT

## 2019-12-16 ENCOUNTER — APPOINTMENT (OUTPATIENT)
Dept: CARDIOLOGY | Facility: CLINIC | Age: 64
End: 2019-12-16
Payer: COMMERCIAL

## 2019-12-16 PROCEDURE — 93798 PHYS/QHP OP CAR RHAB W/ECG: CPT

## 2019-12-18 ENCOUNTER — APPOINTMENT (OUTPATIENT)
Dept: CARDIOLOGY | Facility: CLINIC | Age: 64
End: 2019-12-18
Payer: COMMERCIAL

## 2019-12-18 PROCEDURE — 93798 PHYS/QHP OP CAR RHAB W/ECG: CPT

## 2019-12-23 ENCOUNTER — APPOINTMENT (OUTPATIENT)
Dept: CARDIOLOGY | Facility: CLINIC | Age: 64
End: 2019-12-23
Payer: COMMERCIAL

## 2019-12-23 PROCEDURE — 93798 PHYS/QHP OP CAR RHAB W/ECG: CPT

## 2019-12-24 ENCOUNTER — APPOINTMENT (OUTPATIENT)
Dept: INTERNAL MEDICINE | Facility: CLINIC | Age: 64
End: 2019-12-24

## 2019-12-30 ENCOUNTER — APPOINTMENT (OUTPATIENT)
Dept: CARDIOLOGY | Facility: CLINIC | Age: 64
End: 2019-12-30
Payer: COMMERCIAL

## 2019-12-30 PROCEDURE — 93798 PHYS/QHP OP CAR RHAB W/ECG: CPT

## 2020-01-02 ENCOUNTER — APPOINTMENT (OUTPATIENT)
Dept: CARDIOLOGY | Facility: CLINIC | Age: 65
End: 2020-01-02
Payer: COMMERCIAL

## 2020-01-02 PROCEDURE — 93798 PHYS/QHP OP CAR RHAB W/ECG: CPT

## 2020-01-06 ENCOUNTER — APPOINTMENT (OUTPATIENT)
Dept: CARDIOLOGY | Facility: CLINIC | Age: 65
End: 2020-01-06
Payer: COMMERCIAL

## 2020-01-06 PROCEDURE — 93798 PHYS/QHP OP CAR RHAB W/ECG: CPT

## 2020-01-07 ENCOUNTER — APPOINTMENT (OUTPATIENT)
Dept: PULMONOLOGY | Facility: CLINIC | Age: 65
End: 2020-01-07

## 2020-01-08 ENCOUNTER — APPOINTMENT (OUTPATIENT)
Dept: CARDIOLOGY | Facility: CLINIC | Age: 65
End: 2020-01-08
Payer: COMMERCIAL

## 2020-01-08 PROCEDURE — 93798 PHYS/QHP OP CAR RHAB W/ECG: CPT

## 2020-01-13 ENCOUNTER — NON-APPOINTMENT (OUTPATIENT)
Age: 65
End: 2020-01-13

## 2020-01-13 ENCOUNTER — APPOINTMENT (OUTPATIENT)
Dept: CARDIOLOGY | Facility: CLINIC | Age: 65
End: 2020-01-13
Payer: COMMERCIAL

## 2020-01-13 VITALS
HEART RATE: 71 BPM | WEIGHT: 282 LBS | OXYGEN SATURATION: 97 % | DIASTOLIC BLOOD PRESSURE: 76 MMHG | HEIGHT: 70 IN | SYSTOLIC BLOOD PRESSURE: 119 MMHG | BODY MASS INDEX: 40.37 KG/M2

## 2020-01-13 PROCEDURE — 93000 ELECTROCARDIOGRAM COMPLETE: CPT

## 2020-01-13 PROCEDURE — 93798 PHYS/QHP OP CAR RHAB W/ECG: CPT

## 2020-01-13 PROCEDURE — 99215 OFFICE O/P EST HI 40 MIN: CPT | Mod: 25

## 2020-01-15 ENCOUNTER — APPOINTMENT (OUTPATIENT)
Dept: CARDIOLOGY | Facility: CLINIC | Age: 65
End: 2020-01-15
Payer: COMMERCIAL

## 2020-01-15 PROCEDURE — 93798 PHYS/QHP OP CAR RHAB W/ECG: CPT

## 2020-01-20 ENCOUNTER — APPOINTMENT (OUTPATIENT)
Dept: CARDIOLOGY | Facility: CLINIC | Age: 65
End: 2020-01-20
Payer: COMMERCIAL

## 2020-01-20 PROCEDURE — 93798 PHYS/QHP OP CAR RHAB W/ECG: CPT

## 2020-01-22 ENCOUNTER — APPOINTMENT (OUTPATIENT)
Dept: CARDIOLOGY | Facility: CLINIC | Age: 65
End: 2020-01-22
Payer: COMMERCIAL

## 2020-01-22 PROCEDURE — 93798 PHYS/QHP OP CAR RHAB W/ECG: CPT

## 2020-01-27 ENCOUNTER — APPOINTMENT (OUTPATIENT)
Dept: CARDIOLOGY | Facility: CLINIC | Age: 65
End: 2020-01-27
Payer: COMMERCIAL

## 2020-01-27 PROCEDURE — 93798 PHYS/QHP OP CAR RHAB W/ECG: CPT

## 2020-02-13 ENCOUNTER — TRANSCRIPTION ENCOUNTER (OUTPATIENT)
Age: 65
End: 2020-02-13

## 2020-02-17 ENCOUNTER — TRANSCRIPTION ENCOUNTER (OUTPATIENT)
Age: 65
End: 2020-02-17

## 2020-02-19 ENCOUNTER — APPOINTMENT (OUTPATIENT)
Dept: CARDIOLOGY | Facility: CLINIC | Age: 65
End: 2020-02-19

## 2020-02-20 ENCOUNTER — APPOINTMENT (OUTPATIENT)
Dept: CARDIOLOGY | Facility: CLINIC | Age: 65
End: 2020-02-20

## 2020-02-24 ENCOUNTER — APPOINTMENT (OUTPATIENT)
Dept: CARDIOLOGY | Facility: CLINIC | Age: 65
End: 2020-02-24

## 2020-02-26 ENCOUNTER — APPOINTMENT (OUTPATIENT)
Dept: CARDIOLOGY | Facility: CLINIC | Age: 65
End: 2020-02-26

## 2020-02-27 ENCOUNTER — APPOINTMENT (OUTPATIENT)
Dept: CARDIOLOGY | Facility: CLINIC | Age: 65
End: 2020-02-27

## 2020-03-02 ENCOUNTER — APPOINTMENT (OUTPATIENT)
Dept: INTERNAL MEDICINE | Facility: CLINIC | Age: 65
End: 2020-03-02
Payer: COMMERCIAL

## 2020-03-02 VITALS
DIASTOLIC BLOOD PRESSURE: 78 MMHG | SYSTOLIC BLOOD PRESSURE: 116 MMHG | TEMPERATURE: 98 F | HEIGHT: 70 IN | BODY MASS INDEX: 40.37 KG/M2 | HEART RATE: 76 BPM | WEIGHT: 282 LBS

## 2020-03-02 DIAGNOSIS — R21 RASH AND OTHER NONSPECIFIC SKIN ERUPTION: ICD-10-CM

## 2020-03-02 PROCEDURE — 99214 OFFICE O/P EST MOD 30 MIN: CPT | Mod: 25

## 2020-03-02 PROCEDURE — 36415 COLL VENOUS BLD VENIPUNCTURE: CPT

## 2020-03-02 RX ORDER — SEMAGLUTIDE 1.34 MG/ML
2 INJECTION, SOLUTION SUBCUTANEOUS
Qty: 1 | Refills: 3 | Status: DISCONTINUED | COMMUNITY
Start: 2019-11-22 | End: 2020-03-02

## 2020-03-02 NOTE — PHYSICAL EXAM
[No Acute Distress] : no acute distress [Normal Outer Ear/Nose] : the outer ears and nose were normal in appearance [Well Nourished] : well nourished [Normal Oropharynx] : the oropharynx was normal [No JVD] : no jugular venous distention [No Respiratory Distress] : no respiratory distress  [No Lymphadenopathy] : no lymphadenopathy [Normal Rate] : normal rate  [No Accessory Muscle Use] : no accessory muscle use [Soft] : abdomen soft [Regular Rhythm] : with a regular rhythm [No HSM] : no HSM [No Edema] : there was no peripheral edema

## 2020-03-02 NOTE — REVIEW OF SYSTEMS
[Night Sweats] : no night sweats [Fever] : no fever [Earache] : no earache [Nasal Discharge] : no nasal discharge [Chest Pain] : no chest pain [Orthopena] : no orthopnea [Shortness Of Breath] : no shortness of breath [Wheezing] : no wheezing [Abdominal Pain] : no abdominal pain [Vomiting] : no vomiting [Dysuria] : no dysuria [Hematuria] : no hematuria

## 2020-03-02 NOTE — PLAN
[FreeTextEntry1] : f/u diabetes and chf\par extensive blood\par consider ozempic in future(Not on yet)\par

## 2020-03-02 NOTE — HISTORY OF PRESENT ILLNESS
[de-identified] : diabetes on med\par history of chf on entresto\par no problems with vigourous exercise\par can not lose weight\par not taking Ozempic [FreeTextEntry1] : f/u diabetes and other

## 2020-03-03 ENCOUNTER — NON-APPOINTMENT (OUTPATIENT)
Age: 65
End: 2020-03-03

## 2020-03-03 ENCOUNTER — APPOINTMENT (OUTPATIENT)
Dept: PULMONOLOGY | Facility: CLINIC | Age: 65
End: 2020-03-03
Payer: COMMERCIAL

## 2020-03-03 VITALS
BODY MASS INDEX: 40.37 KG/M2 | OXYGEN SATURATION: 96 % | RESPIRATION RATE: 17 BRPM | HEART RATE: 66 BPM | WEIGHT: 282 LBS | HEIGHT: 70 IN | SYSTOLIC BLOOD PRESSURE: 122 MMHG | DIASTOLIC BLOOD PRESSURE: 80 MMHG

## 2020-03-03 DIAGNOSIS — G25.81 RESTLESS LEGS SYNDROME: ICD-10-CM

## 2020-03-03 DIAGNOSIS — R05 COUGH: ICD-10-CM

## 2020-03-03 DIAGNOSIS — R06.02 SHORTNESS OF BREATH: ICD-10-CM

## 2020-03-03 LAB
25(OH)D3 SERPL-MCNC: 29.2 NG/ML
ALBUMIN SERPL ELPH-MCNC: 4.8 G/DL
ALP BLD-CCNC: 79 U/L
ALT SERPL-CCNC: 18 U/L
ANION GAP SERPL CALC-SCNC: 15 MMOL/L
AST SERPL-CCNC: 21 U/L
BASOPHILS # BLD AUTO: 0.06 K/UL
BASOPHILS NFR BLD AUTO: 0.6 %
BILIRUB SERPL-MCNC: 1.2 MG/DL
BUN SERPL-MCNC: 24 MG/DL
CALCIUM SERPL-MCNC: 9 MG/DL
CHLORIDE SERPL-SCNC: 98 MMOL/L
CHOLEST SERPL-MCNC: 138 MG/DL
CHOLEST/HDLC SERPL: 4.2 RATIO
CO2 SERPL-SCNC: 26 MMOL/L
CREAT SERPL-MCNC: 1.37 MG/DL
EOSINOPHIL # BLD AUTO: 0.32 K/UL
EOSINOPHIL NFR BLD AUTO: 3.1 %
ESTIMATED AVERAGE GLUCOSE: 174 MG/DL
ESTRADIOL SERPL-MCNC: 21 PG/ML
FSH SERPL-MCNC: 10.1 IU/L
GLUCOSE SERPL-MCNC: 122 MG/DL
HBA1C MFR BLD HPLC: 7.7 %
HCT VFR BLD CALC: 41 %
HDLC SERPL-MCNC: 33 MG/DL
HGB BLD-MCNC: 13.9 G/DL
IMM GRANULOCYTES NFR BLD AUTO: 0.4 %
LDLC SERPL CALC-MCNC: 75 MG/DL
LH SERPL-ACNC: 9.1 IU/L
LYMPHOCYTES # BLD AUTO: 3.39 K/UL
LYMPHOCYTES NFR BLD AUTO: 32.9 %
MAN DIFF?: NORMAL
MCHC RBC-ENTMCNC: 31.4 PG
MCHC RBC-ENTMCNC: 33.9 GM/DL
MCV RBC AUTO: 92.8 FL
MONOCYTES # BLD AUTO: 0.79 K/UL
MONOCYTES NFR BLD AUTO: 7.7 %
NEUTROPHILS # BLD AUTO: 5.7 K/UL
NEUTROPHILS NFR BLD AUTO: 55.3 %
NT-PROBNP SERPL-MCNC: 469 PG/ML
PLATELET # BLD AUTO: 239 K/UL
POTASSIUM SERPL-SCNC: 4.4 MMOL/L
PROLACTIN SERPL-MCNC: 12.9 NG/ML
PROT SERPL-MCNC: 7.4 G/DL
PSA FREE FLD-MCNC: 24 %
PSA FREE SERPL-MCNC: 0.09 NG/ML
PSA SERPL-MCNC: 0.38 NG/ML
RBC # BLD: 4.42 M/UL
RBC # FLD: 13.4 %
SODIUM SERPL-SCNC: 139 MMOL/L
T3FREE SERPL-MCNC: 3.21 PG/ML
T4 FREE SERPL-MCNC: 1.2 NG/DL
TESTOST SERPL-MCNC: 418 NG/DL
TRIGL SERPL-MCNC: 151 MG/DL
TSH SERPL-ACNC: 2.57 UIU/ML
URATE SERPL-MCNC: 10.2 MG/DL
WBC # FLD AUTO: 10.3 K/UL

## 2020-03-03 PROCEDURE — 95012 NITRIC OXIDE EXP GAS DETER: CPT

## 2020-03-03 PROCEDURE — 94010 BREATHING CAPACITY TEST: CPT

## 2020-03-03 PROCEDURE — 99214 OFFICE O/P EST MOD 30 MIN: CPT | Mod: 25

## 2020-03-03 NOTE — PHYSICAL EXAM
[Normal Appearance] : normal appearance [General Appearance - Well Developed] : well developed [No Deformities] : no deformities [General Appearance - Well Nourished] : well nourished [Well Groomed] : well groomed [Eyelids - No Xanthelasma] : the eyelids demonstrated no xanthelasmas [Normal Conjunctiva] : the conjunctiva exhibited no abnormalities [General Appearance - In No Acute Distress] : no acute distress [Normal Oropharynx] : normal oropharynx [III] : III [Neck Appearance] : the appearance of the neck was normal [Jugular Venous Distention Increased] : there was no jugular-venous distention [Neck Cervical Mass (___cm)] : no neck mass was observed [Thyroid Nodule] : there were no palpable thyroid nodules [Thyroid Diffuse Enlargement] : the thyroid was not enlarged [Heart Rate And Rhythm] : heart rate and rhythm were normal [Murmurs] : no murmurs present [Heart Sounds] : normal S1 and S2 [Exaggerated Use Of Accessory Muscles For Inspiration] : no accessory muscle use [Auscultation Breath Sounds / Voice Sounds] : lungs were clear to auscultation bilaterally [Respiration, Rhythm And Depth] : normal respiratory rhythm and effort [Abdomen Soft] : soft [Abdomen Tenderness] : non-tender [Abnormal Walk] : normal gait [Abdomen Mass (___ Cm)] : no abdominal mass palpated [Gait - Sufficient For Exercise Testing] : the gait was sufficient for exercise testing [Cyanosis, Localized] : no localized cyanosis [Nail Clubbing] : no clubbing of the fingernails [Petechial Hemorrhages (___cm)] : no petechial hemorrhages [Skin Color & Pigmentation] : normal skin color and pigmentation [] : no rash [Deep Tendon Reflexes (DTR)] : deep tendon reflexes were 2+ and symmetric [Skin Turgor] : normal skin turgor [Sensation] : the sensory exam was normal to light touch and pinprick [No Focal Deficits] : no focal deficits [Oriented To Time, Place, And Person] : oriented to person, place, and time [Affect] : the affect was normal [Impaired Insight] : insight and judgment were intact [FreeTextEntry1] : I:E ratio 1:3; clear

## 2020-03-03 NOTE — HISTORY OF PRESENT ILLNESS
[FreeTextEntry1] : Mr. KENNEDY is a 64 year old male presenting to the office today for initial pulmonary evaluation. His chief complaint is \par - saw Danoff for oral appliance for sleep apnea.\par - Next Tuesday he will get fitting for the oral appliance \par - Thanksgiving he had a lot of salt intake which put him in the hospital in [place]. He started cardio rehab 2 x a week. Went back to martial arts and feels improved since. \par - He has been exercising, going to the gym. \par - He notes hes having trouble losing weight, he is going to get a weekly shot to decline his appetite. \par - he notes he needs cataract surgery done. The floaters are beginning to bother him. \par - he denies coughing / wheezing \par - he notes he tosses and turns throughout the night.\par - He finds he sleeps best after exercise \par - He notes his bowels are not regular, he is constipated \par - when he exercises heavy, his vision gets clearer \par -he denies any headaches, nausea, vomiting, fever, chills, sweats, chest pain, chest pressure, diarrhea, dysphagia, dizziness, sour taste in the mouth, leg swelling, leg pain, itchy eyes, itchy ears.

## 2020-03-03 NOTE — ADDENDUM
[FreeTextEntry1] : Documented by Maria M Eric acting as a scribe for Dr. Israel Levy on 03/03/2020.\par \par All medical record entries made by the Scribe were at my, Dr. Israel Levy's, direction and personally dictated by me on 03/03/2020. I have reviewed the chart and agree that the record accurately reflects my personal performance of the history, physical exam, assessment and plan. I have also personally directed, reviewed, and agree with the discharge instructions.\par

## 2020-03-03 NOTE — PROCEDURE
[FreeTextEntry1] : PFT revealed moderate restrictive flows, with a FEV1 of  2.54L, which is 72% of predicted, with a irregular flow volume loop\par \par \par FENO was 16; a normal value being less than 25\par Fractional exhaled nitric oxide (FENO) is regarded as a simple, noninvasive method for assessing eosinophilic airway inflammation. Produced by a variety of cells within the lung, nitric oxide (NO) concentrations are generally low in healthy individuals. However, high concentrations of NO appear to be involved in nonspecific host defense mechanisms and chronic inflammatory diseases such as asthma. The American Thoracic Society (ATS) therefore has recommended using FENO to aid in the diagnosis and monitoring of eosinophilic airway inflammation and asthma, and for identifying steroid responsive individuals whose chronic respiratory symptoms may be caused by airway inflammation.\par

## 2020-03-03 NOTE — ASSESSMENT
[FreeTextEntry1] : Mr. KENNEDY is a 64 year old male with a history of CAD s/p open, HTN, HLD, overweight, DM, glaucoma, moderate AS, nonsmoker, PAF, staph bacteremia, JUANI who comes into the office today for follow up pulmonary evaluation for SOB and JUANI. His #1 issue is weight. \par \par The patient's shortness of breath is multifactorial due to:\par - Pulmonary \par    - ?Asthma\par -poor breathing mechanics \par -overweight/out of shape\par -CAD (Leopoldo Castillo)\par \par Problem 1: JUANI\par -recommended to get a dental device consultation with Dr. Charles (pending)\par -Recommended to use Oxy-Aid nasal clip and the Chin Strap together\par -Studies to complete: iron study, thyroid function test, free and total testosterone level \par \par Sleep apnea is associated with adverse clinical consequences which an affect most organ systems. Cardiovascular disease risk includes arrhythmias, atrial fibrillation, hypertension, coronary artery disease, and stroke. Metabolic disorders include diabetes type 2, non-alcoholic fatty liver disease. Mood disorder especially depression; and cognitive decline especially in the elderly. Associations with chronic reflux/Charles’s esophagus some but not all inclusive. \par -Reasons include arousal consistent with hypopnea; respiratory events most prominent in REM sleep or supine position; therefore sleep staging and body position are important for accurate diagnosis and estimation of AHI. \par \par Problem 2: RLS\par -Recommended to consider using Mirapex or Requip\par \par Problem 3: Poor Mechanics of Breathing\par - Proper breathing techniques were reviewed with an emphasis of exhalation. Patient instructed to breath in for 1 second and out for four seconds. Patient was encouraged to not talk while walking. \par \par Problem 4: Overweight\par -Weight loss, exercise, and diet control were discussed and are highly encouraged. Treatment options were given such as, aqua therapy, and contacting a nutritionist. Recommended to use the elliptical, stationary bike, less use of treadmill. Mindful eating was explained to the patient Obesity is associated with worsening asthma, shortness of breath, and potential for cardiac disease, diabetes, and other underlying medical conditions. \par \par Problem 5: Cardiac\par -recommended to follow up with a cardiologist (Leopoldo Castillo)\par \par Problem 6: health maintenance\par - Consultation - Dr. De Jesus\par - Bariatric Nutritionist - [facility] j[facility] \par -s/p influenza vaccine (10/30/19)\par -recommended strep pneumonia vaccines: Prevnar-13 vaccine, followed by Pneumo vaccine 23 one year following\par -recommended early intervention for URIs\par -recommended regular osteoporosis evaluations\par -recommended early dermatological evaluations\par -recommended after the age of 50 to the age of 70, colonoscopy every 5 years \par \par  Follow up in 6-8 weeks\par -he  is recommended to call with any changes, questions, or concerns.

## 2020-03-04 ENCOUNTER — TRANSCRIPTION ENCOUNTER (OUTPATIENT)
Age: 65
End: 2020-03-04

## 2020-03-05 ENCOUNTER — TRANSCRIPTION ENCOUNTER (OUTPATIENT)
Age: 65
End: 2020-03-05

## 2020-03-05 LAB
APO LP(A) SERPL-MCNC: 16.9 NMOL/L
SHBG SERPL-SCNC: 42 NMOL/L
TESTOST BND SERPL-MCNC: 7.61 NG/DL
TESTOSTERONE BIOAVAILABLE: 97 NG/DL
TESTOSTERONE TOTAL S: 423 NG/DL

## 2020-03-09 ENCOUNTER — TRANSCRIPTION ENCOUNTER (OUTPATIENT)
Age: 65
End: 2020-03-09

## 2020-03-09 ENCOUNTER — APPOINTMENT (OUTPATIENT)
Dept: CARDIOLOGY | Facility: CLINIC | Age: 65
End: 2020-03-09

## 2020-03-12 NOTE — PATIENT PROFILE ADULT - MEDICATIONS/VISITS
If this is an acute issue, then would recommend that patient be evaluated in the walk-in clinic rather than waiting until next week to be evaluated.    no

## 2020-03-13 ENCOUNTER — APPOINTMENT (OUTPATIENT)
Dept: ENDOCRINOLOGY | Facility: CLINIC | Age: 65
End: 2020-03-13
Payer: COMMERCIAL

## 2020-03-13 VITALS
OXYGEN SATURATION: 96 % | HEIGHT: 70 IN | WEIGHT: 282 LBS | SYSTOLIC BLOOD PRESSURE: 120 MMHG | DIASTOLIC BLOOD PRESSURE: 80 MMHG | BODY MASS INDEX: 40.37 KG/M2 | HEART RATE: 72 BPM

## 2020-03-13 PROCEDURE — 99214 OFFICE O/P EST MOD 30 MIN: CPT

## 2020-03-15 LAB
ALBUMIN SERPL ELPH-MCNC: 4.7 G/DL
ALP BLD-CCNC: 81 U/L
ALT SERPL-CCNC: 18 U/L
ANION GAP SERPL CALC-SCNC: 11 MMOL/L
AST SERPL-CCNC: 15 U/L
BILIRUB SERPL-MCNC: 1.1 MG/DL
BUN SERPL-MCNC: 19 MG/DL
CALCIUM SERPL-MCNC: 10 MG/DL
CHLORIDE SERPL-SCNC: 102 MMOL/L
CO2 SERPL-SCNC: 27 MMOL/L
CREAT SERPL-MCNC: 1.01 MG/DL
CREAT SPEC-SCNC: 123 MG/DL
GLUCOSE SERPL-MCNC: 109 MG/DL
MICROALBUMIN 24H UR DL<=1MG/L-MCNC: 2 MG/DL
MICROALBUMIN/CREAT 24H UR-RTO: 16 MG/G
POTASSIUM SERPL-SCNC: 4.7 MMOL/L
PROT SERPL-MCNC: 7.4 G/DL
SODIUM SERPL-SCNC: 140 MMOL/L

## 2020-03-15 NOTE — ASSESSMENT
[FreeTextEntry1] : 65 yo M with PMH DM2, HTN, HLD, CABG ( 2018), moderate AS, sCHF, A fib, JUANI.\par \par 1. DM2 - to send log and confirm regimen. would like to add ozempic. + urine micro - repeat. continue entresto\par \par 2. HTN - continue metoprolol, entresto, also on spironolactone and lasix\par \par 3. HLD - continue atorvastatin

## 2020-03-15 NOTE — HISTORY OF PRESENT ILLNESS
[FreeTextEntry1] : 63 yo M with PMH DM2, HTN, HLD, CABG ( 2018), moderate AS, sCHF pending ICD,? A fib, JUANI.\par \par he was diagnosed with DM2 at least 20 years ago\par Microvascular complications: + retinopathy\par he is supposed to be on humalog 8 u tidac and lantus 20 u qhs -unlcear as to what he is actually taking\par FS values currently unknown \par \par \par \par

## 2020-03-17 ENCOUNTER — TRANSCRIPTION ENCOUNTER (OUTPATIENT)
Age: 65
End: 2020-03-17

## 2020-04-03 ENCOUNTER — TRANSCRIPTION ENCOUNTER (OUTPATIENT)
Age: 65
End: 2020-04-03

## 2020-04-03 ENCOUNTER — RX RENEWAL (OUTPATIENT)
Age: 65
End: 2020-04-03

## 2020-04-06 ENCOUNTER — APPOINTMENT (OUTPATIENT)
Dept: CARDIOLOGY | Facility: CLINIC | Age: 65
End: 2020-04-06

## 2020-04-06 ENCOUNTER — TRANSCRIPTION ENCOUNTER (OUTPATIENT)
Age: 65
End: 2020-04-06

## 2020-04-08 ENCOUNTER — TRANSCRIPTION ENCOUNTER (OUTPATIENT)
Age: 65
End: 2020-04-08

## 2020-04-14 ENCOUNTER — TRANSCRIPTION ENCOUNTER (OUTPATIENT)
Age: 65
End: 2020-04-14

## 2020-04-24 ENCOUNTER — TRANSCRIPTION ENCOUNTER (OUTPATIENT)
Age: 65
End: 2020-04-24

## 2020-04-27 ENCOUNTER — APPOINTMENT (OUTPATIENT)
Dept: CARDIOLOGY | Facility: CLINIC | Age: 65
End: 2020-04-27
Payer: COMMERCIAL

## 2020-04-27 PROCEDURE — 99215 OFFICE O/P EST HI 40 MIN: CPT | Mod: 95

## 2020-05-08 ENCOUNTER — TRANSCRIPTION ENCOUNTER (OUTPATIENT)
Age: 65
End: 2020-05-08

## 2020-05-15 ENCOUNTER — TRANSCRIPTION ENCOUNTER (OUTPATIENT)
Age: 65
End: 2020-05-15

## 2020-05-18 ENCOUNTER — RX RENEWAL (OUTPATIENT)
Age: 65
End: 2020-05-18

## 2020-05-22 ENCOUNTER — APPOINTMENT (OUTPATIENT)
Dept: INTERNAL MEDICINE | Facility: CLINIC | Age: 65
End: 2020-05-22

## 2020-05-22 ENCOUNTER — TRANSCRIPTION ENCOUNTER (OUTPATIENT)
Age: 65
End: 2020-05-22

## 2020-05-26 ENCOUNTER — TRANSCRIPTION ENCOUNTER (OUTPATIENT)
Age: 65
End: 2020-05-26

## 2020-05-27 ENCOUNTER — TRANSCRIPTION ENCOUNTER (OUTPATIENT)
Age: 65
End: 2020-05-27

## 2020-05-28 ENCOUNTER — TRANSCRIPTION ENCOUNTER (OUTPATIENT)
Age: 65
End: 2020-05-28

## 2020-05-29 ENCOUNTER — TRANSCRIPTION ENCOUNTER (OUTPATIENT)
Age: 65
End: 2020-05-29

## 2020-06-12 ENCOUNTER — APPOINTMENT (OUTPATIENT)
Dept: CARDIOLOGY | Facility: CLINIC | Age: 65
End: 2020-06-12
Payer: COMMERCIAL

## 2020-06-12 PROCEDURE — 99214 OFFICE O/P EST MOD 30 MIN: CPT | Mod: 95

## 2020-06-15 ENCOUNTER — APPOINTMENT (OUTPATIENT)
Dept: INTERNAL MEDICINE | Facility: CLINIC | Age: 65
End: 2020-06-15

## 2020-06-15 ENCOUNTER — APPOINTMENT (OUTPATIENT)
Dept: ENDOCRINOLOGY | Facility: CLINIC | Age: 65
End: 2020-06-15

## 2020-07-02 ENCOUNTER — APPOINTMENT (OUTPATIENT)
Dept: PULMONOLOGY | Facility: CLINIC | Age: 65
End: 2020-07-02

## 2020-07-24 ENCOUNTER — NON-APPOINTMENT (OUTPATIENT)
Age: 65
End: 2020-07-24

## 2020-07-24 ENCOUNTER — APPOINTMENT (OUTPATIENT)
Dept: INTERNAL MEDICINE | Facility: CLINIC | Age: 65
End: 2020-07-24
Payer: COMMERCIAL

## 2020-07-24 VITALS
DIASTOLIC BLOOD PRESSURE: 78 MMHG | BODY MASS INDEX: 42.48 KG/M2 | SYSTOLIC BLOOD PRESSURE: 122 MMHG | HEART RATE: 69 BPM | RESPIRATION RATE: 17 BRPM | OXYGEN SATURATION: 100 % | WEIGHT: 296.05 LBS | TEMPERATURE: 98.3 F

## 2020-07-24 DIAGNOSIS — H26.9 UNSPECIFIED CATARACT: ICD-10-CM

## 2020-07-24 DIAGNOSIS — Z01.818 ENCOUNTER FOR OTHER PREPROCEDURAL EXAMINATION: ICD-10-CM

## 2020-07-24 PROCEDURE — 93000 ELECTROCARDIOGRAM COMPLETE: CPT

## 2020-07-24 PROCEDURE — 36415 COLL VENOUS BLD VENIPUNCTURE: CPT

## 2020-07-24 PROCEDURE — 99214 OFFICE O/P EST MOD 30 MIN: CPT | Mod: 25

## 2020-07-24 RX ORDER — TRIAMCINOLONE ACETONIDE 1 MG/G
0.1 CREAM TOPICAL TWICE DAILY
Qty: 1 | Refills: 3 | Status: ACTIVE | COMMUNITY
Start: 2020-03-02 | End: 1900-01-01

## 2020-07-24 NOTE — HISTORY OF PRESENT ILLNESS
[Coronary Artery Disease] : coronary artery disease [No Adverse Anesthesia Reaction] : no adverse anesthesia reaction in self or family member [Diabetes] : diabetes [Atrial Fibrillation] : no atrial fibrillation [Aortic Stenosis] : no aortic stenosis [Asthma] : no asthma [Implantable Device/Pacemaker] : no implantable device/pacemaker [Recent Myocardial Infarction] : no recent myocardial infarction [COPD] : no COPD [Sleep Apnea] : no sleep apnea [Smoker] : not a smoker [Chronic Anticoagulation] : no chronic anticoagulation [Chronic Kidney Disease] : no chronic kidney disease [(Patient denies any chest pain, claudication, dyspnea on exertion, orthopnea, palpitations or syncope)] : Patient denies any chest pain, claudication, dyspnea on exertion, orthopnea, palpitations or syncope [FreeTextEntry1] : Cataract [FreeTextEntry3] : Dr Mcfarland [FreeTextEntry2] : 7/28 [FreeTextEntry4] : Cataract \par diabetic on insulin\par cardiomyopathy stable on entresto\par Excellent exercise tolerance

## 2020-07-24 NOTE — PLAN
[FreeTextEntry1] : EKG UNchanged\par Cardiomyopathy with excellent exercise tolerance compensated\par Diabetic on insulin\par \par Medically cleared\par Hold lasix until after the procedure\par Night prior reduce lantus from 20 to 15 units\par AM of surgery no regular insuliin until after the procedure

## 2020-07-26 LAB
ALBUMIN SERPL ELPH-MCNC: 4.8 G/DL
ALP BLD-CCNC: 75 U/L
ALT SERPL-CCNC: 21 U/L
ANION GAP SERPL CALC-SCNC: 13 MMOL/L
AST SERPL-CCNC: 15 U/L
BASOPHILS # BLD AUTO: 0.08 K/UL
BASOPHILS NFR BLD AUTO: 0.9 %
BILIRUB SERPL-MCNC: 1.2 MG/DL
BUN SERPL-MCNC: 18 MG/DL
CALCIUM SERPL-MCNC: 10.2 MG/DL
CHLORIDE SERPL-SCNC: 100 MMOL/L
CHOLEST SERPL-MCNC: 157 MG/DL
CHOLEST/HDLC SERPL: 4.4 RATIO
CO2 SERPL-SCNC: 27 MMOL/L
CREAT SERPL-MCNC: 1.01 MG/DL
EOSINOPHIL # BLD AUTO: 0.26 K/UL
EOSINOPHIL NFR BLD AUTO: 3 %
ESTIMATED AVERAGE GLUCOSE: 200 MG/DL
GLUCOSE SERPL-MCNC: 141 MG/DL
HBA1C MFR BLD HPLC: 8.6 %
HCT VFR BLD CALC: 42 %
HDLC SERPL-MCNC: 35 MG/DL
HGB BLD-MCNC: 14.6 G/DL
IMM GRANULOCYTES NFR BLD AUTO: 0.3 %
LDLC SERPL CALC-MCNC: 96 MG/DL
LYMPHOCYTES # BLD AUTO: 2.56 K/UL
LYMPHOCYTES NFR BLD AUTO: 29.7 %
MAN DIFF?: NORMAL
MCHC RBC-ENTMCNC: 30.9 PG
MCHC RBC-ENTMCNC: 34.8 GM/DL
MCV RBC AUTO: 88.8 FL
MONOCYTES # BLD AUTO: 0.56 K/UL
MONOCYTES NFR BLD AUTO: 6.5 %
NEUTROPHILS # BLD AUTO: 5.13 K/UL
NEUTROPHILS NFR BLD AUTO: 59.6 %
PLATELET # BLD AUTO: 236 K/UL
POTASSIUM SERPL-SCNC: 4.8 MMOL/L
PROT SERPL-MCNC: 7.3 G/DL
RBC # BLD: 4.73 M/UL
RBC # FLD: 13.1 %
SODIUM SERPL-SCNC: 140 MMOL/L
T4 FREE SERPL-MCNC: 1.2 NG/DL
TRIGL SERPL-MCNC: 130 MG/DL
TSH SERPL-ACNC: 2.26 UIU/ML
WBC # FLD AUTO: 8.62 K/UL

## 2020-08-03 ENCOUNTER — TRANSCRIPTION ENCOUNTER (OUTPATIENT)
Age: 65
End: 2020-08-03

## 2020-09-06 NOTE — PROGRESS NOTE ADULT - PROBLEM/PLAN-1
DISPLAY PLAN FREE TEXT
no

## 2020-10-27 ENCOUNTER — TRANSCRIPTION ENCOUNTER (OUTPATIENT)
Age: 65
End: 2020-10-27

## 2020-12-14 NOTE — DISCHARGE NOTE ADULT - CARE PROVIDER_API CALL
Venkatesh Sellers), Cardiovascular Disease; Internal Medicine  [address] [place] [address]  Phone: [phone]  Fax: [phone]
[email or web address]

## 2021-01-03 ENCOUNTER — RX RENEWAL (OUTPATIENT)
Age: 66
End: 2021-01-03

## 2021-01-04 ENCOUNTER — TRANSCRIPTION ENCOUNTER (OUTPATIENT)
Age: 66
End: 2021-01-04

## 2021-03-22 ENCOUNTER — APPOINTMENT (OUTPATIENT)
Dept: INTERNAL MEDICINE | Facility: CLINIC | Age: 66
End: 2021-03-22
Payer: COMMERCIAL

## 2021-03-22 VITALS
WEIGHT: 279 LBS | DIASTOLIC BLOOD PRESSURE: 79 MMHG | SYSTOLIC BLOOD PRESSURE: 131 MMHG | OXYGEN SATURATION: 97 % | HEIGHT: 70 IN | HEART RATE: 64 BPM | TEMPERATURE: 97.8 F | BODY MASS INDEX: 39.94 KG/M2

## 2021-03-22 PROCEDURE — 99214 OFFICE O/P EST MOD 30 MIN: CPT | Mod: 25

## 2021-03-22 PROCEDURE — 99072 ADDL SUPL MATRL&STAF TM PHE: CPT

## 2021-03-22 PROCEDURE — 36415 COLL VENOUS BLD VENIPUNCTURE: CPT

## 2021-03-22 RX ORDER — CHLORHEXIDINE GLUCONATE, 0.12% ORAL RINSE 1.2 MG/ML
0.12 SOLUTION DENTAL
Qty: 473 | Refills: 0 | Status: COMPLETED | COMMUNITY
Start: 2020-10-31

## 2021-03-22 RX ORDER — SEMAGLUTIDE 1.34 MG/ML
2 INJECTION, SOLUTION SUBCUTANEOUS
Qty: 1 | Refills: 3 | Status: DISCONTINUED | COMMUNITY
Start: 2020-03-13 | End: 2021-03-22

## 2021-03-22 RX ORDER — AMOXICILLIN 500 MG/1
500 CAPSULE ORAL
Qty: 21 | Refills: 0 | Status: COMPLETED | COMMUNITY
Start: 2020-10-31

## 2021-03-22 NOTE — HISTORY OF PRESENT ILLNESS
[FreeTextEntry1] : diabetes and chf [de-identified] : Tired\par sugars high off long acting insulin\par man boobs on sprollactone\par excellent fitness ?\par no sob\par appliance for sleep apnea\par \par

## 2021-03-22 NOTE — PLAN
[FreeTextEntry1] : CHF appears compensated\par diabetes probable poor control\par Resume 20 units of lantus\par Sliding scale before meals\par 4 units 150\par 6 unit 200 \par 8 units 250\par 10 units over 300\par Lab for sugar, a1c, pro bnp\par check thyroid, cbc and cmp

## 2021-03-23 LAB
25(OH)D3 SERPL-MCNC: 14.6 NG/ML
ALBUMIN SERPL ELPH-MCNC: 4.8 G/DL
ALP BLD-CCNC: 90 U/L
ALT SERPL-CCNC: 26 U/L
ANION GAP SERPL CALC-SCNC: 10 MMOL/L
AST SERPL-CCNC: 18 U/L
BASOPHILS # BLD AUTO: 0.07 K/UL
BASOPHILS NFR BLD AUTO: 0.8 %
BILIRUB SERPL-MCNC: 1.2 MG/DL
BUN SERPL-MCNC: 21 MG/DL
CALCIUM SERPL-MCNC: 10.3 MG/DL
CHLORIDE SERPL-SCNC: 97 MMOL/L
CHOLEST SERPL-MCNC: 199 MG/DL
CO2 SERPL-SCNC: 29 MMOL/L
CREAT SERPL-MCNC: 1 MG/DL
EOSINOPHIL # BLD AUTO: 0.25 K/UL
EOSINOPHIL NFR BLD AUTO: 2.7 %
ESTIMATED AVERAGE GLUCOSE: 338 MG/DL
GLUCOSE SERPL-MCNC: 198 MG/DL
HBA1C MFR BLD HPLC: 13.4 %
HCT VFR BLD CALC: 45.1 %
HDLC SERPL-MCNC: 33 MG/DL
HGB BLD-MCNC: 15.7 G/DL
IMM GRANULOCYTES NFR BLD AUTO: 0.2 %
LDLC SERPL CALC-MCNC: 121 MG/DL
LYMPHOCYTES # BLD AUTO: 3.42 K/UL
LYMPHOCYTES NFR BLD AUTO: 36.9 %
MAN DIFF?: NORMAL
MCHC RBC-ENTMCNC: 31.2 PG
MCHC RBC-ENTMCNC: 34.8 GM/DL
MCV RBC AUTO: 89.5 FL
MONOCYTES # BLD AUTO: 0.64 K/UL
MONOCYTES NFR BLD AUTO: 6.9 %
NEUTROPHILS # BLD AUTO: 4.86 K/UL
NEUTROPHILS NFR BLD AUTO: 52.5 %
NONHDLC SERPL-MCNC: 165 MG/DL
NT-PROBNP SERPL-MCNC: 563 PG/ML
PLATELET # BLD AUTO: 209 K/UL
POTASSIUM SERPL-SCNC: 4.6 MMOL/L
PROT SERPL-MCNC: 7.4 G/DL
RBC # BLD: 5.04 M/UL
RBC # FLD: 13.4 %
SODIUM SERPL-SCNC: 137 MMOL/L
T4 FREE SERPL-MCNC: 1.2 NG/DL
TRIGL SERPL-MCNC: 221 MG/DL
TSH SERPL-ACNC: 3.34 UIU/ML
WBC # FLD AUTO: 9.26 K/UL

## 2021-03-24 RX ORDER — FUROSEMIDE 20 MG/1
20 TABLET ORAL DAILY
Qty: 90 | Refills: 3 | Status: DISCONTINUED | COMMUNITY
Start: 2019-12-05 | End: 2021-03-24

## 2021-12-09 NOTE — PROVIDER CONTACT NOTE (CRITICAL VALUE NOTIFICATION) - TEST AND RESULT REPORTED:
Patient/Caregiver provided printed discharge information.
blood cultures, aerobic bottle-Staph A.  anaerobic bottle gram+ cocci clusters
gram positive cocci in clusters

## 2021-12-22 NOTE — H&P ADULT - HISTORY OF PRESENT ILLNESS
64M hx of CAD/3vl CABG, HTN, HLD, PAF not on A/C, T2IRDM, CHFrEF (last ECHO 8/2019 EF: 45%), hx of MSSA bacteremia 8/2019 pw SOB. Pt related he has been coughing for about a week - productive occ of yellow sputum but for the last 2 hrs prior to arrival has been wheezing straight for 2 hrs and came to ED. It was not assoc with HA, dizziness, palps, SOB, diaphoresis, nausea or CP. Denied any fevers, chills, NVD, dysuria or flank pain. In ED, noted diffuse wheezing on exam, hypertensive 191/110, P:72 sat 93% on RA. s/p duonebs with partial relief, CXR with CHF and given Lasix 40mg IV and sL nitro x 1. Labs: trop: 0.099, bnp: 3512, gluc: 312
(1) obesity (BMI greater than 25)

## 2022-02-11 ENCOUNTER — TRANSCRIPTION ENCOUNTER (OUTPATIENT)
Age: 67
End: 2022-02-11

## 2022-02-11 ENCOUNTER — NON-APPOINTMENT (OUTPATIENT)
Age: 67
End: 2022-02-11

## 2022-02-11 ENCOUNTER — EMERGENCY (EMERGENCY)
Facility: HOSPITAL | Age: 67
LOS: 1 days | Discharge: HOME | End: 2022-02-11
Payer: COMMERCIAL

## 2022-02-11 VITALS
OXYGEN SATURATION: 95 % | SYSTOLIC BLOOD PRESSURE: 136 MMHG | HEART RATE: 80 BPM | TEMPERATURE: 98 F | RESPIRATION RATE: 18 BRPM | DIASTOLIC BLOOD PRESSURE: 80 MMHG

## 2022-02-11 VITALS
TEMPERATURE: 98 F | WEIGHT: 274.92 LBS | HEART RATE: 77 BPM | DIASTOLIC BLOOD PRESSURE: 87 MMHG | OXYGEN SATURATION: 96 % | SYSTOLIC BLOOD PRESSURE: 147 MMHG | RESPIRATION RATE: 20 BRPM | HEIGHT: 70 IN

## 2022-02-11 DIAGNOSIS — Z98.890 OTHER SPECIFIED POSTPROCEDURAL STATES: Chronic | ICD-10-CM

## 2022-02-11 DIAGNOSIS — Z95.1 PRESENCE OF AORTOCORONARY BYPASS GRAFT: Chronic | ICD-10-CM

## 2022-02-11 LAB
ALBUMIN SERPL ELPH-MCNC: 4.4 G/DL — SIGNIFICANT CHANGE UP (ref 3.3–5)
ALP SERPL-CCNC: 73 U/L — SIGNIFICANT CHANGE UP (ref 40–120)
ALT FLD-CCNC: 21 U/L — SIGNIFICANT CHANGE UP (ref 10–45)
ANION GAP SERPL CALC-SCNC: 11 MMOL/L — SIGNIFICANT CHANGE UP (ref 5–17)
APPEARANCE UR: CLEAR — SIGNIFICANT CHANGE UP
AST SERPL-CCNC: 15 U/L — SIGNIFICANT CHANGE UP (ref 10–40)
BACTERIA # UR AUTO: NEGATIVE — SIGNIFICANT CHANGE UP
BASOPHILS # BLD AUTO: 0.02 K/UL — SIGNIFICANT CHANGE UP (ref 0–0.2)
BASOPHILS NFR BLD AUTO: 0.2 % — SIGNIFICANT CHANGE UP (ref 0–2)
BILIRUB SERPL-MCNC: 2 MG/DL — HIGH (ref 0.2–1.2)
BILIRUB UR-MCNC: NEGATIVE — SIGNIFICANT CHANGE UP
BUN SERPL-MCNC: 19 MG/DL — SIGNIFICANT CHANGE UP (ref 7–23)
CALCIUM SERPL-MCNC: 9 MG/DL — SIGNIFICANT CHANGE UP (ref 8.4–10.5)
CHLORIDE SERPL-SCNC: 96 MMOL/L — SIGNIFICANT CHANGE UP (ref 96–108)
CO2 SERPL-SCNC: 24 MMOL/L — SIGNIFICANT CHANGE UP (ref 22–31)
COLOR SPEC: YELLOW — SIGNIFICANT CHANGE UP
CREAT SERPL-MCNC: 0.87 MG/DL — SIGNIFICANT CHANGE UP (ref 0.5–1.3)
DIFF PNL FLD: ABNORMAL
EOSINOPHIL # BLD AUTO: 0.1 K/UL — SIGNIFICANT CHANGE UP (ref 0–0.5)
EOSINOPHIL NFR BLD AUTO: 1 % — SIGNIFICANT CHANGE UP (ref 0–6)
EPI CELLS # UR: 0 /HPF — SIGNIFICANT CHANGE UP
GLUCOSE SERPL-MCNC: 274 MG/DL — HIGH (ref 70–99)
GLUCOSE UR QL: ABNORMAL
HCT VFR BLD CALC: 42.5 % — SIGNIFICANT CHANGE UP (ref 39–50)
HGB BLD-MCNC: 14.7 G/DL — SIGNIFICANT CHANGE UP (ref 13–17)
HYALINE CASTS # UR AUTO: 0 /LPF — SIGNIFICANT CHANGE UP (ref 0–2)
IMM GRANULOCYTES NFR BLD AUTO: 0.5 % — SIGNIFICANT CHANGE UP (ref 0–1.5)
KETONES UR-MCNC: NEGATIVE — SIGNIFICANT CHANGE UP
LEUKOCYTE ESTERASE UR-ACNC: NEGATIVE — SIGNIFICANT CHANGE UP
LIDOCAIN IGE QN: 18 U/L — SIGNIFICANT CHANGE UP (ref 7–60)
LYMPHOCYTES # BLD AUTO: 0.81 K/UL — LOW (ref 1–3.3)
LYMPHOCYTES # BLD AUTO: 8.4 % — LOW (ref 13–44)
MCHC RBC-ENTMCNC: 31.1 PG — SIGNIFICANT CHANGE UP (ref 27–34)
MCHC RBC-ENTMCNC: 34.6 GM/DL — SIGNIFICANT CHANGE UP (ref 32–36)
MCV RBC AUTO: 89.9 FL — SIGNIFICANT CHANGE UP (ref 80–100)
MONOCYTES # BLD AUTO: 0.63 K/UL — SIGNIFICANT CHANGE UP (ref 0–0.9)
MONOCYTES NFR BLD AUTO: 6.5 % — SIGNIFICANT CHANGE UP (ref 2–14)
NEUTROPHILS # BLD AUTO: 8.02 K/UL — HIGH (ref 1.8–7.4)
NEUTROPHILS NFR BLD AUTO: 83.4 % — HIGH (ref 43–77)
NITRITE UR-MCNC: NEGATIVE — SIGNIFICANT CHANGE UP
NRBC # BLD: 0 /100 WBCS — SIGNIFICANT CHANGE UP (ref 0–0)
PH UR: 6 — SIGNIFICANT CHANGE UP (ref 5–8)
PLATELET # BLD AUTO: 169 K/UL — SIGNIFICANT CHANGE UP (ref 150–400)
POTASSIUM SERPL-MCNC: 4.3 MMOL/L — SIGNIFICANT CHANGE UP (ref 3.5–5.3)
POTASSIUM SERPL-SCNC: 4.3 MMOL/L — SIGNIFICANT CHANGE UP (ref 3.5–5.3)
PROT SERPL-MCNC: 6.9 G/DL — SIGNIFICANT CHANGE UP (ref 6–8.3)
PROT UR-MCNC: ABNORMAL
RBC # BLD: 4.73 M/UL — SIGNIFICANT CHANGE UP (ref 4.2–5.8)
RBC # FLD: 13.2 % — SIGNIFICANT CHANGE UP (ref 10.3–14.5)
RBC CASTS # UR COMP ASSIST: 2 /HPF — SIGNIFICANT CHANGE UP (ref 0–4)
SARS-COV-2 RNA SPEC QL NAA+PROBE: SIGNIFICANT CHANGE UP
SODIUM SERPL-SCNC: 131 MMOL/L — LOW (ref 135–145)
SP GR SPEC: 1.02 — SIGNIFICANT CHANGE UP (ref 1.01–1.02)
UROBILINOGEN FLD QL: ABNORMAL
WBC # BLD: 9.63 K/UL — SIGNIFICANT CHANGE UP (ref 3.8–10.5)
WBC # FLD AUTO: 9.63 K/UL — SIGNIFICANT CHANGE UP (ref 3.8–10.5)
WBC UR QL: 1 /HPF — SIGNIFICANT CHANGE UP (ref 0–5)

## 2022-02-11 PROCEDURE — 93005 ELECTROCARDIOGRAM TRACING: CPT

## 2022-02-11 PROCEDURE — 85025 COMPLETE CBC W/AUTO DIFF WBC: CPT

## 2022-02-11 PROCEDURE — 74177 CT ABD & PELVIS W/CONTRAST: CPT | Mod: 26,MA

## 2022-02-11 PROCEDURE — U0003: CPT

## 2022-02-11 PROCEDURE — 80053 COMPREHEN METABOLIC PANEL: CPT

## 2022-02-11 PROCEDURE — 74177 CT ABD & PELVIS W/CONTRAST: CPT | Mod: MA

## 2022-02-11 PROCEDURE — 81001 URINALYSIS AUTO W/SCOPE: CPT

## 2022-02-11 PROCEDURE — 83690 ASSAY OF LIPASE: CPT

## 2022-02-11 PROCEDURE — 99285 EMERGENCY DEPT VISIT HI MDM: CPT

## 2022-02-11 PROCEDURE — 36415 COLL VENOUS BLD VENIPUNCTURE: CPT

## 2022-02-11 PROCEDURE — U0005: CPT

## 2022-02-11 PROCEDURE — 96374 THER/PROPH/DIAG INJ IV PUSH: CPT | Mod: XU

## 2022-02-11 PROCEDURE — 87086 URINE CULTURE/COLONY COUNT: CPT

## 2022-02-11 PROCEDURE — 99285 EMERGENCY DEPT VISIT HI MDM: CPT | Mod: 25

## 2022-02-11 PROCEDURE — 93010 ELECTROCARDIOGRAM REPORT: CPT

## 2022-02-11 RX ORDER — ONDANSETRON 8 MG/1
4 TABLET, FILM COATED ORAL ONCE
Refills: 0 | Status: COMPLETED | OUTPATIENT
Start: 2022-02-11 | End: 2022-02-11

## 2022-02-11 RX ORDER — ACETAMINOPHEN 500 MG
650 TABLET ORAL ONCE
Refills: 0 | Status: COMPLETED | OUTPATIENT
Start: 2022-02-11 | End: 2022-02-11

## 2022-02-11 RX ADMIN — ONDANSETRON 4 MILLIGRAM(S): 8 TABLET, FILM COATED ORAL at 09:25

## 2022-02-11 RX ADMIN — Medication 650 MILLIGRAM(S): at 09:25

## 2022-02-11 NOTE — ED ADULT NURSE NOTE - OBJECTIVE STATEMENT
Received pt c/o abdominal pain. No distress. Breathing easy and non labored. Pt states he went eat at [employer] yesterday and then had nausea. vomiting, diarrhea and abdominal pain. Pt ambulatory. Pt is not diaphoretic. No chills. No vomiting or nausea in the ER at this time.

## 2022-02-11 NOTE — ED PROVIDER NOTE - NS ED ROS FT
CONSTITUTIONAL:  No weight loss, fever, chills, weakness or fatigue.  HEENT: No visual loss, blurred vision. No hearing loss, sneezing, congestion, runny nose or sore throat.  SKIN:  No rash or itching.  CARDIOVASCULAR:  No chest pain, chest pressure or chest discomfort. No palpitations.  RESPIRATORY:  No shortness of breath, cough or sputum.  GASTROINTESTINAL: +nausea,+ vomiting. + abdominal pain   GENITOURINARY:  Denies hematuria, dysuria.   NEUROLOGICAL:  No headache, dizziness, syncope, numbness or tingling in the extremities. No change in bowel or bladder control.  MUSCULOSKELETAL:  No muscle, back pain, joint pain or stiffness.  ENDOCRINOLOGIC:  No reports of sweating, cold or heat intolerance. No polyuria or polydipsia.

## 2022-02-11 NOTE — ED PROVIDER NOTE - PATIENT PORTAL LINK FT
You can access the FollowMyHealth Patient Portal offered by [facility] by registering at the following website: [email or web address]. By joining [employer]’s FollowMyHealth portal, you will also be able to view your health information using other applications (apps) compatible with our system.

## 2022-02-11 NOTE — ED PROVIDER NOTE - CLINICAL SUMMARY MEDICAL DECISION MAKING FREE TEXT BOX
66M hx of CAD/3vl CABG, HTN, HLD, PAF not on A/C, T2IRDM, CHFrEF (last ECHO 8/2019 EF: 45%), hx of MSSA bacteremia 8/2019 p/w abdominal pain, N/V/Diarrhea likely 2/2 gastroenteritis. Will obtain CBC/CMP/Lipase, EKG, CT A/P

## 2022-02-11 NOTE — ED PROVIDER NOTE - NSFOLLOWUPINSTRUCTIONS_ED_ALL_ED_FT
Acute viral gastroenteritis, characterized by diarrhea and vomiting, peaks in the winter and spring months. The most common pathogens are norovirus and rotavirus. (See 'Etiology' above.)    ?Acute viral gastroenteritis typically has a short incubation period (24 to 60 hours), a short infection duration (12 to 60 hours), and a high frequency of vomiting, as well as diarrhea. Patients may present with signs of dehydration if they are unable to drink enough fluids to compensate for fluid losses. (See 'Clinical manifestations' above.)    ?The diagnosis of acute viral gastroenteritis is made by a characteristic history of diarrheal disease of rapid onset that lasts less than one week and may be accompanied by nausea, vomiting, fever, or abdominal pain. Laboratory and stool studies are not required for the diagnosis of acute viral gastroenteritis. (See 'Diagnosis' above.)    ?However, adults presenting with persistent fever, dehydration, blood or pus in the stool, or alarm symptoms and signs (table 1) need to be evaluated for a nonviral cause of gastroenteritis (algorithm 1). The diagnostic approach to patients in these settings is discussed in detail separately. (See "Approach to the adult with acute diarrhea in resource-rich settings", section on 'Evaluation' and "Approach to the adult with acute diarrhea in resource-limited countries", section on 'Clinical assessment'.)    ?Acute viral gastroenteritis is usually self-limited and is treated with supportive measures (fluid repletion and unrestricted nutrition). No specific antiviral agents are available. (See 'Treatment' above.)    ?For adults presenting wit Acute viral gastroenteritis, characterized by diarrhea and vomiting, peaks in the winter and spring months. Acute viral gastroenteritis is usually self-limited and is treated with supportive measures (fluid repletion and unrestricted nutrition). No specific antiviral agents are recommended.

## 2022-02-11 NOTE — ED PROVIDER NOTE - PROGRESS NOTE DETAILS
Obtain CBC, CMP, Lipase. Check EKG, CT A/P Blood work unremarkable. CT A/P negative. Will dc w/ conservative management

## 2022-02-11 NOTE — ED PROVIDER NOTE - ATTENDING CONTRIBUTION TO CARE
MD Keen:  patient seen and evaluated with the resident.  I was present for key portions of the History & Physical, and I agree with the Impression & Plan.  MD Keen:  65 yo M c/o left-sided abd pain   Duration: 24 hrs.  Quality:  sharp & stabbing   Associated Sx:  loose stools last night, nausea today  Modifiers:  none  Context: multiple medical problems; CAD/CABG, HTN, HLD, obesity, DM.    Physical Exam:  adult M, overall well-appearing, NCAT, PERRL, EOMI, neck supple, CTA B, no edema, AAOx3 ambulates w/o difficulty.    Abdomen:  +LLQ TTP w/o rebound or guarding  Impression/Plan:  Adult M, medically complicated, c/o L-sided abd that is significant enough to warrant imaging to r/o acute intraabdominal pathology.  Patient will be evaluated with labs, ua, CT abdomen, and treated with APAP and IVF (currently refusing stronger pain meds).  Hx of MSSA bacteremia warrants blood cultures.

## 2022-02-11 NOTE — ED PROVIDER NOTE - OBJECTIVE STATEMENT
66M hx of CAD/3vl CABG, HTN, HLD, PAF not on A/C, T2IRDM, CHFrEF (last ECHO 8/2019 EF: 45%), hx of MSSA bacteremia 8/2019 p/w abdominal pain, N/V/Diarrhea. States he left work yesterday and went to a [employer] where he had lunch. Upon arriving home, states he started experiencing some abdominal pain on the left side, sharp, 10/10 in severity without radiation. No association with eating/drinking. States his wife gave him an ex-lax after which he had 2 episodes of diarrhea and 1 episode of vomiting (NBNB). This morning pt states symptoms improved (pain now 5/10) but he called his PCP's office and was advised to visit an urgent care center or E.D.    Denies fevers, chills, chest pain, SOB, dizziness/light headededness, dysuria, hematuria, recent travel or sick contacts.

## 2022-02-12 LAB
CULTURE RESULTS: SIGNIFICANT CHANGE UP
SPECIMEN SOURCE: SIGNIFICANT CHANGE UP

## 2022-03-09 NOTE — PROGRESS NOTE ADULT - PROBLEM SELECTOR PLAN 3
resolved Protopic Pregnancy And Lactation Text: This medication is Pregnancy Category C. It is unknown if this medication is excreted in breast milk when applied topically.

## 2022-03-16 NOTE — PHYSICAL EXAM
[No Acute Distress] : no acute distress [Well Nourished] : well nourished [Normal Outer Ear/Nose] : the outer ears and nose were normal in appearance [Normal Oropharynx] : the oropharynx was normal [No Lymphadenopathy] : no lymphadenopathy [No JVD] : no jugular venous distention [No Respiratory Distress] : no respiratory distress  [Normal Rate] : normal rate  [Regular Rhythm] : with a regular rhythm [No Carotid Bruits] : no carotid bruits [No Edema] : there was no peripheral edema [de-identified] : sinus at 60 2 = difficulty swallowing liquids/foods Patient sedated and vented. Unable to get answer/2 = difficulty swallowing liquids/foods

## 2022-03-30 ENCOUNTER — RX RENEWAL (OUTPATIENT)
Age: 67
End: 2022-03-30

## 2022-05-07 ENCOUNTER — NON-APPOINTMENT (OUTPATIENT)
Age: 67
End: 2022-05-07

## 2022-05-08 ENCOUNTER — TRANSCRIPTION ENCOUNTER (OUTPATIENT)
Age: 67
End: 2022-05-08

## 2022-05-09 ENCOUNTER — NON-APPOINTMENT (OUTPATIENT)
Age: 67
End: 2022-05-09

## 2022-05-10 ENCOUNTER — OUTPATIENT (OUTPATIENT)
Dept: OUTPATIENT SERVICES | Facility: HOSPITAL | Age: 67
LOS: 1 days | End: 2022-05-10

## 2022-05-10 ENCOUNTER — APPOINTMENT (OUTPATIENT)
Dept: DISASTER EMERGENCY | Facility: HOSPITAL | Age: 67
End: 2022-05-10

## 2022-05-10 VITALS
HEIGHT: 70 IN | DIASTOLIC BLOOD PRESSURE: 73 MMHG | SYSTOLIC BLOOD PRESSURE: 121 MMHG | WEIGHT: 265 LBS | TEMPERATURE: 98 F | OXYGEN SATURATION: 95 % | RESPIRATION RATE: 14 BRPM | HEART RATE: 72 BPM

## 2022-05-10 VITALS
OXYGEN SATURATION: 96 % | SYSTOLIC BLOOD PRESSURE: 106 MMHG | HEART RATE: 67 BPM | DIASTOLIC BLOOD PRESSURE: 76 MMHG | RESPIRATION RATE: 17 BRPM | TEMPERATURE: 98 F

## 2022-05-10 DIAGNOSIS — Z95.1 PRESENCE OF AORTOCORONARY BYPASS GRAFT: Chronic | ICD-10-CM

## 2022-05-10 DIAGNOSIS — U07.1 COVID-19: ICD-10-CM

## 2022-05-10 DIAGNOSIS — Z98.890 OTHER SPECIFIED POSTPROCEDURAL STATES: Chronic | ICD-10-CM

## 2022-05-10 RX ORDER — BEBTELOVIMAB 87.5 MG/ML
175 INJECTION, SOLUTION INTRAVENOUS ONCE
Refills: 0 | Status: COMPLETED | OUTPATIENT
Start: 2022-05-10 | End: 2022-05-10

## 2022-05-10 RX ADMIN — BEBTELOVIMAB 175 MILLIGRAM(S): 87.5 INJECTION, SOLUTION INTRAVENOUS at 15:05

## 2022-05-10 NOTE — MONOCLONAL ANTIBODY INFUSION - ASSESSMENT AND PLAN
ASSESSMENT:  Pt is a 66y Male recently diagnosed with Covid-19 infection who was referred for monoclonal antibody (Bebtelovimab) infusion after testing positive for COVID-19 on 5/8    PLAN:    I have reviewed the Bebtelovimab Emergency Use Authorization (EUA) and I have provided the patient or patient's caregiver with the following information:    1. The FDA has authorized emergency use of Bebtelovimab, it is not an FDA approved biological product & research is ongoing.  2. The patient or patient's caregiver has the option to accept or refuse administration of Bebtelovimab.  3. The significant known and potential risks and benefits of Bebtelovimab and the extent to which such risks and benefits are unknown.  4. Information on available alternative treatments and the risks/benefits of those alternatives.  5. Patient instructed to self-isolate & use infection control measures (e.g wear mask, isolate, social distance, avoid sharing personal items, clean & disinfect "high touch" surfaces, & frequent handwashing) according to the CDC guidelines.     - Injection procedure explained to patient  - Consent for mAb injection obtained; Patient verbalized understanding of plan and agrees to treatment  - Risk & benefits discussed/all questions answered to the best of my ability  - Bebtelovimab 175mg IVP x1 over ~ 1 minute  - Observe patient for one hour post medication administration  - D/C patient with fact sheet explaining symptoms to be aware of for the next couple of days along with contact information for the 24/7 clinical call center should they experience any symptoms  - Outpatient PCP follow up for further management recommendations

## 2022-05-10 NOTE — MONOCLONAL ANTIBODY INFUSION - EXAM
CC: Monoclonal Antibody Infusion/COVID 19 Positive  66y Male w/ PmHx ofHTN, obesity,DM,and recent dx of COVID-19 who presents today for elective Bebtelovimab infusion. Patient has been screened and was deemed to be a candidate by Dr. Socrates Tejada    Symptoms/Criteria:fever, chills, cough  Date of Symptom Onset: 5/6  Date of Positive COVID PCR:5/8  Risk Profile includes:DM    Vaccination Status:pfizer x3      Physical Exam/findings:  T(C): 36.4 (05-10-22 @ 14:44), Max: 36.4 (05-10-22 @ 14:44)  HR: 72 (05-10-22 @ 14:44) (72 - 72)  BP: 121/73 (05-10-22 @ 14:44) (121/73 - 121/73)  RR: 14 (05-10-22 @ 14:44) (14 - 14)  SpO2: 95% (05-10-22 @ 14:44) (95% - 95%)    PE:   Appearance: obesity	  HEENT:   Normal oral mucosa, NC/AT  Lymphatic: No lymphadenopathy  Cardiovascular: Normal S1 S2, No JVD, No edema  Respiratory: Lungs clear to auscultation, no accessory muscle use or intracostal retraction  Gastrointestinal:  BS (+), Soft, non-tender, non-distended  Skin: Warm and dry  Neurologic: Non-focal, CN II- XII grossly intact  Extremities: Normal range of motion

## 2022-05-10 NOTE — MONOCLONAL ANTIBODY INFUSION - HOME MEDICATIONS
test strips (per patient's insurance) , 1 application subcutaneously 4 times a day . ** Compatible with patient's glucometer **   lancets , 1 application subcutaneously 4 times a day   Insulin Pen Needles, 4mm , 1 application subcutaneously 4 times a day . ** Use with insulin pen **   glucose tablets , 1 tab(s) chewed once a day   glucometer (per patient's insurance) , 1 application subcutaneous 4 times a day   alcohol swabs  , Apply topically to affected area 4 times a day   doxycycline monohydrate 100 mg oral capsule , 1 cap(s) orally 2 times a day  guaiFENesin 100 mg/5 mL oral liquid , 5 milliliter(s) orally every 6 hours, As needed, Cough  furosemide 40 mg oral tablet , 1 tab(s) orally once a day  Lantus 100 units/mL subcutaneous solution , 10 unit(s) subcutaneous once a day (at bedtime)  Entresto 49 mg-51 mg oral tablet , 1 tab(s) orally 2 times a day  spironolactone 25 mg oral tablet , 1 tab(s) orally once a day  HumaLOG KwikPen 100 units/mL injectable solution , 10 unit(s) injectable 3 times a day (before meals)   acetaminophen 325 mg oral tablet , 2 tab(s) orally every 6 hours, As needed, Temp greater or equal to 38C (100.4F), Mild Pain (1 - 3)  Aspirin Enteric Coated 81 mg oral delayed release tablet , 1 tab(s) orally once a day  metoprolol succinate 50 mg oral tablet, extended release , 1 tab(s) orally once a day  atorvastatin 80 mg oral tablet , 1 tab(s) orally once a day (at bedtime)

## 2022-05-12 ENCOUNTER — TRANSCRIPTION ENCOUNTER (OUTPATIENT)
Age: 67
End: 2022-05-12

## 2022-07-06 ENCOUNTER — OUTPATIENT (OUTPATIENT)
Dept: OUTPATIENT SERVICES | Facility: HOSPITAL | Age: 67
LOS: 1 days | End: 2022-07-06
Payer: COMMERCIAL

## 2022-07-06 ENCOUNTER — APPOINTMENT (OUTPATIENT)
Dept: RADIOLOGY | Facility: HOSPITAL | Age: 67
End: 2022-07-06

## 2022-07-06 DIAGNOSIS — M22.91 UNSPECIFIED DISORDER OF PATELLA, RIGHT KNEE: ICD-10-CM

## 2022-07-06 DIAGNOSIS — Z98.890 OTHER SPECIFIED POSTPROCEDURAL STATES: Chronic | ICD-10-CM

## 2022-07-06 DIAGNOSIS — Z95.1 PRESENCE OF AORTOCORONARY BYPASS GRAFT: Chronic | ICD-10-CM

## 2022-07-06 PROCEDURE — 73560 X-RAY EXAM OF KNEE 1 OR 2: CPT

## 2022-07-06 PROCEDURE — 73560 X-RAY EXAM OF KNEE 1 OR 2: CPT | Mod: 26,50

## 2022-08-12 ENCOUNTER — OUTPATIENT (OUTPATIENT)
Dept: OUTPATIENT SERVICES | Facility: HOSPITAL | Age: 67
LOS: 1 days | End: 2022-08-12
Payer: COMMERCIAL

## 2022-08-12 ENCOUNTER — APPOINTMENT (OUTPATIENT)
Dept: RADIOLOGY | Facility: HOSPITAL | Age: 67
End: 2022-08-12

## 2022-08-12 DIAGNOSIS — Z00.8 ENCOUNTER FOR OTHER GENERAL EXAMINATION: ICD-10-CM

## 2022-08-12 DIAGNOSIS — Z98.890 OTHER SPECIFIED POSTPROCEDURAL STATES: Chronic | ICD-10-CM

## 2022-08-12 DIAGNOSIS — Z95.1 PRESENCE OF AORTOCORONARY BYPASS GRAFT: Chronic | ICD-10-CM

## 2022-08-12 PROCEDURE — 77080 DXA BONE DENSITY AXIAL: CPT

## 2022-08-12 PROCEDURE — 77080 DXA BONE DENSITY AXIAL: CPT | Mod: 26

## 2022-09-16 ENCOUNTER — OUTPATIENT (OUTPATIENT)
Dept: OUTPATIENT SERVICES | Facility: HOSPITAL | Age: 67
LOS: 1 days | End: 2022-09-16
Payer: COMMERCIAL

## 2022-09-16 ENCOUNTER — APPOINTMENT (OUTPATIENT)
Dept: RADIOLOGY | Facility: HOSPITAL | Age: 67
End: 2022-09-16

## 2022-09-16 DIAGNOSIS — Z98.890 OTHER SPECIFIED POSTPROCEDURAL STATES: Chronic | ICD-10-CM

## 2022-09-16 DIAGNOSIS — M54.12 RADICULOPATHY, CERVICAL REGION: ICD-10-CM

## 2022-09-16 DIAGNOSIS — Z95.1 PRESENCE OF AORTOCORONARY BYPASS GRAFT: Chronic | ICD-10-CM

## 2022-09-16 PROCEDURE — 72040 X-RAY EXAM NECK SPINE 2-3 VW: CPT | Mod: 26

## 2022-09-16 PROCEDURE — 72040 X-RAY EXAM NECK SPINE 2-3 VW: CPT

## 2023-01-06 ENCOUNTER — TRANSCRIPTION ENCOUNTER (OUTPATIENT)
Age: 68
End: 2023-01-06

## 2023-01-07 ENCOUNTER — TRANSCRIPTION ENCOUNTER (OUTPATIENT)
Age: 68
End: 2023-01-07

## 2023-01-07 ENCOUNTER — EMERGENCY (EMERGENCY)
Facility: HOSPITAL | Age: 68
LOS: 1 days | Discharge: HOME | End: 2023-01-07
Attending: EMERGENCY MEDICINE
Payer: COMMERCIAL

## 2023-01-07 VITALS
RESPIRATION RATE: 19 BRPM | TEMPERATURE: 98 F | SYSTOLIC BLOOD PRESSURE: 160 MMHG | DIASTOLIC BLOOD PRESSURE: 93 MMHG | OXYGEN SATURATION: 98 % | WEIGHT: 315 LBS | HEIGHT: 71 IN | HEART RATE: 87 BPM

## 2023-01-07 VITALS
TEMPERATURE: 98 F | OXYGEN SATURATION: 96 % | HEART RATE: 69 BPM | DIASTOLIC BLOOD PRESSURE: 82 MMHG | RESPIRATION RATE: 19 BRPM | SYSTOLIC BLOOD PRESSURE: 152 MMHG

## 2023-01-07 DIAGNOSIS — Z98.890 OTHER SPECIFIED POSTPROCEDURAL STATES: Chronic | ICD-10-CM

## 2023-01-07 DIAGNOSIS — Z95.1 PRESENCE OF AORTOCORONARY BYPASS GRAFT: Chronic | ICD-10-CM

## 2023-01-07 LAB
ALBUMIN SERPL ELPH-MCNC: 4.3 G/DL — SIGNIFICANT CHANGE UP (ref 3.3–5)
ALP SERPL-CCNC: 115 U/L — SIGNIFICANT CHANGE UP (ref 40–120)
ALT FLD-CCNC: 23 U/L — SIGNIFICANT CHANGE UP (ref 10–45)
ANION GAP SERPL CALC-SCNC: 12 MMOL/L — SIGNIFICANT CHANGE UP (ref 5–17)
AST SERPL-CCNC: 19 U/L — SIGNIFICANT CHANGE UP (ref 10–40)
BASE EXCESS BLDV CALC-SCNC: 5.2 MMOL/L — HIGH (ref -2–3)
BASOPHILS # BLD AUTO: 0.07 K/UL — SIGNIFICANT CHANGE UP (ref 0–0.2)
BASOPHILS NFR BLD AUTO: 0.8 % — SIGNIFICANT CHANGE UP (ref 0–2)
BILIRUB SERPL-MCNC: 1.6 MG/DL — HIGH (ref 0.2–1.2)
BUN SERPL-MCNC: 14 MG/DL — SIGNIFICANT CHANGE UP (ref 7–23)
CA-I SERPL-SCNC: 1.22 MMOL/L — SIGNIFICANT CHANGE UP (ref 1.15–1.33)
CALCIUM SERPL-MCNC: 9.8 MG/DL — SIGNIFICANT CHANGE UP (ref 8.4–10.5)
CHLORIDE BLDV-SCNC: 98 MMOL/L — SIGNIFICANT CHANGE UP (ref 96–108)
CHLORIDE SERPL-SCNC: 98 MMOL/L — SIGNIFICANT CHANGE UP (ref 96–108)
CO2 BLDV-SCNC: 32 MMOL/L — HIGH (ref 22–26)
CO2 SERPL-SCNC: 26 MMOL/L — SIGNIFICANT CHANGE UP (ref 22–31)
CREAT SERPL-MCNC: 0.81 MG/DL — SIGNIFICANT CHANGE UP (ref 0.5–1.3)
EGFR: 97 ML/MIN/1.73M2 — SIGNIFICANT CHANGE UP
EOSINOPHIL # BLD AUTO: 0.28 K/UL — SIGNIFICANT CHANGE UP (ref 0–0.5)
EOSINOPHIL NFR BLD AUTO: 3 % — SIGNIFICANT CHANGE UP (ref 0–6)
GAS PNL BLDV: 133 MMOL/L — LOW (ref 136–145)
GAS PNL BLDV: SIGNIFICANT CHANGE UP
GAS PNL BLDV: SIGNIFICANT CHANGE UP
GLUCOSE BLDV-MCNC: 340 MG/DL — HIGH (ref 70–99)
GLUCOSE SERPL-MCNC: 325 MG/DL — HIGH (ref 70–99)
HCO3 BLDV-SCNC: 30 MMOL/L — HIGH (ref 22–29)
HCT VFR BLD CALC: 42.5 % — SIGNIFICANT CHANGE UP (ref 39–50)
HCT VFR BLDA CALC: 46 % — SIGNIFICANT CHANGE UP (ref 39–51)
HGB BLD CALC-MCNC: 15.4 G/DL — SIGNIFICANT CHANGE UP (ref 12.6–17.4)
HGB BLD-MCNC: 15 G/DL — SIGNIFICANT CHANGE UP (ref 13–17)
IMM GRANULOCYTES NFR BLD AUTO: 0.4 % — SIGNIFICANT CHANGE UP (ref 0–0.9)
LACTATE BLDV-MCNC: 2.1 MMOL/L — HIGH (ref 0.5–2)
LYMPHOCYTES # BLD AUTO: 2.14 K/UL — SIGNIFICANT CHANGE UP (ref 1–3.3)
LYMPHOCYTES # BLD AUTO: 23.1 % — SIGNIFICANT CHANGE UP (ref 13–44)
MCHC RBC-ENTMCNC: 31.1 PG — SIGNIFICANT CHANGE UP (ref 27–34)
MCHC RBC-ENTMCNC: 35.3 GM/DL — SIGNIFICANT CHANGE UP (ref 32–36)
MCV RBC AUTO: 88 FL — SIGNIFICANT CHANGE UP (ref 80–100)
MONOCYTES # BLD AUTO: 0.62 K/UL — SIGNIFICANT CHANGE UP (ref 0–0.9)
MONOCYTES NFR BLD AUTO: 6.7 % — SIGNIFICANT CHANGE UP (ref 2–14)
NEUTROPHILS # BLD AUTO: 6.12 K/UL — SIGNIFICANT CHANGE UP (ref 1.8–7.4)
NEUTROPHILS NFR BLD AUTO: 66 % — SIGNIFICANT CHANGE UP (ref 43–77)
NRBC # BLD: 0 /100 WBCS — SIGNIFICANT CHANGE UP (ref 0–0)
PCO2 BLDV: 46 MMHG — SIGNIFICANT CHANGE UP (ref 42–55)
PH BLDV: 7.43 — SIGNIFICANT CHANGE UP (ref 7.32–7.43)
PLATELET # BLD AUTO: 201 K/UL — SIGNIFICANT CHANGE UP (ref 150–400)
PO2 BLDV: 53 MMHG — HIGH (ref 25–45)
POTASSIUM BLDV-SCNC: 4.3 MMOL/L — SIGNIFICANT CHANGE UP (ref 3.5–5.1)
POTASSIUM SERPL-MCNC: 4.2 MMOL/L — SIGNIFICANT CHANGE UP (ref 3.5–5.3)
POTASSIUM SERPL-SCNC: 4.2 MMOL/L — SIGNIFICANT CHANGE UP (ref 3.5–5.3)
PROT SERPL-MCNC: 7.2 G/DL — SIGNIFICANT CHANGE UP (ref 6–8.3)
RBC # BLD: 4.83 M/UL — SIGNIFICANT CHANGE UP (ref 4.2–5.8)
RBC # FLD: 13 % — SIGNIFICANT CHANGE UP (ref 10.3–14.5)
SAO2 % BLDV: 88.1 % — HIGH (ref 67–88)
SARS-COV-2 RNA SPEC QL NAA+PROBE: DETECTED
SODIUM SERPL-SCNC: 136 MMOL/L — SIGNIFICANT CHANGE UP (ref 135–145)
WBC # BLD: 9.27 K/UL — SIGNIFICANT CHANGE UP (ref 3.8–10.5)
WBC # FLD AUTO: 9.27 K/UL — SIGNIFICANT CHANGE UP (ref 3.8–10.5)

## 2023-01-07 PROCEDURE — 84295 ASSAY OF SERUM SODIUM: CPT

## 2023-01-07 PROCEDURE — U0003: CPT

## 2023-01-07 PROCEDURE — 82803 BLOOD GASES ANY COMBINATION: CPT

## 2023-01-07 PROCEDURE — 82330 ASSAY OF CALCIUM: CPT

## 2023-01-07 PROCEDURE — 85014 HEMATOCRIT: CPT

## 2023-01-07 PROCEDURE — 85025 COMPLETE CBC W/AUTO DIFF WBC: CPT

## 2023-01-07 PROCEDURE — U0005: CPT

## 2023-01-07 PROCEDURE — 83605 ASSAY OF LACTIC ACID: CPT

## 2023-01-07 PROCEDURE — 80053 COMPREHEN METABOLIC PANEL: CPT

## 2023-01-07 PROCEDURE — 99285 EMERGENCY DEPT VISIT HI MDM: CPT

## 2023-01-07 PROCEDURE — 99285 EMERGENCY DEPT VISIT HI MDM: CPT | Mod: 25

## 2023-01-07 PROCEDURE — 71045 X-RAY EXAM CHEST 1 VIEW: CPT

## 2023-01-07 PROCEDURE — 82947 ASSAY GLUCOSE BLOOD QUANT: CPT

## 2023-01-07 PROCEDURE — 85018 HEMOGLOBIN: CPT

## 2023-01-07 PROCEDURE — 84132 ASSAY OF SERUM POTASSIUM: CPT

## 2023-01-07 PROCEDURE — 71045 X-RAY EXAM CHEST 1 VIEW: CPT | Mod: 26

## 2023-01-07 PROCEDURE — 82435 ASSAY OF BLOOD CHLORIDE: CPT

## 2023-01-07 RX ORDER — NIRMATRELVIR AND RITONAVIR 150-100 MG
3 KIT ORAL
Qty: 30 | Refills: 0
Start: 2023-01-07 | End: 2023-01-11

## 2023-01-07 NOTE — ED PROVIDER NOTE - ATTENDING APP SHARED VISIT CONTRIBUTION OF CARE
67-year-old male with COVID-19.  The patient was sent to the ER by his doctor for possible remdesivir.  I discussed the case with Dr. Tian and he asked if we were able to do remdesivir.  Currently were not administering remdesivir in the emergency department for outpatient treatment.  After discussion with the patient and his doctor we came to an agreement that we would start the patient on Paxil that.  I advised the patient that he cannot be taking atorvastatin with Paxil and he has to hold it for a few days after as well.  There is also a potential interaction with Entresto which would require him to monitor his blood pressure very carefully.  The patient states that he presently does not have a blood pressure cuff device at home and that he would ask his wife or child to purchase one when they  the Paxil that and that he would be able to check his blood pressure at least twice a day.  If his blood pressure got below systolic 120 he needs to stop taking the Entresto and come to ER to be checked out again.

## 2023-01-07 NOTE — ED PROVIDER NOTE - PATIENT PORTAL LINK FT
You can access the FollowMyHealth Patient Portal offered by [facility] by registering at the following website: [email or web address]. By joining [email or web address]’s FollowMyHealth portal, you will also be able to view your health information using other applications (apps) compatible with our system.

## 2023-01-07 NOTE — ED PROVIDER NOTE - OBJECTIVE STATEMENT
66M hx of CAD/3vl CABG, HTN, HLD, PAF not on A/C, T2IRDM, CHFrEF (last ECHO 8/2019 EF: 45%), hx of MSSA bacteremia 8/2019 66M hx of CAD/3vl CABG, HTN, HLD, PAF not on A/C, T2IRDM, CHFrEF (last ECHO 8/2019 EF: 45%), hx of MSSA bacteremia 8/2019  Presenting to the ED at the advice of his primary care physician Dr. Osito Luna for IV remdesivir.  Patient reports that beginning Tuesday he started to feel somewhat fatigued however was able to function normally until yesterday when he started to have abdominal bloating gas severe fatigue and today endorses some shortness of breath with exertion.  Patient was diagnosed with COVID on home more test last night.  Patient has had 2 Pfizer vaccines and original booster has not had by valent booster yet.

## 2023-01-07 NOTE — ED PROVIDER NOTE - CLINICAL SUMMARY MEDICAL DECISION MAKING FREE TEXT BOX
66-year-old complicated medical history presenting to the emergency department with known COVID for potential remdesivir infusion.  Patient reports mild symptoms starting 4 days ago and feeling much worse as of last night.  Patient is double vaccinated and single boosted.  Patient is well-appearing with normal vital signs.  According to patient's medication list he is an appropriate candidate for Paxlovid  just needs to monitor BP with entresto. Will attempt to reach Dr. Stevens's smolow the patient's primary care physician who sent him in to discuss.  Likely check basics and send home with paxlovid. - Meghana Cook PA-C

## 2023-01-07 NOTE — ED ADULT NURSE NOTE - OBJECTIVE STATEMENT
66 y/o Male A&Ox3 PMH of HTN, HLD, Paroxysmal a. fib, CAD, Type 2 diabetes, Triple bypass and CHF. presents to the ED complaining of SOB and lack of energy. Patient called PMD and was advised to come to ED for further evaluation. Patient tested positive for COVID yesterday. Chills and body aches started yesterday. No known fevers. Afebrile at this time. Patient endorses shortness of breath on exertion. Patient complaining of abdominal bloating and flatus. Last BM was Thursday. Patient is well-appearing and denies chest pain, fevers, nausea, vomiting and diarrhea. Safety and comfort provided.

## 2023-01-07 NOTE — ED PROVIDER NOTE - NS ED ATTENDING STATEMENT MOD
This was a shared visit with the CECILIA. I reviewed and verified the documentation and independently performed the documented:

## 2023-01-07 NOTE — ED PROVIDER NOTE - NSFOLLOWUPINSTRUCTIONS_ED_ALL_ED_FT
stop taking your lipitor (cholesterol medication while taking paxlovid  Take Paxlovid 2x per day for 5 days  Take blood pressure 2x per day and monitor symptoms, if blood pressure is low stop taking paxlovid and return to the emergency department for evaluation.       You were seen and evaluated for infection which may be COVID 19. Testing was done. We think you are safe for discharge and to follow up in a few days with your doctor by phone or in person.   You should treat fevers by taking advil or tylenol as needed.    Return to the Emergency Department if your shortness of breath becomes worse, you become weak, or new symptoms develop.

## 2023-01-11 NOTE — ED ADULT NURSE NOTE - NS ED NURSE LEVEL OF CONSCIOUSNESS AFFECT
Calm Star Wedge Flap Text: The defect edges were debeveled with a #15 scalpel blade.  Given the location of the defect, shape of the defect and the proximity to free margins a star wedge flap was deemed most appropriate.  Using a sterile surgical marker, an appropriate rotation flap was drawn incorporating the defect and placing the expected incisions within the relaxed skin tension lines where possible. The area thus outlined was incised deep to adipose tissue with a #15 scalpel blade.  The skin margins were undermined to an appropriate distance in all directions utilizing iris scissors.

## 2023-01-30 ENCOUNTER — LABORATORY RESULT (OUTPATIENT)
Age: 68
End: 2023-01-30

## 2023-01-30 ENCOUNTER — APPOINTMENT (OUTPATIENT)
Dept: INTERNAL MEDICINE | Facility: CLINIC | Age: 68
End: 2023-01-30
Payer: COMMERCIAL

## 2023-01-30 VITALS
BODY MASS INDEX: 38.08 KG/M2 | SYSTOLIC BLOOD PRESSURE: 137 MMHG | DIASTOLIC BLOOD PRESSURE: 73 MMHG | TEMPERATURE: 98 F | RESPIRATION RATE: 15 BRPM | OXYGEN SATURATION: 95 % | HEART RATE: 60 BPM | HEIGHT: 70 IN | WEIGHT: 266 LBS

## 2023-01-30 PROCEDURE — G0008: CPT

## 2023-01-30 PROCEDURE — 99397 PER PM REEVAL EST PAT 65+ YR: CPT | Mod: 25

## 2023-01-30 PROCEDURE — 90662 IIV NO PRSV INCREASED AG IM: CPT

## 2023-01-30 PROCEDURE — 36415 COLL VENOUS BLD VENIPUNCTURE: CPT

## 2023-01-31 LAB
ALBUMIN SERPL ELPH-MCNC: 4.5 G/DL
ALP BLD-CCNC: 102 U/L
ALT SERPL-CCNC: 32 U/L
ANION GAP SERPL CALC-SCNC: 11 MMOL/L
APPEARANCE: CLEAR
AST SERPL-CCNC: 17 U/L
BASOPHILS # BLD AUTO: 0.08 K/UL
BASOPHILS NFR BLD AUTO: 0.8 %
BILIRUB SERPL-MCNC: 0.9 MG/DL
BILIRUBIN URINE: NEGATIVE
BLOOD URINE: NEGATIVE
BUN SERPL-MCNC: 20 MG/DL
CALCIUM SERPL-MCNC: 10 MG/DL
CHLORIDE SERPL-SCNC: 101 MMOL/L
CHOLEST SERPL-MCNC: 218 MG/DL
CO2 SERPL-SCNC: 27 MMOL/L
COLOR: YELLOW
CREAT SERPL-MCNC: 0.87 MG/DL
EGFR: 95 ML/MIN/1.73M2
EOSINOPHIL # BLD AUTO: 0.32 K/UL
EOSINOPHIL NFR BLD AUTO: 3 %
ESTIMATED AVERAGE GLUCOSE: 295 MG/DL
GLUCOSE QUALITATIVE U: ABNORMAL
GLUCOSE SERPL-MCNC: 99 MG/DL
HBA1C MFR BLD HPLC: 11.9 %
HCT VFR BLD CALC: 43.6 %
HDLC SERPL-MCNC: 38 MG/DL
HGB BLD-MCNC: 14.4 G/DL
IMM GRANULOCYTES NFR BLD AUTO: 0.4 %
KETONES URINE: NEGATIVE
LDLC SERPL CALC-MCNC: 149 MG/DL
LEUKOCYTE ESTERASE URINE: NEGATIVE
LYMPHOCYTES # BLD AUTO: 3.25 K/UL
LYMPHOCYTES NFR BLD AUTO: 30.8 %
MAGNESIUM SERPL-MCNC: 2 MG/DL
MAN DIFF?: NORMAL
MCHC RBC-ENTMCNC: 30.6 PG
MCHC RBC-ENTMCNC: 33 GM/DL
MCV RBC AUTO: 92.6 FL
MONOCYTES # BLD AUTO: 0.87 K/UL
MONOCYTES NFR BLD AUTO: 8.3 %
NEUTROPHILS # BLD AUTO: 5.98 K/UL
NEUTROPHILS NFR BLD AUTO: 56.7 %
NITRITE URINE: NEGATIVE
NONHDLC SERPL-MCNC: 180 MG/DL
NT-PROBNP SERPL-MCNC: 1232 PG/ML
PH URINE: 6
PLATELET # BLD AUTO: 235 K/UL
POTASSIUM SERPL-SCNC: 4 MMOL/L
PROT SERPL-MCNC: 7 G/DL
PROTEIN URINE: ABNORMAL
RBC # BLD: 4.71 M/UL
RBC # FLD: 13.5 %
SODIUM SERPL-SCNC: 139 MMOL/L
SPECIFIC GRAVITY URINE: 1.03
T4 FREE SERPL-MCNC: 1.2 NG/DL
TRIGL SERPL-MCNC: 151 MG/DL
TSH SERPL-ACNC: 2.79 UIU/ML
UROBILINOGEN URINE: ABNORMAL
WBC # FLD AUTO: 10.54 K/UL

## 2023-01-31 NOTE — PLAN
[FreeTextEntry1] : Annual\par Flu shot today\par \par check all labs\par f/u cardiogoy\par need echo\par diabetes need med adjustment\par on insulin and farxigo\par

## 2023-01-31 NOTE — HEALTH RISK ASSESSMENT
[Fair] :  ~his/her~ mood as fair [No] : No [Never (0 pts)] : Never (0 points) [No falls in past year] : Patient reported no falls in the past year [0] : 2) Feeling down, depressed, or hopeless: Not at all (0) [PHQ-2 Negative - No further assessment needed] : PHQ-2 Negative - No further assessment needed [None] : None [Employed] : employed [College] : College [Feels Safe at Home] : Feels safe at home [Fully functional (bathing, dressing, toileting, transferring, walking, feeding)] : Fully functional (bathing, dressing, toileting, transferring, walking, feeding) [Fully functional (using the telephone, shopping, preparing meals, housekeeping, doing laundry, using] : Fully functional and needs no help or supervision to perform IADLs (using the telephone, shopping, preparing meals, housekeeping, doing laundry, using transportation, managing medications and managing finances) [Smoke Detector] : smoke detector [Carbon Monoxide Detector] : carbon monoxide detector [Seat Belt] :  uses seat belt [[id number]] : 0 [Change in mental status noted] : No change in mental status noted [Language] : denies difficulty with language [Behavior] : denies difficulty with behavior [Learning/Retaining New Information] : denies difficulty learning/retaining new information [Handling Complex Tasks] : denies difficulty handling complex tasks [Reasoning] : denies difficulty with reasoning [Spatial Ability and Orientation] : denies difficulty with spatial ability and orientation [Reports changes in hearing] : Reports no changes in hearing [Reports changes in vision] : Reports no changes in vision [Reports changes in dental health] : Reports no changes in dental health [Guns at Home] : no guns at home

## 2023-01-31 NOTE — HISTORY OF PRESENT ILLNESS
[FreeTextEntry1] : Annual [de-identified] : Annual\par flu shot today\par need colon\par \par diabetic with poor diet and followup' last a1c 2 years ago and 14\par \par Last visit with me or cardiology 2 years\par ran out of many meds such as entresto and spirolactone

## 2023-02-01 RX ORDER — NIRMATRELVIR AND RITONAVIR 300-100 MG
20 X 150 MG & KIT ORAL
Qty: 30 | Refills: 0 | Status: COMPLETED | COMMUNITY
Start: 2023-01-07

## 2023-02-03 ENCOUNTER — TRANSCRIPTION ENCOUNTER (OUTPATIENT)
Age: 68
End: 2023-02-03

## 2023-02-08 NOTE — H&P ADULT - PROBLEM/PLAN-1
End of Shift Note     Bedside shift change report given to Yosvany Luo (oncoming nurse) by Oscar Marcum (offgoing nurse). Report included the following information SBAR and Kardex     Shift worked: Days   Shift summary and any significant changes:     None, pt was turn every 2 hours  Eating less food  Md aware   Palliative consult  Waiting for placement        Concerns for physician to address:  None   Zone phone for oncoming shift:  [address]      Patient Information  Radha Rivas  81 y.o.  2/3/2023  9:02 AM by Jhoan Denson MD. 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 ([address] 02/04/2023    Stroke-like episode 02/03/2023           Past Medical History:   Diagnosis Date    Atrial fibrillation ([address]      CVA (cerebral vascular accident) ([address]      GERD (gastroesophageal reflux disease)      Glaucoma      HTN (hypertension)      Hyperlipidemia           Core Measures:  CVA: Yes Yes  CHF:No No  PNA:No No     Activity:  Activity Level: Bed Rest  Number times ambulated in hallways past shift: 0  Number of times OOB to chair past shift: 0     Cardiac:   Cardiac Monitoring: No      Cardiac Rhythm: Sinus Debbie Grigsby     Access:   Current line(s): PIV   Central Line? No Placement date  Reason Medically Necessary   PICC LINE? No Placement date Reason Medically Necessary      Genitourinary:   Urinary status: voiding  Urinary Catheter?  No Placement Date  Reason Medically Necessary      Respiratory:   O2 Device: None (Room air)  Chronic home O2 use?: NO  Incentive spirometer at bedside: NO     GI:  Last Bowel Movement Date: 02/06/23  Current diet:  DIET ONE TIME MESSAGE  ADULT DIET Dysphagia - Pureed  Passing flatus: YES  Tolerating current diet: YES     Pain Management:   Patient states pain is manageable on current regimen: YES     Skin:  Mikey Score: 15  Interventions: increase time out of bed, PT/OT consult, and nutritional support     Patient Safety:  Fall Score:    Interventions: bed/chair alarm, assistive device (walker, cane, etc), gripper socks, and pt to call before getting OOB  @Rollbelt  @dexterity to release roll belt  Yes/No ( must document dexterity  here by stating Yes or No here, otherwise this is a restraint and must follow restraint documentation policy.)     DVT prophylaxis:  DVT prophylaxis Med- No  DVT prophylaxis SCD or JOSELO- No      Wounds: (If Applicable)  Wounds- No  Location      Active Consults:  IP CONSULT TO NEUROLOGY  IP CONSULT TO CARDIOLOGY     Length of Stay:  Expected LOS: Until patient finds placement  Actual LOS: 4  Discharge Plan: Yes Pending placement DISPLAY PLAN FREE TEXT

## 2023-02-10 ENCOUNTER — APPOINTMENT (OUTPATIENT)
Dept: CARDIOLOGY | Facility: CLINIC | Age: 68
End: 2023-02-10
Payer: COMMERCIAL

## 2023-02-10 ENCOUNTER — NON-APPOINTMENT (OUTPATIENT)
Age: 68
End: 2023-02-10

## 2023-02-10 VITALS
HEART RATE: 67 BPM | BODY MASS INDEX: 38.08 KG/M2 | OXYGEN SATURATION: 98 % | HEIGHT: 70 IN | WEIGHT: 266 LBS | DIASTOLIC BLOOD PRESSURE: 78 MMHG | SYSTOLIC BLOOD PRESSURE: 122 MMHG

## 2023-02-10 PROCEDURE — 99402 PREV MED CNSL INDIV APPRX 30: CPT

## 2023-02-10 PROCEDURE — 93000 ELECTROCARDIOGRAM COMPLETE: CPT

## 2023-02-10 PROCEDURE — 99215 OFFICE O/P EST HI 40 MIN: CPT | Mod: 25

## 2023-03-02 ENCOUNTER — TRANSCRIPTION ENCOUNTER (OUTPATIENT)
Age: 68
End: 2023-03-02

## 2023-03-14 NOTE — PROGRESS NOTE ADULT - SUBJECTIVE AND OBJECTIVE BOX
[facility]  3/14/23     Chief Complaint   Patient presents with    Diabetes     W labs         Allergies   Allergen Reactions    Diltiazem Hcl      Bradycardia          Current Outpatient Medications   Medication Sig Dispense Refill    rosuvastatin (CRESTOR) 5 MG tablet TAKE 1 TABLET DAILY 90 tablet 3    cycloSPORINE (RESTASIS) 0.05 % ophthalmic emulsion Place 1 drop into both eyes 2 times daily      losartan (COZAAR) 25 MG tablet Take 1 tablet by mouth daily 90 tablet 3    rivaroxaban (XARELTO) 20 MG TABS tablet Take 1 tablet by mouth daily (with breakfast) Indications: pt takes with dinner 90 tablet 3    bimatoprost (LUMIGAN) 0.01 % SOLN ophthalmic drops Place 1 drop into both eyes daily 7.5 mL 2    celecoxib (CELEBREX) 100 MG capsule Take 100 mg by mouth as needed       aspirin 81 MG EC tablet Take 81 mg by mouth daily        No current facility-administered medications for this visit. HPI: Patient comes in for routine follow-up visit and to review labs. Currently feels well. He is still working part-time. He stays physically active and exercises on a regular basis. He denies any chest pain or shortness of breath. Remains on all of his usual meds and supplements the same as listed on his med list, which was reviewed with him. He follows up with his cardiologist for management of his coronary artery disease and chronic atrial fibrillation. He follows up with his vascular surgeon for management of his atherosclerotic peripheral vascular disease. Review of Systems: as per HPI      Physical Exam:    Vitals:    03/14/23 0830   BP: 117/78   Pulse: 57   Resp: 18   Temp: 97.9 °F (36.6 °C)   SpO2: 98%       Patient is a 81 y.o. male. Patient appears to be in no distress. Breathing comfortably. Ambulates without assistance. HEENT: normal.  Neck supple, no adenopathy or bruits. Heart: Irregularly irregular rhythm with an apical rate of approximately 60 bpm.  No murmurs gallops or rubs. Lungs clear. Abd: normal  Ext: no edema. Peripheral pulses: normal.  No neurologic deficits noted. Lab Results   Component Value Date    WBC 6.0 08/29/2022    HGB 15.0 08/29/2022    HCT 44.7 08/29/2022     08/29/2022    CHOL 136 03/07/2023    TRIG 89 03/07/2023    HDL 47 03/07/2023    ALT 36 03/07/2023    AST 28 03/07/2023    TSH 3.350 01/24/2022    PSA 1.05 01/24/2022    INR 1.5 10/11/2011    LABA1C 6.5 (H) 03/07/2023      Lab Results   Component Value Date     03/07/2023    K 4.8 03/07/2023     03/07/2023    CO2 25 03/07/2023    BUN 27 (H) 03/07/2023    CREATININE 1.0 03/07/2023    GLUCOSE 104 (H) 03/07/2023    CALCIUM 9.4 03/07/2023    PROT 7.1 03/07/2023    LABALBU 4.4 03/07/2023    BILITOT 0.8 03/07/2023    ALKPHOS 67 03/07/2023    AST 28 03/07/2023    ALT 36 03/07/2023    LABGLOM >60 03/07/2023    GFRAA >60 08/29/2022            Assessment:      Type 2 diabetes mellitus with diabetic peripheral angiopathy without gangrene, without long-term current use of insulin ([facility] fair control with a hemoglobin A1c 6.5%. -     Hemoglobin A1C; Future    Essential hypertension well-controlled on current meds. -     Comprehensive Metabolic Panel; Future  -     TSH; Future    Pure hypercholesterolemia, well controlled on rosuvastatin 5 mg daily.  -     Lipid Panel; Future    Coronary artery disease involving native coronary artery of native heart without angina pectoris, currently stable and followed by his cardiologist.    Permanent atrial fibrillation ([facility] with slow ventricular response and stable and asymptomatic. Followed by his cardiologist.    Anticoagulated with Xarelto and no reported bleeding issues. -     CBC; Future    Atherosclerotic peripheral vascular disease ([facility] currently stable and followed by vascular surgery. Aneurysm of right popliteal artery ([facility] currently stable and followed by vascular surgery.     Abdominal aortic aneurysm (AAA) without rupture, unspecified part, currently stable [facility] Cardiology Progress Note  _______________________________    Pt. seen and examined. No new cardiac-related complaints.    Telemetry - sinus rhythm 80's, PVC's    T(C): 37.1 (04-13-18 @ 08:30), Max: 37.1 (04-13-18 @ 08:30)  HR: 84 (04-13-18 @ 08:30) (80 - 84)  BP: 116/59 (04-13-18 @ 08:30) (115/67 - 146/77)  RR: 20 (04-13-18 @ 08:30) (17 - 20)  SpO2: 91% (04-13-18 @ 08:30) (91% - 100%)  I&O's Summary    12 Apr 2018 07:01  -  13 Apr 2018 07:00  --------------------------------------------------------  IN: 317 mL / OUT: 615 mL / NET: -298 mL    13 Apr 2018 07:01  -  13 Apr 2018 11:04  --------------------------------------------------------  IN: 360 mL / OUT: 550 mL / NET: -190 mL        PHYSICAL EXAM:  GENERAL: Alert, NAD.  NECK: Supple, No JVD, no carotid bruit.  CHEST/LUNG: Clear to auscultation bilaterally; No wheezes, rales, or rhonchi. Median stertonotomy c/d/i.  HEART: S1 S2 normal, RRR; No murmurs, rubs, or gallops  ABDOMEN: Soft, Nontender, Nondistended; Bowel sounds present  EXTREMITIES: trace edema.      LABS:                        9.7    22.9  )-----------( 207      ( 13 Apr 2018 05:57 )             29.8     04-13    136  |  101  |  31<H>  ----------------------------<  168<H>  5.2   |  23  |  1.03    Ca    8.7      13 Apr 2018 05:57    TPro  6.4  /  Alb  3.5  /  TBili  0.7  /  DBili  x   /  AST  21  /  ALT  23  /  AlkPhos  43  04-13    PT/INR - ( 12 Apr 2018 02:10 )   PT: 15.6 sec;   INR: 1.43 ratio         PTT - ( 12 Apr 2018 02:10 )  PTT:27.5 sec  CARDIAC MARKERS ( 11 Apr 2018 14:34 )  x     / 1.42 ng/mL / 195 U/L / x     / 19.2 ng/mL  CARDIAC MARKERS ( 09 Apr 2018 05:37 )  x     / 1.18 ng/mL / 68 U/L / x     / 3.1 ng/mL  CARDIAC MARKERS ( 08 Apr 2018 23:43 )  x     / 1.24 ng/mL / 63 U/L / x     / 3.2 ng/mL  CARDIAC MARKERS ( 08 Apr 2018 14:06 )  x     / 1.51 ng/mL / 74 U/L / x     / 3.4 ng/mL  CARDIAC MARKERS ( 08 Apr 2018 03:55 )  x     / 0.97 ng/mL / 79 U/L / x     / 3.7 ng/mL  CARDIAC MARKERS ( 07 Apr 2018 23:08 )  x     / 1.05 ng/mL / x     / x     / x      CARDIAC MARKERS ( 07 Apr 2018 18:46 )  x     / 1.24 ng/mL / x     / x     / x                  MEDICATIONS  (STANDING):  aspirin enteric coated 325 milliGRAM(s) Oral daily  atorvastatin 80 milliGRAM(s) Oral at bedtime  dextrose 50% Injectable 50 milliLiter(s) IV Push every 15 minutes  dextrose 50% Injectable 25 milliLiter(s) IV Push every 15 minutes  docusate sodium 100 milliGRAM(s) Oral three times a day  enoxaparin Injectable 40 milliGRAM(s) SubCutaneous daily  insulin glargine Injectable (LANTUS) 10 Unit(s) SubCutaneous at bedtime  insulin Infusion 3 Unit(s)/Hr (3 mL/Hr) IV Continuous <Continuous>  insulin lispro (HumaLOG) corrective regimen sliding scale   SubCutaneous Before meals and at bedtime  insulin lispro Injectable (HumaLOG) 4 Unit(s) SubCutaneous before breakfast  insulin lispro Injectable (HumaLOG) 4 Unit(s) SubCutaneous before lunch  insulin lispro Injectable (HumaLOG) 4 Unit(s) SubCutaneous before dinner  lisinopril 5 milliGRAM(s) Oral daily  pantoprazole    Tablet 40 milliGRAM(s) Oral before breakfast  polyethylene glycol 3350 17 Gram(s) Oral daily  sodium chloride 0.45%. 1000 milliLiter(s) (10 mL/Hr) IV Continuous <Continuous>    MEDICATIONS  (PRN):  acetaminophen   Tablet. 650 milliGRAM(s) Oral every 6 hours PRN Mild Pain (1 - 3)  oxyCODONE    IR 5 milliGRAM(s) Oral every 4 hours PRN Moderate Pain (4 - 6)  oxyCODONE    IR 10 milliGRAM(s) Oral every 6 hours PRN Severe Pain (7 - 10)      RADIOLOGY & ADDITIONAL TESTS: and followed by vascular surgery. Prerenal azotemia, probably due to inadequate water intake. Discussion Notes: He will continue all his current meds and supplements the same as listed on his med list.  He is advised to increase his water intake and continue to try to maintain a low-cholesterol, low refined carbohydrate, heart healthy diet and regular exercise. He will follow-up with his cardiologist for management of his coronary artery disease and chronic atrial fibrillation. We will follow-up with vascular surgery for management of his atherosclerotic peripheral vascular disease. Otherwise return here as needed or in 6 months for his AWV and routine labs.

## 2023-03-17 ENCOUNTER — APPOINTMENT (OUTPATIENT)
Dept: ENDOCRINOLOGY | Facility: CLINIC | Age: 68
End: 2023-03-17

## 2023-03-30 ENCOUNTER — TRANSCRIPTION ENCOUNTER (OUTPATIENT)
Age: 68
End: 2023-03-30

## 2023-05-01 NOTE — PATIENT PROFILE ADULT. - HAS THE PATIENT HAD A SIGNIFICANT CHANGE IN FUNCTIONAL STATUS DUE TO CVA, HEAD TRAUMA, ORTHOPEDIC TRAUMA/SURGERY, OR FALL, WITH THE WEEK PRIOR TO ADMISSION
Appointment rescheduled for tomorrow.  
Patient was scheduled today for an INR but says he was too sick to come in. Patient would like to speak to the nurse.   
no

## 2023-05-22 ENCOUNTER — APPOINTMENT (OUTPATIENT)
Dept: ENDOCRINOLOGY | Facility: CLINIC | Age: 68
End: 2023-05-22
Payer: COMMERCIAL

## 2023-05-22 VITALS
TEMPERATURE: 98 F | SYSTOLIC BLOOD PRESSURE: 138 MMHG | BODY MASS INDEX: 38.4 KG/M2 | DIASTOLIC BLOOD PRESSURE: 88 MMHG | HEART RATE: 77 BPM | WEIGHT: 268.25 LBS | OXYGEN SATURATION: 96 % | HEIGHT: 70 IN

## 2023-05-22 LAB
GLUCOSE BLDC GLUCOMTR-MCNC: 320
HBA1C MFR BLD HPLC: 9.1

## 2023-05-22 PROCEDURE — 83036 HEMOGLOBIN GLYCOSYLATED A1C: CPT | Mod: QW

## 2023-05-22 PROCEDURE — 99204 OFFICE O/P NEW MOD 45 MIN: CPT | Mod: 25

## 2023-05-22 PROCEDURE — 95250 CONT GLUC MNTR PHYS/QHP EQP: CPT

## 2023-05-22 NOTE — HISTORY OF PRESENT ILLNESS
[FreeTextEntry1] : 67 yearM here for assessment for Type 2 diabetes mellitus\par \par last A1c: 11.9%\par \par Patient with past medical history as below, remarkable for HTN, HLD, CABG\par \par Self-reported weight gain\par Thirst and frequent urination:  yes \par Dry skin:  no \par Vision problems: yes, gets intravitreal injections \par Burning pain or tingling in the feet, legs, hands, other areas: manageable \par High blood pressure:  yes\par Extreme hunger: no \par Frequent and/or recurring urinary infections: no \par Skin infections:  no  \par Slow healing of cuts: no \par \par  \par Screening \par Ophthalmology: follows\par Podiatry: follows\par LDL:  149 on lipitor 80mg po daily \par EGFR: 95\par \par \par Current diabetic medication regimen (verified with patient): \par Lantus 10 units Qhs\par humalog (seldom uses)\par farxiga 5mg po daily \par \par \par \par SMBG ranges (glucometer): glucometer 200-300's

## 2023-05-22 NOTE — PHYSICAL EXAM
[Alert] : alert [Well Nourished] : well nourished [Obese] : obese [No Acute Distress] : no acute distress [Well Developed] : well developed [Normal Sclera/Conjunctiva] : normal sclera/conjunctiva [EOMI] : extra ocular movement intact [No Proptosis] : no proptosis [Normal Oropharynx] : the oropharynx was normal [Thyroid Not Enlarged] : the thyroid was not enlarged [No Respiratory Distress] : no respiratory distress [No Accessory Muscle Use] : no accessory muscle use [Clear to Auscultation] : lungs were clear to auscultation bilaterally [Normal S1, S2] : normal S1 and S2 [Normal Rate] : heart rate was normal [Regular Rhythm] : with a regular rhythm [No Edema] : no peripheral edema [Pedal Pulses Normal] : the pedal pulses are present [Normal Bowel Sounds] : normal bowel sounds [Not Tender] : non-tender [Not Distended] : not distended [Soft] : abdomen soft [Normal Anterior Cervical Nodes] : no anterior cervical lymphadenopathy [Normal Posterior Cervical Nodes] : no posterior cervical lymphadenopathy [No Spinal Tenderness] : no spinal tenderness [Spine Straight] : spine straight [No Stigmata of Cushings Syndrome] : no stigmata of Cushings Syndrome [Normal Gait] : normal gait [Normal Strength/Tone] : muscle strength and tone were normal [No Rash] : no rash [Acanthosis Nigricans] : no acanthosis nigricans [Right foot was examined, including] : right foot ~C was examined, including visual inspection with sensory and pulse exams [Left foot was examined, including] : left foot ~C was examined, including visual inspection with sensory and pulse exams [Normal Reflexes] : deep tendon reflexes were 2+ and symmetric [Position Sense Dec.] : normal position sense at the level of the toes [No Tremors] : no tremors [Oriented x3] : oriented to person, place, and time [FreeTextEntry2] : scab on second digit

## 2023-05-25 ENCOUNTER — APPOINTMENT (OUTPATIENT)
Dept: DERMATOLOGY | Facility: CLINIC | Age: 68
End: 2023-05-25
Payer: COMMERCIAL

## 2023-05-25 VITALS — HEIGHT: 70 IN | BODY MASS INDEX: 38.37 KG/M2 | WEIGHT: 268 LBS

## 2023-05-25 DIAGNOSIS — L82.0 INFLAMED SEBORRHEIC KERATOSIS: ICD-10-CM

## 2023-05-25 DIAGNOSIS — Z12.83 ENCOUNTER FOR SCREENING FOR MALIGNANT NEOPLASM OF SKIN: ICD-10-CM

## 2023-05-25 PROCEDURE — 17110 DESTRUCTION B9 LES UP TO 14: CPT

## 2023-05-25 PROCEDURE — 99204 OFFICE O/P NEW MOD 45 MIN: CPT | Mod: 25

## 2023-05-25 NOTE — HISTORY OF PRESENT ILLNESS
[FreeTextEntry1] : skin check [de-identified] : 67 year old male here for skin check. has several spots on his face. was advised to have them examined. growing spot on right abdomen.

## 2023-05-25 NOTE — PHYSICAL EXAM
[FreeTextEntry3] : AAOx3, pleasant, NAD, no visual lymphadenopathy\par hair, scalp, face, nose, eyelids, ears, lips, oropharynx, neck, chest, abdomen, back, right arm, left arm, nails, and hands examined with all normal findings,\par pertinent findings include:\par \par multiple benign nevi and lentigines\par yellow papules on b/l malar cheeks\par + inflamed, waxy, keratotic papule on right abdomen

## 2023-05-25 NOTE — ASSESSMENT
[FreeTextEntry1] : 1) skin check-\par -benign findings as above\par \par 2) rash; seb hyp, acne overlap- no treatment required \par education and prognosis\par defers cautery; discussed questionable benefit\par defers accutane\par add tretinoin 0.025% cream at bedtime; SED\par \par 3) ISK\par The specified lesions were treated with liquid nitrogen cryotherapy.  Discussed risks including pain, blistering, crusting, discoloration, recurrence.\par \par f/u 1  year

## 2023-05-26 RX ORDER — TRETINOIN 0.25 MG/G
0.03 CREAM TOPICAL
Qty: 1 | Refills: 1 | Status: ACTIVE | COMMUNITY
Start: 2023-05-25

## 2023-06-12 ENCOUNTER — NON-APPOINTMENT (OUTPATIENT)
Age: 68
End: 2023-06-12

## 2023-06-12 RX ORDER — FLASH GLUCOSE SCANNING READER
EACH MISCELLANEOUS
Qty: 1 | Refills: 0 | Status: ACTIVE | COMMUNITY
Start: 2023-06-12 | End: 1900-01-01

## 2023-06-16 ENCOUNTER — APPOINTMENT (OUTPATIENT)
Dept: ENDOCRINOLOGY | Facility: CLINIC | Age: 68
End: 2023-06-16
Payer: COMMERCIAL

## 2023-06-16 VITALS
WEIGHT: 264.38 LBS | BODY MASS INDEX: 37.85 KG/M2 | HEART RATE: 72 BPM | DIASTOLIC BLOOD PRESSURE: 70 MMHG | HEIGHT: 70 IN | OXYGEN SATURATION: 96 % | TEMPERATURE: 98 F | SYSTOLIC BLOOD PRESSURE: 144 MMHG

## 2023-06-16 PROCEDURE — 95251 CONT GLUC MNTR ANALYSIS I&R: CPT

## 2023-06-16 PROCEDURE — 99214 OFFICE O/P EST MOD 30 MIN: CPT | Mod: 25

## 2023-06-16 RX ORDER — INSULIN LISPRO 100 [IU]/ML
100 INJECTION, SOLUTION INTRAVENOUS; SUBCUTANEOUS 3 TIMES DAILY
Qty: 10 | Refills: 3 | Status: DISCONTINUED | COMMUNITY
Start: 2019-08-09 | End: 2023-06-16

## 2023-06-16 NOTE — PHYSICAL EXAM
[Alert] : alert [Well Nourished] : well nourished [Obese] : obese [No Acute Distress] : no acute distress [Well Developed] : well developed [Normal Sclera/Conjunctiva] : normal sclera/conjunctiva [EOMI] : extra ocular movement intact [No Proptosis] : no proptosis [Normal Oropharynx] : the oropharynx was normal [Thyroid Not Enlarged] : the thyroid was not enlarged [No Respiratory Distress] : no respiratory distress [No Accessory Muscle Use] : no accessory muscle use [Clear to Auscultation] : lungs were clear to auscultation bilaterally [Normal S1, S2] : normal S1 and S2 [Normal Rate] : heart rate was normal [Regular Rhythm] : with a regular rhythm [No Edema] : no peripheral edema [Pedal Pulses Normal] : the pedal pulses are present [Normal Bowel Sounds] : normal bowel sounds [Not Tender] : non-tender [Not Distended] : not distended [Soft] : abdomen soft [Normal Anterior Cervical Nodes] : no anterior cervical lymphadenopathy [No Spinal Tenderness] : no spinal tenderness [Spine Straight] : spine straight [No Stigmata of Cushings Syndrome] : no stigmata of Cushings Syndrome [Normal Gait] : normal gait [Normal Strength/Tone] : muscle strength and tone were normal [No Rash] : no rash [Acanthosis Nigricans] : no acanthosis nigricans [Right foot was examined, including] : right foot ~C was examined, including visual inspection with sensory and pulse exams [Left foot was examined, including] : left foot ~C was examined, including visual inspection with sensory and pulse exams [Position Sense Dec.] : normal position sense at the level of the toes [Normal Reflexes] : deep tendon reflexes were 2+ and symmetric [No Tremors] : no tremors [Oriented x3] : oriented to person, place, and time [FreeTextEntry2] : scab on second digit

## 2023-06-19 ENCOUNTER — APPOINTMENT (OUTPATIENT)
Dept: ENDOCRINOLOGY | Facility: CLINIC | Age: 68
End: 2023-06-19
Payer: COMMERCIAL

## 2023-06-19 VITALS — WEIGHT: 264 LBS | BODY MASS INDEX: 37.8 KG/M2 | HEIGHT: 70 IN

## 2023-06-19 PROCEDURE — G0108 DIAB MANAGE TRN  PER INDIV: CPT

## 2023-07-19 ENCOUNTER — APPOINTMENT (OUTPATIENT)
Dept: ENDOCRINOLOGY | Facility: CLINIC | Age: 68
End: 2023-07-19
Payer: COMMERCIAL

## 2023-07-19 VITALS
OXYGEN SATURATION: 98 % | HEIGHT: 70 IN | BODY MASS INDEX: 38.22 KG/M2 | SYSTOLIC BLOOD PRESSURE: 142 MMHG | TEMPERATURE: 98.2 F | DIASTOLIC BLOOD PRESSURE: 82 MMHG | WEIGHT: 267 LBS | HEART RATE: 75 BPM

## 2023-07-19 LAB
GLUCOSE BLDC GLUCOMTR-MCNC: 111
HBA1C MFR BLD HPLC: 8

## 2023-07-19 PROCEDURE — 99214 OFFICE O/P EST MOD 30 MIN: CPT

## 2023-07-19 PROCEDURE — 82962 GLUCOSE BLOOD TEST: CPT

## 2023-07-19 PROCEDURE — 83036 HEMOGLOBIN GLYCOSYLATED A1C: CPT | Mod: QW

## 2023-07-19 NOTE — HISTORY OF PRESENT ILLNESS
[FreeTextEntry1] : 67 yearM here for f/up for Type 2 diabetes mellitus\par \par last A1c: 11.9%-->9.1%\par \par Patient with past medical history as below, remarkable for HTN, HLD, CABG\par  \par No new complaints. \par Monitoring diet\par Reports that he exercises with karate more vigorously and lifts weight usually, however was limited by wrist injury.\par Plans to resume in coming weeks \par \par \par \par Screening \par Ophthalmology: follows\par Podiatry: follows\par LDL:  149 on lipitor 80mg po daily \par EGFR: 95\par \par \par Met nutritionist \par \par Current diabetic medication regimen (verified with patient): \par Lantus 28 units Qhs\par novolog 8-8-8 tid-ac\par farxiga 10mg po daily \par \par \par Ambulatory glucose profile (AGP):  \par \par Device: Abbott Freestyle Martinez 2\par \par Glucose Management Indicator (GMI): 34% use\par \par fasting sugars appear to be at target. Unable to view other times. \par \par He reports sugars have been improving. \par

## 2023-07-19 NOTE — PHYSICAL EXAM
[Alert] : alert [Well Nourished] : well nourished [Obese] : obese [No Acute Distress] : no acute distress [Well Developed] : well developed [Normal Sclera/Conjunctiva] : normal sclera/conjunctiva [EOMI] : extra ocular movement intact [No Proptosis] : no proptosis [Normal Oropharynx] : the oropharynx was normal [Thyroid Not Enlarged] : the thyroid was not enlarged [No Respiratory Distress] : no respiratory distress [No Accessory Muscle Use] : no accessory muscle use [Clear to Auscultation] : lungs were clear to auscultation bilaterally [Normal S1, S2] : normal S1 and S2 [Normal Rate] : heart rate was normal [Regular Rhythm] : with a regular rhythm [No Edema] : no peripheral edema [Pedal Pulses Normal] : the pedal pulses are present [Normal Bowel Sounds] : normal bowel sounds [Not Tender] : non-tender [Not Distended] : not distended [Soft] : abdomen soft [Normal Anterior Cervical Nodes] : no anterior cervical lymphadenopathy [No Spinal Tenderness] : no spinal tenderness [Spine Straight] : spine straight [No Stigmata of Cushings Syndrome] : no stigmata of Cushings Syndrome [Normal Gait] : normal gait [Normal Strength/Tone] : muscle strength and tone were normal [No Rash] : no rash [Acanthosis Nigricans] : no acanthosis nigricans [Right foot was examined, including] : right foot ~C was examined, including visual inspection with sensory and pulse exams [Left foot was examined, including] : left foot ~C was examined, including visual inspection with sensory and pulse exams [Normal Reflexes] : deep tendon reflexes were 2+ and symmetric [No Tremors] : no tremors [Oriented x3] : oriented to person, place, and time [FreeTextEntry2] : hammer toe [FreeTextEntry6] : hammer toe

## 2023-09-13 ENCOUNTER — APPOINTMENT (OUTPATIENT)
Dept: INTERNAL MEDICINE | Facility: CLINIC | Age: 68
End: 2023-09-13
Payer: COMMERCIAL

## 2023-09-13 VITALS
TEMPERATURE: 97.8 F | OXYGEN SATURATION: 96 % | HEART RATE: 59 BPM | WEIGHT: 262 LBS | DIASTOLIC BLOOD PRESSURE: 82 MMHG | BODY MASS INDEX: 37.51 KG/M2 | HEIGHT: 70 IN | SYSTOLIC BLOOD PRESSURE: 148 MMHG

## 2023-09-13 PROCEDURE — 99213 OFFICE O/P EST LOW 20 MIN: CPT | Mod: 25

## 2023-09-13 PROCEDURE — G0008: CPT

## 2023-09-13 PROCEDURE — 90662 IIV NO PRSV INCREASED AG IM: CPT

## 2023-09-13 PROCEDURE — 36415 COLL VENOUS BLD VENIPUNCTURE: CPT

## 2023-09-14 LAB
ALBUMIN SERPL ELPH-MCNC: 4.9 G/DL
ALP BLD-CCNC: 100 U/L
ALT SERPL-CCNC: 16 U/L
ANION GAP SERPL CALC-SCNC: 12 MMOL/L
APPEARANCE: CLEAR
AST SERPL-CCNC: 15 U/L
BILIRUB SERPL-MCNC: 1.1 MG/DL
BILIRUBIN URINE: NEGATIVE
BLOOD URINE: NEGATIVE
BUN SERPL-MCNC: 15 MG/DL
CALCIUM SERPL-MCNC: 9.9 MG/DL
CHLORIDE SERPL-SCNC: 101 MMOL/L
CHOLEST SERPL-MCNC: 201 MG/DL
CO2 SERPL-SCNC: 27 MMOL/L
COLOR: YELLOW
CREAT SERPL-MCNC: 0.84 MG/DL
EGFR: 96 ML/MIN/1.73M2
ESTIMATED AVERAGE GLUCOSE: 186 MG/DL
GLUCOSE QUALITATIVE U: >=1000 MG/DL
GLUCOSE SERPL-MCNC: 160 MG/DL
HBA1C MFR BLD HPLC: 8.1 %
HCT VFR BLD CALC: 45.4 %
HDLC SERPL-MCNC: 39 MG/DL
HGB BLD-MCNC: 15.1 G/DL
KETONES URINE: NEGATIVE MG/DL
LDLC SERPL CALC-MCNC: 132 MG/DL
LEUKOCYTE ESTERASE URINE: NEGATIVE
MCHC RBC-ENTMCNC: 30.9 PG
MCHC RBC-ENTMCNC: 33.3 GM/DL
MCV RBC AUTO: 92.8 FL
NITRITE URINE: NEGATIVE
NONHDLC SERPL-MCNC: 162 MG/DL
NT-PROBNP SERPL-MCNC: 948 PG/ML
PH URINE: 6
PLATELET # BLD AUTO: 235 K/UL
POTASSIUM SERPL-SCNC: 5.1 MMOL/L
PROT SERPL-MCNC: 7.4 G/DL
PROTEIN URINE: NEGATIVE MG/DL
PSA SERPL-MCNC: 0.42 NG/ML
RBC # BLD: 4.89 M/UL
RBC # FLD: 14.2 %
SODIUM SERPL-SCNC: 140 MMOL/L
SPECIFIC GRAVITY URINE: 1.03
T4 FREE SERPL-MCNC: 1.1 NG/DL
TRIGL SERPL-MCNC: 170 MG/DL
TSH SERPL-ACNC: 2.51 UIU/ML
UROBILINOGEN URINE: 1 MG/DL
WBC # FLD AUTO: 9.73 K/UL

## 2023-09-19 ENCOUNTER — APPOINTMENT (OUTPATIENT)
Dept: ENDOCRINOLOGY | Facility: CLINIC | Age: 68
End: 2023-09-19
Payer: COMMERCIAL

## 2023-09-19 VITALS
SYSTOLIC BLOOD PRESSURE: 122 MMHG | WEIGHT: 267 LBS | OXYGEN SATURATION: 97 % | DIASTOLIC BLOOD PRESSURE: 80 MMHG | TEMPERATURE: 97.6 F | HEIGHT: 70 IN | BODY MASS INDEX: 38.22 KG/M2 | HEART RATE: 79 BPM

## 2023-09-19 LAB
GLUCOSE BLDC GLUCOMTR-MCNC: 125
HBA1C MFR BLD HPLC: 7.8

## 2023-09-19 PROCEDURE — 99214 OFFICE O/P EST MOD 30 MIN: CPT | Mod: 25

## 2023-09-19 PROCEDURE — 83036 HEMOGLOBIN GLYCOSYLATED A1C: CPT | Mod: QW

## 2023-09-19 PROCEDURE — 82962 GLUCOSE BLOOD TEST: CPT

## 2023-09-19 PROCEDURE — 95251 CONT GLUC MNTR ANALYSIS I&R: CPT

## 2023-09-28 ENCOUNTER — APPOINTMENT (OUTPATIENT)
Dept: ENDOCRINOLOGY | Facility: CLINIC | Age: 68
End: 2023-09-28

## 2023-10-05 RX ORDER — METOPROLOL SUCCINATE 50 MG/1
50 TABLET, EXTENDED RELEASE ORAL
Qty: 90 | Refills: 3 | Status: ACTIVE | COMMUNITY
Start: 2020-04-03 | End: 1900-01-01

## 2023-10-05 RX ORDER — ATORVASTATIN CALCIUM 80 MG/1
80 TABLET, FILM COATED ORAL
Qty: 90 | Refills: 3 | Status: ACTIVE | COMMUNITY
Start: 2020-04-03 | End: 1900-01-01

## 2023-10-05 RX ORDER — SPIRONOLACTONE 25 MG/1
25 TABLET ORAL
Qty: 90 | Refills: 3 | Status: ACTIVE | COMMUNITY
Start: 2018-04-17 | End: 1900-01-01

## 2023-10-10 ENCOUNTER — NON-APPOINTMENT (OUTPATIENT)
Age: 68
End: 2023-10-10

## 2023-10-16 ENCOUNTER — APPOINTMENT (OUTPATIENT)
Dept: CARDIOLOGY | Facility: CLINIC | Age: 68
End: 2023-10-16
Payer: COMMERCIAL

## 2023-10-16 VITALS
BODY MASS INDEX: 37.74 KG/M2 | DIASTOLIC BLOOD PRESSURE: 89 MMHG | HEART RATE: 65 BPM | OXYGEN SATURATION: 98 % | WEIGHT: 263 LBS | SYSTOLIC BLOOD PRESSURE: 147 MMHG

## 2023-10-16 DIAGNOSIS — I73.9 PERIPHERAL VASCULAR DISEASE, UNSPECIFIED: ICD-10-CM

## 2023-10-16 DIAGNOSIS — Z86.79 PERSONAL HISTORY OF OTHER DISEASES OF THE CIRCULATORY SYSTEM: ICD-10-CM

## 2023-10-16 PROCEDURE — 99215 OFFICE O/P EST HI 40 MIN: CPT | Mod: 25

## 2023-10-16 PROCEDURE — 93000 ELECTROCARDIOGRAM COMPLETE: CPT

## 2023-10-16 PROCEDURE — 99402 PREV MED CNSL INDIV APPRX 30: CPT

## 2023-10-18 RX ORDER — INSULIN ASPART 100 [IU]/ML
100 INJECTION, SOLUTION INTRAVENOUS; SUBCUTANEOUS
Qty: 10 | Refills: 2 | Status: DISCONTINUED | COMMUNITY
Start: 2023-06-12 | End: 2023-10-18

## 2023-11-13 NOTE — PROGRESS NOTE ADULT - PROBLEM SELECTOR PLAN 3
On meds primary team following up Split-Thickness Skin Graft Text: The defect edges were debeveled with a #15 scalpel blade. Given the location of the defect, shape of the defect and the proximity to free margins a split thickness skin graft was deemed most appropriate. Using a sterile surgical marker, the primary defect shape was transferred to the donor site. The split thickness graft was then harvested.  The skin graft was then placed in the primary defect and oriented appropriately.

## 2023-11-21 ENCOUNTER — APPOINTMENT (OUTPATIENT)
Dept: ENDOCRINOLOGY | Facility: CLINIC | Age: 68
End: 2023-11-21
Payer: COMMERCIAL

## 2023-11-21 VITALS
TEMPERATURE: 97.7 F | HEIGHT: 70 IN | WEIGHT: 264.13 LBS | OXYGEN SATURATION: 98 % | HEART RATE: 71 BPM | DIASTOLIC BLOOD PRESSURE: 74 MMHG | BODY MASS INDEX: 37.81 KG/M2 | SYSTOLIC BLOOD PRESSURE: 120 MMHG

## 2023-11-21 LAB — GLUCOSE BLDC GLUCOMTR-MCNC: 180

## 2023-11-21 PROCEDURE — 82962 GLUCOSE BLOOD TEST: CPT

## 2023-11-21 PROCEDURE — 99214 OFFICE O/P EST MOD 30 MIN: CPT | Mod: 25

## 2023-11-21 PROCEDURE — 95251 CONT GLUC MNTR ANALYSIS I&R: CPT

## 2024-03-01 ENCOUNTER — RESULT REVIEW (OUTPATIENT)
Age: 69
End: 2024-03-01

## 2024-03-01 ENCOUNTER — OUTPATIENT (OUTPATIENT)
Dept: OUTPATIENT SERVICES | Facility: HOSPITAL | Age: 69
LOS: 1 days | End: 2024-03-01
Payer: COMMERCIAL

## 2024-03-01 ENCOUNTER — APPOINTMENT (OUTPATIENT)
Dept: ULTRASOUND IMAGING | Facility: HOSPITAL | Age: 69
End: 2024-03-01

## 2024-03-01 ENCOUNTER — APPOINTMENT (OUTPATIENT)
Dept: CV DIAGNOSITCS | Facility: HOSPITAL | Age: 69
End: 2024-03-01

## 2024-03-01 DIAGNOSIS — I73.9 PERIPHERAL VASCULAR DISEASE, UNSPECIFIED: ICD-10-CM

## 2024-03-01 DIAGNOSIS — Z98.890 OTHER SPECIFIED POSTPROCEDURAL STATES: Chronic | ICD-10-CM

## 2024-03-01 DIAGNOSIS — Z95.1 PRESENCE OF AORTOCORONARY BYPASS GRAFT: Chronic | ICD-10-CM

## 2024-03-01 PROCEDURE — C8929: CPT

## 2024-03-01 PROCEDURE — 93924 LWR XTR VASC STDY BILAT: CPT

## 2024-03-01 PROCEDURE — 93924 LWR XTR VASC STDY BILAT: CPT | Mod: 26

## 2024-03-01 PROCEDURE — 93306 TTE W/DOPPLER COMPLETE: CPT | Mod: 26

## 2024-03-15 ENCOUNTER — NON-APPOINTMENT (OUTPATIENT)
Age: 69
End: 2024-03-15

## 2024-03-15 ENCOUNTER — APPOINTMENT (OUTPATIENT)
Dept: CARDIOLOGY | Facility: CLINIC | Age: 69
End: 2024-03-15
Payer: COMMERCIAL

## 2024-03-15 VITALS
OXYGEN SATURATION: 97 % | BODY MASS INDEX: 39.75 KG/M2 | DIASTOLIC BLOOD PRESSURE: 76 MMHG | WEIGHT: 277 LBS | HEART RATE: 66 BPM | SYSTOLIC BLOOD PRESSURE: 136 MMHG

## 2024-03-15 DIAGNOSIS — R01.1 CARDIAC MURMUR, UNSPECIFIED: ICD-10-CM

## 2024-03-15 PROCEDURE — 99401 PREV MED CNSL INDIV APPRX 15: CPT

## 2024-03-15 PROCEDURE — 93000 ELECTROCARDIOGRAM COMPLETE: CPT

## 2024-03-15 PROCEDURE — 99215 OFFICE O/P EST HI 40 MIN: CPT | Mod: 25

## 2024-03-18 DIAGNOSIS — G47.33 OBSTRUCTIVE SLEEP APNEA (ADULT) (PEDIATRIC): ICD-10-CM

## 2024-03-22 ENCOUNTER — APPOINTMENT (OUTPATIENT)
Dept: CARDIOLOGY | Facility: CLINIC | Age: 69
End: 2024-03-22
Payer: MEDICARE

## 2024-03-22 PROCEDURE — G2211 COMPLEX E/M VISIT ADD ON: CPT

## 2024-03-22 PROCEDURE — 99215 OFFICE O/P EST HI 40 MIN: CPT

## 2024-03-25 ENCOUNTER — APPOINTMENT (OUTPATIENT)
Dept: INTERNAL MEDICINE | Facility: CLINIC | Age: 69
End: 2024-03-25
Payer: COMMERCIAL

## 2024-03-25 VITALS
SYSTOLIC BLOOD PRESSURE: 163 MMHG | TEMPERATURE: 97.9 F | WEIGHT: 273 LBS | OXYGEN SATURATION: 97 % | HEART RATE: 68 BPM | DIASTOLIC BLOOD PRESSURE: 84 MMHG | BODY MASS INDEX: 39.08 KG/M2 | HEIGHT: 70 IN

## 2024-03-25 DIAGNOSIS — C44.91 BASAL CELL CARCINOMA OF SKIN, UNSPECIFIED: ICD-10-CM

## 2024-03-25 DIAGNOSIS — Z00.00 ENCOUNTER FOR GENERAL ADULT MEDICAL EXAMINATION W/OUT ABNORMAL FINDINGS: ICD-10-CM

## 2024-03-25 DIAGNOSIS — L98.9 DISORDER OF THE SKIN AND SUBCUTANEOUS TISSUE, UNSPECIFIED: ICD-10-CM

## 2024-03-25 PROCEDURE — G0402 INITIAL PREVENTIVE EXAM: CPT

## 2024-03-25 PROCEDURE — 36415 COLL VENOUS BLD VENIPUNCTURE: CPT

## 2024-03-25 RX ORDER — SACUBITRIL AND VALSARTAN 49; 51 MG/1; MG/1
49-51 TABLET, FILM COATED ORAL
Qty: 60 | Refills: 3 | Status: ACTIVE | COMMUNITY
Start: 2024-03-25

## 2024-03-26 LAB
ALBUMIN SERPL ELPH-MCNC: 4.7 G/DL
ALP BLD-CCNC: 92 U/L
ALT SERPL-CCNC: 16 U/L
ANION GAP SERPL CALC-SCNC: 12 MMOL/L
AST SERPL-CCNC: 15 U/L
BILIRUB SERPL-MCNC: 1.1 MG/DL
BUN SERPL-MCNC: 11 MG/DL
CALCIUM SERPL-MCNC: 9.6 MG/DL
CHLORIDE SERPL-SCNC: 99 MMOL/L
CHOLEST SERPL-MCNC: 161 MG/DL
CO2 SERPL-SCNC: 27 MMOL/L
CREAT SERPL-MCNC: 0.92 MG/DL
EGFR: 91 ML/MIN/1.73M2
ESTIMATED AVERAGE GLUCOSE: 157 MG/DL
GLUCOSE SERPL-MCNC: 143 MG/DL
HBA1C MFR BLD HPLC: 7.1 %
HCT VFR BLD CALC: 46.8 %
HDLC SERPL-MCNC: 33 MG/DL
HGB BLD-MCNC: 16 G/DL
LDLC SERPL CALC-MCNC: 99 MG/DL
MCHC RBC-ENTMCNC: 31 PG
MCHC RBC-ENTMCNC: 34.2 GM/DL
MCV RBC AUTO: 90.7 FL
NONHDLC SERPL-MCNC: 127 MG/DL
NT-PROBNP SERPL-MCNC: 588 PG/ML
PLATELET # BLD AUTO: 203 K/UL
POTASSIUM SERPL-SCNC: 4.6 MMOL/L
PROT SERPL-MCNC: 7.6 G/DL
RBC # BLD: 5.16 M/UL
RBC # FLD: 13.5 %
SODIUM SERPL-SCNC: 139 MMOL/L
T4 FREE SERPL-MCNC: 1.1 NG/DL
TRIGL SERPL-MCNC: 157 MG/DL
TSH SERPL-ACNC: 3.33 UIU/ML
URATE SERPL-MCNC: 5.8 MG/DL
WBC # FLD AUTO: 6.83 K/UL

## 2024-03-27 NOTE — HEALTH RISK ASSESSMENT
[Fair] :  ~his/her~ mood as fair [No] : No [Never (0 pts)] : Never (0 points) [No falls in past year] : Patient reported no falls in the past year [0] : 2) Feeling down, depressed, or hopeless: Not at all (0) [PHQ-2 Negative - No further assessment needed] : PHQ-2 Negative - No further assessment needed [With Family] : lives with family [None] : None [Employed] : employed [College] : College [] :  [Feels Safe at Home] : Feels safe at home [Fully functional (using the telephone, shopping, preparing meals, housekeeping, doing laundry, using] : Fully functional and needs no help or supervision to perform IADLs (using the telephone, shopping, preparing meals, housekeeping, doing laundry, using transportation, managing medications and managing finances) [Fully functional (bathing, dressing, toileting, transferring, walking, feeding)] : Fully functional (bathing, dressing, toileting, transferring, walking, feeding) [Carbon Monoxide Detector] : carbon monoxide detector [Smoke Detector] : smoke detector [Seat Belt] :  uses seat belt [Never] : Never [COD3Jihdf] : 0 [Change in mental status noted] : No change in mental status noted [Language] : denies difficulty with language [Behavior] : denies difficulty with behavior [Learning/Retaining New Information] : denies difficulty learning/retaining new information [Handling Complex Tasks] : denies difficulty handling complex tasks [Reasoning] : denies difficulty with reasoning [Reports changes in hearing] : Reports no changes in hearing [Reports changes in dental health] : Reports no changes in dental health [Reports changes in vision] : Reports no changes in vision [Guns at Home] : no guns at home

## 2024-03-27 NOTE — PLAN
[FreeTextEntry1] : Annual had flu shot  weight an issue just started [place] with constipation to try miralax once or twice diaily and stop metamucil diabetes check a1c check lipid on pralurnt on insulin

## 2024-03-27 NOTE — HISTORY OF PRESENT ILLNESS
[FreeTextEntry1] : Annual [de-identified] : Annual Had flu shot  s/p cabg and cardiomyopathy diabetic on insulin obesity just start on moujaro with constipation

## 2024-04-01 ENCOUNTER — APPOINTMENT (OUTPATIENT)
Dept: DERMATOLOGY | Facility: CLINIC | Age: 69
End: 2024-04-01
Payer: COMMERCIAL

## 2024-04-01 DIAGNOSIS — L70.0 ACNE VULGARIS: ICD-10-CM

## 2024-04-01 DIAGNOSIS — D22.9 MELANOCYTIC NEVI, UNSPECIFIED: ICD-10-CM

## 2024-04-01 DIAGNOSIS — L81.4 OTHER MELANIN HYPERPIGMENTATION: ICD-10-CM

## 2024-04-01 DIAGNOSIS — L73.8 OTHER SPECIFIED FOLLICULAR DISORDERS: ICD-10-CM

## 2024-04-01 PROCEDURE — 99214 OFFICE O/P EST MOD 30 MIN: CPT

## 2024-04-01 RX ORDER — TRETINOIN 0.25 MG/G
0.03 CREAM TOPICAL
Qty: 1 | Refills: 1 | Status: ACTIVE | COMMUNITY
Start: 2024-04-01 | End: 1900-01-01

## 2024-04-01 NOTE — HISTORY OF PRESENT ILLNESS
[FreeTextEntry1] : skin check [de-identified] : 68 year old male here for skin check. has several spots on his face. was advised to have them examined.

## 2024-04-01 NOTE — ASSESSMENT
[FreeTextEntry1] : seb hyp, acne overlap- no treatment required  education and prognosis defers cautery; discussed questionable benefit and risk of hypopigmentation and recurrence defers accutane; questionable benefit  add tretinoin 0.025% cream at bedtime; SED

## 2024-04-03 ENCOUNTER — TRANSCRIPTION ENCOUNTER (OUTPATIENT)
Age: 69
End: 2024-04-03

## 2024-04-03 ENCOUNTER — APPOINTMENT (OUTPATIENT)
Dept: CARDIOLOGY | Facility: CLINIC | Age: 69
End: 2024-04-03
Payer: COMMERCIAL

## 2024-04-03 VITALS — WEIGHT: 265 LBS | HEIGHT: 70 IN | BODY MASS INDEX: 37.94 KG/M2

## 2024-04-03 PROCEDURE — 97802 MEDICAL NUTRITION INDIV IN: CPT

## 2024-04-05 ENCOUNTER — TRANSCRIPTION ENCOUNTER (OUTPATIENT)
Age: 69
End: 2024-04-05

## 2024-04-05 ENCOUNTER — APPOINTMENT (OUTPATIENT)
Dept: CARDIOLOGY | Facility: CLINIC | Age: 69
End: 2024-04-05
Payer: COMMERCIAL

## 2024-04-05 PROCEDURE — 99215 OFFICE O/P EST HI 40 MIN: CPT

## 2024-04-05 PROCEDURE — G2211 COMPLEX E/M VISIT ADD ON: CPT

## 2024-04-05 RX ORDER — ALIROCUMAB 75 MG/ML
75 INJECTION, SOLUTION SUBCUTANEOUS
Qty: 1 | Refills: 6 | Status: ACTIVE | COMMUNITY
Start: 2023-02-10 | End: 1900-01-01

## 2024-04-08 ENCOUNTER — TRANSCRIPTION ENCOUNTER (OUTPATIENT)
Age: 69
End: 2024-04-08

## 2024-04-09 ENCOUNTER — APPOINTMENT (OUTPATIENT)
Dept: ENDOCRINOLOGY | Facility: CLINIC | Age: 69
End: 2024-04-09
Payer: COMMERCIAL

## 2024-04-09 VITALS
HEIGHT: 70 IN | SYSTOLIC BLOOD PRESSURE: 106 MMHG | DIASTOLIC BLOOD PRESSURE: 82 MMHG | TEMPERATURE: 97.5 F | WEIGHT: 270.44 LBS | OXYGEN SATURATION: 97 % | HEART RATE: 75 BPM | BODY MASS INDEX: 38.72 KG/M2

## 2024-04-09 LAB
GLUCOSE BLDC GLUCOMTR-MCNC: 139
HBA1C MFR BLD HPLC: 7.6

## 2024-04-09 PROCEDURE — 83036 HEMOGLOBIN GLYCOSYLATED A1C: CPT | Mod: QW

## 2024-04-09 PROCEDURE — 99214 OFFICE O/P EST MOD 30 MIN: CPT

## 2024-04-09 PROCEDURE — 82962 GLUCOSE BLOOD TEST: CPT

## 2024-04-09 PROCEDURE — 95251 CONT GLUC MNTR ANALYSIS I&R: CPT

## 2024-04-09 RX ORDER — FLASH GLUCOSE SENSOR
KIT MISCELLANEOUS
Qty: 6 | Refills: 3 | Status: ACTIVE | COMMUNITY
Start: 2023-05-22 | End: 1900-01-01

## 2024-04-09 RX ORDER — INSULIN GLARGINE 100 [IU]/ML
100 INJECTION, SOLUTION SUBCUTANEOUS
Qty: 1 | Refills: 2 | Status: ACTIVE | COMMUNITY
Start: 2019-08-09 | End: 1900-01-01

## 2024-04-09 RX ORDER — PEN NEEDLE, DIABETIC 29 G X1/2"
32G X 4 MM NEEDLE, DISPOSABLE MISCELLANEOUS
Qty: 600 | Refills: 2 | Status: ACTIVE | COMMUNITY
Start: 2020-02-04 | End: 1900-01-01

## 2024-04-09 RX ORDER — BLOOD-GLUCOSE METER
70 EACH MISCELLANEOUS
Qty: 360 | Refills: 2 | Status: ACTIVE | COMMUNITY
Start: 2023-05-22 | End: 1900-01-01

## 2024-04-09 NOTE — PHYSICAL EXAM
[Alert] : alert [Well Nourished] : well nourished [Obese] : obese [No Acute Distress] : no acute distress [Well Developed] : well developed [Normal Sclera/Conjunctiva] : normal sclera/conjunctiva [EOMI] : extra ocular movement intact [No Proptosis] : no proptosis [Normal Oropharynx] : the oropharynx was normal [Thyroid Not Enlarged] : the thyroid was not enlarged [No Respiratory Distress] : no respiratory distress [No Accessory Muscle Use] : no accessory muscle use [Clear to Auscultation] : lungs were clear to auscultation bilaterally [Normal S1, S2] : normal S1 and S2 [Normal Rate] : heart rate was normal [Regular Rhythm] : with a regular rhythm [No Edema] : no peripheral edema [Pedal Pulses Normal] : the pedal pulses are present [Normal Bowel Sounds] : normal bowel sounds [Not Tender] : non-tender [Not Distended] : not distended [Soft] : abdomen soft [Normal Anterior Cervical Nodes] : no anterior cervical lymphadenopathy [No Spinal Tenderness] : no spinal tenderness [Spine Straight] : spine straight [No Stigmata of Cushings Syndrome] : no stigmata of Cushings Syndrome [Normal Gait] : normal gait [Normal Strength/Tone] : muscle strength and tone were normal [No Rash] : no rash [Right foot was examined, including] : right foot ~C was examined, including visual inspection with sensory and pulse exams [Left foot was examined, including] : left foot ~C was examined, including visual inspection with sensory and pulse exams [Normal Reflexes] : deep tendon reflexes were 2+ and symmetric [No Tremors] : no tremors [Oriented x3] : oriented to person, place, and time [Acanthosis Nigricans] : no acanthosis nigricans [FreeTextEntry2] : hammer toe [FreeTextEntry6] : hammer toe

## 2024-04-15 ENCOUNTER — TRANSCRIPTION ENCOUNTER (OUTPATIENT)
Age: 69
End: 2024-04-15

## 2024-04-30 ENCOUNTER — EMERGENCY (EMERGENCY)
Facility: HOSPITAL | Age: 69
LOS: 1 days | Discharge: HOME | End: 2024-04-30
Attending: EMERGENCY MEDICINE
Payer: COMMERCIAL

## 2024-04-30 VITALS
DIASTOLIC BLOOD PRESSURE: 93 MMHG | WEIGHT: 270.07 LBS | OXYGEN SATURATION: 96 % | HEIGHT: 71 IN | SYSTOLIC BLOOD PRESSURE: 149 MMHG | HEART RATE: 65 BPM | RESPIRATION RATE: 20 BRPM

## 2024-04-30 VITALS
RESPIRATION RATE: 20 BRPM | SYSTOLIC BLOOD PRESSURE: 164 MMHG | OXYGEN SATURATION: 95 % | HEART RATE: 61 BPM | DIASTOLIC BLOOD PRESSURE: 91 MMHG

## 2024-04-30 DIAGNOSIS — Z98.890 OTHER SPECIFIED POSTPROCEDURAL STATES: Chronic | ICD-10-CM

## 2024-04-30 DIAGNOSIS — Z95.1 PRESENCE OF AORTOCORONARY BYPASS GRAFT: Chronic | ICD-10-CM

## 2024-04-30 LAB
ALBUMIN SERPL ELPH-MCNC: 4.4 G/DL — SIGNIFICANT CHANGE UP (ref 3.3–5)
ALP SERPL-CCNC: 81 U/L — SIGNIFICANT CHANGE UP (ref 40–120)
ALT FLD-CCNC: 21 U/L — SIGNIFICANT CHANGE UP (ref 10–45)
ANION GAP SERPL CALC-SCNC: 13 MMOL/L — SIGNIFICANT CHANGE UP (ref 5–17)
APPEARANCE UR: CLEAR — SIGNIFICANT CHANGE UP
AST SERPL-CCNC: 17 U/L — SIGNIFICANT CHANGE UP (ref 10–40)
BACTERIA # UR AUTO: NEGATIVE /HPF — SIGNIFICANT CHANGE UP
BASOPHILS # BLD AUTO: 0.08 K/UL — SIGNIFICANT CHANGE UP (ref 0–0.2)
BASOPHILS NFR BLD AUTO: 0.6 % — SIGNIFICANT CHANGE UP (ref 0–2)
BILIRUB SERPL-MCNC: 1 MG/DL — SIGNIFICANT CHANGE UP (ref 0.2–1.2)
BILIRUB UR-MCNC: NEGATIVE — SIGNIFICANT CHANGE UP
BUN SERPL-MCNC: 18 MG/DL — SIGNIFICANT CHANGE UP (ref 7–23)
CALCIUM SERPL-MCNC: 9.6 MG/DL — SIGNIFICANT CHANGE UP (ref 8.4–10.5)
CAST: 0 /LPF — SIGNIFICANT CHANGE UP (ref 0–4)
CHLORIDE SERPL-SCNC: 101 MMOL/L — SIGNIFICANT CHANGE UP (ref 96–108)
CO2 SERPL-SCNC: 25 MMOL/L — SIGNIFICANT CHANGE UP (ref 22–31)
COLOR SPEC: YELLOW — SIGNIFICANT CHANGE UP
CREAT SERPL-MCNC: 0.76 MG/DL — SIGNIFICANT CHANGE UP (ref 0.5–1.3)
DIFF PNL FLD: NEGATIVE — SIGNIFICANT CHANGE UP
EGFR: 99 ML/MIN/1.73M2 — SIGNIFICANT CHANGE UP
EOSINOPHIL # BLD AUTO: 0.18 K/UL — SIGNIFICANT CHANGE UP (ref 0–0.5)
EOSINOPHIL NFR BLD AUTO: 1.4 % — SIGNIFICANT CHANGE UP (ref 0–6)
GLUCOSE SERPL-MCNC: 178 MG/DL — HIGH (ref 70–99)
GLUCOSE UR QL: >=1000 MG/DL
HCT VFR BLD CALC: 43.5 % — SIGNIFICANT CHANGE UP (ref 39–50)
HGB BLD-MCNC: 14.8 G/DL — SIGNIFICANT CHANGE UP (ref 13–17)
IMM GRANULOCYTES NFR BLD AUTO: 0.5 % — SIGNIFICANT CHANGE UP (ref 0–0.9)
KETONES UR-MCNC: NEGATIVE MG/DL — SIGNIFICANT CHANGE UP
LEUKOCYTE ESTERASE UR-ACNC: NEGATIVE — SIGNIFICANT CHANGE UP
LIDOCAIN IGE QN: 24 U/L — SIGNIFICANT CHANGE UP (ref 7–60)
LYMPHOCYTES # BLD AUTO: 25 % — SIGNIFICANT CHANGE UP (ref 13–44)
LYMPHOCYTES # BLD AUTO: 3.28 K/UL — SIGNIFICANT CHANGE UP (ref 1–3.3)
MCHC RBC-ENTMCNC: 30.6 PG — SIGNIFICANT CHANGE UP (ref 27–34)
MCHC RBC-ENTMCNC: 34 GM/DL — SIGNIFICANT CHANGE UP (ref 32–36)
MCV RBC AUTO: 89.9 FL — SIGNIFICANT CHANGE UP (ref 80–100)
MONOCYTES # BLD AUTO: 0.82 K/UL — SIGNIFICANT CHANGE UP (ref 0–0.9)
MONOCYTES NFR BLD AUTO: 6.2 % — SIGNIFICANT CHANGE UP (ref 2–14)
NEUTROPHILS # BLD AUTO: 8.72 K/UL — HIGH (ref 1.8–7.4)
NEUTROPHILS NFR BLD AUTO: 66.3 % — SIGNIFICANT CHANGE UP (ref 43–77)
NITRITE UR-MCNC: NEGATIVE — SIGNIFICANT CHANGE UP
NRBC # BLD: 0 /100 WBCS — SIGNIFICANT CHANGE UP (ref 0–0)
PH UR: 6 — SIGNIFICANT CHANGE UP (ref 5–8)
PLATELET # BLD AUTO: 236 K/UL — SIGNIFICANT CHANGE UP (ref 150–400)
POTASSIUM SERPL-MCNC: 4 MMOL/L — SIGNIFICANT CHANGE UP (ref 3.5–5.3)
POTASSIUM SERPL-SCNC: 4 MMOL/L — SIGNIFICANT CHANGE UP (ref 3.5–5.3)
PROT SERPL-MCNC: 7.8 G/DL — SIGNIFICANT CHANGE UP (ref 6–8.3)
PROT UR-MCNC: NEGATIVE MG/DL — SIGNIFICANT CHANGE UP
RBC # BLD: 4.84 M/UL — SIGNIFICANT CHANGE UP (ref 4.2–5.8)
RBC # FLD: 13.2 % — SIGNIFICANT CHANGE UP (ref 10.3–14.5)
RBC CASTS # UR COMP ASSIST: 0 /HPF — SIGNIFICANT CHANGE UP (ref 0–4)
SODIUM SERPL-SCNC: 139 MMOL/L — SIGNIFICANT CHANGE UP (ref 135–145)
SP GR SPEC: >1.03 — HIGH (ref 1–1.03)
SQUAMOUS # UR AUTO: 0 /HPF — SIGNIFICANT CHANGE UP (ref 0–5)
UROBILINOGEN FLD QL: 1 MG/DL — SIGNIFICANT CHANGE UP (ref 0.2–1)
WBC # BLD: 13.14 K/UL — HIGH (ref 3.8–10.5)
WBC # FLD AUTO: 13.14 K/UL — HIGH (ref 3.8–10.5)
WBC UR QL: 0 /HPF — SIGNIFICANT CHANGE UP (ref 0–5)

## 2024-04-30 PROCEDURE — 83690 ASSAY OF LIPASE: CPT

## 2024-04-30 PROCEDURE — 85025 COMPLETE CBC W/AUTO DIFF WBC: CPT

## 2024-04-30 PROCEDURE — 87086 URINE CULTURE/COLONY COUNT: CPT

## 2024-04-30 PROCEDURE — 99285 EMERGENCY DEPT VISIT HI MDM: CPT | Mod: 25

## 2024-04-30 PROCEDURE — 96375 TX/PRO/DX INJ NEW DRUG ADDON: CPT

## 2024-04-30 PROCEDURE — 36415 COLL VENOUS BLD VENIPUNCTURE: CPT

## 2024-04-30 PROCEDURE — 81001 URINALYSIS AUTO W/SCOPE: CPT

## 2024-04-30 PROCEDURE — 80053 COMPREHEN METABOLIC PANEL: CPT

## 2024-04-30 PROCEDURE — 74177 CT ABD & PELVIS W/CONTRAST: CPT | Mod: 26,MC

## 2024-04-30 PROCEDURE — 74177 CT ABD & PELVIS W/CONTRAST: CPT | Mod: MC

## 2024-04-30 PROCEDURE — 93005 ELECTROCARDIOGRAM TRACING: CPT

## 2024-04-30 PROCEDURE — 99285 EMERGENCY DEPT VISIT HI MDM: CPT

## 2024-04-30 PROCEDURE — 96374 THER/PROPH/DIAG INJ IV PUSH: CPT | Mod: XU

## 2024-04-30 RX ORDER — ACETAMINOPHEN 500 MG
1000 TABLET ORAL ONCE
Refills: 0 | Status: COMPLETED | OUTPATIENT
Start: 2024-04-30 | End: 2024-04-30

## 2024-04-30 RX ORDER — SODIUM CHLORIDE 9 MG/ML
1000 INJECTION INTRAMUSCULAR; INTRAVENOUS; SUBCUTANEOUS ONCE
Refills: 0 | Status: COMPLETED | OUTPATIENT
Start: 2024-04-30 | End: 2024-04-30

## 2024-04-30 RX ORDER — MORPHINE SULFATE 50 MG/1
4 CAPSULE, EXTENDED RELEASE ORAL ONCE
Refills: 0 | Status: DISCONTINUED | OUTPATIENT
Start: 2024-04-30 | End: 2024-04-30

## 2024-04-30 RX ORDER — FAMOTIDINE 10 MG/ML
20 INJECTION INTRAVENOUS ONCE
Refills: 0 | Status: COMPLETED | OUTPATIENT
Start: 2024-04-30 | End: 2024-04-30

## 2024-04-30 RX ORDER — KETOROLAC TROMETHAMINE 30 MG/ML
30 SYRINGE (ML) INJECTION ONCE
Refills: 0 | Status: DISCONTINUED | OUTPATIENT
Start: 2024-04-30 | End: 2024-04-30

## 2024-04-30 RX ORDER — ONDANSETRON 8 MG/1
4 TABLET, FILM COATED ORAL ONCE
Refills: 0 | Status: COMPLETED | OUTPATIENT
Start: 2024-04-30 | End: 2024-04-30

## 2024-04-30 RX ADMIN — FAMOTIDINE 20 MILLIGRAM(S): 10 INJECTION INTRAVENOUS at 13:26

## 2024-04-30 RX ADMIN — MORPHINE SULFATE 4 MILLIGRAM(S): 50 CAPSULE, EXTENDED RELEASE ORAL at 16:27

## 2024-04-30 RX ADMIN — SODIUM CHLORIDE 1000 MILLILITER(S): 9 INJECTION INTRAMUSCULAR; INTRAVENOUS; SUBCUTANEOUS at 16:27

## 2024-04-30 RX ADMIN — SODIUM CHLORIDE 1000 MILLILITER(S): 9 INJECTION INTRAMUSCULAR; INTRAVENOUS; SUBCUTANEOUS at 13:26

## 2024-04-30 RX ADMIN — ONDANSETRON 4 MILLIGRAM(S): 8 TABLET, FILM COATED ORAL at 13:27

## 2024-04-30 RX ADMIN — Medication 400 MILLIGRAM(S): at 13:27

## 2024-04-30 RX ADMIN — Medication 30 MILLIGRAM(S): at 15:03

## 2024-04-30 NOTE — ED ADULT NURSE NOTE - OBJECTIVE STATEMENT
Pt came in c/o abdominal pain prior to arrival to the ER. No distress. Breathing easy and non labored. Pt is ambulatory. Pt states he has the abdominal pain and became clammy prior to arrival. Pt is alert and orientedX4. Pt is mildly anxious. Emotional support offered. Pt's abdomen obese. Denies any nausea or vomiting at this time.

## 2024-04-30 NOTE — ED PROVIDER NOTE - CLINICAL SUMMARY MEDICAL DECISION MAKING FREE TEXT BOX
See Attending Note The patient currently reports abdominal pain. Based upon the history and exam, the patient requires CT imaging of the abdomen and pelvis. They have abdominal tenderness or pain concerning for an acute intra-abdominal pathology including infection or obstruction.

## 2024-04-30 NOTE — ED ADULT NURSE NOTE - NSFALLUNIVINTERV_ED_ALL_ED
Bed/Stretcher in lowest position, wheels locked, appropriate side rails in place/Call bell, personal items and telephone in reach/Instruct patient to call for assistance before getting out of bed/chair/stretcher/Non-slip footwear applied when patient is off stretcher/[place] to call system/Physically safe environment - no spills, clutter or unnecessary equipment/Purposeful proactive rounding/Room/bathroom lighting operational, light cord in reach

## 2024-04-30 NOTE — ED PROVIDER NOTE - ATTENDING CONTRIBUTION TO CARE
Patient is a 62-year-old male no prior abdominal surgeries besides possible hernia surgery when he was younger presenting with sudden onset of pain left lower quadrant no radiation of pain although he did say his testicles bother him but has a normal  exam no radiation of pain no back pain CT ordered rule out diverticulitis pain control check labs recently returned from vacation denies any chest pain or shortness of breath vital signs are otherwise stable, no f/c.

## 2024-04-30 NOTE — ED PROVIDER NOTE - NSFOLLOWUPINSTRUCTIONS_ED_ALL_ED_FT
You were seen in the emergency department today for abdominal pain. We performed a CT scan of your abdomen and pelvis (CTAP) and a urine test to help determine the cause of your discomfort. Both of these tests came back normal, which is good news as it means we did not find any serious conditions such as appendicitis, kidney stones, or infections.    At home, it's important to continue monitoring your symptoms. Try to rest and stay hydrated. Over-the-counter pain medication such as acetaminophen or ibuprofen can be used to manage your pain, but please follow the instructions on the packaging for correct dosages. Avoid any foods or activities that seem to worsen your pain.     Please return to the emergency department if your pain becomes severe, if you develop a fever, vomiting, or if your pain changes in character or location. These could be signs of a more serious condition. Also, if your pain persists for more than a few days without improvement, it would be wise to seek further medical attention. It's important to remember that not all causes of abdominal pain can be detected on a CT scan or urine test, so ongoing pain should be evaluated. You were seen in the emergency department today for abdominal pain. We performed a CT scan of your abdomen and pelvis (CTAP) and a urine test to help determine the cause of your discomfort. Both of these tests came back normal, which is good news as it means we did not find any serious conditions such as appendicitis, kidney stones, or infections.    Please see your gastroenterologist to discuss a possible colonoscopy.    At home, it's important to continue monitoring your symptoms. Try to rest and stay hydrated. Over-the-counter pain medication such as acetaminophen or ibuprofen can be used to manage your pain, but please follow the instructions on the packaging for correct dosages. Avoid any foods or activities that seem to worsen your pain.    Please return to the emergency department if your pain becomes severe, if you develop a fever, vomiting, or if your pain changes in character or location. These could be signs of a more serious condition. Also, if your pain persists for more than a few days without improvement, it would be wise to seek further medical attention. It's important to remember that not all causes of abdominal pain can be detected on a CT scan or urine test, so ongoing pain should be evaluated.

## 2024-04-30 NOTE — ED PROVIDER NOTE - PATIENT PORTAL LINK FT
You can access the FollowMyHealth Patient Portal offered by [facility] by registering at the following website: [email or web address]. By joining [employer]’s FollowMyHealth portal, you will also be able to view your health information using other applications (apps) compatible with our system.

## 2024-04-30 NOTE — ED PROVIDER NOTE - OBJECTIVE STATEMENT
68-year-old male with past medical history of coronary artery disease, hypertension, dyslipidemia, insulin-dependent diabetes, congestive heart failure, presents emergency department due to sudden onset left lower quadrant abdominal pain 30 minutes prior to arrival.  He has been afebrile for the past several days.  He has no chest pain or shortness of breath.  He denies any dysuria or urinary frequency.  He has baseline constipation, last bowel movement several days ago, continues to pass flatus.

## 2024-04-30 NOTE — ED PROVIDER NOTE - PHYSICAL EXAMINATION
General: alert, oriented to person, time, place  Psych: mood appropriate  Head: normocephalic; atraumatic  Eyes: PERRLA, EOMI, conjunctivae clear bilaterally, sclerae anicteric  ENT: no nasal flaring, patent nares  Cardio: RRR, no m/r/g, pulses 2+ b/l  Resp: CATB, no w/r/r  GI: left lower quadrant abdominal tenderness to palpation   Neuro: normal sensation, moving all four extremities equally  Skin: No evidence of rash or bruising  MSK: normal movement of all extremities  Lymph/Vasc: no LE edema

## 2024-04-30 NOTE — ED ADULT NURSE REASSESSMENT NOTE - NS ED NURSE REASSESS COMMENT FT1
Received report @ 1430, pt lying in bed comfortably, complaining of pain. Pt given 30mg toradol as per eMAR. Pt made aware of plan of care for UA result then dispo. VSS.

## 2024-05-01 ENCOUNTER — TRANSCRIPTION ENCOUNTER (OUTPATIENT)
Age: 69
End: 2024-05-01

## 2024-05-01 LAB
CULTURE RESULTS: NO GROWTH — SIGNIFICANT CHANGE UP
SPECIMEN SOURCE: SIGNIFICANT CHANGE UP

## 2024-05-02 ENCOUNTER — TRANSCRIPTION ENCOUNTER (OUTPATIENT)
Age: 69
End: 2024-05-02

## 2024-05-03 ENCOUNTER — APPOINTMENT (OUTPATIENT)
Dept: CARDIOLOGY | Facility: CLINIC | Age: 69
End: 2024-05-03
Payer: COMMERCIAL

## 2024-05-03 PROCEDURE — G2211 COMPLEX E/M VISIT ADD ON: CPT

## 2024-05-03 PROCEDURE — 99213 OFFICE O/P EST LOW 20 MIN: CPT

## 2024-05-06 ENCOUNTER — TRANSCRIPTION ENCOUNTER (OUTPATIENT)
Age: 69
End: 2024-05-06

## 2024-05-06 RX ORDER — EVOLOCUMAB 140 MG/ML
140 INJECTION, SOLUTION SUBCUTANEOUS
Qty: 1 | Refills: 6 | Status: ACTIVE | COMMUNITY
Start: 2024-04-10 | End: 1900-01-01

## 2024-05-07 ENCOUNTER — TRANSCRIPTION ENCOUNTER (OUTPATIENT)
Age: 69
End: 2024-05-07

## 2024-05-08 ENCOUNTER — TRANSCRIPTION ENCOUNTER (OUTPATIENT)
Age: 69
End: 2024-05-08

## 2024-05-08 ENCOUNTER — APPOINTMENT (OUTPATIENT)
Dept: CARDIOLOGY | Facility: CLINIC | Age: 69
End: 2024-05-08
Payer: MEDICARE

## 2024-05-08 PROCEDURE — 97803 MED NUTRITION INDIV SUBSEQ: CPT

## 2024-05-10 ENCOUNTER — APPOINTMENT (OUTPATIENT)
Dept: CARDIOLOGY | Facility: CLINIC | Age: 69
End: 2024-05-10
Payer: COMMERCIAL

## 2024-05-10 VITALS
OXYGEN SATURATION: 96 % | SYSTOLIC BLOOD PRESSURE: 153 MMHG | HEART RATE: 65 BPM | WEIGHT: 270 LBS | DIASTOLIC BLOOD PRESSURE: 75 MMHG | BODY MASS INDEX: 38.74 KG/M2

## 2024-05-10 PROCEDURE — G2211 COMPLEX E/M VISIT ADD ON: CPT

## 2024-05-10 PROCEDURE — 99215 OFFICE O/P EST HI 40 MIN: CPT

## 2024-05-22 ENCOUNTER — TRANSCRIPTION ENCOUNTER (OUTPATIENT)
Age: 69
End: 2024-05-22

## 2024-05-22 RX ORDER — DAPAGLIFLOZIN 10 MG/1
10 TABLET, FILM COATED ORAL DAILY
Qty: 90 | Refills: 3 | Status: ACTIVE | COMMUNITY
Start: 2021-03-24 | End: 1900-01-01

## 2024-05-23 ENCOUNTER — TRANSCRIPTION ENCOUNTER (OUTPATIENT)
Age: 69
End: 2024-05-23

## 2024-05-23 ENCOUNTER — APPOINTMENT (OUTPATIENT)
Dept: INTERNAL MEDICINE | Facility: CLINIC | Age: 69
End: 2024-05-23

## 2024-05-31 ENCOUNTER — APPOINTMENT (OUTPATIENT)
Dept: INTERNAL MEDICINE | Facility: CLINIC | Age: 69
End: 2024-05-31
Payer: COMMERCIAL

## 2024-05-31 ENCOUNTER — TRANSCRIPTION ENCOUNTER (OUTPATIENT)
Age: 69
End: 2024-05-31

## 2024-05-31 VITALS
BODY MASS INDEX: 38.51 KG/M2 | DIASTOLIC BLOOD PRESSURE: 92 MMHG | SYSTOLIC BLOOD PRESSURE: 134 MMHG | TEMPERATURE: 98 F | OXYGEN SATURATION: 96 % | HEART RATE: 73 BPM | WEIGHT: 269 LBS | HEIGHT: 70 IN

## 2024-05-31 DIAGNOSIS — Z95.1 PRESENCE OF AORTOCORONARY BYPASS GRAFT: ICD-10-CM

## 2024-05-31 DIAGNOSIS — Z00.00 ENCOUNTER FOR GENERAL ADULT MEDICAL EXAMINATION W/OUT ABNORMAL FINDINGS: ICD-10-CM

## 2024-05-31 DIAGNOSIS — R10.9 UNSPECIFIED ABDOMINAL PAIN: ICD-10-CM

## 2024-05-31 DIAGNOSIS — I50.22 CHRONIC SYSTOLIC (CONGESTIVE) HEART FAILURE: ICD-10-CM

## 2024-05-31 DIAGNOSIS — I10 ESSENTIAL (PRIMARY) HYPERTENSION: ICD-10-CM

## 2024-05-31 PROCEDURE — 99214 OFFICE O/P EST MOD 30 MIN: CPT

## 2024-05-31 PROCEDURE — G0402 INITIAL PREVENTIVE EXAM: CPT

## 2024-05-31 PROCEDURE — 36415 COLL VENOUS BLD VENIPUNCTURE: CPT

## 2024-06-01 LAB
ALBUMIN SERPL ELPH-MCNC: 4.9 G/DL
ALP BLD-CCNC: 87 U/L
ALT SERPL-CCNC: 20 U/L
ANION GAP SERPL CALC-SCNC: 13 MMOL/L
AST SERPL-CCNC: 15 U/L
BILIRUB SERPL-MCNC: 1.1 MG/DL
BUN SERPL-MCNC: 25 MG/DL
CALCIUM SERPL-MCNC: 9.8 MG/DL
CHLORIDE SERPL-SCNC: 100 MMOL/L
CHOLEST SERPL-MCNC: 196 MG/DL
CO2 SERPL-SCNC: 24 MMOL/L
CREAT SERPL-MCNC: 1.1 MG/DL
EGFR: 74 ML/MIN/1.73M2
ERYTHROCYTE [SEDIMENTATION RATE] IN BLOOD BY WESTERGREN METHOD: 28 MM/HR
GLUCOSE SERPL-MCNC: 128 MG/DL
HCT VFR BLD CALC: 47.4 %
HDLC SERPL-MCNC: 35 MG/DL
HGB BLD-MCNC: 16.4 G/DL
LDLC SERPL CALC-MCNC: 119 MG/DL
MCHC RBC-ENTMCNC: 31.3 PG
MCHC RBC-ENTMCNC: 34.6 GM/DL
MCV RBC AUTO: 90.5 FL
NONHDLC SERPL-MCNC: 161 MG/DL
PLATELET # BLD AUTO: 249 K/UL
POTASSIUM SERPL-SCNC: 5 MMOL/L
PROT SERPL-MCNC: 8 G/DL
PSA FREE FLD-MCNC: 24 %
PSA FREE SERPL-MCNC: 0.1 NG/ML
PSA SERPL-MCNC: 0.4 NG/ML
RBC # BLD: 5.24 M/UL
RBC # FLD: 13.6 %
SODIUM SERPL-SCNC: 138 MMOL/L
TRIGL SERPL-MCNC: 240 MG/DL
TSH SERPL-ACNC: 2.25 UIU/ML
WBC # FLD AUTO: 10.97 K/UL

## 2024-06-04 ENCOUNTER — TRANSCRIPTION ENCOUNTER (OUTPATIENT)
Age: 69
End: 2024-06-04

## 2024-06-07 ENCOUNTER — NON-APPOINTMENT (OUTPATIENT)
Age: 69
End: 2024-06-07

## 2024-06-08 ENCOUNTER — TRANSCRIPTION ENCOUNTER (OUTPATIENT)
Age: 69
End: 2024-06-08

## 2024-06-10 ENCOUNTER — APPOINTMENT (OUTPATIENT)
Dept: CARDIOLOGY | Facility: CLINIC | Age: 69
End: 2024-06-10
Payer: COMMERCIAL

## 2024-06-10 DIAGNOSIS — E78.5 HYPERLIPIDEMIA, UNSPECIFIED: ICD-10-CM

## 2024-06-10 PROCEDURE — 97803 MED NUTRITION INDIV SUBSEQ: CPT

## 2024-06-11 NOTE — HEALTH RISK ASSESSMENT
[None] : None [With Family] : lives with family [Retired] : retired [Fully functional (bathing, dressing, toileting, transferring, walking, feeding)] : Fully functional (bathing, dressing, toileting, transferring, walking, feeding) [Fully functional (using the telephone, shopping, preparing meals, housekeeping, doing laundry, using] : Fully functional and needs no help or supervision to perform IADLs (using the telephone, shopping, preparing meals, housekeeping, doing laundry, using transportation, managing medications and managing finances) [Reports changes in hearing] : Reports no changes in hearing [Reports changes in vision] : Reports no changes in vision [Reports changes in dental health] : Reports no changes in dental health [ColonoscopyComments] : Due - last 5-6 years ago

## 2024-06-11 NOTE — HISTORY OF PRESENT ILLNESS
[FreeTextEntry1] : Establish Care, CPE/hospital follow up/acute visit [de-identified] : PMHx: History of Glaucoma, Hypertension, Hyperlipidemia, Obesity with Poor Lifestyle Habits, T2DM, Coronary Artery Disease S/P CABG by Dr. Downs (04/2018), Systolic CHF Pending ICD Determination (Recent MUGA After OMT with LVEF 30%; But TTE With LVEF 35%), Moderate AS, Reported Atrial Fibrillation Per Chart But Unknown To Patient  Pt f/w endocrinology for DMII - on insulin Pt did labs March 2024: A1C 7.1, 7.6%   Farxiga qd Entresto BID Manson qd Metoprolol qd Lantus - not currently using due to semaglutide Humalog - sliding scale Mounjaro 2.5  Dental - Dr. James Pt recently returned from [place] - ate a lot of salty foods/meats - after he returned he had severe stomach pain - went to ED 4/30 - [facility] - unclear diagnosis - but recommended to see GI for colonoscopy Pt on Mounjaro - some hx of intermittent constipation Last colonoscopy about 5 years ago No ETOH x 10-15 years Pt is very active - martial arts, weight lifting  -Flu - 9/2023 -COVID19 -  -TDAP/MMR -  -Shingles vaccine -  -PNA vaccine -  -Colonoscopy - Due - referral provided   134/92 269lb

## 2024-06-20 ENCOUNTER — TRANSCRIPTION ENCOUNTER (OUTPATIENT)
Age: 69
End: 2024-06-20

## 2024-06-21 ENCOUNTER — APPOINTMENT (OUTPATIENT)
Dept: ENDOCRINOLOGY | Facility: CLINIC | Age: 69
End: 2024-06-21
Payer: MEDICARE

## 2024-06-21 ENCOUNTER — EMERGENCY (EMERGENCY)
Facility: HOSPITAL | Age: 69
LOS: 1 days | Discharge: HOME | End: 2024-06-21
Attending: EMERGENCY MEDICINE | Admitting: EMERGENCY MEDICINE
Payer: COMMERCIAL

## 2024-06-21 VITALS
OXYGEN SATURATION: 100 % | TEMPERATURE: 98 F | HEART RATE: 61 BPM | WEIGHT: 273.37 LBS | HEIGHT: 71 IN | DIASTOLIC BLOOD PRESSURE: 90 MMHG | RESPIRATION RATE: 18 BRPM | SYSTOLIC BLOOD PRESSURE: 174 MMHG

## 2024-06-21 VITALS
SYSTOLIC BLOOD PRESSURE: 142 MMHG | DIASTOLIC BLOOD PRESSURE: 80 MMHG | OXYGEN SATURATION: 99 % | HEIGHT: 70 IN | BODY MASS INDEX: 38.8 KG/M2 | HEART RATE: 57 BPM | WEIGHT: 271 LBS

## 2024-06-21 DIAGNOSIS — E66.9 OBESITY, UNSPECIFIED: ICD-10-CM

## 2024-06-21 DIAGNOSIS — Z79.4 LONG TERM (CURRENT) USE OF INSULIN: ICD-10-CM

## 2024-06-21 DIAGNOSIS — Z95.1 PRESENCE OF AORTOCORONARY BYPASS GRAFT: Chronic | ICD-10-CM

## 2024-06-21 DIAGNOSIS — Z97.8 PRESENCE OF OTHER SPECIFIED DEVICES: ICD-10-CM

## 2024-06-21 DIAGNOSIS — E11.9 TYPE 2 DIABETES MELLITUS W/OUT COMPLICATIONS: ICD-10-CM

## 2024-06-21 DIAGNOSIS — Z98.890 OTHER SPECIFIED POSTPROCEDURAL STATES: Chronic | ICD-10-CM

## 2024-06-21 LAB
ESTIMATED AVERAGE GLUCOSE: 174 MG/DL
GLUCOSE BLDC GLUCOMTR-MCNC: 195 MG/DL — HIGH (ref 70–99)
GLUCOSE BLDC GLUCOMTR-MCNC: 202
GLUCOSE BLDC GLUCOMTR-MCNC: 204 MG/DL — HIGH (ref 70–99)
HBA1C MFR BLD HPLC: 7.7 %

## 2024-06-21 PROCEDURE — 99283 EMERGENCY DEPT VISIT LOW MDM: CPT

## 2024-06-21 PROCEDURE — 82962 GLUCOSE BLOOD TEST: CPT

## 2024-06-21 PROCEDURE — 99282 EMERGENCY DEPT VISIT SF MDM: CPT

## 2024-06-21 PROCEDURE — 99214 OFFICE O/P EST MOD 30 MIN: CPT

## 2024-06-21 PROCEDURE — 99204 OFFICE O/P NEW MOD 45 MIN: CPT

## 2024-06-21 PROCEDURE — 95251 CONT GLUC MNTR ANALYSIS I&R: CPT

## 2024-06-21 RX ORDER — BLOOD SUGAR DIAGNOSTIC
STRIP MISCELLANEOUS TWICE DAILY
Qty: 1 | Refills: 2 | Status: ACTIVE | COMMUNITY
Start: 2023-05-22 | End: 1900-01-01

## 2024-06-21 RX ORDER — GLUCOSAM/CHON-MSM1/C/MANG/BOSW 500-416.6
TABLET ORAL
Qty: 1 | Refills: 0 | Status: ACTIVE | COMMUNITY
Start: 2023-05-22 | End: 1900-01-01

## 2024-06-21 RX ORDER — TIRZEPATIDE 2.5 MG/.5ML
2.5 INJECTION, SOLUTION SUBCUTANEOUS
Qty: 4 | Refills: 0 | Status: ACTIVE | COMMUNITY
Start: 2024-03-15 | End: 1900-01-01

## 2024-06-21 RX ORDER — BLOOD-GLUCOSE METER
W/DEVICE KIT MISCELLANEOUS
Qty: 1 | Refills: 0 | Status: ACTIVE | COMMUNITY
Start: 2024-06-21 | End: 1900-01-01

## 2024-06-21 RX ORDER — INSULIN LISPRO 100 [IU]/ML
100 INJECTION, SOLUTION INTRAVENOUS; SUBCUTANEOUS 3 TIMES DAILY
Qty: 10 | Refills: 3 | Status: DISCONTINUED | COMMUNITY
Start: 2023-10-18 | End: 2024-06-21

## 2024-06-21 RX ORDER — DAPAGLIFLOZIN 10 MG/1
10 TABLET, FILM COATED ORAL
Qty: 90 | Refills: 1 | Status: ACTIVE | COMMUNITY
Start: 2023-10-06 | End: 1900-01-01

## 2024-06-21 NOTE — ED PROVIDER NOTE - NSFOLLOWUPINSTRUCTIONS_ED_ALL_ED_FT
-- You should update your primary care physician on your Emergency Department visit and follow up with them.  If you do not have a physician or have difficulty following up, please call: [phone] to obtain a [facility] doctor or specialist who can provide follow up.    -- Return to the ER for worsening or persistent symptoms, and/or ANY NEW OR CONCERNING SYMPTOMS.    -- Follow up with your endocrinologist as soon as possible. Reduce your insulin or don't take any if your sugars remain low.

## 2024-06-21 NOTE — ED PROVIDER NOTE - PATIENT PORTAL LINK FT
You can access the FollowMyHealth Patient Portal offered by [facility] by registering at the following website: [email or web address]. By joining [employer]’s FollowMyHealth portal, you will also be able to view your health information using other applications (apps) compatible with our system.

## 2024-06-21 NOTE — HISTORY OF PRESENT ILLNESS
[FreeTextEntry1] : 68 yearM here for f/up for Type 2 diabetes mellitus  last A1c: 7.1%  Patient with past medical history as below, remarkable for HTN, HLD, CABG   No new complaints.    Screening  Ophthalmology: follows Podiatry: follows LDL:  149 on lipitor 80mg po daily  EGFR: 95  Met nutritionist  Current diabetic medication regimen (verified with patient):   Lantus 10 units Qhs (reports not using for last 2 weeks due to increased activity) farxiga 10mg po daily Mounjaro 2.5mg Q weekly (dose increase limited by GI effects)    Ambulatory glucose profile (AGP):    Device:  Abbott Freestyle Martinez 2  Glucose Management Indicator (GMI): 7.1%  Average Bmg/dl   TIR:   TIR >250 mg/dl:  2%  TIR >180mg/dl: 22%  TIR 70 to 180mg/dl: 76%  TIR <70mg/dl: 0%  TIR <54mg/dl: 0%   Coefficient of variation: 22.7%   Time (%) CGM active: 80%  AGP: at target, mild PP excursions    Patient reports sugars were persistently in the 50's, no typical hypoglycemia symptoms. Did not confirm with fingerstick, called on call service, then went to ER and was told that glucose was normal.  Did not change sensor.  Likely sensor error. 
I will START or STAY ON the medications listed below when I get home from the hospital:    predniSONE 5 mg oral tablet  -- 4 tab(s) by mouth two times a day x 1 day on 8/16/18 then  4 tabs daily     -- Indication: For Immunosuppression    acetaminophen 325 mg oral tablet  -- 2 tab(s) by mouth every 6 hours, As needed, Mild Pain (1 - 3)  -- Indication: For pain    tamsulosin 0.4 mg oral capsule  -- 1 cap(s) by mouth once a day (at bedtime)  -- Indication: For gu    gabapentin 300 mg oral capsule  -- 1 cap(s) by mouth once a day  -- Indication: For pain    Januvia 25 mg oral tablet  -- 1 tab(s) by mouth once a day  -- Indication: For DM (diabetes mellitus)    HumaLOG KwikPen (Concentrated) 200 units/mL subcutaneous solution  -- unit(s) subcutaneous 4 times a day  -200=2units SQ  -250=4units SQ  251-300=6units sq  301-350=8units sq  >351=10 units and call MD  -- Indication: For DM (diabetes mellitus)    diphenoxylate-atropine 2.5 mg-0.025 mg oral tablet  -- 2 tab(s) by mouth 3 times a day  -- Indication: For Diarrhea    nystatin 100,000 units/mL oral suspension  -- 5 milliliter(s) by mouth 4 times a day  -- Indication: For ppx    Epclusa 400 mg-100 mg oral tablet  -- 1 tab(s) by mouth once a day (at bedtime)  -- Indication: For gi    valGANciclovir 450 mg oral tablet  -- 1 tab(s) by mouth 3 times a week  -- Indication: For ppx    NIFEdipine 30 mg oral tablet, extended release  -- 1 tab(s) by mouth once a day  -- Indication: For HTN (hypertension)    famotidine 20 mg oral tablet  -- 1 tab(s) by mouth once a day  -- Indication: For gi    tacrolimus 1 mg oral capsule  -- 4 cap(s) by mouth 2 times a day  -- Indication: For Immunosuppression    sulfamethoxazole-trimethoprim 400 mg-80 mg oral tablet  -- 1 tab(s) by mouth once a day  -- Indication: For ppx

## 2024-06-21 NOTE — ED ADULT NURSE NOTE - OBJECTIVE STATEMENT
Pt is alert, came to the ER due to concerns that his blood sugar is getting lower and lower. Pt started taking Mounjaro 2.5mg injection once a week ( Thursday). Also with headache. No chest pain or SOB. At home his CGM indicated a blood Glucose of 60 PTA.

## 2024-06-21 NOTE — ED PROVIDER NOTE - CLINICAL SUMMARY MEDICAL DECISION MAKING FREE TEXT BOX
Patient with low blood sugar but not critical low.  Encouraged follow-up with endocrinologist to adjust his insulin and reevaluate need for Mounjaro.  Patient encouraged to eat some carbs and given a sandwich here.  Patient stable for discharge.

## 2024-06-21 NOTE — ED ADULT NURSE NOTE - HIV OFFER
February 2, 2017      Navid Toribio Jr., MD  [address] [facility] [place] [address]           [facility]  [address]  [facility] [address]  Phone: [phone]          Patient: Jackelin Douglas   MR Number: [id number]   YOB: 2004   Date of Visit: 2/2/2017       Dear Dr. Navid Toribio Jr.:    Thank you for referring Jackelin Douglas to me for evaluation. Attached you will find relevant portions of my assessment and plan of care.    If you have questions, please do not hesitate to call me. I look forward to following Jackelin Douglas along with you.    Sincerely,    Natalia Patricia MD    Enclosure  CC:  No Recipients    If you would like to receive this communication electronically, please contact [email or web address] or [phone] to request more information on [employer] Link access.    For providers and/or their staff who would like to refer a patient to [facility], please contact us through our one-stop-shop provider referral line, [facility], at [phone].    If you feel you have received this communication in error or would no longer like to receive these types of communications, please e-mail [email or web address]          Opt out

## 2024-06-21 NOTE — ED PROVIDER NOTE - CARE PLAN
Principal Discharge DX:	Encounter for medical screening examination  Secondary Diagnosis:	Hypoglycemia   1

## 2024-06-21 NOTE — ED PROVIDER NOTE - OBJECTIVE STATEMENT
Patient is diabetic and his hemoglobin A1c was close to 13 a few months ago.  Patient started diet and exercise and was on Humalog and Lantus and then recently started Mounjaro.  All this made a huge difference and his hemoglobin A1c is now 7.  Patient also got a gigi 2 glucose continuous monitoring system.  Tonight his phone started to alert him that his glucose was approaching 60.  Patient states he had some juice but then 2 hours later it dropped again to 70 so patient was concerned that his sugar Dropping so comes to the ER for evaluation.  Patient states that he did a double karate class today and mainly had protein for dinner without much carbs.  Patient states that otherwise he feels fine and he has no symptoms but was just worried about the numbers.

## 2024-06-21 NOTE — ED ADULT NURSE NOTE - NS ED NURSE LEVEL OF CONSCIOUSNESS ORIENTATION
Oriented - self; Oriented - place; Oriented - time Tranexamic Acid Counseling:  Patient advised of the small risk of bleeding problems with tranexamic acid. They were also instructed to call if they developed any nausea, vomiting or diarrhea. All of the patient's questions and concerns were addressed.

## 2024-06-25 NOTE — ED PROVIDER NOTE - QRS
Detail Level: Detailed Quality 226: Preventive Care And Screening: Tobacco Use: Screening And Cessation Intervention: Patient screened for tobacco use and is an ex/non-smoker Quality 431: Preventive Care And Screening: Unhealthy Alcohol Use - Screening: Patient not identified as an unhealthy alcohol user when screened for unhealthy alcohol use using a systematic screening method 96

## 2024-06-27 ENCOUNTER — APPOINTMENT (OUTPATIENT)
Dept: CARDIOLOGY | Facility: CLINIC | Age: 69
End: 2024-06-27

## 2024-06-28 ENCOUNTER — APPOINTMENT (OUTPATIENT)
Dept: CARDIOLOGY | Facility: CLINIC | Age: 69
End: 2024-06-28

## 2024-06-28 NOTE — CHART NOTE - NSCHARTNOTEFT_GEN_A_CORE
68 y o male presenting to the ED on 6/21/24 with hypoglycemia.   Per HIE, patient has the recommended endocrinology follow up appointment scheduled on 9/20/24 with Dr. Shaw Strickland, and primary care appointment scheduled on 7/23/24 with Dr. Gianluca Barron.

## 2024-07-15 ENCOUNTER — TRANSCRIPTION ENCOUNTER (OUTPATIENT)
Age: 69
End: 2024-07-15

## 2024-07-15 ENCOUNTER — APPOINTMENT (OUTPATIENT)
Dept: CARDIOLOGY | Facility: CLINIC | Age: 69
End: 2024-07-15
Payer: COMMERCIAL

## 2024-07-15 VITALS — BODY MASS INDEX: 37.22 KG/M2 | WEIGHT: 260 LBS | HEIGHT: 70 IN

## 2024-07-15 DIAGNOSIS — E66.9 OBESITY, UNSPECIFIED: ICD-10-CM

## 2024-07-15 PROCEDURE — 97803 MED NUTRITION INDIV SUBSEQ: CPT

## 2024-07-18 ENCOUNTER — TRANSCRIPTION ENCOUNTER (OUTPATIENT)
Age: 69
End: 2024-07-18

## 2024-07-23 ENCOUNTER — APPOINTMENT (OUTPATIENT)
Dept: INTERNAL MEDICINE | Facility: CLINIC | Age: 69
End: 2024-07-23

## 2024-07-23 ENCOUNTER — TRANSCRIPTION ENCOUNTER (OUTPATIENT)
Age: 69
End: 2024-07-23

## 2024-07-23 VITALS
TEMPERATURE: 98.1 F | DIASTOLIC BLOOD PRESSURE: 78 MMHG | SYSTOLIC BLOOD PRESSURE: 132 MMHG | WEIGHT: 261 LBS | OXYGEN SATURATION: 98 % | BODY MASS INDEX: 37.37 KG/M2 | HEART RATE: 71 BPM | HEIGHT: 70 IN

## 2024-07-23 DIAGNOSIS — Z95.1 PRESENCE OF AORTOCORONARY BYPASS GRAFT: ICD-10-CM

## 2024-07-23 PROCEDURE — 99215 OFFICE O/P EST HI 40 MIN: CPT | Mod: 25

## 2024-07-23 PROCEDURE — 36415 COLL VENOUS BLD VENIPUNCTURE: CPT

## 2024-07-23 NOTE — HISTORY OF PRESENT ILLNESS
[FreeTextEntry1] : Follow up, labs, medication review [de-identified] : Mr. KENNEDY is a 68 year old male who presents to the office for follow up: Pt feeling well overall. The patient denies chest pain, shortness of breath, fever, chills, abdominal pain.  PMHx: History of Glaucoma, Hypertension, Hyperlipidemia, Obesity with Poor Lifestyle Habits, T2DM, Coronary Artery Disease S/P CABG by Dr. Downs (04/2018), Systolic CHF Pending ICD Determination (Recent MUGA After OMT with LVEF 30%; But TTE With LVEF 35%), Moderate AS  Pt seeing nutritionist Pt is exercising - has lost some weight overall, now able to do pushups, feels like he is in better shape Does [employer] arts - kurt salmon GI follow up - [facility] - upcoming colonoscopy Provided card to have results sent in Will need cardio clearance prior to procedure - appointment on Friday  Pt f/w endocrinology for DMII - on insulin Pt did labs March 2024: A1C 7.1, 7.6% On Mounjaro - has been working on diet- lean proteins, mostly water for beverage Goal weight of 240 lbs - advised aiming for about 1-2 lb/week  132/78 261lb  Plan to repeat labs, UA today Medications reviewed Tolerating regimen well, some constipation but managed with metamucil  F/up 4 months

## 2024-07-24 LAB
ALBUMIN SERPL ELPH-MCNC: 4.8 G/DL
ALP BLD-CCNC: 98 U/L
ALT SERPL-CCNC: 22 U/L
ANION GAP SERPL CALC-SCNC: 15 MMOL/L
APPEARANCE: CLEAR
AST SERPL-CCNC: 21 U/L
BACTERIA: NEGATIVE /HPF
BILIRUB SERPL-MCNC: 1.5 MG/DL
BILIRUBIN URINE: NEGATIVE
BLOOD URINE: NEGATIVE
BUN SERPL-MCNC: 16 MG/DL
CALCIUM SERPL-MCNC: 9.9 MG/DL
CAST: 0 /LPF
CHLORIDE SERPL-SCNC: 100 MMOL/L
CHOLEST SERPL-MCNC: 98 MG/DL
CO2 SERPL-SCNC: 24 MMOL/L
COLOR: NORMAL
CREAT SERPL-MCNC: 0.94 MG/DL
EGFR: 89 ML/MIN/1.73M2
EPITHELIAL CELLS: 0 /HPF
ESTIMATED AVERAGE GLUCOSE: 163 MG/DL
GLUCOSE QUALITATIVE U: >=1000 MG/DL
GLUCOSE SERPL-MCNC: 115 MG/DL
HBA1C MFR BLD HPLC: 7.3 %
HCT VFR BLD CALC: 46.2 %
HDLC SERPL-MCNC: 34 MG/DL
HGB BLD-MCNC: 15 G/DL
KETONES URINE: NEGATIVE MG/DL
LDLC SERPL CALC-MCNC: 38 MG/DL
LEUKOCYTE ESTERASE URINE: NEGATIVE
MCHC RBC-ENTMCNC: 31.3 PG
MCHC RBC-ENTMCNC: 32.5 GM/DL
MCV RBC AUTO: 96.5 FL
MICROSCOPIC-UA: NORMAL
NITRITE URINE: NEGATIVE
NONHDLC SERPL-MCNC: 63 MG/DL
PH URINE: 5.5
PLATELET # BLD AUTO: 240 K/UL
POTASSIUM SERPL-SCNC: 4.4 MMOL/L
PROT SERPL-MCNC: 7.7 G/DL
PROTEIN URINE: NEGATIVE MG/DL
PSA FREE FLD-MCNC: 24 %
PSA FREE SERPL-MCNC: 0.1 NG/ML
PSA SERPL-MCNC: 0.41 NG/ML
RBC # BLD: 4.79 M/UL
RBC # FLD: 14.3 %
RED BLOOD CELLS URINE: 1 /HPF
SODIUM SERPL-SCNC: 140 MMOL/L
SPECIFIC GRAVITY URINE: >1.03
TRIGL SERPL-MCNC: 148 MG/DL
UROBILINOGEN URINE: 0.2 MG/DL
WBC # FLD AUTO: 10.38 K/UL
WHITE BLOOD CELLS URINE: 0 /HPF

## 2024-07-25 ENCOUNTER — APPOINTMENT (OUTPATIENT)
Dept: CARDIOLOGY | Facility: CLINIC | Age: 69
End: 2024-07-25

## 2024-07-26 ENCOUNTER — APPOINTMENT (OUTPATIENT)
Dept: CARDIOLOGY | Facility: CLINIC | Age: 69
End: 2024-07-26
Payer: COMMERCIAL

## 2024-07-26 VITALS
HEIGHT: 70 IN | WEIGHT: 261 LBS | HEART RATE: 62 BPM | BODY MASS INDEX: 37.37 KG/M2 | OXYGEN SATURATION: 98 % | SYSTOLIC BLOOD PRESSURE: 132 MMHG | DIASTOLIC BLOOD PRESSURE: 76 MMHG

## 2024-07-26 DIAGNOSIS — E78.5 HYPERLIPIDEMIA, UNSPECIFIED: ICD-10-CM

## 2024-07-26 DIAGNOSIS — Z86.79 PERSONAL HISTORY OF OTHER DISEASES OF THE CIRCULATORY SYSTEM: ICD-10-CM

## 2024-07-26 DIAGNOSIS — E11.9 TYPE 2 DIABETES MELLITUS W/OUT COMPLICATIONS: ICD-10-CM

## 2024-07-26 DIAGNOSIS — I10 ESSENTIAL (PRIMARY) HYPERTENSION: ICD-10-CM

## 2024-07-26 PROCEDURE — G2211 COMPLEX E/M VISIT ADD ON: CPT | Mod: NC

## 2024-07-26 PROCEDURE — 99215 OFFICE O/P EST HI 40 MIN: CPT

## 2024-08-14 NOTE — ED PROVIDER NOTE - NS ED MD DISPO ISOLATION TYPES
[facility]                                                                       Query Response Note      PATIENT:               HARLEY DE LA CRUZ  ACCT #:                  [id number]  MRN:                     [id number]  :                      1988  ADMIT DATE:       2024 10:58 AM  DISCH DATE:          RESPONDING  PROVIDER #:        [id number]           QUERY TEXT:    Per  Dietary PN - BMI is 44.65 kg/m².), Class III obesity    Can a diagnosis be provided to support this finding?    Note: If you agree with a diagnosis listed, please remember to include it in your concurrent daily documentation and onto the Discharge Summary.    The patient's Clinical Indicators include:  H&P - General: obese   Dietary PN - BMI is 44.65 kg/m².), Class III obesity    Treatment - dietary consult; Weight loss counseling not appropriate in acute care setting    Risk factors - Obese; BMI 44.65    Thank you,  Catalina PINEDA  Clinical   Connect via [employer]  Options provided:   -- Obese only   -- Morbidly obese (class III obesity)   -- Other explanation, (please specify other explanation)      Query created by: Catalina Betancur on 2024 1:12 PM    RESPONSE TEXT:    Morbidly obese (class III obesity)       QUERY TEXT:    Per  PN - has been on Humira for the last year with a suppressed WBC of 4.2    Please clarify the clinical/diagnostic relevance for the clinical indicators documented.    Note: If you agree with a diagnosis listed, please remember to include it in your concurrent daily documentation and onto the Discharge Summary    The patient's clinical indicators include:   PN - has been on Humira for the last year with a suppressed WBC of 4.2    Treatment - Repeat CBC and monitoring of WBC    Risk factors - taking Humira    Thank you,  Catalina PINEDA  Clinical Documentation  Specialist  Connect via [employer]  Options provided:   -- Immunocompromised due to taking Humira is clinically significant and ruled in   -- Findings of no clinical significance   -- Other explanation, (please specify the other explanation)   -- Unable to determine      Query created by: Catalina Betancur on 8/13/2024 11:51 AM    RESPONSE TEXT:    Unable to determine          Electronically signed by:  MIL FRANCOIS MD 8/14/2024 6:22 AM               None

## 2024-09-09 ENCOUNTER — TRANSCRIPTION ENCOUNTER (OUTPATIENT)
Age: 69
End: 2024-09-09

## 2024-09-20 ENCOUNTER — APPOINTMENT (OUTPATIENT)
Dept: ENDOCRINOLOGY | Facility: CLINIC | Age: 69
End: 2024-09-20
Payer: COMMERCIAL

## 2024-09-20 VITALS
RESPIRATION RATE: 18 BRPM | OXYGEN SATURATION: 98 % | HEIGHT: 70 IN | DIASTOLIC BLOOD PRESSURE: 80 MMHG | SYSTOLIC BLOOD PRESSURE: 130 MMHG | BODY MASS INDEX: 37.8 KG/M2 | WEIGHT: 264 LBS | HEART RATE: 73 BPM | TEMPERATURE: 97.5 F

## 2024-09-20 DIAGNOSIS — Z97.8 PRESENCE OF OTHER SPECIFIED DEVICES: ICD-10-CM

## 2024-09-20 DIAGNOSIS — Z79.4 LONG TERM (CURRENT) USE OF INSULIN: ICD-10-CM

## 2024-09-20 DIAGNOSIS — E11.9 TYPE 2 DIABETES MELLITUS W/OUT COMPLICATIONS: ICD-10-CM

## 2024-09-20 DIAGNOSIS — E66.9 OBESITY, UNSPECIFIED: ICD-10-CM

## 2024-09-20 LAB — GLUCOSE BLDC GLUCOMTR-MCNC: 179

## 2024-09-20 PROCEDURE — 95251 CONT GLUC MNTR ANALYSIS I&R: CPT

## 2024-09-20 PROCEDURE — 82962 GLUCOSE BLOOD TEST: CPT

## 2024-09-20 PROCEDURE — 99214 OFFICE O/P EST MOD 30 MIN: CPT

## 2024-09-20 RX ORDER — GLIPIZIDE 2.5 MG/1
2.5 TABLET, FILM COATED, EXTENDED RELEASE ORAL DAILY
Qty: 90 | Refills: 1 | Status: ACTIVE | COMMUNITY
Start: 2024-09-20 | End: 1900-01-01

## 2024-09-20 NOTE — HISTORY OF PRESENT ILLNESS
[FreeTextEntry1] :  f/up for Type 2 diabetes mellitus  last A1c: 7.7%  Patient with past medical history as below, remarkable for HTN, HLD, CABG   No new complaints.    Screening  Ophthalmology: follows Podiatry: follows LDL:  149 on lipitor 80mg po daily  EGFR: 95  Met nutritionist  Current diabetic medication regimen (verified with patient):   Lantus (patient self d/c 1 month ago. last on 10 units)   farxiga 10mg po daily Mounjaro 5mg Q weekly     Ambulatory glucose profile (AGP):    Device:  Abbott Freestyle Martinez 2  Glucose Management Indicator (GMI): %  Average BG:  mg/dl   TIR:   TIR >250 mg/dl:  8%  TIR >180mg/dl: 38%  TIR 70 to 180mg/dl: 54%  TIR <70mg/dl: 0%  TIR <54mg/dl: 0%   Coefficient of variation: 22.8%   Time (%) CGM active: 80%  AGP:  at target fasting PP hyperglycemia

## 2024-09-24 ENCOUNTER — TRANSCRIPTION ENCOUNTER (OUTPATIENT)
Age: 69
End: 2024-09-24

## 2024-10-01 ENCOUNTER — APPOINTMENT (OUTPATIENT)
Dept: GASTROENTEROLOGY | Facility: CLINIC | Age: 69
End: 2024-10-01

## 2024-10-28 ENCOUNTER — APPOINTMENT (OUTPATIENT)
Dept: INTERNAL MEDICINE | Facility: CLINIC | Age: 69
End: 2024-10-28
Payer: COMMERCIAL

## 2024-10-28 VITALS
TEMPERATURE: 97.8 F | OXYGEN SATURATION: 98 % | HEART RATE: 60 BPM | HEIGHT: 70 IN | SYSTOLIC BLOOD PRESSURE: 130 MMHG | DIASTOLIC BLOOD PRESSURE: 80 MMHG | BODY MASS INDEX: 37.37 KG/M2 | WEIGHT: 261 LBS

## 2024-10-28 DIAGNOSIS — E11.9 TYPE 2 DIABETES MELLITUS W/OUT COMPLICATIONS: ICD-10-CM

## 2024-10-28 DIAGNOSIS — D32.9 BENIGN NEOPLASM OF MENINGES, UNSPECIFIED: ICD-10-CM

## 2024-10-28 PROCEDURE — 99214 OFFICE O/P EST MOD 30 MIN: CPT

## 2024-10-28 PROCEDURE — 36415 COLL VENOUS BLD VENIPUNCTURE: CPT

## 2024-10-29 LAB
ALBUMIN SERPL ELPH-MCNC: 4.6 G/DL
ALP BLD-CCNC: 88 U/L
ALT SERPL-CCNC: 15 U/L
ANION GAP SERPL CALC-SCNC: 9 MMOL/L
APPEARANCE: CLEAR
AST SERPL-CCNC: 13 U/L
BACTERIA: NEGATIVE /HPF
BILIRUB SERPL-MCNC: 1.1 MG/DL
BILIRUBIN URINE: NEGATIVE
BLOOD URINE: NEGATIVE
BUN SERPL-MCNC: 17 MG/DL
CALCIUM SERPL-MCNC: 9.8 MG/DL
CAST: 0 /LPF
CHLORIDE SERPL-SCNC: 101 MMOL/L
CHOLEST SERPL-MCNC: 107 MG/DL
CO2 SERPL-SCNC: 28 MMOL/L
COLOR: YELLOW
CREAT SERPL-MCNC: 0.89 MG/DL
EGFR: 93 ML/MIN/1.73M2
EPITHELIAL CELLS: 0 /HPF
ESTIMATED AVERAGE GLUCOSE: 157 MG/DL
GLUCOSE QUALITATIVE U: >=1000 MG/DL
GLUCOSE SERPL-MCNC: 146 MG/DL
HBA1C MFR BLD HPLC: 7.1 %
HCT VFR BLD CALC: 44.5 %
HDLC SERPL-MCNC: 41 MG/DL
HGB BLD-MCNC: 15 G/DL
KETONES URINE: NEGATIVE MG/DL
LDLC SERPL CALC-MCNC: 43 MG/DL
LEUKOCYTE ESTERASE URINE: NEGATIVE
MCHC RBC-ENTMCNC: 31.2 PG
MCHC RBC-ENTMCNC: 33.7 GM/DL
MCV RBC AUTO: 92.5 FL
MICROSCOPIC-UA: NORMAL
NITRITE URINE: NEGATIVE
NONHDLC SERPL-MCNC: 66 MG/DL
PH URINE: 5.5
PLATELET # BLD AUTO: 196 K/UL
POTASSIUM SERPL-SCNC: 4.5 MMOL/L
PROT SERPL-MCNC: 7.2 G/DL
PROTEIN URINE: NEGATIVE MG/DL
PSA FREE FLD-MCNC: 25 %
PSA FREE SERPL-MCNC: 0.1 NG/ML
PSA SERPL-MCNC: 0.42 NG/ML
RBC # BLD: 4.81 M/UL
RBC # FLD: 13.7 %
RED BLOOD CELLS URINE: 0 /HPF
SODIUM SERPL-SCNC: 138 MMOL/L
SPECIFIC GRAVITY URINE: >1.03
TRIGL SERPL-MCNC: 127 MG/DL
TSH SERPL-ACNC: 3.9 UIU/ML
UROBILINOGEN URINE: 0.2 MG/DL
WBC # FLD AUTO: 9.2 K/UL
WHITE BLOOD CELLS URINE: 0 /HPF

## 2024-10-31 ENCOUNTER — TRANSCRIPTION ENCOUNTER (OUTPATIENT)
Age: 69
End: 2024-10-31

## 2024-11-02 NOTE — ED ADULT NURSE NOTE - NSSISCREENINGQ3_ED_A_ED
prescribed.    -Patient advised to increase PO hydration  -Discharged patient with instructions to follow up with PCP  -Advised to go immediately to the ED for worsening or persistent symptoms      I have discussed the results, diagnosis and treatment plan with the patient.  The patient also understands that early in the process of an illness, an urgent care workup can be falsely reassuring.  Routine discharge counseling and specific return precautions discussed with patient and the patient understands that worsening, changing or persistent symptoms should prompt an immediate return to the urgent care or emergency department.  Patient/Guardian expressed understanding and agrees with the discharge plan.  No further questions at time of discharge.    Pamella Harper, APRN - CNP  
No

## 2024-11-05 ENCOUNTER — NON-APPOINTMENT (OUTPATIENT)
Age: 69
End: 2024-11-05

## 2024-11-05 ENCOUNTER — APPOINTMENT (OUTPATIENT)
Dept: CARDIOLOGY | Facility: CLINIC | Age: 69
End: 2024-11-05
Payer: COMMERCIAL

## 2024-11-05 VITALS
SYSTOLIC BLOOD PRESSURE: 130 MMHG | OXYGEN SATURATION: 98 % | BODY MASS INDEX: 38.08 KG/M2 | WEIGHT: 266 LBS | HEART RATE: 60 BPM | DIASTOLIC BLOOD PRESSURE: 76 MMHG | HEIGHT: 70 IN

## 2024-11-05 DIAGNOSIS — E78.5 HYPERLIPIDEMIA, UNSPECIFIED: ICD-10-CM

## 2024-11-05 DIAGNOSIS — I10 ESSENTIAL (PRIMARY) HYPERTENSION: ICD-10-CM

## 2024-11-05 PROCEDURE — G2211 COMPLEX E/M VISIT ADD ON: CPT

## 2024-11-05 PROCEDURE — 93000 ELECTROCARDIOGRAM COMPLETE: CPT

## 2024-11-05 PROCEDURE — 99215 OFFICE O/P EST HI 40 MIN: CPT

## 2024-11-11 ENCOUNTER — OUTPATIENT (OUTPATIENT)
Dept: OUTPATIENT SERVICES | Facility: HOSPITAL | Age: 69
LOS: 1 days | End: 2024-11-11
Payer: COMMERCIAL

## 2024-11-11 ENCOUNTER — TRANSCRIPTION ENCOUNTER (OUTPATIENT)
Age: 69
End: 2024-11-11

## 2024-11-11 VITALS
DIASTOLIC BLOOD PRESSURE: 80 MMHG | RESPIRATION RATE: 23 BRPM | HEIGHT: 71 IN | SYSTOLIC BLOOD PRESSURE: 141 MMHG | TEMPERATURE: 97 F | WEIGHT: 257.94 LBS | OXYGEN SATURATION: 97 % | HEART RATE: 63 BPM

## 2024-11-11 VITALS
DIASTOLIC BLOOD PRESSURE: 71 MMHG | HEART RATE: 63 BPM | RESPIRATION RATE: 18 BRPM | OXYGEN SATURATION: 95 % | SYSTOLIC BLOOD PRESSURE: 121 MMHG

## 2024-11-11 DIAGNOSIS — K59.00 CONSTIPATION, UNSPECIFIED: ICD-10-CM

## 2024-11-11 DIAGNOSIS — Z98.890 OTHER SPECIFIED POSTPROCEDURAL STATES: Chronic | ICD-10-CM

## 2024-11-11 DIAGNOSIS — Z95.1 PRESENCE OF AORTOCORONARY BYPASS GRAFT: Chronic | ICD-10-CM

## 2024-11-11 DIAGNOSIS — R19.4 CHANGE IN BOWEL HABIT: ICD-10-CM

## 2024-11-11 LAB
GLUCOSE BLDC GLUCOMTR-MCNC: 147 MG/DL — HIGH (ref 70–99)
GLUCOSE BLDC GLUCOMTR-MCNC: 147 MG/DL — HIGH (ref 70–99)

## 2024-11-11 PROCEDURE — 82962 GLUCOSE BLOOD TEST: CPT

## 2024-11-11 PROCEDURE — G0121: CPT

## 2024-11-11 RX ORDER — SODIUM CHLORIDE 9 MG/ML
500 INJECTION, SOLUTION INTRAMUSCULAR; INTRAVENOUS; SUBCUTANEOUS
Refills: 0 | Status: COMPLETED | OUTPATIENT
Start: 2024-11-11 | End: 2024-11-11

## 2024-11-11 RX ORDER — GLIPIZIDE 5 MG
1 TABLET ORAL
Refills: 0 | DISCHARGE

## 2024-11-11 RX ORDER — DAPAGLIFLOZIN 10 MG/1
1 TABLET, FILM COATED ORAL
Refills: 0 | DISCHARGE

## 2024-11-11 RX ADMIN — SODIUM CHLORIDE 75 MILLILITER(S): 9 INJECTION, SOLUTION INTRAMUSCULAR; INTRAVENOUS; SUBCUTANEOUS at 10:47

## 2024-11-11 NOTE — PRE PROCEDURE NOTE - PRE PROCEDURE EVALUATION
Attending Physician:     Fabio Rivers MD                       Procedure:  Colonoscopy    Indication for Procedure: Routine screening  ________________________________________________________  PAST MEDICAL & SURGICAL HISTORY:  Type 2 diabetes mellitus with diabetic neuropathy, without long-term current use of insulin      Essential hypertension      Coronary artery disease      Paroxysmal atrial fibrillation      Systolic congestive heart failure      Moderate aortic stenosis      H/O inguinal hernia repair      History of excision of pilonidal cyst      S/P CABG (coronary artery bypass graft)        ALLERGIES:  allery to INSECT VENOM (Hives)  Originally Entered as [Unknown] reaction to [ENVIRONMENTAL] (Unknown)  Novocain (Hives)    HOME MEDICATIONS:  dapagliflozin 10 mg oral tablet: 1 tab(s) orally once a day  Entresto 49 mg-51 mg oral tablet: 1 tab(s) orally 2 times a day  glipiZIDE 2.5 mg oral tablet, extended release: 1 tab(s) orally once a day  spironolactone 25 mg oral tablet: 1 tab(s) orally once a day    AICD/PPM: [ ] yes   [ ] no    PERTINENT LAB DATA:                      PHYSICAL EXAMINATION:    T(C): --  HR: --  BP: --  RR: --  SpO2: --    Constitutional: NAD  HEENT: PERRLA, EOMI,    Neck:  No JVD  Respiratory: CTAB/L  Cardiovascular: S1 and S2  Gastrointestinal: BS+, soft, NT/ND  Extremities: No peripheral edema  Neurological: A/O x 3, no focal deficits  Psychiatric: Normal mood, normal affect  Skin: No rashes    ASA Class: I [ ]  II [ ]  III [x ]  IV [ ]    COMMENTS:    The patient is a suitable candidate for the planned procedure unless box checked [ ]  No, explain:

## 2024-11-11 NOTE — ASU DISCHARGE PLAN (ADULT/PEDIATRIC) - FINANCIAL ASSISTANCE
[facility] provides services at a reduced cost to those who are determined to be eligible through [facility]’s financial assistance program. Information regarding [facility]’s financial assistance program can be found by going to [email or web address] or by calling [phone].

## 2024-11-11 NOTE — ASU DISCHARGE PLAN (ADULT/PEDIATRIC) - NS MD DC FALL RISK RISK
For information on Fall & Injury Prevention, visit: [email or web address] OR  [email or web address] OR  https://www.cdc.gov/steadi/patient.html

## 2024-11-22 ENCOUNTER — APPOINTMENT (OUTPATIENT)
Dept: ENDOCRINOLOGY | Facility: CLINIC | Age: 69
End: 2024-11-22
Payer: COMMERCIAL

## 2024-11-22 VITALS
OXYGEN SATURATION: 99 % | HEART RATE: 60 BPM | HEIGHT: 70 IN | DIASTOLIC BLOOD PRESSURE: 89 MMHG | WEIGHT: 264 LBS | SYSTOLIC BLOOD PRESSURE: 152 MMHG | BODY MASS INDEX: 37.8 KG/M2 | TEMPERATURE: 97.5 F | RESPIRATION RATE: 18 BRPM

## 2024-11-22 DIAGNOSIS — Z97.8 PRESENCE OF OTHER SPECIFIED DEVICES: ICD-10-CM

## 2024-11-22 DIAGNOSIS — E11.9 TYPE 2 DIABETES MELLITUS W/OUT COMPLICATIONS: ICD-10-CM

## 2024-11-22 DIAGNOSIS — Z79.4 LONG TERM (CURRENT) USE OF INSULIN: ICD-10-CM

## 2024-11-22 DIAGNOSIS — E66.9 OBESITY, UNSPECIFIED: ICD-10-CM

## 2024-11-22 LAB — GLUCOSE BLDC GLUCOMTR-MCNC: 158

## 2024-11-22 PROCEDURE — 82962 GLUCOSE BLOOD TEST: CPT

## 2024-11-22 PROCEDURE — 99214 OFFICE O/P EST MOD 30 MIN: CPT

## 2024-11-22 PROCEDURE — 95251 CONT GLUC MNTR ANALYSIS I&R: CPT

## 2024-12-06 ENCOUNTER — APPOINTMENT (OUTPATIENT)
Dept: CARDIOLOGY | Facility: CLINIC | Age: 69
End: 2024-12-06
Payer: COMMERCIAL

## 2024-12-06 DIAGNOSIS — E66.9 OBESITY, UNSPECIFIED: ICD-10-CM

## 2024-12-06 DIAGNOSIS — E11.9 TYPE 2 DIABETES MELLITUS W/OUT COMPLICATIONS: ICD-10-CM

## 2024-12-06 DIAGNOSIS — E78.5 HYPERLIPIDEMIA, UNSPECIFIED: ICD-10-CM

## 2024-12-06 PROCEDURE — 99214 OFFICE O/P EST MOD 30 MIN: CPT

## 2024-12-06 PROCEDURE — G2211 COMPLEX E/M VISIT ADD ON: CPT | Mod: NC

## 2024-12-17 ENCOUNTER — APPOINTMENT (OUTPATIENT)
Dept: CARDIOLOGY | Facility: CLINIC | Age: 69
End: 2024-12-17

## 2024-12-20 ENCOUNTER — APPOINTMENT (OUTPATIENT)
Dept: CARDIOLOGY | Facility: CLINIC | Age: 69
End: 2024-12-20

## 2024-12-24 ENCOUNTER — APPOINTMENT (OUTPATIENT)
Dept: ENDOCRINOLOGY | Facility: CLINIC | Age: 69
End: 2024-12-24
Payer: MEDICARE

## 2024-12-24 VITALS
WEIGHT: 269 LBS | HEART RATE: 64 BPM | TEMPERATURE: 97.8 F | RESPIRATION RATE: 18 BRPM | DIASTOLIC BLOOD PRESSURE: 82 MMHG | SYSTOLIC BLOOD PRESSURE: 130 MMHG | HEIGHT: 70 IN | OXYGEN SATURATION: 99 % | BODY MASS INDEX: 38.51 KG/M2

## 2024-12-24 DIAGNOSIS — E11.9 TYPE 2 DIABETES MELLITUS W/OUT COMPLICATIONS: ICD-10-CM

## 2024-12-24 DIAGNOSIS — Z01.818 ENCOUNTER FOR OTHER PREPROCEDURAL EXAMINATION: ICD-10-CM

## 2024-12-24 DIAGNOSIS — Z97.8 PRESENCE OF OTHER SPECIFIED DEVICES: ICD-10-CM

## 2024-12-24 DIAGNOSIS — E66.9 OBESITY, UNSPECIFIED: ICD-10-CM

## 2024-12-24 LAB
GLUCOSE BLDC GLUCOMTR-MCNC: 182
HBA1C MFR BLD HPLC: 6.8

## 2024-12-24 PROCEDURE — 82962 GLUCOSE BLOOD TEST: CPT

## 2024-12-24 PROCEDURE — 99214 OFFICE O/P EST MOD 30 MIN: CPT

## 2024-12-24 PROCEDURE — 83036 HEMOGLOBIN GLYCOSYLATED A1C: CPT | Mod: QW

## 2025-01-06 ENCOUNTER — LABORATORY RESULT (OUTPATIENT)
Age: 70
End: 2025-01-06

## 2025-01-06 ENCOUNTER — APPOINTMENT (OUTPATIENT)
Dept: DERMATOLOGY | Facility: CLINIC | Age: 70
End: 2025-01-06
Payer: COMMERCIAL

## 2025-01-06 DIAGNOSIS — L73.8 OTHER SPECIFIED FOLLICULAR DISORDERS: ICD-10-CM

## 2025-01-06 DIAGNOSIS — D22.9 MELANOCYTIC NEVI, UNSPECIFIED: ICD-10-CM

## 2025-01-06 DIAGNOSIS — L81.4 OTHER MELANIN HYPERPIGMENTATION: ICD-10-CM

## 2025-01-06 DIAGNOSIS — D48.5 NEOPLASM OF UNCERTAIN BEHAVIOR OF SKIN: ICD-10-CM

## 2025-01-06 DIAGNOSIS — Z12.83 ENCOUNTER FOR SCREENING FOR MALIGNANT NEOPLASM OF SKIN: ICD-10-CM

## 2025-01-06 PROCEDURE — 11102 TANGNTL BX SKIN SINGLE LES: CPT

## 2025-01-06 PROCEDURE — 99213 OFFICE O/P EST LOW 20 MIN: CPT | Mod: 25

## 2025-01-07 ENCOUNTER — APPOINTMENT (OUTPATIENT)
Dept: CARDIOLOGY | Facility: CLINIC | Age: 70
End: 2025-01-07
Payer: MEDICARE

## 2025-01-07 ENCOUNTER — NON-APPOINTMENT (OUTPATIENT)
Age: 70
End: 2025-01-07

## 2025-01-07 VITALS
SYSTOLIC BLOOD PRESSURE: 112 MMHG | BODY MASS INDEX: 38.51 KG/M2 | DIASTOLIC BLOOD PRESSURE: 80 MMHG | WEIGHT: 269 LBS | OXYGEN SATURATION: 99 % | HEART RATE: 64 BPM | HEIGHT: 70 IN

## 2025-01-07 DIAGNOSIS — I10 ESSENTIAL (PRIMARY) HYPERTENSION: ICD-10-CM

## 2025-01-07 DIAGNOSIS — E78.5 HYPERLIPIDEMIA, UNSPECIFIED: ICD-10-CM

## 2025-01-07 DIAGNOSIS — Z95.1 PRESENCE OF AORTOCORONARY BYPASS GRAFT: ICD-10-CM

## 2025-01-07 PROCEDURE — 93000 ELECTROCARDIOGRAM COMPLETE: CPT

## 2025-01-07 PROCEDURE — G2211 COMPLEX E/M VISIT ADD ON: CPT

## 2025-01-07 PROCEDURE — 99215 OFFICE O/P EST HI 40 MIN: CPT

## 2025-01-10 ENCOUNTER — TRANSCRIPTION ENCOUNTER (OUTPATIENT)
Age: 70
End: 2025-01-10

## 2025-01-13 ENCOUNTER — NON-APPOINTMENT (OUTPATIENT)
Age: 70
End: 2025-01-13

## 2025-01-24 ENCOUNTER — APPOINTMENT (OUTPATIENT)
Dept: DERMATOLOGY | Facility: CLINIC | Age: 70
End: 2025-01-24

## 2025-01-29 ENCOUNTER — APPOINTMENT (OUTPATIENT)
Dept: CARDIOLOGY | Facility: CLINIC | Age: 70
End: 2025-01-29

## 2025-02-06 NOTE — PATIENT PROFILE ADULT - NSPROCHRONICPAINRELIEVE_GEN_A_NUR
[Initial Eval - Existing Diagnosis] : an initial evaluation of an existing diagnosis [Patient] : patient [Other: _____] : [unfilled] medications

## 2025-02-27 ENCOUNTER — APPOINTMENT (OUTPATIENT)
Dept: DERMATOLOGY | Facility: CLINIC | Age: 70
End: 2025-02-27
Payer: COMMERCIAL

## 2025-02-27 DIAGNOSIS — D48.5 NEOPLASM OF UNCERTAIN BEHAVIOR OF SKIN: ICD-10-CM

## 2025-02-27 DIAGNOSIS — D23.9 OTHER BENIGN NEOPLASM OF SKIN, UNSPECIFIED: ICD-10-CM

## 2025-02-27 PROCEDURE — 13101 CMPLX RPR TRUNK 2.6-7.5 CM: CPT

## 2025-02-27 PROCEDURE — 11403 EXC TR-EXT B9+MARG 2.1-3CM: CPT

## 2025-03-05 ENCOUNTER — TRANSCRIPTION ENCOUNTER (OUTPATIENT)
Age: 70
End: 2025-03-05

## 2025-03-14 ENCOUNTER — APPOINTMENT (OUTPATIENT)
Dept: CARDIOLOGY | Facility: CLINIC | Age: 70
End: 2025-03-14

## 2025-03-14 ENCOUNTER — TRANSCRIPTION ENCOUNTER (OUTPATIENT)
Age: 70
End: 2025-03-14

## 2025-03-17 ENCOUNTER — APPOINTMENT (OUTPATIENT)
Dept: DERMATOLOGY | Facility: CLINIC | Age: 70
End: 2025-03-17
Payer: MEDICARE

## 2025-03-17 DIAGNOSIS — L92.9 GRANULOMATOUS DISORDER OF THE SKIN AND SUBCUTANEOUS TISSUE, UNSPECIFIED: ICD-10-CM

## 2025-03-17 DIAGNOSIS — D22.9 MELANOCYTIC NEVI, UNSPECIFIED: ICD-10-CM

## 2025-03-17 DIAGNOSIS — L81.4 OTHER MELANIN HYPERPIGMENTATION: ICD-10-CM

## 2025-03-17 PROCEDURE — 99213 OFFICE O/P EST LOW 20 MIN: CPT

## 2025-03-17 PROCEDURE — 99203 OFFICE O/P NEW LOW 30 MIN: CPT | Mod: 25

## 2025-03-17 PROCEDURE — 17250 CHEM CAUT OF GRANLTJ TISSUE: CPT

## 2025-03-18 ENCOUNTER — TRANSCRIPTION ENCOUNTER (OUTPATIENT)
Age: 70
End: 2025-03-18

## 2025-03-18 RX ORDER — MUPIROCIN 20 MG/G
2 OINTMENT TOPICAL TWICE DAILY
Qty: 1 | Refills: 1 | Status: ACTIVE | COMMUNITY
Start: 2025-03-14 | End: 1900-01-01

## 2025-03-19 ENCOUNTER — TRANSCRIPTION ENCOUNTER (OUTPATIENT)
Age: 70
End: 2025-03-19

## 2025-03-28 ENCOUNTER — APPOINTMENT (OUTPATIENT)
Dept: ENDOCRINOLOGY | Facility: CLINIC | Age: 70
End: 2025-03-28
Payer: COMMERCIAL

## 2025-03-28 VITALS
RESPIRATION RATE: 18 BRPM | TEMPERATURE: 98 F | OXYGEN SATURATION: 100 % | HEIGHT: 70 IN | WEIGHT: 273 LBS | HEART RATE: 76 BPM | DIASTOLIC BLOOD PRESSURE: 85 MMHG | SYSTOLIC BLOOD PRESSURE: 140 MMHG | BODY MASS INDEX: 39.08 KG/M2

## 2025-03-28 DIAGNOSIS — Z97.8 PRESENCE OF OTHER SPECIFIED DEVICES: ICD-10-CM

## 2025-03-28 DIAGNOSIS — E11.9 TYPE 2 DIABETES MELLITUS W/OUT COMPLICATIONS: ICD-10-CM

## 2025-03-28 DIAGNOSIS — E66.9 OBESITY, UNSPECIFIED: ICD-10-CM

## 2025-03-28 LAB
GLUCOSE BLDC GLUCOMTR-MCNC: 198
HBA1C MFR BLD HPLC: 6.6

## 2025-03-28 PROCEDURE — 83036 HEMOGLOBIN GLYCOSYLATED A1C: CPT | Mod: QW

## 2025-03-28 PROCEDURE — 99214 OFFICE O/P EST MOD 30 MIN: CPT

## 2025-03-28 PROCEDURE — 82962 GLUCOSE BLOOD TEST: CPT

## 2025-03-28 PROCEDURE — 95251 CONT GLUC MNTR ANALYSIS I&R: CPT

## 2025-04-01 ENCOUNTER — APPOINTMENT (OUTPATIENT)
Dept: CARDIOLOGY | Facility: CLINIC | Age: 70
End: 2025-04-01

## 2025-04-01 ENCOUNTER — APPOINTMENT (OUTPATIENT)
Dept: CARDIOLOGY | Facility: CLINIC | Age: 70
End: 2025-04-01
Payer: COMMERCIAL

## 2025-04-01 VITALS
OXYGEN SATURATION: 97 % | DIASTOLIC BLOOD PRESSURE: 80 MMHG | SYSTOLIC BLOOD PRESSURE: 127 MMHG | WEIGHT: 270 LBS | BODY MASS INDEX: 38.74 KG/M2 | HEART RATE: 64 BPM

## 2025-04-01 DIAGNOSIS — G47.33 OBSTRUCTIVE SLEEP APNEA (ADULT) (PEDIATRIC): ICD-10-CM

## 2025-04-01 DIAGNOSIS — I10 ESSENTIAL (PRIMARY) HYPERTENSION: ICD-10-CM

## 2025-04-01 DIAGNOSIS — E78.5 HYPERLIPIDEMIA, UNSPECIFIED: ICD-10-CM

## 2025-04-01 PROCEDURE — 99215 OFFICE O/P EST HI 40 MIN: CPT

## 2025-04-01 PROCEDURE — G2211 COMPLEX E/M VISIT ADD ON: CPT | Mod: NC

## 2025-04-01 RX ORDER — TIRZEPATIDE 7.5 MG/.5ML
7.5 INJECTION, SOLUTION SUBCUTANEOUS
Qty: 3 | Refills: 0 | Status: ACTIVE | COMMUNITY
Start: 2025-03-28 | End: 1900-01-01

## 2025-04-15 NOTE — DISCHARGE NOTE ADULT - NS MD DC PLAN IMMU FLU REF OTH
Care Transitions Note    Follow Up Call     Patient Current Location:  Home: [id number] [id number]    Care Transition Nurse contacted the patient by telephone. Verified name and  as identifiers.    Additional needs identified to be addressed with provider   No needs identified                 Method of communication with provider: none.    Care Summary Note: Pt states doing well, no issues or concerns. Has an occasional cough. Agreed to more CTC f/u calls.    Advance Care Planning:   Does patient have an Advance Directive: reviewed during previous call, see note. .    Medication Review:  No changes since last call.     Remote Patient Monitoring:  Offered patient enrollment in the Remote Patient Monitoring (RPM) program for in-home monitoring: Yes, but did not enroll at this time: controlled chronic disease management.    Assessments:  Care Transitions Subsequent and Final Call    Subsequent and Final Calls  Do you have any ongoing symptoms?: No  Have your medications changed?: No  Do you have any questions related to your medications?: No  Do you currently have any active services?: No  Do you have any needs or concerns that I can assist you with?: No  Identified Barriers: None  Care Transitions Interventions  No Identified Needs  Other Interventions:              Follow Up Appointment:         Care Transition Nurse provided contact information.  Plan for follow-up call in 6-10 days based on severity of symptoms and risk factors.  Plan for next call: self management-       Parker Post RN       Refused

## 2025-04-18 ENCOUNTER — APPOINTMENT (OUTPATIENT)
Dept: INTERNAL MEDICINE | Facility: CLINIC | Age: 70
End: 2025-04-18
Payer: COMMERCIAL

## 2025-04-18 VITALS
DIASTOLIC BLOOD PRESSURE: 82 MMHG | OXYGEN SATURATION: 99 % | SYSTOLIC BLOOD PRESSURE: 134 MMHG | WEIGHT: 273 LBS | HEART RATE: 62 BPM | BODY MASS INDEX: 39.08 KG/M2 | HEIGHT: 70 IN

## 2025-04-18 DIAGNOSIS — M48.02 SPINAL STENOSIS, CERVICAL REGION: ICD-10-CM

## 2025-04-18 DIAGNOSIS — E11.9 TYPE 2 DIABETES MELLITUS W/OUT COMPLICATIONS: ICD-10-CM

## 2025-04-18 DIAGNOSIS — I10 ESSENTIAL (PRIMARY) HYPERTENSION: ICD-10-CM

## 2025-04-18 DIAGNOSIS — E78.5 HYPERLIPIDEMIA, UNSPECIFIED: ICD-10-CM

## 2025-04-18 DIAGNOSIS — Z95.1 PRESENCE OF AORTOCORONARY BYPASS GRAFT: ICD-10-CM

## 2025-04-18 PROCEDURE — 99215 OFFICE O/P EST HI 40 MIN: CPT

## 2025-05-13 ENCOUNTER — APPOINTMENT (OUTPATIENT)
Dept: CARDIOLOGY | Facility: CLINIC | Age: 70
End: 2025-05-13
Payer: MEDICARE

## 2025-05-13 VITALS
BODY MASS INDEX: 38.6 KG/M2 | SYSTOLIC BLOOD PRESSURE: 120 MMHG | DIASTOLIC BLOOD PRESSURE: 80 MMHG | WEIGHT: 269 LBS | HEART RATE: 67 BPM | OXYGEN SATURATION: 97 %

## 2025-05-13 DIAGNOSIS — Z95.1 PRESENCE OF AORTOCORONARY BYPASS GRAFT: ICD-10-CM

## 2025-05-13 DIAGNOSIS — E78.5 HYPERLIPIDEMIA, UNSPECIFIED: ICD-10-CM

## 2025-05-13 DIAGNOSIS — I50.22 CHRONIC SYSTOLIC (CONGESTIVE) HEART FAILURE: ICD-10-CM

## 2025-05-13 DIAGNOSIS — E11.9 TYPE 2 DIABETES MELLITUS W/OUT COMPLICATIONS: ICD-10-CM

## 2025-05-13 DIAGNOSIS — I10 ESSENTIAL (PRIMARY) HYPERTENSION: ICD-10-CM

## 2025-05-13 PROCEDURE — 99215 OFFICE O/P EST HI 40 MIN: CPT

## 2025-05-13 PROCEDURE — G2211 COMPLEX E/M VISIT ADD ON: CPT

## 2025-05-19 ENCOUNTER — NON-APPOINTMENT (OUTPATIENT)
Age: 70
End: 2025-05-19

## 2025-05-19 ENCOUNTER — APPOINTMENT (OUTPATIENT)
Dept: DERMATOLOGY | Facility: CLINIC | Age: 70
End: 2025-05-19
Payer: COMMERCIAL

## 2025-05-19 DIAGNOSIS — L82.0 INFLAMED SEBORRHEIC KERATOSIS: ICD-10-CM

## 2025-05-19 DIAGNOSIS — R21 RASH AND OTHER NONSPECIFIC SKIN ERUPTION: ICD-10-CM

## 2025-05-19 DIAGNOSIS — D48.5 NEOPLASM OF UNCERTAIN BEHAVIOR OF SKIN: ICD-10-CM

## 2025-05-19 PROCEDURE — 17110 DESTRUCTION B9 LES UP TO 14: CPT

## 2025-05-19 PROCEDURE — 99213 OFFICE O/P EST LOW 20 MIN: CPT | Mod: 25

## 2025-06-06 ENCOUNTER — RX RENEWAL (OUTPATIENT)
Age: 70
End: 2025-06-06

## 2025-06-19 LAB
ALBUMIN SERPL ELPH-MCNC: 4.6 G/DL
ALP BLD-CCNC: 83 U/L
ALT SERPL-CCNC: 29 U/L
ANION GAP SERPL CALC-SCNC: 13 MMOL/L
AST SERPL-CCNC: 18 U/L
BILIRUB SERPL-MCNC: 1.2 MG/DL
BUN SERPL-MCNC: 21 MG/DL
CALCIUM SERPL-MCNC: 9.5 MG/DL
CHLORIDE SERPL-SCNC: 103 MMOL/L
CHOLEST SERPL-MCNC: 78 MG/DL
CO2 SERPL-SCNC: 23 MMOL/L
CREAT SERPL-MCNC: 0.78 MG/DL
EGFRCR SERPLBLD CKD-EPI 2021: 97 ML/MIN/1.73M2
ESTIMATED AVERAGE GLUCOSE: 154 MG/DL
GLUCOSE SERPL-MCNC: 146 MG/DL
HBA1C MFR BLD HPLC: 7 %
HCT VFR BLD CALC: 41.4 %
HDLC SERPL-MCNC: 40 MG/DL
HGB BLD-MCNC: 14.6 G/DL
LDLC SERPL-MCNC: 15 MG/DL
MCHC RBC-ENTMCNC: 31.3 PG
MCHC RBC-ENTMCNC: 35.3 G/DL
MCV RBC AUTO: 88.8 FL
NONHDLC SERPL-MCNC: 38 MG/DL
PLATELET # BLD AUTO: 232 K/UL
POTASSIUM SERPL-SCNC: 4.4 MMOL/L
PROT SERPL-MCNC: 7.4 G/DL
PSA FREE FLD-MCNC: 25 %
PSA FREE SERPL-MCNC: 0.11 NG/ML
PSA SERPL-MCNC: 0.42 NG/ML
RBC # BLD: 4.66 M/UL
RBC # FLD: 13.9 %
SODIUM SERPL-SCNC: 140 MMOL/L
TRIGL SERPL-MCNC: 130 MG/DL
TSH SERPL-ACNC: 2.6 UIU/ML
WBC # FLD AUTO: 11.76 K/UL

## 2025-06-24 ENCOUNTER — APPOINTMENT (OUTPATIENT)
Dept: INTERNAL MEDICINE | Facility: CLINIC | Age: 70
End: 2025-06-24
Payer: COMMERCIAL

## 2025-06-24 VITALS
TEMPERATURE: 97.6 F | OXYGEN SATURATION: 98 % | SYSTOLIC BLOOD PRESSURE: 130 MMHG | WEIGHT: 265 LBS | HEIGHT: 70 IN | HEART RATE: 63 BPM | DIASTOLIC BLOOD PRESSURE: 80 MMHG | BODY MASS INDEX: 37.94 KG/M2

## 2025-06-24 PROCEDURE — 99397 PER PM REEVAL EST PAT 65+ YR: CPT

## 2025-06-25 ENCOUNTER — TRANSCRIPTION ENCOUNTER (OUTPATIENT)
Age: 70
End: 2025-06-25

## 2025-07-07 ENCOUNTER — TRANSCRIPTION ENCOUNTER (OUTPATIENT)
Age: 70
End: 2025-07-07

## 2025-07-15 ENCOUNTER — APPOINTMENT (OUTPATIENT)
Dept: ENDOCRINOLOGY | Facility: CLINIC | Age: 70
End: 2025-07-15

## 2025-07-16 ENCOUNTER — APPOINTMENT (OUTPATIENT)
Dept: CARDIOLOGY | Facility: CLINIC | Age: 70
End: 2025-07-16

## 2025-07-21 ENCOUNTER — APPOINTMENT (OUTPATIENT)
Dept: DERMATOLOGY | Facility: CLINIC | Age: 70
End: 2025-07-21
Payer: COMMERCIAL

## 2025-07-21 DIAGNOSIS — B07.9 VIRAL WART, UNSPECIFIED: ICD-10-CM

## 2025-07-21 DIAGNOSIS — D22.9 MELANOCYTIC NEVI, UNSPECIFIED: ICD-10-CM

## 2025-07-21 DIAGNOSIS — L82.0 INFLAMED SEBORRHEIC KERATOSIS: ICD-10-CM

## 2025-07-21 DIAGNOSIS — L81.4 OTHER MELANIN HYPERPIGMENTATION: ICD-10-CM

## 2025-07-21 DIAGNOSIS — C44.91 BASAL CELL CARCINOMA OF SKIN, UNSPECIFIED: ICD-10-CM

## 2025-07-21 PROCEDURE — 99213 OFFICE O/P EST LOW 20 MIN: CPT | Mod: 25

## 2025-07-21 PROCEDURE — 17110 DESTRUCTION B9 LES UP TO 14: CPT

## 2025-07-23 ENCOUNTER — TRANSCRIPTION ENCOUNTER (OUTPATIENT)
Age: 70
End: 2025-07-23

## 2025-08-15 ENCOUNTER — APPOINTMENT (OUTPATIENT)
Dept: ENDOCRINOLOGY | Facility: CLINIC | Age: 70
End: 2025-08-15

## 2025-09-16 ENCOUNTER — APPOINTMENT (OUTPATIENT)
Dept: CARDIOLOGY | Facility: CLINIC | Age: 70
End: 2025-09-16
Payer: COMMERCIAL

## 2025-09-16 ENCOUNTER — NON-APPOINTMENT (OUTPATIENT)
Age: 70
End: 2025-09-16

## 2025-09-16 VITALS
WEIGHT: 260 LBS | SYSTOLIC BLOOD PRESSURE: 130 MMHG | BODY MASS INDEX: 37.31 KG/M2 | HEART RATE: 62 BPM | OXYGEN SATURATION: 96 % | DIASTOLIC BLOOD PRESSURE: 80 MMHG

## 2025-09-16 DIAGNOSIS — E78.5 HYPERLIPIDEMIA, UNSPECIFIED: ICD-10-CM

## 2025-09-16 DIAGNOSIS — E11.9 TYPE 2 DIABETES MELLITUS W/OUT COMPLICATIONS: ICD-10-CM

## 2025-09-16 DIAGNOSIS — I10 ESSENTIAL (PRIMARY) HYPERTENSION: ICD-10-CM

## 2025-09-16 DIAGNOSIS — I50.22 CHRONIC SYSTOLIC (CONGESTIVE) HEART FAILURE: ICD-10-CM

## 2025-09-16 PROCEDURE — 93000 ELECTROCARDIOGRAM COMPLETE: CPT

## 2025-09-16 PROCEDURE — G2211 COMPLEX E/M VISIT ADD ON: CPT | Mod: NC

## 2025-09-16 PROCEDURE — 99215 OFFICE O/P EST HI 40 MIN: CPT

## (undated) DEVICE — BIOPSY FORCEP RADIAL JAW 4 STANDARD WITH NEEDLE

## (undated) DEVICE — CATH IV SAFE BC 22G X 1" (BLUE)

## (undated) DEVICE — TUBE VENOUS BLOOD COLLECTION LIGHT GREEN TOP

## (undated) DEVICE — TUBING SUCTION 20FT

## (undated) DEVICE — TUBING SUCTION CONN 6FT STERILE

## (undated) DEVICE — BITE BLOCK ADULT 20 X 27MM (GREEN)

## (undated) DEVICE — CATH BLLN ULTRASONIC ENSOSCOPE

## (undated) DEVICE — SENSOR O2 FINGER ADULT

## (undated) DEVICE — FOLEY HOLDER STATLOCK 2 WAY ADULT

## (undated) DEVICE — SOL INJ NS 0.9% 500ML 2 PORT

## (undated) DEVICE — BALLOON US ENDO

## (undated) DEVICE — SYR ALLIANCE II INFLATION 60ML

## (undated) DEVICE — FORCEP RADIAL JAW 4 JUMBO 2.8MM 3.2MM 240CM ORANGE DISP

## (undated) DEVICE — SYR LUER LOK 10CC

## (undated) DEVICE — CATH IV SAFE BC 20G X 1.16" (PINK)

## (undated) DEVICE — SYR IV FLUSH SALINE 10ML 30/TY

## (undated) DEVICE — SUCTION YANKAUER NO CONTROL VENT

## (undated) DEVICE — PACK IV START WITH CHG

## (undated) DEVICE — IRRIGATOR BIO SHIELD

## (undated) DEVICE — TUBING IV SET GRAVITY 3Y 100" MACRO